# Patient Record
Sex: MALE | Race: WHITE
[De-identification: names, ages, dates, MRNs, and addresses within clinical notes are randomized per-mention and may not be internally consistent; named-entity substitution may affect disease eponyms.]

---

## 2017-08-17 ENCOUNTER — APPOINTMENT (OUTPATIENT)
Dept: INTERNAL MEDICINE | Facility: CLINIC | Age: 62
End: 2017-08-17

## 2017-08-22 ENCOUNTER — APPOINTMENT (OUTPATIENT)
Dept: INTERNAL MEDICINE | Facility: CLINIC | Age: 62
End: 2017-08-22

## 2017-12-26 ENCOUNTER — NON-APPOINTMENT (OUTPATIENT)
Age: 62
End: 2017-12-26

## 2017-12-26 ENCOUNTER — APPOINTMENT (OUTPATIENT)
Dept: INTERNAL MEDICINE | Facility: CLINIC | Age: 62
End: 2017-12-26
Payer: SELF-PAY

## 2017-12-26 VITALS
WEIGHT: 232.98 LBS | TEMPERATURE: 98.1 F | DIASTOLIC BLOOD PRESSURE: 66 MMHG | HEART RATE: 86 BPM | BODY MASS INDEX: 33.35 KG/M2 | OXYGEN SATURATION: 96 % | SYSTOLIC BLOOD PRESSURE: 130 MMHG | RESPIRATION RATE: 16 BRPM | HEIGHT: 70 IN

## 2017-12-26 DIAGNOSIS — G47.33 OBSTRUCTIVE SLEEP APNEA (ADULT) (PEDIATRIC): ICD-10-CM

## 2017-12-26 DIAGNOSIS — F41.9 ANXIETY DISORDER, UNSPECIFIED: ICD-10-CM

## 2017-12-26 DIAGNOSIS — I10 ESSENTIAL (PRIMARY) HYPERTENSION: ICD-10-CM

## 2017-12-26 DIAGNOSIS — R06.09 OTHER FORMS OF DYSPNEA: ICD-10-CM

## 2017-12-26 DIAGNOSIS — E78.5 HYPERLIPIDEMIA, UNSPECIFIED: ICD-10-CM

## 2017-12-26 PROCEDURE — 94060 EVALUATION OF WHEEZING: CPT

## 2017-12-26 PROCEDURE — 99214 OFFICE O/P EST MOD 30 MIN: CPT | Mod: 25

## 2017-12-26 RX ORDER — ESCITALOPRAM OXALATE 20 MG/1
20 TABLET, FILM COATED ORAL
Refills: 0 | Status: ACTIVE | COMMUNITY

## 2017-12-26 RX ORDER — IRBESARTAN 150 MG/1
150 TABLET ORAL
Refills: 0 | Status: DISCONTINUED | COMMUNITY
End: 2017-12-26

## 2017-12-26 RX ORDER — AMLODIPINE BESYLATE 10 MG/1
10 TABLET ORAL
Refills: 0 | Status: ACTIVE | COMMUNITY

## 2017-12-26 RX ORDER — CLONAZEPAM 1 MG/1
1 TABLET ORAL
Refills: 0 | Status: ACTIVE | COMMUNITY

## 2017-12-26 RX ORDER — QUINAPRIL HYDROCHLORIDE 20 MG/1
20 TABLET, FILM COATED ORAL
Refills: 0 | Status: ACTIVE | COMMUNITY

## 2017-12-26 RX ORDER — ATORVASTATIN CALCIUM 80 MG/1
TABLET, FILM COATED ORAL
Refills: 0 | Status: ACTIVE | COMMUNITY

## 2018-03-14 ENCOUNTER — INPATIENT (INPATIENT)
Facility: HOSPITAL | Age: 63
LOS: 1 days | Discharge: HOME | DRG: 440 | End: 2018-03-16
Attending: INTERNAL MEDICINE | Admitting: INTERNAL MEDICINE
Payer: COMMERCIAL

## 2018-03-14 VITALS
TEMPERATURE: 98 F | WEIGHT: 225.09 LBS | DIASTOLIC BLOOD PRESSURE: 78 MMHG | HEART RATE: 99 BPM | RESPIRATION RATE: 12 BRPM | OXYGEN SATURATION: 97 % | SYSTOLIC BLOOD PRESSURE: 142 MMHG

## 2018-03-14 DIAGNOSIS — Z98.89 OTHER SPECIFIED POSTPROCEDURAL STATES: Chronic | ICD-10-CM

## 2018-03-14 LAB
ALBUMIN SERPL ELPH-MCNC: 3.5 G/DL — SIGNIFICANT CHANGE UP (ref 3.3–5)
ALP SERPL-CCNC: 99 U/L — SIGNIFICANT CHANGE UP (ref 40–120)
ALT FLD-CCNC: 97 U/L — HIGH (ref 12–78)
AMYLASE P1 CFR SERPL: 63 U/L — SIGNIFICANT CHANGE UP (ref 25–115)
ANION GAP SERPL CALC-SCNC: 8 MMOL/L — SIGNIFICANT CHANGE UP (ref 5–17)
AST SERPL-CCNC: 101 U/L — HIGH (ref 15–37)
BASOPHILS # BLD AUTO: 0.1 K/UL — SIGNIFICANT CHANGE UP (ref 0–0.2)
BASOPHILS NFR BLD AUTO: 0.7 % — SIGNIFICANT CHANGE UP (ref 0–2)
BILIRUB SERPL-MCNC: 0.6 MG/DL — SIGNIFICANT CHANGE UP (ref 0.2–1.2)
BUN SERPL-MCNC: 13 MG/DL — SIGNIFICANT CHANGE UP (ref 7–23)
CALCIUM SERPL-MCNC: 8.4 MG/DL — LOW (ref 8.5–10.1)
CHLORIDE SERPL-SCNC: 100 MMOL/L — SIGNIFICANT CHANGE UP (ref 96–108)
CK SERPL-CCNC: 361 U/L — HIGH (ref 26–308)
CO2 SERPL-SCNC: 27 MMOL/L — SIGNIFICANT CHANGE UP (ref 22–31)
CREAT SERPL-MCNC: 0.87 MG/DL — SIGNIFICANT CHANGE UP (ref 0.5–1.3)
EOSINOPHIL # BLD AUTO: 0.1 K/UL — SIGNIFICANT CHANGE UP (ref 0–0.5)
EOSINOPHIL NFR BLD AUTO: 1 % — SIGNIFICANT CHANGE UP (ref 0–6)
GLUCOSE SERPL-MCNC: 126 MG/DL — HIGH (ref 70–99)
HCT VFR BLD CALC: 46 % — SIGNIFICANT CHANGE UP (ref 39–50)
HGB BLD-MCNC: 15.5 G/DL — SIGNIFICANT CHANGE UP (ref 13–17)
LIDOCAIN IGE QN: 1007 U/L — HIGH (ref 73–393)
LYMPHOCYTES # BLD AUTO: 1.1 K/UL — SIGNIFICANT CHANGE UP (ref 1–3.3)
LYMPHOCYTES # BLD AUTO: 9.4 % — LOW (ref 13–44)
MCHC RBC-ENTMCNC: 33.7 GM/DL — SIGNIFICANT CHANGE UP (ref 32–36)
MCHC RBC-ENTMCNC: 33.8 PG — SIGNIFICANT CHANGE UP (ref 27–34)
MCV RBC AUTO: 100.2 FL — HIGH (ref 80–100)
MONOCYTES # BLD AUTO: 1 K/UL — HIGH (ref 0–0.9)
MONOCYTES NFR BLD AUTO: 8.4 % — SIGNIFICANT CHANGE UP (ref 1–9)
NEUTROPHILS # BLD AUTO: 9.9 K/UL — HIGH (ref 1.8–7.4)
NEUTROPHILS NFR BLD AUTO: 80.6 % — HIGH (ref 43–77)
NT-PROBNP SERPL-SCNC: 22 PG/ML — SIGNIFICANT CHANGE UP (ref 0–125)
PLATELET # BLD AUTO: 206 K/UL — SIGNIFICANT CHANGE UP (ref 150–400)
POTASSIUM SERPL-MCNC: 4.7 MMOL/L — SIGNIFICANT CHANGE UP (ref 3.5–5.3)
POTASSIUM SERPL-SCNC: 4.7 MMOL/L — SIGNIFICANT CHANGE UP (ref 3.5–5.3)
PROT SERPL-MCNC: 7.8 G/DL — SIGNIFICANT CHANGE UP (ref 6–8.3)
RBC # BLD: 4.6 M/UL — SIGNIFICANT CHANGE UP (ref 4.2–5.8)
RBC # FLD: 12.6 % — SIGNIFICANT CHANGE UP (ref 10.3–14.5)
SODIUM SERPL-SCNC: 135 MMOL/L — SIGNIFICANT CHANGE UP (ref 135–145)
TROPONIN I SERPL-MCNC: <.015 NG/ML — SIGNIFICANT CHANGE UP (ref 0.01–0.04)
WBC # BLD: 12.2 K/UL — HIGH (ref 3.8–10.5)
WBC # FLD AUTO: 12.2 K/UL — HIGH (ref 3.8–10.5)

## 2018-03-14 RX ORDER — CYCLOBENZAPRINE HYDROCHLORIDE 10 MG/1
10 TABLET, FILM COATED ORAL ONCE
Qty: 0 | Refills: 0 | Status: COMPLETED | OUTPATIENT
Start: 2018-03-14 | End: 2018-03-14

## 2018-03-14 RX ORDER — FAMOTIDINE 10 MG/ML
20 INJECTION INTRAVENOUS ONCE
Qty: 0 | Refills: 0 | Status: COMPLETED | OUTPATIENT
Start: 2018-03-14 | End: 2018-03-14

## 2018-03-14 RX ORDER — ONDANSETRON 8 MG/1
4 TABLET, FILM COATED ORAL ONCE
Qty: 0 | Refills: 0 | Status: COMPLETED | OUTPATIENT
Start: 2018-03-14 | End: 2018-03-14

## 2018-03-14 RX ORDER — MORPHINE SULFATE 50 MG/1
4 CAPSULE, EXTENDED RELEASE ORAL ONCE
Qty: 0 | Refills: 0 | Status: DISCONTINUED | OUTPATIENT
Start: 2018-03-14 | End: 2018-03-14

## 2018-03-14 RX ORDER — SODIUM CHLORIDE 9 MG/ML
1000 INJECTION INTRAMUSCULAR; INTRAVENOUS; SUBCUTANEOUS
Qty: 0 | Refills: 0 | Status: COMPLETED | OUTPATIENT
Start: 2018-03-14 | End: 2018-03-14

## 2018-03-14 RX ADMIN — ONDANSETRON 4 MILLIGRAM(S): 8 TABLET, FILM COATED ORAL at 23:02

## 2018-03-14 RX ADMIN — CYCLOBENZAPRINE HYDROCHLORIDE 10 MILLIGRAM(S): 10 TABLET, FILM COATED ORAL at 23:01

## 2018-03-14 RX ADMIN — MORPHINE SULFATE 4 MILLIGRAM(S): 50 CAPSULE, EXTENDED RELEASE ORAL at 23:01

## 2018-03-14 RX ADMIN — FAMOTIDINE 20 MILLIGRAM(S): 10 INJECTION INTRAVENOUS at 23:01

## 2018-03-14 RX ADMIN — SODIUM CHLORIDE 1000 MILLILITER(S): 9 INJECTION INTRAMUSCULAR; INTRAVENOUS; SUBCUTANEOUS at 22:00

## 2018-03-14 NOTE — ED ADULT NURSE NOTE - OBJECTIVE STATEMENT
received pt stable alert oriented x4 no distress c/o back pain pt evaluated by MD and blood work drawned and sent to lab pt medicated with morphine 4mg ivp for pain scale 8 and cyclo po given with zofran and papcid as ordered ekg done awaiting ct scan as ordered

## 2018-03-14 NOTE — ED ADULT TRIAGE NOTE - CHIEF COMPLAINT QUOTE
patient with complain of back pain and abdominal discomfort patient with complain of back pain and abdominal discomfort, feels worst after visiting chiropractor today

## 2018-03-14 NOTE — ED PROVIDER NOTE - OBJECTIVE STATEMENT
63 male presents to ER c/o mid back pain radiating to the front, started this morning, felt it was musculoskeletal and went to the chiropracter, had heat therapy, tens and manipulation and feels no relief and came to the ER.

## 2018-03-14 NOTE — ED PROVIDER NOTE - PROGRESS NOTE DETAILS
patient states he feels more comfortable now, pain under control, aware lipase is elevated, having pancreatitis, patient admits to drinking vodka daily, awaiting cat scan and US, states he does not want to stay in the hospital and will f/u with his gastroenterologist Dw Pt re all findings, will stay for admission. Rajiv rosenberg, will see pt to admit

## 2018-03-15 DIAGNOSIS — K85.90 ACUTE PANCREATITIS WITHOUT NECROSIS OR INFECTION, UNSPECIFIED: ICD-10-CM

## 2018-03-15 DIAGNOSIS — R89.9 UNSPECIFIED ABNORMAL FINDING IN SPECIMENS FROM OTHER ORGANS, SYSTEMS AND TISSUES: ICD-10-CM

## 2018-03-15 DIAGNOSIS — Z29.9 ENCOUNTER FOR PROPHYLACTIC MEASURES, UNSPECIFIED: ICD-10-CM

## 2018-03-15 DIAGNOSIS — F32.9 MAJOR DEPRESSIVE DISORDER, SINGLE EPISODE, UNSPECIFIED: ICD-10-CM

## 2018-03-15 DIAGNOSIS — F10.10 ALCOHOL ABUSE, UNCOMPLICATED: ICD-10-CM

## 2018-03-15 DIAGNOSIS — E78.5 HYPERLIPIDEMIA, UNSPECIFIED: ICD-10-CM

## 2018-03-15 DIAGNOSIS — E29.1 TESTICULAR HYPOFUNCTION: ICD-10-CM

## 2018-03-15 DIAGNOSIS — D72.829 ELEVATED WHITE BLOOD CELL COUNT, UNSPECIFIED: ICD-10-CM

## 2018-03-15 DIAGNOSIS — I10 ESSENTIAL (PRIMARY) HYPERTENSION: ICD-10-CM

## 2018-03-15 DIAGNOSIS — Z98.890 OTHER SPECIFIED POSTPROCEDURAL STATES: Chronic | ICD-10-CM

## 2018-03-15 DIAGNOSIS — G47.33 OBSTRUCTIVE SLEEP APNEA (ADULT) (PEDIATRIC): ICD-10-CM

## 2018-03-15 LAB
ALBUMIN SERPL ELPH-MCNC: 3.4 G/DL — SIGNIFICANT CHANGE UP (ref 3.3–5)
ALP SERPL-CCNC: 92 U/L — SIGNIFICANT CHANGE UP (ref 40–120)
ALT FLD-CCNC: 83 U/L — HIGH (ref 12–78)
AMPHET UR-MCNC: NEGATIVE — SIGNIFICANT CHANGE UP
AMYLASE P1 CFR SERPL: 54 U/L — SIGNIFICANT CHANGE UP (ref 25–115)
ANION GAP SERPL CALC-SCNC: 7 MMOL/L — SIGNIFICANT CHANGE UP (ref 5–17)
APPEARANCE UR: CLEAR — SIGNIFICANT CHANGE UP
AST SERPL-CCNC: 63 U/L — HIGH (ref 15–37)
BARBITURATES UR SCN-MCNC: NEGATIVE — SIGNIFICANT CHANGE UP
BASOPHILS # BLD AUTO: 0.1 K/UL — SIGNIFICANT CHANGE UP (ref 0–0.2)
BASOPHILS NFR BLD AUTO: 0.8 % — SIGNIFICANT CHANGE UP (ref 0–2)
BENZODIAZ UR-MCNC: NEGATIVE — SIGNIFICANT CHANGE UP
BILIRUB SERPL-MCNC: 0.8 MG/DL — SIGNIFICANT CHANGE UP (ref 0.2–1.2)
BILIRUB UR-MCNC: NEGATIVE — SIGNIFICANT CHANGE UP
BUN SERPL-MCNC: 7 MG/DL — SIGNIFICANT CHANGE UP (ref 7–23)
CALCIUM SERPL-MCNC: 8.4 MG/DL — LOW (ref 8.5–10.1)
CHLORIDE SERPL-SCNC: 105 MMOL/L — SIGNIFICANT CHANGE UP (ref 96–108)
CHOLEST SERPL-MCNC: 180 MG/DL — SIGNIFICANT CHANGE UP (ref 10–199)
CO2 SERPL-SCNC: 27 MMOL/L — SIGNIFICANT CHANGE UP (ref 22–31)
COCAINE METAB.OTHER UR-MCNC: NEGATIVE — SIGNIFICANT CHANGE UP
COLOR SPEC: YELLOW — SIGNIFICANT CHANGE UP
CREAT SERPL-MCNC: 0.85 MG/DL — SIGNIFICANT CHANGE UP (ref 0.5–1.3)
DIFF PNL FLD: NEGATIVE — SIGNIFICANT CHANGE UP
EOSINOPHIL # BLD AUTO: 0.4 K/UL — SIGNIFICANT CHANGE UP (ref 0–0.5)
EOSINOPHIL NFR BLD AUTO: 3.3 % — SIGNIFICANT CHANGE UP (ref 0–6)
ETHANOL SERPL-MCNC: <10 MG/DL — SIGNIFICANT CHANGE UP (ref 0–10)
GLUCOSE SERPL-MCNC: 123 MG/DL — HIGH (ref 70–99)
GLUCOSE UR QL: 50 MG/DL
HCT VFR BLD CALC: 47.9 % — SIGNIFICANT CHANGE UP (ref 39–50)
HDLC SERPL-MCNC: 52 MG/DL — SIGNIFICANT CHANGE UP (ref 40–125)
HGB BLD-MCNC: 15.5 G/DL — SIGNIFICANT CHANGE UP (ref 13–17)
KETONES UR-MCNC: ABNORMAL
LEUKOCYTE ESTERASE UR-ACNC: NEGATIVE — SIGNIFICANT CHANGE UP
LIDOCAIN IGE QN: 708 U/L — HIGH (ref 73–393)
LIPID PNL WITH DIRECT LDL SERPL: 57 MG/DL — SIGNIFICANT CHANGE UP
LYMPHOCYTES # BLD AUTO: 1.7 K/UL — SIGNIFICANT CHANGE UP (ref 1–3.3)
LYMPHOCYTES # BLD AUTO: 15.3 % — SIGNIFICANT CHANGE UP (ref 13–44)
MAGNESIUM SERPL-MCNC: 1.8 MG/DL — SIGNIFICANT CHANGE UP (ref 1.6–2.6)
MCHC RBC-ENTMCNC: 32.4 GM/DL — SIGNIFICANT CHANGE UP (ref 32–36)
MCHC RBC-ENTMCNC: 33.5 PG — SIGNIFICANT CHANGE UP (ref 27–34)
MCV RBC AUTO: 103.4 FL — HIGH (ref 80–100)
METHADONE UR-MCNC: NEGATIVE — SIGNIFICANT CHANGE UP
MONOCYTES # BLD AUTO: 1 K/UL — HIGH (ref 0–0.9)
MONOCYTES NFR BLD AUTO: 8.9 % — SIGNIFICANT CHANGE UP (ref 1–9)
NEUTROPHILS # BLD AUTO: 7.8 K/UL — HIGH (ref 1.8–7.4)
NEUTROPHILS NFR BLD AUTO: 71.8 % — SIGNIFICANT CHANGE UP (ref 43–77)
NITRITE UR-MCNC: NEGATIVE — SIGNIFICANT CHANGE UP
OPIATES UR-MCNC: POSITIVE — SIGNIFICANT CHANGE UP
PCP SPEC-MCNC: SIGNIFICANT CHANGE UP
PCP UR-MCNC: NEGATIVE — SIGNIFICANT CHANGE UP
PH UR: 9 — HIGH (ref 5–8)
PHOSPHATE SERPL-MCNC: 3 MG/DL — SIGNIFICANT CHANGE UP (ref 2.5–4.5)
PLATELET # BLD AUTO: 203 K/UL — SIGNIFICANT CHANGE UP (ref 150–400)
POTASSIUM SERPL-MCNC: 3.9 MMOL/L — SIGNIFICANT CHANGE UP (ref 3.5–5.3)
POTASSIUM SERPL-SCNC: 3.9 MMOL/L — SIGNIFICANT CHANGE UP (ref 3.5–5.3)
PROT SERPL-MCNC: 7.6 G/DL — SIGNIFICANT CHANGE UP (ref 6–8.3)
PROT UR-MCNC: NEGATIVE — SIGNIFICANT CHANGE UP
RBC # BLD: 4.63 M/UL — SIGNIFICANT CHANGE UP (ref 4.2–5.8)
RBC # FLD: 13.2 % — SIGNIFICANT CHANGE UP (ref 10.3–14.5)
SODIUM SERPL-SCNC: 139 MMOL/L — SIGNIFICANT CHANGE UP (ref 135–145)
SP GR SPEC: 1.01 — SIGNIFICANT CHANGE UP (ref 1.01–1.02)
THC UR QL: NEGATIVE — SIGNIFICANT CHANGE UP
TOTAL CHOLESTEROL/HDL RATIO MEASUREMENT: 3.5 RATIO — SIGNIFICANT CHANGE UP (ref 3.4–9.6)
TRIGL SERPL-MCNC: 355 MG/DL — HIGH (ref 10–149)
UROBILINOGEN FLD QL: NEGATIVE — SIGNIFICANT CHANGE UP
WBC # BLD: 10.8 K/UL — HIGH (ref 3.8–10.5)
WBC # FLD AUTO: 10.8 K/UL — HIGH (ref 3.8–10.5)

## 2018-03-15 PROCEDURE — 90792 PSYCH DIAG EVAL W/MED SRVCS: CPT

## 2018-03-15 PROCEDURE — 76705 ECHO EXAM OF ABDOMEN: CPT | Mod: 26

## 2018-03-15 PROCEDURE — 99233 SBSQ HOSP IP/OBS HIGH 50: CPT

## 2018-03-15 PROCEDURE — 99285 EMERGENCY DEPT VISIT HI MDM: CPT

## 2018-03-15 PROCEDURE — 71275 CT ANGIOGRAPHY CHEST: CPT | Mod: 26

## 2018-03-15 PROCEDURE — 74183 MRI ABD W/O CNTR FLWD CNTR: CPT | Mod: 26

## 2018-03-15 PROCEDURE — 74174 CTA ABD&PLVS W/CONTRAST: CPT | Mod: 26

## 2018-03-15 RX ORDER — ATORVASTATIN CALCIUM 80 MG/1
20 TABLET, FILM COATED ORAL AT BEDTIME
Qty: 0 | Refills: 0 | Status: DISCONTINUED | OUTPATIENT
Start: 2018-03-15 | End: 2018-03-16

## 2018-03-15 RX ORDER — PANTOPRAZOLE SODIUM 20 MG/1
40 TABLET, DELAYED RELEASE ORAL DAILY
Qty: 0 | Refills: 0 | Status: DISCONTINUED | OUTPATIENT
Start: 2018-03-15 | End: 2018-03-16

## 2018-03-15 RX ORDER — LISINOPRIL 2.5 MG/1
40 TABLET ORAL
Qty: 0 | Refills: 0 | Status: DISCONTINUED | OUTPATIENT
Start: 2018-03-15 | End: 2018-03-16

## 2018-03-15 RX ORDER — CLONAZEPAM 1 MG
1 TABLET ORAL
Qty: 0 | Refills: 0 | Status: DISCONTINUED | OUTPATIENT
Start: 2018-03-15 | End: 2018-03-16

## 2018-03-15 RX ORDER — THIAMINE MONONITRATE (VIT B1) 100 MG
100 TABLET ORAL DAILY
Qty: 0 | Refills: 0 | Status: DISCONTINUED | OUTPATIENT
Start: 2018-03-15 | End: 2018-03-16

## 2018-03-15 RX ORDER — MORPHINE SULFATE 50 MG/1
1 CAPSULE, EXTENDED RELEASE ORAL EVERY 6 HOURS
Qty: 0 | Refills: 0 | Status: DISCONTINUED | OUTPATIENT
Start: 2018-03-15 | End: 2018-03-16

## 2018-03-15 RX ORDER — MORPHINE SULFATE 50 MG/1
2 CAPSULE, EXTENDED RELEASE ORAL EVERY 6 HOURS
Qty: 0 | Refills: 0 | Status: DISCONTINUED | OUTPATIENT
Start: 2018-03-15 | End: 2018-03-16

## 2018-03-15 RX ORDER — CLONAZEPAM 1 MG
1 TABLET ORAL THREE TIMES A DAY
Qty: 0 | Refills: 0 | Status: DISCONTINUED | OUTPATIENT
Start: 2018-03-15 | End: 2018-03-15

## 2018-03-15 RX ORDER — QUETIAPINE FUMARATE 200 MG/1
100 TABLET, FILM COATED ORAL AT BEDTIME
Qty: 0 | Refills: 0 | Status: DISCONTINUED | OUTPATIENT
Start: 2018-03-15 | End: 2018-03-16

## 2018-03-15 RX ORDER — FOLIC ACID 0.8 MG
1 TABLET ORAL DAILY
Qty: 0 | Refills: 0 | Status: DISCONTINUED | OUTPATIENT
Start: 2018-03-15 | End: 2018-03-16

## 2018-03-15 RX ORDER — SODIUM CHLORIDE 9 MG/ML
1000 INJECTION INTRAMUSCULAR; INTRAVENOUS; SUBCUTANEOUS
Qty: 0 | Refills: 0 | Status: DISCONTINUED | OUTPATIENT
Start: 2018-03-15 | End: 2018-03-16

## 2018-03-15 RX ORDER — MORPHINE SULFATE 50 MG/1
4 CAPSULE, EXTENDED RELEASE ORAL EVERY 6 HOURS
Qty: 0 | Refills: 0 | Status: DISCONTINUED | OUTPATIENT
Start: 2018-03-15 | End: 2018-03-16

## 2018-03-15 RX ORDER — ENOXAPARIN SODIUM 100 MG/ML
40 INJECTION SUBCUTANEOUS DAILY
Qty: 0 | Refills: 0 | Status: DISCONTINUED | OUTPATIENT
Start: 2018-03-15 | End: 2018-03-16

## 2018-03-15 RX ORDER — ESCITALOPRAM OXALATE 10 MG/1
20 TABLET, FILM COATED ORAL AT BEDTIME
Qty: 0 | Refills: 0 | Status: DISCONTINUED | OUTPATIENT
Start: 2018-03-15 | End: 2018-03-16

## 2018-03-15 RX ORDER — AMLODIPINE BESYLATE 2.5 MG/1
10 TABLET ORAL DAILY
Qty: 0 | Refills: 0 | Status: DISCONTINUED | OUTPATIENT
Start: 2018-03-15 | End: 2018-03-16

## 2018-03-15 RX ADMIN — PANTOPRAZOLE SODIUM 40 MILLIGRAM(S): 20 TABLET, DELAYED RELEASE ORAL at 11:44

## 2018-03-15 RX ADMIN — Medication 100 MILLIGRAM(S): at 11:46

## 2018-03-15 RX ADMIN — LISINOPRIL 40 MILLIGRAM(S): 2.5 TABLET ORAL at 17:21

## 2018-03-15 RX ADMIN — Medication 1 TABLET(S): at 11:45

## 2018-03-15 RX ADMIN — ATORVASTATIN CALCIUM 20 MILLIGRAM(S): 80 TABLET, FILM COATED ORAL at 21:35

## 2018-03-15 RX ADMIN — QUETIAPINE FUMARATE 100 MILLIGRAM(S): 200 TABLET, FILM COATED ORAL at 21:35

## 2018-03-15 RX ADMIN — ENOXAPARIN SODIUM 40 MILLIGRAM(S): 100 INJECTION SUBCUTANEOUS at 11:45

## 2018-03-15 RX ADMIN — SODIUM CHLORIDE 125 MILLILITER(S): 9 INJECTION INTRAMUSCULAR; INTRAVENOUS; SUBCUTANEOUS at 06:10

## 2018-03-15 RX ADMIN — ESCITALOPRAM OXALATE 20 MILLIGRAM(S): 10 TABLET, FILM COATED ORAL at 21:35

## 2018-03-15 RX ADMIN — Medication 1 MILLIGRAM(S): at 11:46

## 2018-03-15 RX ADMIN — SODIUM CHLORIDE 1000 MILLILITER(S): 9 INJECTION INTRAMUSCULAR; INTRAVENOUS; SUBCUTANEOUS at 01:24

## 2018-03-15 RX ADMIN — MORPHINE SULFATE 2 MILLIGRAM(S): 50 CAPSULE, EXTENDED RELEASE ORAL at 08:46

## 2018-03-15 RX ADMIN — LISINOPRIL 40 MILLIGRAM(S): 2.5 TABLET ORAL at 06:10

## 2018-03-15 RX ADMIN — MORPHINE SULFATE 2 MILLIGRAM(S): 50 CAPSULE, EXTENDED RELEASE ORAL at 09:15

## 2018-03-15 RX ADMIN — MORPHINE SULFATE 4 MILLIGRAM(S): 50 CAPSULE, EXTENDED RELEASE ORAL at 01:24

## 2018-03-15 RX ADMIN — Medication 1 MILLIGRAM(S): at 17:21

## 2018-03-15 RX ADMIN — AMLODIPINE BESYLATE 10 MILLIGRAM(S): 2.5 TABLET ORAL at 06:11

## 2018-03-15 NOTE — PROGRESS NOTE ADULT - PROBLEM SELECTOR PLAN 2
States that he consumes 2-3 glasses of vodka daily  [facility] protocol  Monitor for withdrawal symptoms States that he consumes 2-3 glasses of vodka daily  [facility] protocol  Monitor for withdrawal symptoms  f/u EtOH, [facility] labs States that he consumes 2-4 glasses of vodka daily  [facility] protocol, D/W Dr Coats. Restart, Klonopin 2x day  Monitor for withdrawal symptoms  f/u EtOH, [facility] labs

## 2018-03-15 NOTE — H&P ADULT - PROBLEM SELECTOR PLAN 8
IMPROVE VTE Individual Risk Assessment          RISK                                                          Points    [  ] Previous VTE                                                3  [  ] Thrombophilia                                             2  [  ] Lower limb paralysis                                   2        (unable to hold up >15 seconds)    [  ] Current Cancer                                            2         (within 6 months)  [  ] Immobilization > 24 hrs                              1  [  ] ICU/CCU stay > 24 hours                            1  [ x ] Age > 60                                                    1    IMPROVE VTE Score ____1_____    DVT ppx: Lovenox 40m g subq Continue home dose atorvastatin Patient states that he received testosterone injections every 3 weeks  States he takes anasterozole 1 mg, two times a week. Accurate scheduling of medication to be addressed in am. Held anasterozole for now. Patient states that he received testosterone injections every 3 weeks  States he takes Anastrozole 1 mg, two times a week. Accurate scheduling of medication to be addressed in am. Held Anastrozole for now.

## 2018-03-15 NOTE — CONSULT NOTE ADULT - PROBLEM SELECTOR RECOMMENDATION 9
likely secondary to etoh abuse   NPO, IVF  CTA a/p reviewed with abnormality of pancreatic head  MRI abdomen with contrast ordered to further evaluate-- r/o portal vein thrombosis/CBD stone/pancreatic head necrosis  further recommendations to follow   Check IGG-4, Triglycerides, trend amylase and lipase and cbc  pain control

## 2018-03-15 NOTE — H&P ADULT - NSHPSOCIALHISTORY_GEN_ALL_CORE
Lives at home with wife and daughter, in apartment  Restaurant owner   ETOH use: drinks 3-4 glasses of vodka daily  Smoking use: denies  Influenza vaccine: Oct 2017  Received pneumovax. Unsure of date  Colonoscopy: Had two. Last one performed in 2012. Found 1 polyp.

## 2018-03-15 NOTE — H&P ADULT - PROBLEM SELECTOR PLAN 5
Chronic, stable  Continue home dose amlodipine  Continue lisinopril (interchange of homedose quinipril) Chronic, stable  Continue home dose klonipin, quetipine, seroquel Chronic, stable  Continue home dose Klonopin, Quetiapine, Seroquel

## 2018-03-15 NOTE — H&P ADULT - HISTORY OF PRESENT ILLNESS
62 yo M pmh htn, hld, depression presents to the ED with abdominal and back pain. States the pain started suddenly today and was radiating from mid back to the front. Patient thought he has muscle pain and went to chiropractor had heat therapy and offered no relief. States that the pain has significantly decreased since he has been in the ED. Denies fevers, chills, nausea, vomiting, CP, SOB, palpitations. 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 Also states that he had been seeing psychiatrist and is taking antidepressant medications due to recent death of his son. Also states that his daughter recently finished drug rehab.     In the ED, VS 97.7, HR 99, /78, RR 12, SpO2 97 on rm air. WBC 12.2, Hg 15.5, Hct 46.0, platelet 206, Na 135, K 4.7, Cl 100, Co2 27, BUN 13, Cr 0.87, glucose 126, , ALT 97, Lipase 1007, Creatine kinase 361, troponin <0.015, ProBNP 22. UA small ketones, pH 9.0    Imaging:   CT angio chest, abdomen and pelvis with IV contast: No evidence of aortic dissection. Inflammatory change along the head of the pancreas most likely   pancreatitis. Small area of low density along the posterior aspect of the pancreatic head may be related to edema although early necrosis or an underlying lesion are not excluded. Follow-up CT or MRI recommended if there are no contraindications.  Low density within the portal vein/SMV in the region of the pancreatic head is presumably related to mixing of opacified and unopacified blood. This can also be further evaluated on follow-up imaging.  Gallbladder US: no gallstones    Received flexeril 10 mg oral x 1, 1 L NaCl 0.9% bolus, famotidine 20 mg IV x 1, morphone 4 mg IV x1, zofran 4 mg IV x 1. 64 yo M pmh htn, hld, depression presents to the ED with abdominal and back pain. States the pain started suddenly today and was radiating from mid back to the front. Patient thought he has muscle pain and went to chiropractor had heat therapy and offered no relief. States that the pain has significantly decreased since he has been in the ED. Denies fevers, chills, nausea, vomiting, CP, SOB, palpitations. 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 Also states that he had been seeing psychiatrist and is taking antidepressant medications due to recent death of his son. Also states that his daughter recently finished drug rehab.     In the ED, VS 97.7, HR 99, /78, RR 12, SpO2 97 on rm air. WBC 12.2, Hg 15.5, Hct 46.0, platelet 206, Na 135, K 4.7, Cl 100, Co2 27, BUN 13, Cr 0.87, glucose 126, , ALT 97, Lipase 1007, Creatine kinase 361, troponin <0.015, ProBNP 22. UA small ketones, pH 9.0    Imaging:   CT angio chest, abdomen and pelvis with IV contast: No evidence of aortic dissection. Inflammatory change along the head of the pancreas most likely   pancreatitis. Small area of low density along the posterior aspect of the pancreatic head may be related to edema although early necrosis or an underlying lesion are not excluded. Follow-up CT or MRI recommended if there are no contraindications.  Low density within the portal vein/SMV in the region of the pancreatic head is presumably related to mixing of opacified and unopacified blood. This can also be further evaluated on follow-up imaging.  Gallbladder US: no gallstones  EKG: NSR at 88 bpm    Received flexeril 10 mg oral x 1, 1 L NaCl 0.9% bolus, famotidine 20 mg IV x 1, morphone 4 mg IV x1, zofran 4 mg IV x 1. 64 yo M pmh htn, hld, ROSS, depression presents to the ED with abdominal and back pain. States the pain started suddenly today and was radiating from mid back to the front. Patient thought he has muscle pain and went to chiropractor had heat therapy and offered no relief. States that the pain has significantly decreased since he has been in the ED. Denies fevers, chills, nausea, vomiting, CP, SOB, palpitations. 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 Also states that he had been seeing psychiatrist and is taking antidepressant medications due to recent death of his son. Also states that his daughter recently finished drug rehab.     In the ED, VS 97.7, HR 99, /78, RR 12, SpO2 97 on rm air. WBC 12.2, Hg 15.5, Hct 46.0, platelet 206, Na 135, K 4.7, Cl 100, Co2 27, BUN 13, Cr 0.87, glucose 126, , ALT 97, Lipase 1007, Creatine kinase 361, troponin <0.015, ProBNP 22. UA small ketones, pH 9.0    Imaging:   CT angio chest, abdomen and pelvis with IV contast: No evidence of aortic dissection. Inflammatory change along the head of the pancreas most likely   pancreatitis. Small area of low density along the posterior aspect of the pancreatic head may be related to edema although early necrosis or an underlying lesion are not excluded. Follow-up CT or MRI recommended if there are no contraindications.  Low density within the portal vein/SMV in the region of the pancreatic head is presumably related to mixing of opacified and unopacified blood. This can also be further evaluated on follow-up imaging.  Gallbladder US: no gallstones  EKG: NSR at 88 bpm    Received flexeril 10 mg oral x 1, 1 L NaCl 0.9% bolus, famotidine 20 mg IV x 1, morphone 4 mg IV x1, zofran 4 mg IV x 1. 62 yo M pmh htn, hld, ROSS, depression presents to the ED with abdominal and back pain. States the pain started suddenly today and was radiating from mid back to the front. Patient thought he has muscle pain and went to chiropractor had heat therapy and offered no relief. States that the pain has significantly decreased since he has been in the ED. Denies fevers, chills, nausea, vomiting, CP, SOB, palpitations. 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 Also states that he had been seeing psychiatrist and is taking antidepressant medications due to recent death of his son. ~ 18 months ago. Also states that his daughter recently finished drug rehab & came home yesterday.Pt drinks ~4-5 drinks Vodka daily     In the ED, VS 97.7, HR 99, /78, RR 12, SpO2 97 on rm air. WBC 12.2, Hg 15.5, Hct 46.0, platelet 206, Na 135, K 4.7, Cl 100, Co2 27, BUN 13, Cr 0.87, glucose 126, , ALT 97, Lipase 1007, Creatine kinase 361, troponin <0.015, ProBNP 22. UA small ketones, pH 9.0    Imaging:   CT angio chest, abdomen and pelvis with IV contast: No evidence of aortic dissection. Inflammatory change along the head of the pancreas most likely   pancreatitis. Small area of low density along the posterior aspect of the pancreatic head may be related to edema although early necrosis or an underlying lesion are not excluded. Follow-up CT or MRI recommended if there are no contraindications.  Low density within the portal vein/SMV in the region of the pancreatic head is presumably related to mixing of opacified and unopacified blood. This can also be further evaluated on follow-up imaging.  Gallbladder US: no gallstones  EKG: NSR at 88 bpm    Received flexeril 10 mg oral x 1, 1 L NaCl 0.9% bolus, famotidine 20 mg IV x 1, morphone 4 mg IV x1, zofran 4 mg IV x 1.

## 2018-03-15 NOTE — PROGRESS NOTE ADULT - PROBLEM SELECTOR PLAN 1
Likely 2/2 to ETOH abuse, stress induced.  Continue IVF at 125 cc/hr  Pain control  GI Dr. Faulkner consulted   Protonix 40 mg IV  F/u repeat amylase, lipase  F/u hepatic function panel  F/u alcohol level Likely 2/2 to ETOH abuse, stress induced.  Continue IVF at 125 cc/hr  Pain control with morphine  GI Dr. Faulkner on [place], as per Renée (GI PA) Pt will go for MRI abd with contrast  NPO for study  c/w Protonix 40 mg IV  F/u repeat amylase, lipase  F/u hepatic function panel Likely 2/2 to ETOH abuse, CT a/p Ac pancreatitis Edema head of pancreas  Continue IVF at 125 cc/hr  Pain control with morphine  GI Dr. Faulkner on [place], as per Renée (GI PA) Pt will go for MRI abd with contrast  NPO for study  c/w Protonix 40 mg IV  F/u repeat amylase, lipase  F/u hepatic function panel

## 2018-03-15 NOTE — H&P ADULT - PROBLEM SELECTOR PLAN 2
States that he consumes 2-3 glasses of vodka daily  [facility] protocol  Monitor for withdrawal symptoms States that he consumes 4-5 glasses of vodka daily  [facility] protocol  Monitor for withdrawal symptoms  DR Coats Detox consult

## 2018-03-15 NOTE — H&P ADULT - NEUROLOGICAL DETAILS
alert and oriented x 3/responds to pain/sensation intact/cranial nerves intact/normal strength/no spontaneous movement/responds to verbal commands

## 2018-03-15 NOTE — ED ADULT NURSE REASSESSMENT NOTE - NS ED NURSE REASSESS COMMENT FT1
pt resting confortably report given to yumiko MATTHEWS pt medicated as ordered and iv fluid infusing well and transfer to floor in no distress
ultrasound and ct scan done ua and cxs sent to lab
pt stable reavaluated  and admitted to floor awaiting admitting MD  at present pt verbalizes good relief from pain at present
pt reavaluated and vitaligns stable and verbalizrs good relief from pain

## 2018-03-15 NOTE — PROGRESS NOTE ADULT - SUBJECTIVE AND OBJECTIVE BOX
Patient is a 63y old  Male who presents with a chief complaint of abdominal pain (15 Mar 2018 03:57)      INTERVAL HPI:    62 yo M pmh htn, hld, ROSS, depression presents to the ED with abdominal and back pain. Pt states the pain started suddenly today and was radiating from mid back to the front. Patient thought he has muscle pain and went to chiropractor had heat therapy and offered no relief. Pt states that the pain has significantly decreased since he has been in the ED. Denies fevers, chills, nausea, vomiting, CP, SOB, palpitations. 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 Also states that he had been seeing psychiatrist and is taking antidepressant medications due to recent death of his son. Also states that his daughter recently finished drug rehab. In the ED, VS 97.7, HR 99, /78, RR 12, SpO2 97 on rm air. WBC 12.2, Hg 15.5, Hct 46.0, platelet 206, Na 135, K 4.7, Cl 100, Co2 27, BUN 13, Cr 0.87, glucose 126, , ALT 97, Lipase 1007, Creatine kinase 361, troponin <0.015, ProBNP 22. UA small ketones, pH 9.0 CT angio chest, abdomen and pelvis with IV contast: No evidence of aortic dissection. Inflammatory change along the head of the pancreas most likely pancreatitis. Small area of low density along the posterior aspect of the pancreatic head may be related to edema although early necrosis or an underlying lesion are not excluded. Follow-up CT or MRI recommended if there are no contraindications. Low density within the portal vein/SMV in the region of the pancreatic head is presumably related to mixing of opacified and unopacified blood. This can also be further evaluated on follow-up imaging. Gallbladder US shows no gallstones. EKG: NSR at 88 bpm. In the ED, Pt received flexeril 10 mg oral x 1, 1 L NaCl 0.9% bolus, famotidine 20 mg IV x 1, morphone 4 mg IV x1, zofran 4 mg IV x 1.    No events overnight. Patient was seen and examined at bedside this AM. Patient described mild back pain and abd pain, but otherwise fine.     MEDICATIONS  (STANDING):  amLODIPine   Tablet 10 milliGRAM(s) Oral daily  atorvastatin 20 milliGRAM(s) Oral at bedtime  clonazePAM Tablet 1 milliGRAM(s) Oral two times a day  enoxaparin Injectable 40 milliGRAM(s) SubCutaneous daily  escitalopram 20 milliGRAM(s) Oral at bedtime  folic acid 1 milliGRAM(s) Oral daily  lisinopril 40 milliGRAM(s) Oral two times a day  multivitamin 1 Tablet(s) Oral daily  pantoprazole  Injectable 40 milliGRAM(s) IV Push daily  QUEtiapine 100 milliGRAM(s) Oral at bedtime  sodium chloride 0.9%. 1000 milliLiter(s) (125 mL/Hr) IV Continuous <Continuous>  thiamine 100 milliGRAM(s) Oral daily    MEDICATIONS  (PRN):  LORazepam   Injectable 2 milliGRAM(s) IV Push every 2 hours PRN CIWA-Ar score increase by 2 points and a total score of 7 or less  morphine  - Injectable 2 milliGRAM(s) IV Push every 6 hours PRN Moderate Pain (4 - 6)  morphine  - Injectable 4 milliGRAM(s) IV Push every 6 hours PRN Severe Pain (7 - 10)  morphine  - Injectable 1 milliGRAM(s) IV Push every 6 hours PRN Mild Pain (1 - 3)      Allergies    No Known Allergies    Intolerances        REVIEW OF SYSTEMS:  CONSTITUTIONAL: No fever, No chills, No fatigue, No myalgia, No Body ache  EYES: No eye pain, visual disturbances, or discharge  ENMT:  No ear pain, No nose bleed, No vertigo; No sinus or throat pain, No Congestion  NECK: No pain, No stiffness  RESPIRATORY: No cough, wheezing, No  hemoptysis, No shortness of breath  CARDIOVASCULAR: No chest pain, palpitations  GASTROINTESTINAL: + epigastric pain. No nausea, No vomiting; No diarrhea or constipation. [  ] BM  GENITOURINARY: No dysuria, No frequency, No urgency, No hematuria, or incontinence  NEUROLOGICAL: No headaches, No dizziness, No numbness, No tingling, No tremors, No weakness  EXT: No Swelling, No Pain, No Edema  SKIN:  [ s ] No itching, burning, rashes, or lesions   MUSCULOSKELETAL: No joint pain or swelling; No muscle pain, No back pain, No extremity pain  PSYCHIATRIC: No depression, anxiety, mood swings or difficulty sleeping at night  PAIN SCALE: [  ] None  [  x] Other- 3/10  ROS Unable to obtain due to - [  ] Dementia  [  ] Lethargy  [  ] Sedated   REST OF REVIEW Of SYSTEM - [  ] Normal     Vital Signs Last 24 Hrs  T(C): 37 (15 Mar 2018 13:10), Max: 37 (15 Mar 2018 13:10)  T(F): 98.6 (15 Mar 2018 13:10), Max: 98.6 (15 Mar 2018 13:10)  HR: 101 (15 Mar 2018 13:10) (84 - 101)  BP: 152/79 (15 Mar 2018 13:10) (142/78 - 152/79)  BP(mean): --  RR: 16 (15 Mar 2018 13:10) (12 - 16)  SpO2: 92% (15 Mar 2018 13:10) (92% - 97%)  Finger Stick          PHYSICAL EXAM:  GENERAL:  [  ] NAD , [  ] well appearing, [  ] Agitated, [  ] Lethargy, [  ] confused   HEAD:  [  ] Normal, [  ] Other  EYES:  [  ] EOMI, [  ] PERRLA, [  ] conjunctiva and sclera clear normal, [  ] Other,  [  ] Pallor,[  ] Discharge  ENMT:  [  ] Normal, [  ] Moist mucous membranes, [  ] Good dentition, [  ] No Thrush  NECK:  [  ] Supple, [  ] No JVD, [  ] Normal thyroid, [  ] Lymphadenopathy [  ] Other  NERVOUS SYSTEM:  [  ] Alert & Oriented X3, [  ] Nonfocal   [  ] Confusion  [  ] Encephalopathic [  ] Sedated [  ] Other-  CHEST/LUNG:  [  ] Clear to auscultation bilaterally, [  ] No rales, [  ] No rhonchi  [  ]  No wheezing  HEART:  [  ] Regular rate and rhythm  [  ] irregular  [  ] No murmurs, rubs, or gallops, [  ] PPM in place (Mfr:  )  ABDOMEN:  [  ] Soft, [  ] Nontender, [  ] Nondistended, [  ]No mass, [  ] Bowel sounds present, [  ] obese  EXTREMITIES: [  ] 2+ Peripheral Pulses, No clubbing, cyanosis,  [  ] edema, [  ] PVD stasis skin changes  LYMPH: No lymphadenopathy noted  SKIN:  [  ] No rashes or lesions, [  ] Pressure Ulcers, [  ] echymosis, [  ] Other    DIET:     LABS:                        15.5   12.2  )-----------( 206      ( 14 Mar 2018 23:12 )             46.0     15 Mar 2018 12:53    139    |  105    |  7      ----------------------------<  123    3.9     |  27     |  0.85     Ca    8.4        15 Mar 2018 12:53  Phos  3.0       15 Mar 2018 09:09  Mg     1.8       15 Mar 2018 09:09    TPro  7.6    /  Alb  3.4    /  TBili  0.8    /  DBili  x      /  AST  63     /  ALT  83     /  AlkPhos  92     15 Mar 2018 12:53      Urinalysis Basic - ( 15 Mar 2018 01:25 )    Color: Yellow / Appearance: Clear / S.010 / pH: x  Gluc: x / Ketone: Small  / Bili: Negative / Urobili: Negative   Blood: x / Protein: Negative / Nitrite: Negative   Leuk Esterase: Negative / RBC: x / WBC x   Sq Epi: x / Non Sq Epi: x / Bacteria: x                    RADIOLOGY & ADDITIONAL TESTS:      HEALTH ISSUES - PROBLEM Dx:  Obstructive sleep apnea on CPAP: Obstructive sleep apnea on CPAP  Abnormal laboratory test: Abnormal laboratory test  Need for prophylactic measure: Need for prophylactic measure  HLD (hyperlipidemia): HLD (hyperlipidemia)  Low testosterone in male: Low testosterone in male  HTN (hypertension): HTN (hypertension)  Depression: Depression  Leukocytosis: Leukocytosis  ETOH abuse: ETOH abuse  Acute pancreatitis, unspecified complication status, unspecified pancreatitis type: Acute pancreatitis, unspecified complication status, unspecified pancreatitis type          Consultant(s) Notes Reviewed:  [  ] YES     Care Discussed with [X] Consultants  [  ] Patient  [  ] Family  [  ]   [  ] Social Service  [  ] RN, [  ] Physical Therapy  DVT PPX: [  ] Lovenox, [  ] S C Heparin, [  ] Coumadin, [  ] Xarelto, [  ] Eliquis, [  ] SCD   Advanced directive: [  ] None, [  ] DNR/DNI Patient is a 63y old  Male who presents with a chief complaint of abdominal pain (15 Mar 2018 03:57)      INTERVAL HPI:    64 yo M pmh htn, hld, ROSS, depression presents to the ED with abdominal and back pain. Pt states the pain started suddenly today and was radiating from mid back to the front. Patient thought he has muscle pain and went to chiropractor had heat therapy and offered no relief. Pt states that the pain has significantly decreased since he has been in the ED. Denies fevers, chills, nausea, vomiting, CP, SOB, palpitations. 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 Also states that he had been seeing psychiatrist and is taking antidepressant medications due to recent death of his son. Also states that his daughter recently finished drug rehab. In the ED, VS 97.7, HR 99, /78, RR 12, SpO2 97 on rm air. WBC 12.2, Hg 15.5, Hct 46.0, platelet 206, Na 135, K 4.7, Cl 100, Co2 27, BUN 13, Cr 0.87, glucose 126, , ALT 97, Lipase 1007, Creatine kinase 361, troponin <0.015, ProBNP 22. UA small ketones, pH 9.0 CT angio chest, abdomen and pelvis with IV contast: No evidence of aortic dissection. Inflammatory change along the head of the pancreas most likely pancreatitis. Small area of low density along the posterior aspect of the pancreatic head may be related to edema although early necrosis or an underlying lesion are not excluded. Follow-up CT or MRI recommended if there are no contraindications. Low density within the portal vein/SMV in the region of the pancreatic head is presumably related to mixing of opacified and unopacified blood. This can also be further evaluated on follow-up imaging. Gallbladder US shows no gallstones. EKG: NSR at 88 bpm. In the ED, Pt received flexeril 10 mg oral x 1, 1 L NaCl 0.9% bolus, famotidine 20 mg IV x 1, morphone 4 mg IV x1, zofran 4 mg IV x 1.    No events overnight. Patient was seen and examined at bedside this AM. Patient described mild back pain and abd pain, but otherwise fine.     MEDICATIONS  (STANDING):  amLODIPine   Tablet 10 milliGRAM(s) Oral daily  atorvastatin 20 milliGRAM(s) Oral at bedtime  clonazePAM Tablet 1 milliGRAM(s) Oral two times a day  enoxaparin Injectable 40 milliGRAM(s) SubCutaneous daily  escitalopram 20 milliGRAM(s) Oral at bedtime  folic acid 1 milliGRAM(s) Oral daily  lisinopril 40 milliGRAM(s) Oral two times a day  multivitamin 1 Tablet(s) Oral daily  pantoprazole  Injectable 40 milliGRAM(s) IV Push daily  QUEtiapine 100 milliGRAM(s) Oral at bedtime  sodium chloride 0.9%. 1000 milliLiter(s) (125 mL/Hr) IV Continuous <Continuous>  thiamine 100 milliGRAM(s) Oral daily    MEDICATIONS  (PRN):  LORazepam   Injectable 2 milliGRAM(s) IV Push every 2 hours PRN CIWA-Ar score increase by 2 points and a total score of 7 or less  morphine  - Injectable 2 milliGRAM(s) IV Push every 6 hours PRN Moderate Pain (4 - 6)  morphine  - Injectable 4 milliGRAM(s) IV Push every 6 hours PRN Severe Pain (7 - 10)  morphine  - Injectable 1 milliGRAM(s) IV Push every 6 hours PRN Mild Pain (1 - 3)      Allergies    No Known Allergies    Intolerances        REVIEW OF SYSTEMS:  CONSTITUTIONAL: No fever, No chills, No fatigue, No myalgia, No Body ache  EYES: No eye pain, visual disturbances, or discharge  ENMT:  No ear pain, No nose bleed, No vertigo; No sinus or throat pain, No Congestion  NECK: No pain, No stiffness  RESPIRATORY: No cough, wheezing, No  hemoptysis, No shortness of breath  CARDIOVASCULAR: No chest pain, palpitations  GASTROINTESTINAL: + epigastric pain. No nausea, No vomiting; No diarrhea or constipation. [  ] BM  GENITOURINARY: No dysuria, No frequency, No urgency, No hematuria, or incontinence  NEUROLOGICAL: No headaches, No dizziness, No numbness, No tingling, No tremors, No weakness  EXT: No Swelling, No Pain, No Edema  SKIN:  [ s ] No itching, burning, rashes, or lesions   MUSCULOSKELETAL: No joint pain or swelling; No muscle pain, No back pain, No extremity pain  PSYCHIATRIC: No depression, anxiety, mood swings or difficulty sleeping at night, + Substance abuse issues  PAIN SCALE: [  ] None  [  x] Other- 3/10  ROS Unable to obtain due to - [  ] Dementia  [  ] Lethargy  [  ] Sedated   REST OF REVIEW Of SYSTEM - [ x ] Normal     Vital Signs Last 24 Hrs  T(C): 37 (15 Mar 2018 13:10), Max: 37 (15 Mar 2018 13:10)  T(F): 98.6 (15 Mar 2018 13:10), Max: 98.6 (15 Mar 2018 13:10)  HR: 101 (15 Mar 2018 13:10) (84 - 101)  BP: 152/79 (15 Mar 2018 13:10) (142/78 - 152/79)  BP(mean): --  RR: 16 (15 Mar 2018 13:10) (12 - 16)  SpO2: 92% (15 Mar 2018 13:10) (92% - 97%)  Finger Stick          PHYSICAL EXAM:  GENERAL:  [x  ] NAD , [ x ] well appearing, [  ] Agitated, [  ] Lethargy, [  ] confused   HEAD:  [x  ] Normal, [  ] Other  EYES:  [ x ] EOMI, [ x ] PERRLA, [  ] conjunctiva and sclera clear normal, [  ] Other,  [  ] Pallor,[  ] Discharge  ENMT:  [ x ] Normal, [x  ] Moist mucous membranes, [  ] Good dentition, [  ] No Thrush  NECK:  [x  ] Supple, [x  ] No JVD, [  ] Normal thyroid, [  ] Lymphadenopathy [  ] Other  NERVOUS SYSTEM:  [ x ] Alert & Oriented X3, [  ] Nonfocal   [  ] Confusion  [  ] Encephalopathic [  ] Sedated [  ] Other-  CHEST/LUNG:  [ x ] Clear to auscultation bilaterally, [  ] No rales, [  ] No rhonchi  [  ]  No wheezing  HEART:  [ x ] Regular rate and rhythm  [  ] irregular  [  ] No murmurs, rubs, or gallops, [  ] PPM in place (Mfr:  )  ABDOMEN:  [ x ] Soft, [  ] Nontender, [  ] Nondistended, [  ] No mass, [  ] Bowel sounds present, [  ] obese, + TTP right upper abd area  EXTREMITIES: [ x ] 2+ Peripheral Pulses, No clubbing, cyanosis,  [  ] edema, [  ] PVD stasis skin changes  LYMPH: No lymphadenopathy noted  SKIN:  [x  ] No rashes or lesions, [  ] Pressure Ulcers, [  ] echymosis, [  ] Other    DIET: NPO for expected MRI of abd with contrast     LABS:                        15.5   12.2  )-----------( 206      ( 14 Mar 2018 23:12 )             46.0     15 Mar 2018 12:53    139    |  105    |  7      ----------------------------<  123    3.9     |  27     |  0.85     Ca    8.4        15 Mar 2018 12:53  Phos  3.0       15 Mar 2018 09:09  Mg     1.8       15 Mar 2018 09:09    TPro  7.6    /  Alb  3.4    /  TBili  0.8    /  DBili  x      /  AST  63     /  ALT  83     /  AlkPhos  92     15 Mar 2018 12:53      Urinalysis Basic - ( 15 Mar 2018 01:25 )    Color: Yellow / Appearance: Clear / S.010 / pH: x  Gluc: x / Ketone: Small  / Bili: Negative / Urobili: Negative   Blood: x / Protein: Negative / Nitrite: Negative   Leuk Esterase: Negative / RBC: x / WBC x   Sq Epi: x / Non Sq Epi: x / Bacteria: x                    RADIOLOGY & ADDITIONAL TESTS:      HEALTH ISSUES - PROBLEM Dx:  Obstructive sleep apnea on CPAP: Obstructive sleep apnea on CPAP  Abnormal laboratory test: Abnormal laboratory test  Need for prophylactic measure: Need for prophylactic measure  HLD (hyperlipidemia): HLD (hyperlipidemia)  Low testosterone in male: Low testosterone in male  HTN (hypertension): HTN (hypertension)  Depression: Depression  Leukocytosis: Leukocytosis  ETOH abuse: ETOH abuse  Acute pancreatitis, unspecified complication status, unspecified pancreatitis type: Acute pancreatitis, unspecified complication status, unspecified pancreatitis type          Consultant(s) Notes Reviewed:  [  ] YES     Care Discussed with [X] Consultants  [  ] Patient  [  ] Family  [  ]   [  ] Social Service  [  ] RN, [  ] Physical Therapy  DVT PPX: [x  ] Lovenox, [  ] S C Heparin, [  ] Coumadin, [  ] Xarelto, [  ] Eliquis, [  ] SCD   Advanced directive: [  ] None, [  ] DNR/DNI Patient is a 63y old  Male who presents with a chief complaint of abdominal pain (15 Mar 2018 03:57)      INTERVAL HPI:    62 yo M pmh htn, hld, ROSS, depression presents to the ED with abdominal and back pain. Pt states the pain started suddenly today and was radiating from mid back to the front. Patient thought he has muscle pain and went to chiropractor had heat therapy and offered no relief. Pt states that the pain has significantly decreased since he has been in the ED. Denies fevers, chills, nausea, vomiting, CP, SOB, palpitations. 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 Also states that he had been seeing psychiatrist and is taking antidepressant medications due to recent death of his son. Also states that his daughter recently finished drug rehab. In the ED, VS 97.7, HR 99, /78, RR 12, SpO2 97 on rm air. WBC 12.2, Hg 15.5, Hct 46.0, platelet 206, Na 135, K 4.7, Cl 100, Co2 27, BUN 13, Cr 0.87, glucose 126, , ALT 97, Lipase 1007, Creatine kinase 361, troponin <0.015, ProBNP 22. UA small ketones, pH 9.0 CT angio chest, abdomen and pelvis with IV contast: No evidence of aortic dissection. Inflammatory change along the head of the pancreas most likely pancreatitis. Small area of low density along the posterior aspect of the pancreatic head may be related to edema although early necrosis or an underlying lesion are not excluded. Follow-up CT or MRI recommended if there are no contraindications. Low density within the portal vein/SMV in the region of the pancreatic head is presumably related to mixing of opacified and unopacified blood. This can also be further evaluated on follow-up imaging. Gallbladder US shows no gallstones. EKG: NSR at 88 bpm. In the ED, Pt received flexeril 10 mg oral x 1, 1 L NaCl 0.9% bolus, famotidine 20 mg IV x 1, morphine 4 mg IV x1, zofran 4 mg IV x 1.    No events overnight. Patient was seen and examined at bedside this AM. Patient described mild back pain and abd pain, but otherwise fine. Pt was seen by GI Dr medina, Detox- DR Coats &  psych -Dr Mcwilliams, Pt wants to go home, MRI abdomen ordered by GI for further work up,    MEDICATIONS  (STANDING):  amLODIPine   Tablet 10 milliGRAM(s) Oral daily  atorvastatin 20 milliGRAM(s) Oral at bedtime  clonazePAM Tablet 1 milliGRAM(s) Oral two times a day  enoxaparin Injectable 40 milliGRAM(s) SubCutaneous daily  escitalopram 20 milliGRAM(s) Oral at bedtime  folic acid 1 milliGRAM(s) Oral daily  lisinopril 40 milliGRAM(s) Oral two times a day  multivitamin 1 Tablet(s) Oral daily  pantoprazole  Injectable 40 milliGRAM(s) IV Push daily  QUEtiapine 100 milliGRAM(s) Oral at bedtime  sodium chloride 0.9%. 1000 milliLiter(s) (125 mL/Hr) IV Continuous <Continuous>  thiamine 100 milliGRAM(s) Oral daily    MEDICATIONS  (PRN):  LORazepam   Injectable 2 milliGRAM(s) IV Push every 2 hours PRN CIWA-Ar score increase by 2 points and a total score of 7 or less  morphine  - Injectable 2 milliGRAM(s) IV Push every 6 hours PRN Moderate Pain (4 - 6)  morphine  - Injectable 4 milliGRAM(s) IV Push every 6 hours PRN Severe Pain (7 - 10)  morphine  - Injectable 1 milliGRAM(s) IV Push every 6 hours PRN Mild Pain (1 - 3)      Allergies    No Known Allergies    Intolerances        REVIEW OF SYSTEMS: i feel better today  CONSTITUTIONAL: No fever, No chills, No fatigue, No myalgia, No Body ache  EYES: No eye pain, visual disturbances, or discharge  ENMT:  No ear pain, No nose bleed, No vertigo; No sinus or throat pain, No Congestion  NECK: No pain, No stiffness  RESPIRATORY: No cough, wheezing, No  hemoptysis, No shortness of breath  CARDIOVASCULAR: No chest pain, palpitations  GASTROINTESTINAL: mid  + epigastric pain. No nausea, No vomiting; No diarrhea or constipation. [  ] BM  GENITOURINARY: No dysuria, No frequency, No urgency, No hematuria, or incontinence  NEUROLOGICAL: No headaches, No dizziness, No numbness, No tingling, No tremors, No weakness  EXT: No Swelling, No Pain, No Edema  SKIN:  [ x ] No itching, burning, rashes, or lesions   MUSCULOSKELETAL: No joint pain or swelling; No muscle pain, No back pain, No extremity pain  PSYCHIATRIC: No depression, anxiety, mood swings or difficulty sleeping at night, + Substance abuse issues  PAIN SCALE: [  ] None  [  x] Other- 3/10 mid epigastric   ROS Unable to obtain due to - [  ] Dementia  [  ] Lethargy  [  ] Sedated   REST OF REVIEW Of SYSTEM - [ x ] Normal     Vital Signs Last 24 Hrs  T(C): 37 (15 Mar 2018 13:10), Max: 37 (15 Mar 2018 13:10)  T(F): 98.6 (15 Mar 2018 13:10), Max: 98.6 (15 Mar 2018 13:10)  HR: 101 (15 Mar 2018 13:10) (84 - 101)  BP: 152/79 (15 Mar 2018 13:10) (142/78 - 152/79)  BP(mean): --  RR: 16 (15 Mar 2018 13:10) (12 - 16)  SpO2: 92% (15 Mar 2018 13:10) (92% - 97%)  Finger Stick      PHYSICAL EXAM:  GENERAL:  [x  ] NAD , [ x ] well appearing, [  ] Agitated, [  ] Lethargy, [  ] confused   HEAD:  [x  ] Normal, [  ] Other  EYES:  [ x ] EOMI, [ x ] PERRLA, [  ] conjunctiva and sclera clear normal, [  ] Other,  [  ] Pallor,[  ] Discharge  ENMT:  [ x ] Normal, [x  ] Moist mucous membranes, [  ] Good dentition, [  ] No Thrush  NECK:  [x  ] Supple, [x  ] No JVD, [ x ] Normal thyroid, [  ] Lymphadenopathy [  ] Other  NERVOUS SYSTEM:  [ x ] Alert & Oriented X3, [  ] Nonfocal   [  ] Confusion  [  ] Encephalopathic [  ] Sedated [  ] Other-  CHEST/LUNG:  [ x ] Clear to auscultation bilaterally, [ x ] No rales, [x  ] No rhonchi  [ x ]  No wheezing  HEART:  [ x ] Regular rate and rhythm  [  ] irregular  [  x] No murmurs, rubs, or gallops, [  ] PPM in place (Mfr:  )  ABDOMEN:  [ x ] Soft, [ x ] Nontender, [ x ] Nondistended, [ x ] No mass, [  x] Bowel sounds present, [x ] obese,  soreness+right upper abd area  EXTREMITIES: [ x ] 2+ Peripheral Pulses, No clubbing, cyanosis,  [  ] edema, [  ] PVD stasis skin changes  LYMPH: No lymphadenopathy noted  SKIN:  [x  ] No rashes or lesions, [  ] Pressure Ulcers, [  ] ecchymosis [  ] Other    DIET: NPO for expected MRI of abd with contrast     LABS:                        15.5   12.2  )-----------( 206      ( 14 Mar 2018 23:12 )             46.0     15 Mar 2018 12:53    139    |  105    |  7      ----------------------------<  123    3.9     |  27     |  0.85     Ca    8.4        15 Mar 2018 12:53  Phos  3.0       15 Mar 2018 09:09  Mg     1.8       15 Mar 2018 09:09    TPro  7.6    /  Alb  3.4    /  TBili  0.8    /  DBili  x      /  AST  63     /  ALT  83     /  AlkPhos  92     15 Mar 2018 12:53      Urinalysis Basic - ( 15 Mar 2018 01:25 )    Color: Yellow / Appearance: Clear / S.010 / pH: x  Gluc: x / Ketone: Small  / Bili: Negative / Urobili: Negative   Blood: x / Protein: Negative / Nitrite: Negative   Leuk Esterase: Negative / RBC: x / WBC x   Sq Epi: x / Non Sq Epi: x / Bacteria: x    RADIOLOGY & ADDITIONAL TESTS: NONE  < from: CT Angio Abdomen and Pelvis w/ IV Cont (03.15.18 @ 01:06) >  CT Angiography of the Chest, Abdomen and Pelvis.   Precontrast imaging was performed through the chest followed by arterial   phase imaging of the chest, abdomen and pelvis.  Intravenous contrast: 95 ml Omnipaque 350.   Oral contrast:None.  Sagittal and coronal reformats were performed as well as MIPS.  FINDINGS:    CHEST:     LUNGS, LARGE AIRWAYS, PLEURA: Patent central airways. No consolidation or   pneumothorax.  No pleural effusion. Bibasilar atelectasis.  VESSELS: Coronary atherosclerosis. No evidence of aortic dissection.  HEART: Heart size is normal. No pericardial effusion.  MEDIASTINUM AND ANDREW: No lymphadenopathy.  CHEST WALL AND LOWER NECK: Degenerative changes along the spine.    ABDOMEN AND PELVIS:    Arterial phase imaging of the following:  LIVER: Hepatic steatosis  GALLBLADDER: Within normal limits.  SPLEEN: Within normal limits.  PANCREAS: Inflammatory change along the head of the pancreas compatible   with pancreatitis. Small 1.6 cm area of low density alongthe posterior   head may represent edema although early necrosis or underlying lesion not   excluded.  ADRENALS: Within normal limits.  KIDNEYS/URETERS: Right renal hypodensity is too small to characterize. No   hydronephrosis    BLADDER: Collapsed  REPRODUCTIVE ORGANS: Unremarkable    BOWEL: Limited without oral contrast. No evidence of bowel obstruction.   Normal appendix  PERITONEUM: No ascites.  VESSELS:  Aortic atherosclerosis. Low density along the portal vein in   the region of the pancreas is likely related to mixing of opacified and   unopacified blood.  RETROPERITONEUM: No lymphadenopathy.    ABDOMINAL WALL: Small fat-containing left inguinal hernia  BONES: Degenerative changes    IMPRESSION:    No evidence of aortic dissection.     Inflammatory change along the head of the pancreas most likely   pancreatitis. Small area of low density along the posterior aspect of the   pancreatic head may be related to edema although early necrosis or an   underlying lesion are not excluded. Follow-up CT or MRI recommended if   there are no contraindications.    Low density within the portal vein/SMV in the region of the pancreatic   head is presumably related to mixing of opacified and unopacified blood.   This can also be further evaluated on follow-up imaging.      Obstructive sleep apnea on CPAP: Obstructive sleep apnea on CPAP  Abnormal laboratory test: Abnormal laboratory test  Need for prophylactic measure: Need for prophylactic measure  HLD (hyperlipidemia): HLD (hyperlipidemia)  Low testosterone in male: Low testosterone in male  HTN (hypertension): HTN (hypertension)  Depression: Depression  Leukocytosis: Leukocytosis  ETOH abuse: ETOH abuse  Acute pancreatitis, unspecified complication status, unspecified pancreatitis type: Acute pancreatitis, unspecified complication status, unspecified pancreatitis type      Consultant(s) Notes Reviewed:  [ x ] YES     Care Discussed with [X] Consultants  [ x ] Patient  [  ] Family  [  ]   [  ] Social Service  [ x ] RN, [  ] Physical Therapy  DVT PPX: [x  ] Lovenox, [  ] S C Heparin, [  ] Coumadin, [  ] Xarelto, [  ] Eliquis, [  ] SCD   Advanced directive: [ x ] None, [  ] DNR/DNI

## 2018-03-15 NOTE — H&P ADULT - NEGATIVE ENMT SYMPTOMS
no hearing difficulty/no sinus symptoms/no nasal congestion/no throat pain/no vertigo/no nasal obstruction/no post-nasal discharge/no ear pain/no gum bleeding/no tinnitus/no nasal discharge/no nose bleeds/no abnormal taste sensation/no dysphagia/no recurrent cold sores

## 2018-03-15 NOTE — H&P ADULT - NEGATIVE PSYCHIATRIC SYMPTOMS
no hyperactivity/no auditory hallucinations/no mood swings/no paranoia/no visual hallucinations/no anxiety/no memory loss/no agitation/no insomnia/no suicidal ideation

## 2018-03-15 NOTE — H&P ADULT - NEGATIVE GENERAL SYMPTOMS
no weight loss/no polyphagia/no polydipsia/no fatigue/no sweating/no anorexia/no weight gain/no polyuria/no malaise/no fever/no chills

## 2018-03-15 NOTE — H&P ADULT - RS GEN PE MLT RESP DETAILS PC
no rales/no subcutaneous emphysema/good air movement/no intercostal retractions/no chest wall tenderness/no rhonchi/no wheezes/normal/airway patent/respirations non-labored/clear to auscultation bilaterally/breath sounds equal

## 2018-03-15 NOTE — BEHAVIORAL HEALTH ASSESSMENT NOTE - HPI (INCLUDE ILLNESS QUALITY, SEVERITY, DURATION, TIMING, CONTEXT, MODIFYING FACTORS, ASSOCIATED SIGNS AND SYMPTOMS)
62 yo M pmh htn, hld, ROSS, depression presents to the ED with abdominal and back pain. States the pain started suddenly today and was radiating from mid back to the front. Patient thought he has muscle pain and went to chiropractor had heat therapy and offered no relief. States that the pain has significantly decreased since he has been in the ED. Denies fevers, chills, nausea, vomiting, CP, SOB, palpitations. 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 Also states that he had been seeing psychiatrist and is taking antidepressant medications due to recent death of his son. Also states that his daughter recently finished drug rehab.   Patient reports that he was deeply affected by the death of his son 2 years ago, who likely accidentally overdosed. Currently he shows insight into alcohol abuse problem, denies acute depression, dennys or psychosis

## 2018-03-15 NOTE — H&P ADULT - NEGATIVE NEUROLOGICAL SYMPTOMS
no generalized seizures/no focal seizures/no syncope/no tremors/no loss of sensation/no difficulty walking/no transient paralysis/no weakness/no vertigo/no loss of consciousness/no hemiparesis/no headache/no confusion/no paresthesias

## 2018-03-15 NOTE — H&P ADULT - PROBLEM SELECTOR PLAN 10
IMPROVE VTE Individual Risk Assessment          RISK                                                          Points    [  ] Previous VTE                                                3  [  ] Thrombophilia                                             2  [  ] Lower limb paralysis                                   2        (unable to hold up >15 seconds)    [  ] Current Cancer                                            2         (within 6 months)  [  ] Immobilization > 24 hrs                              1  [  ] ICU/CCU stay > 24 hours                            1  [ x ] Age > 60                                                    1    IMPROVE VTE Score ____1_____    DVT ppx: Lovenox 40 mg subq

## 2018-03-15 NOTE — PROGRESS NOTE ADULT - PROBLEM SELECTOR PLAN 4
Elevated creatinine kinase  Possibly 2/2 to muscle damage s/p chiropractor session  Continue IVF  F/u repeat creatine kinase Elevated creatinine kinase  Possibly 2/2 to muscle damage s/p chiropractor session  Continue IVF  f/u CK in AM

## 2018-03-15 NOTE — H&P ADULT - PROBLEM SELECTOR PLAN 6
Patient states that he received testosterone injections every 3 weeks  States he takes anasterozole 1 mg, two times a week. Accurate scheduling of medication to be addressed in am. Held anasterozole for now. Chronic, stable  Continue home dose amlodipine  Continue lisinopril (interchange of homedose quinipril) Chronic, stable  Continue home dose amlodipine  Continue lisinopril (interchange of home dose quinipril)

## 2018-03-15 NOTE — PROGRESS NOTE ADULT - PROBLEM SELECTOR PLAN 6
Chronic, stable  Continue home dose amlodipine  Continue lisinopril (interchange of homedose quinipril) Chronic, stable  Continue home dose amlodipine  Continue lisinopril (interchange of home dose quinipril) Chronic, stable  Continue home dose amlodipine  Continue lisinopril (interchange of home dose quinapril)

## 2018-03-15 NOTE — H&P ADULT - ASSESSMENT
64 yo M pmh htn, hld, depression presents to the ED with abdominal and back pain. Admit to F for pancreatitis, leokocytosis, elevated creatine kinase, ETOH abuse. 64 yo M pmh htn, hld, ROSS, depression presents to the ED with abdominal and back pain. Admit to F for pancreatitis, leokocytosis, elevated creatine kinase, ETOH abuse. 64 yo M pmh htn, hld, ROSS, depression presents to the ED with abdominal and back pain. Admit to F for Ac pancreatitis 2/2 ETOH abuse, leokocytosis, elevated creatine kinase, ETOH abuse.

## 2018-03-15 NOTE — H&P ADULT - NEGATIVE OPHTHALMOLOGIC SYMPTOMS
no lacrimation L/no lacrimation R/no blurred vision L/no irritation R/no discharge R/no blurred vision R/no photophobia/no irritation L/no loss of vision R/no discharge L/no pain L/no pain R/no loss of vision L/no diplopia

## 2018-03-15 NOTE — H&P ADULT - PROBLEM SELECTOR PLAN 4
Chronic, stable  Continue home dose klonipin, quetipine, seroquel Elevated creatinine kinase  Possibly 2/2 to muscle damage s/p chiropractor session  Continue IVF  F/u repeat creatine kinase

## 2018-03-15 NOTE — BEHAVIORAL HEALTH ASSESSMENT NOTE - DETAILS
Abdominal pain Son had multiple emotional problems, and drug dependence Son had polysubstance dependence

## 2018-03-15 NOTE — H&P ADULT - GASTROINTESTINAL DETAILS
no rebound tenderness/no distention/no masses palpable/normal/soft/no organomegaly/no rigidity/no guarding/bowel sounds normal

## 2018-03-15 NOTE — H&P ADULT - NEGATIVE GENERAL GENITOURINARY SYMPTOMS
no nocturia/no hematuria/no flank pain R/no renal colic/no bladder infections/no gas in urine/normal urinary frequency/no urinary hesitancy/no flank pain L/no incontinence/no urine discoloration/no dysuria

## 2018-03-15 NOTE — H&P ADULT - NEGATIVE GASTROINTESTINAL SYMPTOMS
no steatorrhea/no hiccoughs/no diarrhea/no constipation/no nausea/no melena/no jaundice/no vomiting/no change in bowel habits/no flatulence/no hematochezia

## 2018-03-15 NOTE — PROGRESS NOTE ADULT - ASSESSMENT
64 yo M pmh htn, hld, ROSS, depression presents to the ED with abdominal and back pain. Admit to F for pancreatitis, leokocytosis, elevated creatine kinase, ETOH abuse.

## 2018-03-15 NOTE — H&P ADULT - NEGATIVE CARDIOVASCULAR SYMPTOMS
no peripheral edema/no orthopnea/no palpitations/no dyspnea on exertion/no paroxysmal nocturnal dyspnea/no claudication/no chest pain

## 2018-03-15 NOTE — PROGRESS NOTE ADULT - PROBLEM SELECTOR PLAN 5
Chronic, stable  Continue home dose klonipin, quetipine, seroquel Chronic, stable  Continue home dose klonipin, quetipine, seroquel  Dr. Mcwilliams (PSych) on board, no contraindications to discharge home  Patient stated he has no thoughts of suicide or self-harm Chronic, stable  Continue home dose klonipin, quetipine, seroquel  Dr. Mcwilliams (PSych) on board, no contraindications to discharge home  Dr. Coats (Addiction) on board  Patient stated he has no thoughts of suicide or self-harm  Previously saw psychiatrist, but practice closed

## 2018-03-15 NOTE — H&P ADULT - PROBLEM SELECTOR PLAN 9
IMPROVE VTE Individual Risk Assessment          RISK                                                          Points    [  ] Previous VTE                                                3  [  ] Thrombophilia                                             2  [  ] Lower limb paralysis                                   2        (unable to hold up >15 seconds)    [  ] Current Cancer                                            2         (within 6 months)  [  ] Immobilization > 24 hrs                              1  [  ] ICU/CCU stay > 24 hours                            1  [ x ] Age > 60                                                    1    IMPROVE VTE Score ____1_____    DVT ppx: Lovenox 40m g subq IMPROVE VTE Individual Risk Assessment          RISK                                                          Points    [  ] Previous VTE                                                3  [  ] Thrombophilia                                             2  [  ] Lower limb paralysis                                   2        (unable to hold up >15 seconds)    [  ] Current Cancer                                            2         (within 6 months)  [  ] Immobilization > 24 hrs                              1  [  ] ICU/CCU stay > 24 hours                            1  [ x ] Age > 60                                                    1    IMPROVE VTE Score ____1_____    DVT ppx: Lovenox 40 mg subq Continue home dose atorvastatin

## 2018-03-15 NOTE — H&P ADULT - NEGATIVE MUSCULOSKELETAL SYMPTOMS
no back pain/no leg pain L/no myalgia/no joint swelling/no arm pain L/no muscle cramps/no muscle weakness/no arthralgia/no arthritis/no stiffness/no neck pain/no arm pain R/no leg pain R

## 2018-03-15 NOTE — H&P ADULT - NEGATIVE MALE-SPECIFIC SYMPTOMS
no genital sores/no impotence/no erectile dysfunction/no urethral discharge/no ejaculatory dysfunction

## 2018-03-15 NOTE — H&P ADULT - MUSCULOSKELETAL
details… detailed exam no joint swelling/no joint erythema/no joint warmth/normal/ROM intact/no calf tenderness/normal strength

## 2018-03-15 NOTE — H&P ADULT - PROBLEM SELECTOR PLAN 1
Likely 2/2 to ETOH abuse, stress induced.  Continue IVF  Pain control  GI Dr. Faulkner consulted   F/u repeat amylase, lipase Likely 2/2 to ETOH abuse, stress induced.  Continue IVF at 125 cc/hr  Pain control  GI Dr. Faulkner consulted   Protonix 40 mg IV  F/u repeat amylase, lipase  F/u hepatic function panel  F/u alcohol level Likely 2/2 to ETOH abuse,  Continue IVF at 125 cc/hr  Pain control, NPO  GI Dr. Faulkner consulted   Protonix 40 mg IV daily  F/u repeat amylase, lipase  F/u hepatic function panel in AM  F/u alcohol level

## 2018-03-15 NOTE — H&P ADULT - FAMILY HISTORY
Father  Still living? Unknown  Family history of hypertension, Age at diagnosis: Age Unknown  Family history of squamous cell carcinoma, Age at diagnosis: Age Unknown

## 2018-03-15 NOTE — H&P ADULT - PROBLEM SELECTOR PLAN 7
Continue home dose atorvastatin Patient states that he received testosterone injections every 3 weeks  States he takes anasterozole 1 mg, two times a week. Accurate scheduling of medication to be addressed in am. Held anasterozole for now. Chronic, stable  Continue CIPAP Chronic, stable  Continue CPAP

## 2018-03-15 NOTE — H&P ADULT - NEGATIVE SKIN SYMPTOMS
no dryness/no itching/no brittle nails/no rash/no tumor/no change in size/color of mole/no pitted nails

## 2018-03-16 ENCOUNTER — TRANSCRIPTION ENCOUNTER (OUTPATIENT)
Age: 63
End: 2018-03-16

## 2018-03-16 VITALS
HEART RATE: 97 BPM | TEMPERATURE: 98 F | SYSTOLIC BLOOD PRESSURE: 121 MMHG | OXYGEN SATURATION: 95 % | RESPIRATION RATE: 18 BRPM | DIASTOLIC BLOOD PRESSURE: 73 MMHG

## 2018-03-16 LAB
ALBUMIN SERPL ELPH-MCNC: 2.9 G/DL — LOW (ref 3.3–5)
ALP SERPL-CCNC: 75 U/L — SIGNIFICANT CHANGE UP (ref 40–120)
ALT FLD-CCNC: 60 U/L — SIGNIFICANT CHANGE UP (ref 12–78)
AMYLASE P1 CFR SERPL: 35 U/L — SIGNIFICANT CHANGE UP (ref 25–115)
ANION GAP SERPL CALC-SCNC: 6 MMOL/L — SIGNIFICANT CHANGE UP (ref 5–17)
AST SERPL-CCNC: 42 U/L — HIGH (ref 15–37)
BASOPHILS # BLD AUTO: 0.1 K/UL — SIGNIFICANT CHANGE UP (ref 0–0.2)
BASOPHILS NFR BLD AUTO: 0.8 % — SIGNIFICANT CHANGE UP (ref 0–2)
BILIRUB SERPL-MCNC: 0.7 MG/DL — SIGNIFICANT CHANGE UP (ref 0.2–1.2)
BUN SERPL-MCNC: 7 MG/DL — SIGNIFICANT CHANGE UP (ref 7–23)
CALCIUM SERPL-MCNC: 8.6 MG/DL — SIGNIFICANT CHANGE UP (ref 8.5–10.1)
CHLORIDE SERPL-SCNC: 106 MMOL/L — SIGNIFICANT CHANGE UP (ref 96–108)
CK SERPL-CCNC: 169 U/L — SIGNIFICANT CHANGE UP (ref 26–308)
CO2 SERPL-SCNC: 29 MMOL/L — SIGNIFICANT CHANGE UP (ref 22–31)
CREAT SERPL-MCNC: 0.82 MG/DL — SIGNIFICANT CHANGE UP (ref 0.5–1.3)
CULTURE RESULTS: SIGNIFICANT CHANGE UP
EOSINOPHIL # BLD AUTO: 0.5 K/UL — SIGNIFICANT CHANGE UP (ref 0–0.5)
EOSINOPHIL NFR BLD AUTO: 5.6 % — SIGNIFICANT CHANGE UP (ref 0–6)
GLUCOSE SERPL-MCNC: 109 MG/DL — HIGH (ref 70–99)
HCT VFR BLD CALC: 42.8 % — SIGNIFICANT CHANGE UP (ref 39–50)
HGB BLD-MCNC: 14.4 G/DL — SIGNIFICANT CHANGE UP (ref 13–17)
LIDOCAIN IGE QN: 334 U/L — SIGNIFICANT CHANGE UP (ref 73–393)
LYMPHOCYTES # BLD AUTO: 1.8 K/UL — SIGNIFICANT CHANGE UP (ref 1–3.3)
LYMPHOCYTES # BLD AUTO: 22.2 % — SIGNIFICANT CHANGE UP (ref 13–44)
MAGNESIUM SERPL-MCNC: 2 MG/DL — SIGNIFICANT CHANGE UP (ref 1.6–2.6)
MCHC RBC-ENTMCNC: 33.6 GM/DL — SIGNIFICANT CHANGE UP (ref 32–36)
MCHC RBC-ENTMCNC: 34.1 PG — HIGH (ref 27–34)
MCV RBC AUTO: 101.7 FL — HIGH (ref 80–100)
MONOCYTES # BLD AUTO: 1.1 K/UL — HIGH (ref 0–0.9)
MONOCYTES NFR BLD AUTO: 12.9 % — HIGH (ref 1–9)
NEUTROPHILS # BLD AUTO: 4.8 K/UL — SIGNIFICANT CHANGE UP (ref 1.8–7.4)
NEUTROPHILS NFR BLD AUTO: 58.4 % — SIGNIFICANT CHANGE UP (ref 43–77)
PHOSPHATE SERPL-MCNC: 3.8 MG/DL — SIGNIFICANT CHANGE UP (ref 2.5–4.5)
PLATELET # BLD AUTO: 172 K/UL — SIGNIFICANT CHANGE UP (ref 150–400)
POTASSIUM SERPL-MCNC: 3.3 MMOL/L — LOW (ref 3.5–5.3)
POTASSIUM SERPL-SCNC: 3.3 MMOL/L — LOW (ref 3.5–5.3)
PROT SERPL-MCNC: 7 G/DL — SIGNIFICANT CHANGE UP (ref 6–8.3)
RBC # BLD: 4.21 M/UL — SIGNIFICANT CHANGE UP (ref 4.2–5.8)
RBC # FLD: 12.7 % — SIGNIFICANT CHANGE UP (ref 10.3–14.5)
SODIUM SERPL-SCNC: 141 MMOL/L — SIGNIFICANT CHANGE UP (ref 135–145)
SPECIMEN SOURCE: SIGNIFICANT CHANGE UP
WBC # BLD: 8.2 K/UL — SIGNIFICANT CHANGE UP (ref 3.8–10.5)
WBC # FLD AUTO: 8.2 K/UL — SIGNIFICANT CHANGE UP (ref 3.8–10.5)

## 2018-03-16 PROCEDURE — 99232 SBSQ HOSP IP/OBS MODERATE 35: CPT

## 2018-03-16 RX ORDER — POTASSIUM CHLORIDE 20 MEQ
40 PACKET (EA) ORAL EVERY 4 HOURS
Qty: 0 | Refills: 0 | Status: DISCONTINUED | OUTPATIENT
Start: 2018-03-16 | End: 2018-03-16

## 2018-03-16 RX ORDER — SODIUM CHLORIDE 0.65 %
1 AEROSOL, SPRAY (ML) NASAL
Qty: 0 | Refills: 0 | Status: DISCONTINUED | OUTPATIENT
Start: 2018-03-16 | End: 2018-03-16

## 2018-03-16 RX ORDER — THIAMINE MONONITRATE (VIT B1) 100 MG
1 TABLET ORAL
Qty: 90 | Refills: 0 | OUTPATIENT
Start: 2018-03-16 | End: 2018-06-13

## 2018-03-16 RX ORDER — FOLIC ACID 0.8 MG
1 TABLET ORAL
Qty: 90 | Refills: 0 | OUTPATIENT
Start: 2018-03-16 | End: 2018-06-13

## 2018-03-16 RX ORDER — POTASSIUM CHLORIDE 20 MEQ
40 PACKET (EA) ORAL EVERY 4 HOURS
Qty: 0 | Refills: 0 | Status: COMPLETED | OUTPATIENT
Start: 2018-03-16 | End: 2018-03-16

## 2018-03-16 RX ADMIN — Medication 40 MILLIEQUIVALENT(S): at 16:10

## 2018-03-16 RX ADMIN — Medication 1 TABLET(S): at 11:20

## 2018-03-16 RX ADMIN — Medication 1 MILLIGRAM(S): at 05:50

## 2018-03-16 RX ADMIN — Medication 1 SPRAY(S): at 12:35

## 2018-03-16 RX ADMIN — SODIUM CHLORIDE 125 MILLILITER(S): 9 INJECTION INTRAMUSCULAR; INTRAVENOUS; SUBCUTANEOUS at 12:34

## 2018-03-16 RX ADMIN — Medication 40 MILLIEQUIVALENT(S): at 12:34

## 2018-03-16 RX ADMIN — ENOXAPARIN SODIUM 40 MILLIGRAM(S): 100 INJECTION SUBCUTANEOUS at 11:20

## 2018-03-16 RX ADMIN — Medication 1 MILLIGRAM(S): at 11:20

## 2018-03-16 RX ADMIN — Medication 100 MILLIGRAM(S): at 11:21

## 2018-03-16 RX ADMIN — LISINOPRIL 40 MILLIGRAM(S): 2.5 TABLET ORAL at 05:50

## 2018-03-16 RX ADMIN — PANTOPRAZOLE SODIUM 40 MILLIGRAM(S): 20 TABLET, DELAYED RELEASE ORAL at 11:20

## 2018-03-16 RX ADMIN — AMLODIPINE BESYLATE 10 MILLIGRAM(S): 2.5 TABLET ORAL at 05:50

## 2018-03-16 NOTE — PROGRESS NOTE ADULT - SUBJECTIVE AND OBJECTIVE BOX
Patient is a 63y old  Male who presents with a chief complaint of abdominal pain (16 Mar 2018 12:35)      INTERVAL HPI:  64 yo M pmh htn, hld, ROSS, depression presents to the ED with abdominal and back pain. Pt states the pain started suddenly today and was radiating from mid back to the front. Patient thought he has muscle pain and went to chiropractor had heat therapy and offered no relief. Pt states that the pain has significantly decreased since he has been in the ED. Denies fevers, chills, nausea, vomiting, CP, SOB, palpitations. 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 Also states that he had been seeing psychiatrist and is taking antidepressant medications due to recent death of his son. Also states that his daughter recently finished drug rehab. In the ED, VS 97.7, HR 99, /78, RR 12, SpO2 97 on rm air. WBC 12.2, Hg 15.5, Hct 46.0, platelet 206, Na 135, K 4.7, Cl 100, Co2 27, BUN 13, Cr 0.87, glucose 126, , ALT 97, Lipase 1007, Creatine kinase 361, troponin <0.015, ProBNP 22. UA small ketones, pH 9.0 CT angio chest, abdomen and pelvis with IV contast: No evidence of aortic dissection. Inflammatory change along the head of the pancreas most likely pancreatitis. Small area of low density along the posterior aspect of the pancreatic head may be related to edema although early necrosis or an underlying lesion are not excluded. Follow-up CT or MRI recommended if there are no contraindications. Low density within the portal vein/SMV in the region of the pancreatic head is presumably related to mixing of opacified and unopacified blood. This can also be further evaluated on follow-up imaging. Gallbladder US shows no gallstones. EKG: NSR at 88 bpm. In the ED, Pt received flexeril 10 mg oral x 1, 1 L NaCl 0.9% bolus, famotidine 20 mg IV x 1, morphine 4 mg IV x1, zofran 4 mg IV x 1.    No events overnight. Patient was seen and examined at bedside this AM. Patient described mild back pain, but otherwise no complaints. MRI abdomen with contrast showed pancreatitis, a 4mm cyst and a 1cm focus of hemorrhage, which must be followed up wit ha repeat MRI abd with contrast in 3 months. Patient is enthusiastic to return home.      MEDICATIONS  (STANDING):  amLODIPine   Tablet 10 milliGRAM(s) Oral daily  atorvastatin 20 milliGRAM(s) Oral at bedtime  clonazePAM Tablet 1 milliGRAM(s) Oral two times a day  enoxaparin Injectable 40 milliGRAM(s) SubCutaneous daily  escitalopram 20 milliGRAM(s) Oral at bedtime  folic acid 1 milliGRAM(s) Oral daily  lisinopril 40 milliGRAM(s) Oral two times a day  multivitamin 1 Tablet(s) Oral daily  pantoprazole  Injectable 40 milliGRAM(s) IV Push daily  potassium chloride    Tablet ER 40 milliEquivalent(s) Oral every 4 hours  QUEtiapine 100 milliGRAM(s) Oral at bedtime  sodium chloride 0.9%. 1000 milliLiter(s) (125 mL/Hr) IV Continuous <Continuous>  thiamine 100 milliGRAM(s) Oral daily    MEDICATIONS  (PRN):  guaiFENesin/dextromethorphan  Syrup 5 milliLiter(s) Oral every 8 hours PRN Cough, congestion  LORazepam   Injectable 2 milliGRAM(s) IV Push every 2 hours PRN CIWA-Ar score increase by 2 points and a total score of 7 or less  morphine  - Injectable 2 milliGRAM(s) IV Push every 6 hours PRN Moderate Pain (4 - 6)  morphine  - Injectable 4 milliGRAM(s) IV Push every 6 hours PRN Severe Pain (7 - 10)  morphine  - Injectable 1 milliGRAM(s) IV Push every 6 hours PRN Mild Pain (1 - 3)  sodium chloride 0.65% Nasal 1 Spray(s) Both Nostrils five times a day PRN Nasal Congestion      Allergies    No Known Allergies    Intolerances        REVIEW OF SYSTEMS:  CONSTITUTIONAL: No fever, No chills, No fatigue, No myalgia, No Body ache  EYES: No eye pain, visual disturbances, or discharge  ENMT:  No ear pain, No nose bleed, No vertigo; No sinus or throat pain, No Congestion  NECK: No pain, No stiffness  RESPIRATORY: No cough, wheezing, No  hemoptysis, No shortness of breath  CARDIOVASCULAR: No chest pain, palpitations  GASTROINTESTINAL: No abdominal or epigastric pain. No nausea, No vomiting; No diarrhea or constipation. [  ] BM  GENITOURINARY: No dysuria, No frequency, No urgency, No hematuria, or incontinence  NEUROLOGICAL: No headaches, No dizziness, No numbness, No tingling, No tremors, No weakness  EXT: No Swelling, No Pain, No Edema  SKIN:  [  ] No itching, burning, rashes, or lesions   MUSCULOSKELETAL: No joint pain or swelling; No muscle pain, No back pain, No extremity pain  PSYCHIATRIC: No depression, anxiety, mood swings or difficulty sleeping at night  PAIN SCALE: [x  ] None  [  ] Other-  ROS Unable to obtain due to - [  ] Dementia  [  ] Lethargy  [  ] Sedated   REST OF REVIEW Of SYSTEM - [x  ] Normal     Vital Signs Last 24 Hrs  T(C): 36.9 (16 Mar 2018 05:17), Max: 36.9 (16 Mar 2018 05:17)  T(F): 98.5 (16 Mar 2018 05:17), Max: 98.5 (16 Mar 2018 05:17)  HR: 96 (16 Mar 2018 05:17) (96 - 100)  BP: 112/74 (16 Mar 2018 05:17) (112/74 - 148/75)  BP(mean): --  RR: 18 (16 Mar 2018 05:17) (18 - 18)  SpO2: 93% (16 Mar 2018 05:17) (93% - 94%)  Finger Stick        03-15 @ 07: @ 07:00  --------------------------------------------------------  IN: 2180 mL / OUT: 400 mL / NET: 1780 mL     @ 07: @ 13:28  --------------------------------------------------------  IN: 500 mL / OUT: 0 mL / NET: 500 mL        PHYSICAL EXAM:  GENERAL:  [x ] NAD , [x  ] well appearing, [  ] Agitated, [  ] Lethargy, [  ] confused   HEAD:  [ x ] Normal, [  ] Other  EYES:  [x  ] EOMI, [x  ] PERRLA, [  ] conjunctiva and sclera clear normal, [  ] Other,  [  ] Pallor,[  ] Discharge  ENMT:  [ x ] Normal, [ x ] Moist mucous membranes, [  ] Good dentition, [  ] No Thrush  NECK:  [ x] Supple, [ x ] No JVD, [  ] Normal thyroid, [  ] Lymphadenopathy [  ] Other  NERVOUS SYSTEM:  [ x ] Alert & Oriented X3, [  ] Nonfocal   [  ] Confusion  [  ] Encephalopathic [  ] Sedated [  ] Other-  CHEST/LUNG:  [ x ] Clear to auscultation bilaterally, [  ] No rales, [  ] No rhonchi  [  ]  No wheezing  HEART:  [x  ] Regular rate and rhythm  [  ] irregular  [  ] No murmurs, rubs, or gallops, [  ] PPM in place (Mfr:  )  ABDOMEN:  [ x ] Soft, [ x ] Nontender, [  ] Nondistended, [  ]No mass, [  ] Bowel sounds present, [  ] obese  EXTREMITIES: [ x ] 2+ Peripheral Pulses, No clubbing, cyanosis,  [  ] edema, [  ] PVD stasis skin changes  LYMPH: No lymphadenopathy noted  SKIN:  [ x ] No rashes or lesions, [  ] Pressure Ulcers, [  ] echymosis, [  ] Other    DIET:     LABS:                        14.4   8.2   )-----------( 172      ( 16 Mar 2018 07:36 )             42.8     16 Mar 2018 07:36    141    |  106    |  7      ----------------------------<  109    3.3     |  29     |  0.82     Ca    8.6        16 Mar 2018 07:36  Phos  3.8       16 Mar 2018 07:36  Mg     2.0       16 Mar 2018 07:36    TPro  7.0    /  Alb  2.9    /  TBili  0.7    /  DBili  x      /  AST  42     /  ALT  60     /  AlkPhos  75     16 Mar 2018 07:36      Urinalysis Basic - ( 15 Mar 2018 01:25 )    Color: Yellow / Appearance: Clear / S.010 / pH: x  Gluc: x / Ketone: Small  / Bili: Negative / Urobili: Negative   Blood: x / Protein: Negative / Nitrite: Negative   Leuk Esterase: Negative / RBC: x / WBC x   Sq Epi: x / Non Sq Epi: x / Bacteria: x                    RADIOLOGY & ADDITIONAL TESTS:      HEALTH ISSUES - PROBLEM Dx:  Obstructive sleep apnea on CPAP: Obstructive sleep apnea on CPAP  Abnormal laboratory test: Abnormal laboratory test  Need for prophylactic measure: Need for prophylactic measure  HLD (hyperlipidemia): HLD (hyperlipidemia)  Low testosterone in male: Low testosterone in male  HTN (hypertension): HTN (hypertension)  Depression: Depression  Leukocytosis: Leukocytosis  ETOH abuse: ETOH abuse  Acute pancreatitis, unspecified complication status, unspecified pancreatitis type: Acute pancreatitis, unspecified complication status, unspecified pancreatitis type          Consultant(s) Notes Reviewed:  [  ] YES     Care Discussed with [X] Consultants  [  ] Patient  [  ] Family  [  ]   [  ] Social Service  [  ] RN, [  ] Physical Therapy  DVT PPX: [ x ] Lovenox, [  ] S C Heparin, [  ] Coumadin, [  ] Xarelto, [  ] Eliquis, [  ] SCD   Advanced directive: [  ] None, [  ] DNR/DNI Patient is a 63y old  Male who presents with a chief complaint of abdominal pain (16 Mar 2018 12:35)      INTERVAL HPI:  62 yo M pmh htn, hld, ROSS, depression presents to the ED with abdominal and back pain. Pt states the pain started suddenly today and was radiating from mid back to the front. Patient thought he has muscle pain and went to chiropractor had heat therapy and offered no relief. Pt states that the pain has significantly decreased since he has been in the ED. Denies fevers, chills, nausea, vomiting, CP, SOB, palpitations. 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 Also states that he had been seeing psychiatrist and is taking antidepressant medications due to recent death of his son. Also states that his daughter recently finished drug rehab. In the ED, VS 97.7, HR 99, /78, RR 12, SpO2 97 on rm air. WBC 12.2, Hg 15.5, Hct 46.0, platelet 206, Na 135, K 4.7, Cl 100, Co2 27, BUN 13, Cr 0.87, glucose 126, , ALT 97, Lipase 1007, Creatine kinase 361, troponin <0.015, ProBNP 22. UA small ketones, pH 9.0 CT angio chest, abdomen and pelvis with IV contast: No evidence of aortic dissection. Inflammatory change along the head of the pancreas most likely pancreatitis. Small area of low density along the posterior aspect of the pancreatic head may be related to edema although early necrosis or an underlying lesion are not excluded. Follow-up CT or MRI recommended if there are no contraindications. Low density within the portal vein/SMV in the region of the pancreatic head is presumably related to mixing of opacified and unopacified blood. This can also be further evaluated on follow-up imaging. Gallbladder US shows no gallstones. EKG: NSR at 88 bpm. In the ED, Pt received flexeril 10 mg oral x 1, 1 L NaCl 0.9% bolus, famotidine 20 mg IV x 1, morphine 4 mg IV x1, zofran 4 mg IV x 1.    No events overnight. Patient was seen and examined at bedside this AM. Patient described mild back pain, but otherwise no complaints. MRI abdomen with contrast showed pancreatitis, a 4mm cyst and a 1cm focus of hemorrhage, which must be followed up wit ha repeat MRI abd with contrast in 3 months. Patient is enthusiastic to return home. Pt seen by GI ,diet advanced, pt has NO pain, wants to go home. U Tox + opiates .Pt was given Iv Morphine for pain. No sign of withdrawal.      MEDICATIONS  (STANDING):  amLODIPine   Tablet 10 milliGRAM(s) Oral daily  atorvastatin 20 milliGRAM(s) Oral at bedtime  clonazePAM Tablet 1 milliGRAM(s) Oral two times a day  enoxaparin Injectable 40 milliGRAM(s) SubCutaneous daily  escitalopram 20 milliGRAM(s) Oral at bedtime  folic acid 1 milliGRAM(s) Oral daily  lisinopril 40 milliGRAM(s) Oral two times a day  multivitamin 1 Tablet(s) Oral daily  pantoprazole  Injectable 40 milliGRAM(s) IV Push daily  potassium chloride    Tablet ER 40 milliEquivalent(s) Oral every 4 hours  QUEtiapine 100 milliGRAM(s) Oral at bedtime  sodium chloride 0.9%. 1000 milliLiter(s) (125 mL/Hr) IV Continuous <Continuous>  thiamine 100 milliGRAM(s) Oral daily    MEDICATIONS  (PRN):  guaiFENesin/dextromethorphan  Syrup 5 milliLiter(s) Oral every 8 hours PRN Cough, congestion  LORazepam   Injectable 2 milliGRAM(s) IV Push every 2 hours PRN CIWA-Ar score increase by 2 points and a total score of 7 or less  morphine  - Injectable 2 milliGRAM(s) IV Push every 6 hours PRN Moderate Pain (4 - 6)  morphine  - Injectable 4 milliGRAM(s) IV Push every 6 hours PRN Severe Pain (7 - 10)  morphine  - Injectable 1 milliGRAM(s) IV Push every 6 hours PRN Mild Pain (1 - 3)  sodium chloride 0.65% Nasal 1 Spray(s) Both Nostrils five times a day PRN Nasal Congestion      Allergies    No Known Allergies    Intolerances      REVIEW OF SYSTEMS: I feel fine, no complaints, Tolerated oral diet.  CONSTITUTIONAL: No fever, No chills, No fatigue, No myalgia, No Body ache  EYES: No eye pain, visual disturbances, or discharge  ENMT:  No ear pain, No nose bleed, No vertigo; No sinus or throat pain, No Congestion  NECK: No pain, No stiffness  RESPIRATORY: No cough, wheezing, No  hemoptysis, No shortness of breath  CARDIOVASCULAR: No chest pain, palpitations  GASTROINTESTINAL: No abdominal or epigastric pain. No nausea, No vomiting; No diarrhea or constipation. [  ] BM  GENITOURINARY: No dysuria, No frequency, No urgency, No hematuria, or incontinence  NEUROLOGICAL: No headaches, No dizziness, No numbness, No tingling, No tremors, No weakness  EXT: No Swelling, No Pain, No Edema  SKIN:  [ x ] No itching, burning, rashes, or lesions   MUSCULOSKELETAL: No joint pain or swelling; No muscle pain, No back pain, No extremity pain  PSYCHIATRIC: No depression, anxiety, mood swings or difficulty sleeping at night  PAIN SCALE: [x  ] None  [  ] Other-  ROS Unable to obtain due to - [  ] Dementia  [  ] Lethargy  [  ] Sedated   REST OF REVIEW Of SYSTEM - [x  ] Normal     Vital Signs Last 24 Hrs  T(C): 36.9 (16 Mar 2018 05:17), Max: 36.9 (16 Mar 2018 05:17)  T(F): 98.5 (16 Mar 2018 05:17), Max: 98.5 (16 Mar 2018 05:17)  HR: 96 (16 Mar 2018 05:17) (96 - 100)  BP: 112/74 (16 Mar 2018 05:17) (112/74 - 148/75)  BP(mean): --  RR: 18 (16 Mar 2018 05:17) (18 - 18)  SpO2: 93% (16 Mar 2018 05:17) (93% - 94%)  Finger Stick        03-15 @ 07: @ 07:00  --------------------------------------------------------  IN: 2180 mL / OUT: 400 mL / NET: 1780 mL     @ 07: @ 13:28  --------------------------------------------------------  IN: 500 mL / OUT: 0 mL / NET: 500 mL        PHYSICAL EXAM:  GENERAL:  [x ] NAD , [x  ] well appearing, [  ] Agitated, [  ] Lethargy, [  ] confused   HEAD:  [ x ] Normal, [  ] Other  EYES:  [x  ] EOMI, [x  ] PERRLA, [ x ] conjunctiva and sclera clear normal, [  ] Other,  [  ] Pallor,[  ] Discharge  ENMT:  [ x ] Normal, [ x ] Moist mucous membranes, [ x ] Good dentition, [ x ] No Thrush  NECK:  [ x] Supple, [ x ] No JVD, [ x ] Normal thyroid, [  ] Lymphadenopathy [  ] Other  NERVOUS SYSTEM:  [ x ] Alert & Oriented X3, [ x ] Nonfocal   [  ] Confusion  [  ] Encephalopathic [  ] Sedated [  ] Other-  CHEST/LUNG:  [ x ] Clear to auscultation bilaterally, [ x ] No rales, [ x ] No rhonchi  [ x ]  No wheezing  HEART:  [x  ] Regular rate and rhythm  [  ] irregular  [x  ] No murmurs, rubs, or gallops, [  ] PPM in place (Mfr:  )  ABDOMEN:  [ x ] Soft, [ x ] Nontender, [ x ] Nondistended, [ x ]No mass, [  x] Bowel sounds present, [ x ] obese  EXTREMITIES: [ x ] 2+ Peripheral Pulses, No clubbing, cyanosis,  [  ] edema, [  ] PVD stasis skin changes  LYMPH: No lymphadenopathy noted  SKIN:  [ x ] No rashes or lesions, [  ] Pressure Ulcers, [  ] ecchymosis [  ] Other    DIET: Low fat ,Low cholesterol    LABS:                        14.4   8.2   )-----------( 172      ( 16 Mar 2018 07:36 )             42.8     16 Mar 2018 07:36    141    |  106    |  7      ----------------------------<  109    3.3     |  29     |  0.82     Ca    8.6        16 Mar 2018 07:36  Phos  3.8       16 Mar 2018 07:36  Mg     2.0       16 Mar 2018 07:36    TPro  7.0    /  Alb  2.9    /  TBili  0.7    /  DBili  x      /  AST  42     /  ALT  60     /  AlkPhos  75     16 Mar 2018 07:36      Urinalysis Basic - ( 15 Mar 2018 01:25 )    Color: Yellow / Appearance: Clear / S.010 / pH: x  Gluc: x / Ketone: Small  / Bili: Negative / Urobili: Negative   Blood: x / Protein: Negative / Nitrite: Negative   Leuk Esterase: Negative / RBC: x / WBC x   Sq Epi: x / Non Sq Epi: x / Bacteria: x      RADIOLOGY & ADDITIONAL TESTS:  < from: MR Abdomen w/wo IV Cont (03.15.18 @ 19:19) >    INTERPRETATION:  CLINICAL INFORMATION: Evaluate for CBD stone and portal   vein thrombosis. Pancreatitis, evaluate for necrosis    COMPARISON: CT of the abdomen and pelvis dated 3/15/2018    PROCEDURE: Multiplanar multisequence imaging of the abdomen was performed   with and without contrast. MRCP was also performed. 15 mls of Magnevist   was administered intravenously without complication and 0 mls were   discarded.    FINDINGS:     The liver enhances homogeneously without focal lesion.  No intra or   extrahepatic biliary dilatation is noted. There is diffuse fatty   infiltration of the liver. The CBD measures 3 mm. The portal veins are   patent. No intraductal biliary calculi are identified. The gallbladder is   visualized without stones.     There is mild inflammation involving the pancreatic head and uncinate   process. There is a 1 cm focus of hemorrhage within the uncinate process   (series 12, image 36). The pancreatic duct is normal in caliber.     The spleen, adrenal glands, and kidneys are unremarkable in appearance   aside from small bilateral renal cysts.      There is no significant retroperitoneal adenopathy. There is trace free   fluid in the right upper quadrant.      IMPRESSION:      Mild inflammatory changes in the pancreatic head/uncinate process   compatible with pancreatitis. 1 cm focus of hemorrhage within the   uncinate process. 3 month follow-up may be obtained if clinically   indicated.    Fatty infiltration of the liver.    No biliary ductal dilatation or choledocholithiasis.    No portal vein thrombosis.        < end of copied text >      HEALTH ISSUES - PROBLEM Dx:  Obstructive sleep apnea on CPAP: Obstructive sleep apnea on CPAP  Abnormal laboratory test: Abnormal laboratory test  Need for prophylactic measure: Need for prophylactic measure  HLD (hyperlipidemia): HLD (hyperlipidemia)  Low testosterone in male: Low testosterone in male  HTN (hypertension): HTN (hypertension)  Depression: Depression  Leukocytosis: Leukocytosis  ETOH abuse: ETOH abuse  Acute pancreatitis, unspecified complication status, unspecified pancreatitis type: Acute pancreatitis, unspecified complication status, unspecified pancreatitis type      Consultant(s) Notes Reviewed:  [ x ] YES     Care Discussed with [X] Consultants  [ x ] Patient  [  ] Family  [  ]   [  ] Social Service  [ x ] RN, [  ] Physical Therapy  DVT PPX: [ x ] Lovenox, [  ] S C Heparin, [  ] Coumadin, [  ] Xarelto, [  ] Eliquis, [  ] SCD   Advanced directive: [x  ] None, [  ] DNR/DNI

## 2018-03-16 NOTE — PROGRESS NOTE ADULT - PROBLEM SELECTOR PLAN 5
Chronic, stable  Continue home dose klonipin, quetipine, seroquel  Dr. Mcwilliams (PSych) on board, no contraindications to discharge home  Dr. Coats (Addiction) on board  Patient stated he has no thoughts of suicide or self-harm  Previously saw psychiatrist, but practice closed  Follow up with Dr. Mcwilliams as outpatient Chronic, stable  Continue home dose Klonopin, quetiapine, Seroquel  Dr. Mcwilliams (PSych) on board, no contraindications to discharge home  Dr. Coats (Addiction) on board  Patient stated he has no thoughts of suicide or self-harm  Previously saw psychiatrist,  Follow up with Dr. Mcwilliams as outpatient

## 2018-03-16 NOTE — DISCHARGE NOTE ADULT - NS AS ACTIVITY OBS
Bathing allowed/Return to Work/School allowed/Walking-Outdoors allowed/Showering allowed/Walking-Indoors allowed Showering allowed/No Heavy lifting/straining/Bathing allowed/Walking-Outdoors allowed/Return to Work/School allowed/Walking-Indoors allowed

## 2018-03-16 NOTE — DISCHARGE NOTE ADULT - MEDICATION SUMMARY - MEDICATIONS TO TAKE
I will START or STAY ON the medications listed below when I get home from the hospital:    quinapril 40 mg oral tablet  -- 1 tab(s) by mouth 2 times a day  -- Indication: For HTN (hypertension)    KlonoPIN 1 mg oral tablet  -- 1 tab(s) by mouth 3 times a day, As Needed  -- Indication: For Depression    Lexapro 20 mg oral tablet  -- 1 tab(s) by mouth once a day  -- Indication: For Depression    Lipitor 20 mg oral tablet  -- 1 tab(s) by mouth once a day  -- Indication: For Need for prophylactic measure    anastrozole 1 mg oral tablet  -- 1 tab(s) by mouth 2 times a week  -- Indication: For Low testosterone in male    SEROquel 100 mg oral tablet  -- 1 tab(s) by mouth once a day (at bedtime)  -- Indication: For Depression    amLODIPine 10 mg oral tablet  -- 1 tab(s) by mouth once a day  -- Indication: For HTN (hypertension)    folic acid 1 mg oral tablet  -- 1 tab(s) by mouth once a day  -- Indication: For ETOH abuse    Vitamin B1 100 mg oral tablet  -- 1 tab(s) by mouth once a day  -- Indication: For ETOH abuse I will START or STAY ON the medications listed below when I get home from the hospital:    quinapril 40 mg oral tablet  -- 1 tab(s) by mouth 2 times a day  -- Indication: For HTN (hypertension)    KlonoPIN 1 mg oral tablet  -- 1 tab(s) by mouth 3 times a day, As Needed  -- Indication: For Depression    Lexapro 20 mg oral tablet  -- 1 tab(s) by mouth once a day  -- Indication: For Depression    Lipitor 20 mg oral tablet  -- 1 tab(s) by mouth once a day  -- Indication: For Dyslipidemia    anastrozole 1 mg oral tablet  -- 1 tab(s) by mouth 2 times a week  -- Indication: For Low testosterone in male    SEROquel 100 mg oral tablet  -- 1 tab(s) by mouth once a day (at bedtime)  -- Indication: For Depression    amLODIPine 10 mg oral tablet  -- 1 tab(s) by mouth once a day  -- Indication: For HTN (hypertension)    folic acid 1 mg oral tablet  -- 1 tab(s) by mouth once a day  -- Indication: For ETOH abuse, VIt    Vitamin B1 100 mg oral tablet  -- 1 tab(s) by mouth once a day  -- Indication: For ETOH abuse ,vit

## 2018-03-16 NOTE — PROGRESS NOTE ADULT - PROBLEM SELECTOR PLAN 8
Patient states that he received testosterone injections every 3 weeks  States he takes Anastrozole 1 mg, two times a week. Accurate scheduling of medication to be addressed in am. Held Anastrozole for now.

## 2018-03-16 NOTE — PROGRESS NOTE ADULT - PROBLEM SELECTOR PLAN 3
Resolved  Likely due to pancreatitis  Continue to trend WBC Resolved  Likely due to pancreatitis  CBC as out pt

## 2018-03-16 NOTE — DISCHARGE NOTE ADULT - PLAN OF CARE
Please follow up with Dr. Mcwilliams (Psychiatrist) for management of EtOH abuse and depression  Please take supplemental vitamins   NO ALCOHOL Resolved, likely result of pancreatitis Please follow up with Dr. Mcwilliams for management of EtOH abuse and depression  Continue home medications   NO ALCOHOL Continue home medications  Monitor BP continue CPAP at night  monitor symptoms Continue home anastrozole Resolution of pain and symptoms, hemorrhagic lesion Improved, lipase reduced  MRI abdomen exhibited pancreatitis, 4mm cyst and 1cm focus of hemmorhage in uncinate process  Patient needs follow-up MRI abdomen with contrast within 3 months   Follow up with GI specialists (Dr. Faulkner)   Avoid fatty foods  NO ALCOHOL Cessation of alcohol abuse 2/2 Alcohol abuse  MRI abdomen exhibited pancreatitis, 4mm cyst and 1cm focus of hemorrhage in uncinate process  Patient needs follow-up MRI abdomen with IV contrast within 3 months with GI MD  Follow up with GI specialists (Dr. Faulkner)   Avoid fatty foods  NO ALCOHOL at all  Follow with GI in 1-2 weeks follow up with Dr. Mcwilliams (Psychiatrist) for management of EtOH abuse and depression   take supplemental vitamins   NO ALCOHOL 2/2 Alcohol abuse  MRI abdomen exhibited pancreatitis, 4mm cyst and 1cm focus of hemorrhage in uncinate process  Patient needs follow-up MRI abdomen with IV contrast within 3 months with GI MD to follow resolution   Follow up with GI specialists (Dr. Faulkner) OR your   Avoid fatty foods  NO ALCOHOL at all  Follow with GI in 1-2 weeks follow up with Dr. Mcwilliams (Psychiatrist) for management of EtOH abuse and depression   take supplemental vitamins, VIt B12, MVI, Folic Acid daily  NO ALCOHOL Resolved, likely result of pancreatitis, CBC with PMD in 1 week Continue home medications with Norvasc & Quinapril continue CPAP at night  Follow with PMD Continue home anastrozole with your Urologist after 3 weeks  Please d/w your Urologist about Ac Pancreatitis prior to your Next Anastrozole dose

## 2018-03-16 NOTE — PROGRESS NOTE ADULT - SUBJECTIVE AND OBJECTIVE BOX
HPI:  62 yo M pmh htn, hld, ROSS, depression presents to the ED with abdominal and back pain. States the pain started suddenly today and was radiating from mid back to the front. Patient thought he has muscle pain and went to chiropractor had heat therapy and offered no relief. States that the pain has significantly decreased since he has been in the ED. Denies fevers, chills, nausea, vomiting, CP, SOB, palpitations. 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 Also states that he had been seeing psychiatrist and is taking antidepressant medications due to recent death of his son. ~ 18 months ago. Also states that his daughter recently finished drug rehab & came home yesterday.Pt drinks ~4-5 drinks Vodka daily     Detox:  Feels better   slight tremor  on Klonopin    In the ED, VS 97.7, HR 99, /78, RR 12, SpO2 97 on rm air. WBC 12.2, Hg 15.5, Hct 46.0, platelet 206, Na 135, K 4.7, Cl 100, Co2 27, BUN 13, Cr 0.87, glucose 126, , ALT 97, Lipase 1007, Creatine kinase 361, troponin <0.015, ProBNP 22. UA small ketones, pH 9.0    Imaging:   CT angio chest, abdomen and pelvis with IV contast: No evidence of aortic dissection. Inflammatory change along the head of the pancreas most likely   pancreatitis. Small area of low density along the posterior aspect of the pancreatic head may be related to edema although early necrosis or an underlying lesion are not excluded. Follow-up CT or MRI recommended if there are no contraindications.  Low density within the portal vein/SMV in the region of the pancreatic head is presumably related to mixing of opacified and unopacified blood. This can also be further evaluated on follow-up imaging.  Gallbladder US: no gallstones  EKG: NSR at 88 bpm    Received flexeril 10 mg oral x 1, 1 L NaCl 0.9% bolus, famotidine 20 mg IV x 1, morphone 4 mg IV x1, zofran 4 mg IV x 1. (15 Mar 2018 03:57)      Allergies    No Known Allergies    Intolerances        REVIEW OF SYSTEMS:    Constitutional: No fever, weight loss or fatigue  ENT:  No difficulty hearing, tinnitus, vertigo; No sinus or throat pain  Neck: No pain or stiffness  Respiratory: No cough, wheezing, chills or hemoptysis  Cardiovascular: No chest pain, palpitations, shortness of breath, dizziness or leg swelling  Gastrointestinal: No abdominal or epigastric pain. No nausea, vomiting or hematemesis; No diarrhea or constipation. No melena or hematochezia.  Neurological: No headaches, memory loss, loss of strength, numbness or tremors  Musculoskeletal: No joint pain or swelling; No muscle, back or extremity pain  Psychiatric: No depression, anxiety, mood swings or difficulty sleeping    MEDICATIONS  (STANDING):  amLODIPine   Tablet 10 milliGRAM(s) Oral daily  atorvastatin 20 milliGRAM(s) Oral at bedtime  clonazePAM Tablet 1 milliGRAM(s) Oral two times a day  enoxaparin Injectable 40 milliGRAM(s) SubCutaneous daily  escitalopram 20 milliGRAM(s) Oral at bedtime  folic acid 1 milliGRAM(s) Oral daily  lisinopril 40 milliGRAM(s) Oral two times a day  multivitamin 1 Tablet(s) Oral daily  pantoprazole  Injectable 40 milliGRAM(s) IV Push daily  QUEtiapine 100 milliGRAM(s) Oral at bedtime  sodium chloride 0.9%. 1000 milliLiter(s) (125 mL/Hr) IV Continuous <Continuous>  thiamine 100 milliGRAM(s) Oral daily    MEDICATIONS  (PRN):  guaiFENesin/dextromethorphan  Syrup 5 milliLiter(s) Oral every 8 hours PRN Cough, congestion  LORazepam   Injectable 2 milliGRAM(s) IV Push every 2 hours PRN CIWA-Ar score increase by 2 points and a total score of 7 or less  morphine  - Injectable 2 milliGRAM(s) IV Push every 6 hours PRN Moderate Pain (4 - 6)  morphine  - Injectable 4 milliGRAM(s) IV Push every 6 hours PRN Severe Pain (7 - 10)  morphine  - Injectable 1 milliGRAM(s) IV Push every 6 hours PRN Mild Pain (1 - 3)      Vital Signs Last 24 Hrs  T(C): 36.9 (16 Mar 2018 05:17), Max: 37 (15 Mar 2018 13:10)  T(F): 98.5 (16 Mar 2018 05:17), Max: 98.6 (15 Mar 2018 13:10)  HR: 96 (16 Mar 2018 05:17) (96 - 101)  BP: 112/74 (16 Mar 2018 05:17) (112/74 - 152/79)  BP(mean): --  RR: 18 (16 Mar 2018 05:17) (16 - 18)  SpO2: 93% (16 Mar 2018 05:17) (92% - 94%)    PHYSICAL EXAM:    Constitutional: NAD, well-groomed, well-developed  HEENT: PERRLA, EOMI, Normal Hearing, MMM  Neck: No LAD, No JVD  Back: Normal spine flexure, No CVA tenderness  Respiratory: CTAB/L  Cardiovascular: S1 and S2, RRR, no M/G/R  Gastrointestinal: BS+, soft, NT/ND  Extremities: No peripheral edema  Vascular: 2+ peripheral pulses  Neurological: A/O x 3, no focal deficits slight tremor    LABS:                        15.5   10.8  )-----------( 203      ( 15 Mar 2018 16:47 )             47.9     03-15    139  |  105  |  7   ----------------------------<  123<H>  3.9   |  27  |  0.85    Ca    8.4<L>      15 Mar 2018 12:53  Phos  3.0     03-15  Mg     1.8     -15    TPro  7.6  /  Alb  3.4  /  TBili  0.8  /  DBili  x   /  AST  63<H>  /  ALT  83<H>  /  AlkPhos  92  03-15      Urinalysis Basic - ( 15 Mar 2018 01:25 )    Color: Yellow / Appearance: Clear / S.010 / pH: x  Gluc: x / Ketone: Small  / Bili: Negative / Urobili: Negative   Blood: x / Protein: Negative / Nitrite: Negative   Leuk Esterase: Negative / RBC: x / WBC x   Sq Epi: x / Non Sq Epi: x / Bacteria: x        RADIOLOGY & ADDITIONAL STUDIES:      Assessment and Plan:      Alcoholism: continue present treatment plan

## 2018-03-16 NOTE — DISCHARGE NOTE ADULT - SECONDARY DIAGNOSIS.
Leukocytosis Depression HTN (hypertension) Obstructive sleep apnea on CPAP Low testosterone in male ETOH abuse

## 2018-03-16 NOTE — PROGRESS NOTE ADULT - PROBLEM SELECTOR PLAN 2
States that he consumes 2-4 glasses of vodka daily  CIWA protocol, D/W Dr Coats. Restart, Klonopin 2x day  Monitor for withdrawal symptoms  EtOH negative States that he consumes 2-4 glasses of vodka daily, NO signs of withdrawal  CIWA protocol, D/W Dr Coats. Restart, Klonopin 2x day  Monitor for withdrawal symptoms  EtOH level negative, D/W pt STOP drinking Alcohol

## 2018-03-16 NOTE — PROGRESS NOTE ADULT - ATTENDING COMMENTS
Pt seen, examined, case & care plan d/w pt at detail. D/W GI at detail, stable for d/c with out pt follow up with GI , DR Mcwilliams as out pt.  D/C home, Total d/c care time is 45 minutes.
Pt seen, examined, case & care plan d/w pt, residents, GI DR Faulkner, Detox Dr Rodriguez & Dr Mcwilliams at detail.  Pt has capacity to make medical decisions.  AM labs, Clear liquid as per GI  D/W pt at detail.

## 2018-03-16 NOTE — PROGRESS NOTE ADULT - SUBJECTIVE AND OBJECTIVE BOX
Interval Events: Pt denies abdominal pain, nausea, vomiting, diarrhea, Diet advanced to low fat.     HPI:64 yo M pmh htn, hld, ROSS, depression presents to the ED with abdominal and back pain. States the pain started suddenly today and was radiating from mid back to the front. Patient thought he has muscle pain and went to chiropractor had heat therapy and offered no relief. States that the pain has significantly decreased since he has been in the ED. Denies fevers, chills, nausea, vomiting, CP, SOB, palpitations. 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 Also states that he had been seeing psychiatrist and is taking antidepressant medications due to recent death of his son. Also states that his daughter recently finished drug rehab.     Imaging:   CT angio chest, abdomen and pelvis with IV contast: No evidence of aortic dissection. Inflammatory Call to make an appointment [phone] along the head of the pancreas most likely   pancreatitis. Small area of low density along the posterior aspect of the pancreatic head may be related to edema although early necrosis or an underlying lesion are not excluded. Follow-up CT or MRI recommended if there are no contraindications.  Low density within the portal vein/SMV in the region of the pancreatic head is presumably related to mixing of opacified and unopacified blood. This can also be further evaluated on follow-up imaging.  Gallbladder US: no gallstones  EKG: NSR at 88 bpm    Pt states he had an EGD and colonoscopy within 5-7 years which were reportedly normal.       MEDICATIONS  (STANDING):  amLODIPine   Tablet 10 milliGRAM(s) Oral daily  atorvastatin 20 milliGRAM(s) Oral at bedtime  clonazePAM Tablet 1 milliGRAM(s) Oral two times a day  enoxaparin Injectable 40 milliGRAM(s) SubCutaneous daily  escitalopram 20 milliGRAM(s) Oral at bedtime  folic acid 1 milliGRAM(s) Oral daily  lisinopril 40 milliGRAM(s) Oral two times a day  multivitamin 1 Tablet(s) Oral daily  pantoprazole  Injectable 40 milliGRAM(s) IV Push daily  potassium chloride    Tablet ER 40 milliEquivalent(s) Oral every 4 hours  QUEtiapine 100 milliGRAM(s) Oral at bedtime  sodium chloride 0.9%. 1000 milliLiter(s) (125 mL/Hr) IV Continuous <Continuous>  thiamine 100 milliGRAM(s) Oral daily    MEDICATIONS  (PRN):  guaiFENesin/dextromethorphan  Syrup 5 milliLiter(s) Oral every 8 hours PRN Cough, congestion  LORazepam   Injectable 2 milliGRAM(s) IV Push every 2 hours PRN CIWA-Ar score increase by 2 points and a total score of 7 or less  morphine  - Injectable 2 milliGRAM(s) IV Push every 6 hours PRN Moderate Pain (4 - 6)  morphine  - Injectable 4 milliGRAM(s) IV Push every 6 hours PRN Severe Pain (7 - 10)  morphine  - Injectable 1 milliGRAM(s) IV Push every 6 hours PRN Mild Pain (1 - 3)      Allergies    No Known Allergies    Intolerances        Review of Systems:    General:  No wt loss, fevers, chills, night sweats,fatigue,   Eyes:  Good vision, no reported pain  ENT:  No sore throat, pain, runny nose, dysphagia  CV:  No pain, palpitations, hypo/hypertension  Resp:  No dyspnea, cough, tachypnea, wheezing  GI:  No pain, No nausea, No vomiting, No diarrhea, No constipation, No weight loss, No fever, No pruritis, No rectal bleeding, No melena, No dysphagia  :  No pain, bleeding, incontinence, nocturia  Muscle:  No pain, weakness  Neuro:  No weakness, tingling, memory problems  Psych:  No fatigue, insomnia, mood problems, depression  Endocrine:  No polyuria, polydypsia, cold/heat intolerance  Heme:  No petechiae, ecchymosis, easy bruisability  Skin:  No rash, tattoos, scars, edema      Vital Signs Last 24 Hrs  T(C): 36.9 (16 Mar 2018 05:17), Max: 37 (15 Mar 2018 13:10)  T(F): 98.5 (16 Mar 2018 05:17), Max: 98.6 (15 Mar 2018 13:10)  HR: 96 (16 Mar 2018 05:17) (96 - 101)  BP: 112/74 (16 Mar 2018 05:17) (112/74 - 152/79)  BP(mean): --  RR: 18 (16 Mar 2018 05:17) (16 - 18)  SpO2: 93% (16 Mar 2018 05:17) (92% - 94%)    PHYSICAL EXAM:    Constitutional: NAD, well-developed  HEENT: EOMI, throat clear  Neck: No LAD, supple  Respiratory: CTA and P  Cardiovascular: S1 and S2, RRR, no M  Gastrointestinal: BS+, soft, NT/ND, neg HSM,  Extremities: No peripheral edema, neg clubing, cyanosis  Vascular: 2+ peripheral pulses  Neurological: A/O x 3, no focal deficits  Psychiatric: Normal mood, normal affect  Skin: No rashes      LABS:                        14.4   8.2   )-----------( 172      ( 16 Mar 2018 07:36 )             42.8     03-16    141  |  106  |  7   ----------------------------<  109<H>  3.3<L>   |  29  |  0.82    Ca    8.6      16 Mar 2018 07:36  Phos  3.8     03-16  Mg     2.0     03-16    TPro  7.0  /  Alb  2.9<L>  /  TBili  0.7  /  DBili  x   /  AST  42<H>  /  ALT  60  /  AlkPhos  75  03-16      Urinalysis Basic - ( 15 Mar 2018 01:25 )    Color: Yellow / Appearance: Clear / S.010 / pH: x  Gluc: x / Ketone: Small  / Bili: Negative / Urobili: Negative   Blood: x / Protein: Negative / Nitrite: Negative   Leuk Esterase: Negative / RBC: x / WBC x   Sq Epi: x / Non Sq Epi: x / Bacteria: x        RADIOLOGY & ADDITIONAL TESTS:

## 2018-03-16 NOTE — PROGRESS NOTE ADULT - PROBLEM SELECTOR PLAN 6
Chronic, stable  Continue home dose amlodipine  Continue lisinopril (interchange of home dose quinapril)

## 2018-03-16 NOTE — DISCHARGE NOTE ADULT - CARE PROVIDER_API CALL
Michael Wade), Cardiovascular Disease; Internal Medicine  [address] [place] [address]  Phone: [phone]  Fax: [phone]    Srinivasa Mcwilliams), Psychiatry  [address] [place] [address]  Phone: [phone]  Fax: [phone]    Saurav Faulkner (), Gastroenterology; Internal Medicine  [address] [place] [address]  Phone: [phone]  Fax: [phone]

## 2018-03-16 NOTE — PROGRESS NOTE ADULT - PROBLEM SELECTOR PLAN 1
likely secondary to etoh abuse   diet advanced to low fat   MRI abdomen reviewed with mild inflammatory changes in the pancreatic head/uncinate process compatible with pancreatitis. 1 cm focus of hemorrhage within the uncinate process. Recommend 3 month follow-up MRI  dc planning if he can tolerate diet

## 2018-03-16 NOTE — PROGRESS NOTE ADULT - PROBLEM SELECTOR PLAN 4
Elevated creatinine kinase  Possibly 2/2 to muscle damage s/p chiropractor session  Continue IVF  f/u CK Elevated creatinine kinase  Possibly 2/2 to muscle damage s/p chiropractor session  Continue IVF

## 2018-03-16 NOTE — DISCHARGE NOTE ADULT - PATIENT PORTAL LINK FT
You can access the [facility] Patient Portal, offered by [facility], by registering with the following website: [email or web address]

## 2018-03-16 NOTE — DISCHARGE NOTE ADULT - CARE PLAN
Principal Discharge DX:	Acute pancreatitis, unspecified complication status, unspecified pancreatitis type  Goal:	Resolution of pain and symptoms, hemorrhagic lesion  Assessment and plan of treatment:	Improved, lipase reduced  MRI abdomen exhibited pancreatitis, 4mm cyst and 1cm focus of hemmorhage in uncinate process  Patient needs follow-up MRI abdomen with contrast within 3 months   Follow up with GI specialists (Dr. Faulkner)   Avoid fatty foods  NO ALCOHOL  Secondary Diagnosis:	ETOH abuse  Goal:	Cessation of alcohol abuse  Assessment and plan of treatment:	Please follow up with Dr. Mcwilliams (Psychiatrist) for management of EtOH abuse and depression  Please take supplemental vitamins   NO ALCOHOL  Secondary Diagnosis:	Leukocytosis  Assessment and plan of treatment:	Resolved, likely result of pancreatitis  Secondary Diagnosis:	Depression  Assessment and plan of treatment:	Please follow up with Dr. Mcwilliams for management of EtOH abuse and depression  Continue home medications   NO ALCOHOL  Secondary Diagnosis:	HTN (hypertension)  Assessment and plan of treatment:	Continue home medications  Monitor BP  Secondary Diagnosis:	Obstructive sleep apnea on CPAP  Assessment and plan of treatment:	continue CPAP at night  monitor symptoms  Secondary Diagnosis:	Low testosterone in male  Assessment and plan of treatment:	Continue home anastrozole Principal Discharge DX:	Acute pancreatitis, unspecified complication status, unspecified pancreatitis type  Goal:	Resolution of pain and symptoms, hemorrhagic lesion  Assessment and plan of treatment:	2/2 Alcohol abuse  MRI abdomen exhibited pancreatitis, 4mm cyst and 1cm focus of hemorrhage in uncinate process  Patient needs follow-up MRI abdomen with IV contrast within 3 months with GI MD  Follow up with GI specialists (Dr. Faulkner)   Avoid fatty foods  NO ALCOHOL at all  Follow with GI in 1-2 weeks  Secondary Diagnosis:	ETOH abuse  Goal:	Cessation of alcohol abuse  Assessment and plan of treatment:	follow up with Dr. Mcwilliams (Psychiatrist) for management of EtOH abuse and depression   take supplemental vitamins   NO ALCOHOL  Secondary Diagnosis:	Leukocytosis  Assessment and plan of treatment:	Resolved, likely result of pancreatitis  Secondary Diagnosis:	Depression  Assessment and plan of treatment:	Please follow up with Dr. Mcwilliams for management of EtOH abuse and depression  Continue home medications   NO ALCOHOL  Secondary Diagnosis:	HTN (hypertension)  Assessment and plan of treatment:	Continue home medications  Monitor BP  Secondary Diagnosis:	Obstructive sleep apnea on CPAP  Assessment and plan of treatment:	continue CPAP at night  monitor symptoms  Secondary Diagnosis:	Low testosterone in male  Assessment and plan of treatment:	Continue home anastrozole Principal Discharge DX:	Acute pancreatitis, unspecified complication status, unspecified pancreatitis type  Goal:	Resolution of pain and symptoms, hemorrhagic lesion  Assessment and plan of treatment:	2/2 Alcohol abuse  MRI abdomen exhibited pancreatitis, 4mm cyst and 1cm focus of hemorrhage in uncinate process  Patient needs follow-up MRI abdomen with IV contrast within 3 months with GI MD to follow resolution   Follow up with GI specialists (Dr. Faulkner) OR your   Avoid fatty foods  NO ALCOHOL at all  Follow with GI in 1-2 weeks  Secondary Diagnosis:	ETOH abuse  Goal:	Cessation of alcohol abuse  Assessment and plan of treatment:	follow up with Dr. Mcwilliams (Psychiatrist) for management of EtOH abuse and depression   take supplemental vitamins, VIt B12, MVI, Folic Acid daily  NO ALCOHOL  Secondary Diagnosis:	Leukocytosis  Assessment and plan of treatment:	Resolved, likely result of pancreatitis, CBC with PMD in 1 week  Secondary Diagnosis:	Depression  Assessment and plan of treatment:	Please follow up with Dr. Mcwilliams for management of EtOH abuse and depression  Continue home medications   NO ALCOHOL  Secondary Diagnosis:	HTN (hypertension)  Assessment and plan of treatment:	Continue home medications with Norvasc & Quinapril  Secondary Diagnosis:	Obstructive sleep apnea on CPAP  Assessment and plan of treatment:	continue CPAP at night  Follow with PMD  Secondary Diagnosis:	Low testosterone in male  Assessment and plan of treatment:	Continue home anastrozole with your Urologist after 3 weeks  Please d/w your Urologist about Ac Pancreatitis prior to your Next Anastrozole dose

## 2018-03-16 NOTE — PROGRESS NOTE ADULT - PROBLEM SELECTOR PLAN 1
Likely 2/2 to ETOH abuse, Improved  Pain significantly improved  Lipase 334 today  CT a/p Ac pancreatitis Edema head of pancreas  MRI shows pancreatitis, 4mm cyst and 1cm focus of hemorrhage in uncinate process, as per GI must follow up and repeat MRI abd with contrast in 3 months  Continue IVF at 125 cc/hr  Pain control with morphine  GI Dr. Faulkner on board, as per Renée (GI PA)  c/w Protonix 40 mg IV  F/u folate, B12 levels Likely 2/2 to ETOH abuse, Improved  Pain significantly improved  Lipase 334 today  CT a/p Ac pancreatitis Edema head of pancreas, hepatic steatosis  MRI shows pancreatitis, 4mm cyst and 1cm focus of hemorrhage in uncinate process, as per GI must follow up and repeat MRI abd with contrast in 3 months  Continue IVF at 125 cc/hr  Pain control with morphine  GI Dr. Faulkner D/W, stable for d/c with out pt follow up  c/w Protonix 40 mg IV, PO K Dur 40 Meq x 2 doses today for Low K  F/u folate, B12 levels  D/W pt about Liver steatosis, Pancreatic cyst & Hemorrhagic lesionwhich needs close follow up & STOP Alcohol

## 2018-03-16 NOTE — DISCHARGE NOTE ADULT - HOSPITAL COURSE
62 yo M pmh htn, hld, ROSS, depression presented to the ED with abdominal and back pain. Pt states the pain started suddenly and was radiating from mid back to the front. Patient thought he has muscle pain and went to chiropractor had heat therapy and offered no relief. 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 Also states that he had been seeing psychiatrist and is taking antidepressant medications due to recent death of his son. Also states that his daughter recently finished drug rehab. In the ED, VS 97.7, HR 99, /78, RR 12, SpO2 97 on rm air. WBC 12.2, Hg 15.5, Hct 46.0, platelet 206, Na 135, K 4.7, Cl 100, Co2 27, BUN 13, Cr 0.87, glucose 126, , ALT 97, Lipase 1007, Creatine kinase 361, troponin <0.015, ProBNP 22. UA small ketones, pH 9.0. CT angio chest, abdomen and pelvis with IV contrast showed inflammatory change along the head of the pancreas, most likely pancreatitis. Gallbladder US shows no gallstones. EKG: NSR at 88 bpm. In the ED, Pt received flexeril 10 mg oral x 1, 1 L NaCl 0.9% bolus, famotidine 20 mg IV x 1, morphine 4 mg IV x1, zofran 4 mg IV x 1. Patient was seen by addiction specialist, and started on CIWA protocol, which was not utilized. Patient was seen by GI specialist. MRI abdomen with contrast showed mild inflammatory changes in the pancreatic head/uncinate process compatible with pancreatitis, 4mm cyst in uncinate process, and 1 cm focus of hemorrhage within the uncinate process. GI recommends follow up with GI and repeat MRI abdomen with contrast in 3 months. Lipase was found to be reduced. Patient was also evaluated by Dr. Mcwilliams (psychiatry), who found o psychiatric contraindications to discharge. Patient stated he had no suicidal ideations, or thoughts to hurt himself or others. Patient was optimized and discharged home. 62 yo M pmh htn, hld, ROSS, depression presented to the ED with abdominal and back pain. Pt states the pain started suddenly and was radiating from mid back to the front. Patient thought he has muscle pain and went to chiropractor had heat therapy and offered no relief. 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 Also states that he had been seeing psychiatrist and is taking antidepressant medications due to recent death of his son. Also states that his daughter recently finished drug rehab. In the ED, VS 97.7, HR 99, /78, RR 12, SpO2 97 on rm air. WBC 12.2, Hg 15.5, Hct 46.0, platelet 206, Na 135, K 4.7, Cl 100, Co2 27, BUN 13, Cr 0.87, glucose 126, , ALT 97, Lipase 1007, Creatine kinase 361, troponin <0.015, ProBNP 22. UA small ketones, pH 9.0. CT angio chest, abdomen and pelvis with IV contrast showed inflammatory change along the head of the pancreas, most likely pancreatitis. Gallbladder US shows no gallstones. EKG: NSR at 88 bpm. In the ED, Pt received flexeril 10 mg oral x 1, 1 L NaCl 0.9% bolus, famotidine 20 mg IV x 1, morphine 4 mg IV x1, Zofran 4 mg IV x 1. Patient was seen by addiction specialist, and started on CIWA protocol, which was not utilized. Patient was seen by GI specialist. Detox Eval done by Dr Coats , Psych Dr Mcwilliams on case, Pt was NPO, later changed to clear Liquid & advance as tolerated to Low Fat diet. D/W pt at detail about NO Alcohol, as MRI abdomen showed Liver & Pancreatic changes.   MRI abdomen with contrast showed mild inflammatory changes in the pancreatic head/uncinate process compatible with pancreatitis, 4mm cyst in uncinate process, and 1 cm focus of hemorrhage within the uncinate process. GI recommends follow up with GI and repeat MRI abdomen with contrast in 3 months. Lipase was found to be reduced. Patient was also evaluated by Dr. Mcwilliams (psychiatry), who found o psychiatric contraindications to discharge. Patient stated he had no suicidal ideations, or thoughts to hurt himself or others. Patient was optimized and discharged home. Pt Voiced Verbal understanding & Agrees to foloow with all MD"s & to STOP ETOH drinking.

## 2018-03-19 LAB
IGG SERPL-MCNC: 1126 MG/DL — SIGNIFICANT CHANGE UP (ref 700–1600)
IGG1 SER-MCNC: 798 MG/DL — SIGNIFICANT CHANGE UP (ref 248–810)
IGG2 SER-MCNC: 397 MG/DL — SIGNIFICANT CHANGE UP (ref 130–555)
IGG3 SER-MCNC: 53 MG/DL — SIGNIFICANT CHANGE UP (ref 15–102)
IGG4 SER-MCNC: 67 MG/DL — SIGNIFICANT CHANGE UP (ref 2–96)

## 2018-04-09 RX ORDER — QUETIAPINE FUMARATE 200 MG/1
0 TABLET, FILM COATED ORAL
Qty: 0 | Refills: 0 | COMMUNITY

## 2018-04-09 RX ORDER — CLONAZEPAM 1 MG
0 TABLET ORAL
Qty: 0 | Refills: 0 | COMMUNITY

## 2018-04-09 RX ORDER — ATORVASTATIN CALCIUM 80 MG/1
0 TABLET, FILM COATED ORAL
Qty: 0 | Refills: 0 | COMMUNITY

## 2018-04-09 RX ORDER — ESCITALOPRAM OXALATE 10 MG/1
0 TABLET, FILM COATED ORAL
Qty: 0 | Refills: 0 | COMMUNITY

## 2018-04-09 RX ORDER — QUINAPRIL HYDROCHLORIDE 40 MG/1
0 TABLET, FILM COATED ORAL
Qty: 0 | Refills: 0 | COMMUNITY

## 2018-12-01 ENCOUNTER — INPATIENT (INPATIENT)
Facility: HOSPITAL | Age: 63
LOS: 4 days | Discharge: OTHER INSTITUTION | DRG: 438 | End: 2018-12-06
Attending: STUDENT IN AN ORGANIZED HEALTH CARE EDUCATION/TRAINING PROGRAM | Admitting: INTERNAL MEDICINE
Payer: COMMERCIAL

## 2018-12-01 VITALS
HEART RATE: 98 BPM | RESPIRATION RATE: 18 BRPM | DIASTOLIC BLOOD PRESSURE: 70 MMHG | HEIGHT: 70 IN | OXYGEN SATURATION: 98 % | SYSTOLIC BLOOD PRESSURE: 133 MMHG | WEIGHT: 220.02 LBS | TEMPERATURE: 98 F

## 2018-12-01 DIAGNOSIS — Z98.89 OTHER SPECIFIED POSTPROCEDURAL STATES: Chronic | ICD-10-CM

## 2018-12-01 DIAGNOSIS — Z98.890 OTHER SPECIFIED POSTPROCEDURAL STATES: Chronic | ICD-10-CM

## 2018-12-01 DIAGNOSIS — A41.9 SEPSIS, UNSPECIFIED ORGANISM: ICD-10-CM

## 2018-12-01 DIAGNOSIS — K85.92 ACUTE PANCREATITIS WITH INFECTED NECROSIS, UNSPECIFIED: ICD-10-CM

## 2018-12-01 DIAGNOSIS — E78.5 HYPERLIPIDEMIA, UNSPECIFIED: ICD-10-CM

## 2018-12-01 DIAGNOSIS — F10.10 ALCOHOL ABUSE, UNCOMPLICATED: ICD-10-CM

## 2018-12-01 DIAGNOSIS — I10 ESSENTIAL (PRIMARY) HYPERTENSION: ICD-10-CM

## 2018-12-01 DIAGNOSIS — G47.00 INSOMNIA, UNSPECIFIED: ICD-10-CM

## 2018-12-01 DIAGNOSIS — Z29.9 ENCOUNTER FOR PROPHYLACTIC MEASURES, UNSPECIFIED: ICD-10-CM

## 2018-12-01 DIAGNOSIS — M51.26 OTHER INTERVERTEBRAL DISC DISPLACEMENT, LUMBAR REGION: ICD-10-CM

## 2018-12-01 DIAGNOSIS — F32.9 MAJOR DEPRESSIVE DISORDER, SINGLE EPISODE, UNSPECIFIED: ICD-10-CM

## 2018-12-01 DIAGNOSIS — R73.03 PREDIABETES: ICD-10-CM

## 2018-12-01 PROBLEM — E29.1 TESTICULAR HYPOFUNCTION: Chronic | Status: ACTIVE | Noted: 2018-03-15

## 2018-12-01 LAB
ALBUMIN SERPL ELPH-MCNC: 3.2 G/DL — LOW (ref 3.3–5)
ALP SERPL-CCNC: 425 U/L — HIGH (ref 40–120)
ALT FLD-CCNC: 214 U/L — HIGH (ref 12–78)
AMMONIA BLD-MCNC: 79 UMOL/L — HIGH (ref 11–32)
AMYLASE P1 CFR SERPL: 358 U/L — HIGH (ref 25–115)
ANION GAP SERPL CALC-SCNC: 12 MMOL/L — SIGNIFICANT CHANGE UP (ref 5–17)
ANION GAP SERPL CALC-SCNC: 13 MMOL/L — SIGNIFICANT CHANGE UP (ref 5–17)
ANION GAP SERPL CALC-SCNC: 17 MMOL/L — SIGNIFICANT CHANGE UP (ref 5–17)
APAP SERPL-MCNC: <2 UG/ML — LOW (ref 10–30)
APPEARANCE UR: CLEAR — SIGNIFICANT CHANGE UP
APTT BLD: 27.4 SEC — LOW (ref 27.5–36.3)
AST SERPL-CCNC: 404 U/L — HIGH (ref 15–37)
BACTERIA # UR AUTO: ABNORMAL
BASOPHILS # BLD AUTO: 0.02 K/UL — SIGNIFICANT CHANGE UP (ref 0–0.2)
BASOPHILS NFR BLD AUTO: 0.2 % — SIGNIFICANT CHANGE UP (ref 0–2)
BILIRUB SERPL-MCNC: 0.9 MG/DL — SIGNIFICANT CHANGE UP (ref 0.2–1.2)
BILIRUB UR-MCNC: NEGATIVE — SIGNIFICANT CHANGE UP
BUN SERPL-MCNC: 16 MG/DL — SIGNIFICANT CHANGE UP (ref 7–23)
CALCIUM SERPL-MCNC: 6.2 MG/DL — CRITICAL LOW (ref 8.5–10.1)
CALCIUM SERPL-MCNC: 6.8 MG/DL — LOW (ref 8.5–10.1)
CALCIUM SERPL-MCNC: 7.5 MG/DL — LOW (ref 8.5–10.1)
CHLORIDE SERPL-SCNC: 100 MMOL/L — SIGNIFICANT CHANGE UP (ref 96–108)
CHLORIDE SERPL-SCNC: 94 MMOL/L — LOW (ref 96–108)
CHLORIDE SERPL-SCNC: 98 MMOL/L — SIGNIFICANT CHANGE UP (ref 96–108)
CK SERPL-CCNC: 375 U/L — HIGH (ref 26–308)
CO2 SERPL-SCNC: 19 MMOL/L — LOW (ref 22–31)
CO2 SERPL-SCNC: 20 MMOL/L — LOW (ref 22–31)
CO2 SERPL-SCNC: 21 MMOL/L — LOW (ref 22–31)
COLOR SPEC: YELLOW — SIGNIFICANT CHANGE UP
CREAT SERPL-MCNC: 0.8 MG/DL — SIGNIFICANT CHANGE UP (ref 0.5–1.3)
CREAT SERPL-MCNC: 0.92 MG/DL — SIGNIFICANT CHANGE UP (ref 0.5–1.3)
CREAT SERPL-MCNC: 1 MG/DL — SIGNIFICANT CHANGE UP (ref 0.5–1.3)
DIFF PNL FLD: NEGATIVE — SIGNIFICANT CHANGE UP
EOSINOPHIL # BLD AUTO: 0 K/UL — SIGNIFICANT CHANGE UP (ref 0–0.5)
EOSINOPHIL NFR BLD AUTO: 0 % — SIGNIFICANT CHANGE UP (ref 0–6)
ETHANOL SERPL-MCNC: <10 MG/DL — SIGNIFICANT CHANGE UP (ref 0–10)
GLUCOSE SERPL-MCNC: 141 MG/DL — HIGH (ref 70–99)
GLUCOSE SERPL-MCNC: 181 MG/DL — HIGH (ref 70–99)
GLUCOSE SERPL-MCNC: 197 MG/DL — HIGH (ref 70–99)
GLUCOSE UR QL: 50 MG/DL
HCT VFR BLD CALC: 38.1 % — LOW (ref 39–50)
HCT VFR BLD CALC: 43.3 % — SIGNIFICANT CHANGE UP (ref 39–50)
HGB BLD-MCNC: 13.7 G/DL — SIGNIFICANT CHANGE UP (ref 13–17)
HGB BLD-MCNC: 15.2 G/DL — SIGNIFICANT CHANGE UP (ref 13–17)
IMM GRANULOCYTES NFR BLD AUTO: 0.4 % — SIGNIFICANT CHANGE UP (ref 0–1.5)
INR BLD: 1.25 RATIO — HIGH (ref 0.88–1.16)
KETONES UR-MCNC: ABNORMAL
LACTATE SERPL-SCNC: 2.3 MMOL/L — HIGH (ref 0.7–2)
LACTATE SERPL-SCNC: 2.3 MMOL/L — HIGH (ref 0.7–2)
LEUKOCYTE ESTERASE UR-ACNC: ABNORMAL
LIDOCAIN IGE QN: HIGH U/L (ref 73–393)
LYMPHOCYTES # BLD AUTO: 0.49 K/UL — LOW (ref 1–3.3)
LYMPHOCYTES # BLD AUTO: 5.2 % — LOW (ref 13–44)
MAGNESIUM SERPL-MCNC: 1.1 MG/DL — LOW (ref 1.6–2.6)
MAGNESIUM SERPL-MCNC: 2.9 MG/DL — HIGH (ref 1.6–2.6)
MCHC RBC-ENTMCNC: 34.1 PG — HIGH (ref 27–34)
MCHC RBC-ENTMCNC: 34.4 PG — HIGH (ref 27–34)
MCHC RBC-ENTMCNC: 35.1 GM/DL — SIGNIFICANT CHANGE UP (ref 32–36)
MCHC RBC-ENTMCNC: 36 GM/DL — SIGNIFICANT CHANGE UP (ref 32–36)
MCV RBC AUTO: 95.7 FL — SIGNIFICANT CHANGE UP (ref 80–100)
MCV RBC AUTO: 97.1 FL — SIGNIFICANT CHANGE UP (ref 80–100)
MONOCYTES # BLD AUTO: 0.42 K/UL — SIGNIFICANT CHANGE UP (ref 0–0.9)
MONOCYTES NFR BLD AUTO: 4.5 % — SIGNIFICANT CHANGE UP (ref 2–14)
NEUTROPHILS # BLD AUTO: 8.41 K/UL — HIGH (ref 1.8–7.4)
NEUTROPHILS NFR BLD AUTO: 89.7 % — HIGH (ref 43–77)
NITRITE UR-MCNC: NEGATIVE — SIGNIFICANT CHANGE UP
NRBC # BLD: 0 /100 WBCS — SIGNIFICANT CHANGE UP (ref 0–0)
PH UR: 8 — SIGNIFICANT CHANGE UP (ref 5–8)
PHOSPHATE SERPL-MCNC: 2.5 MG/DL — SIGNIFICANT CHANGE UP (ref 2.5–4.5)
PLATELET # BLD AUTO: 177 K/UL — SIGNIFICANT CHANGE UP (ref 150–400)
PLATELET # BLD AUTO: 214 K/UL — SIGNIFICANT CHANGE UP (ref 150–400)
POTASSIUM SERPL-MCNC: 4.4 MMOL/L — SIGNIFICANT CHANGE UP (ref 3.5–5.3)
POTASSIUM SERPL-MCNC: 5.6 MMOL/L — HIGH (ref 3.5–5.3)
POTASSIUM SERPL-MCNC: 5.9 MMOL/L — HIGH (ref 3.5–5.3)
POTASSIUM SERPL-SCNC: 4.4 MMOL/L — SIGNIFICANT CHANGE UP (ref 3.5–5.3)
POTASSIUM SERPL-SCNC: 5.6 MMOL/L — HIGH (ref 3.5–5.3)
POTASSIUM SERPL-SCNC: 5.9 MMOL/L — HIGH (ref 3.5–5.3)
PROT SERPL-MCNC: 7.5 G/DL — SIGNIFICANT CHANGE UP (ref 6–8.3)
PROT UR-MCNC: 25 MG/DL
PROTHROM AB SERPL-ACNC: 14.2 SEC — HIGH (ref 10–12.9)
RBC # BLD: 3.98 M/UL — LOW (ref 4.2–5.8)
RBC # BLD: 4.46 M/UL — SIGNIFICANT CHANGE UP (ref 4.2–5.8)
RBC # FLD: 12.2 % — SIGNIFICANT CHANGE UP (ref 10.3–14.5)
RBC # FLD: 12.6 % — SIGNIFICANT CHANGE UP (ref 10.3–14.5)
SODIUM SERPL-SCNC: 130 MMOL/L — LOW (ref 135–145)
SODIUM SERPL-SCNC: 130 MMOL/L — LOW (ref 135–145)
SODIUM SERPL-SCNC: 134 MMOL/L — LOW (ref 135–145)
SP GR SPEC: 1.01 — SIGNIFICANT CHANGE UP (ref 1.01–1.02)
TRIGL SERPL-MCNC: 1845 MG/DL — HIGH (ref 10–149)
UROBILINOGEN FLD QL: 1
WBC # BLD: 12.69 K/UL — HIGH (ref 3.8–10.5)
WBC # BLD: 9.38 K/UL — SIGNIFICANT CHANGE UP (ref 3.8–10.5)
WBC # FLD AUTO: 12.69 K/UL — HIGH (ref 3.8–10.5)
WBC # FLD AUTO: 9.38 K/UL — SIGNIFICANT CHANGE UP (ref 3.8–10.5)
WBC UR QL: SIGNIFICANT CHANGE UP

## 2018-12-01 PROCEDURE — 76705 ECHO EXAM OF ABDOMEN: CPT | Mod: 26

## 2018-12-01 PROCEDURE — 93010 ELECTROCARDIOGRAM REPORT: CPT

## 2018-12-01 PROCEDURE — 71045 X-RAY EXAM CHEST 1 VIEW: CPT | Mod: 26

## 2018-12-01 PROCEDURE — 74177 CT ABD & PELVIS W/CONTRAST: CPT | Mod: 26

## 2018-12-01 PROCEDURE — 99223 1ST HOSP IP/OBS HIGH 75: CPT | Mod: AI,GC

## 2018-12-01 PROCEDURE — 99291 CRITICAL CARE FIRST HOUR: CPT

## 2018-12-01 PROCEDURE — 99285 EMERGENCY DEPT VISIT HI MDM: CPT

## 2018-12-01 RX ORDER — INSULIN HUMAN 100 [IU]/ML
10 INJECTION, SOLUTION SUBCUTANEOUS
Qty: 100 | Refills: 0 | Status: DISCONTINUED | OUTPATIENT
Start: 2018-12-01 | End: 2018-12-03

## 2018-12-01 RX ORDER — SODIUM CHLORIDE 9 MG/ML
1000 INJECTION, SOLUTION INTRAVENOUS
Qty: 0 | Refills: 0 | Status: DISCONTINUED | OUTPATIENT
Start: 2018-12-01 | End: 2018-12-01

## 2018-12-01 RX ORDER — MAGNESIUM SULFATE 500 MG/ML
4 VIAL (ML) INJECTION ONCE
Qty: 0 | Refills: 0 | Status: COMPLETED | OUTPATIENT
Start: 2018-12-01 | End: 2018-12-01

## 2018-12-01 RX ORDER — MORPHINE SULFATE 50 MG/1
4 CAPSULE, EXTENDED RELEASE ORAL ONCE
Qty: 0 | Refills: 0 | Status: DISCONTINUED | OUTPATIENT
Start: 2018-12-01 | End: 2018-12-01

## 2018-12-01 RX ORDER — HYDROMORPHONE HYDROCHLORIDE 2 MG/ML
2 INJECTION INTRAMUSCULAR; INTRAVENOUS; SUBCUTANEOUS EVERY 4 HOURS
Qty: 0 | Refills: 0 | Status: DISCONTINUED | OUTPATIENT
Start: 2018-12-01 | End: 2018-12-04

## 2018-12-01 RX ORDER — HYDROMORPHONE HYDROCHLORIDE 2 MG/ML
1 INJECTION INTRAMUSCULAR; INTRAVENOUS; SUBCUTANEOUS EVERY 4 HOURS
Qty: 0 | Refills: 0 | Status: DISCONTINUED | OUTPATIENT
Start: 2018-12-01 | End: 2018-12-04

## 2018-12-01 RX ORDER — SODIUM CHLORIDE 9 MG/ML
1000 INJECTION INTRAMUSCULAR; INTRAVENOUS; SUBCUTANEOUS ONCE
Qty: 0 | Refills: 0 | Status: COMPLETED | OUTPATIENT
Start: 2018-12-01 | End: 2018-12-01

## 2018-12-01 RX ORDER — HYDROMORPHONE HYDROCHLORIDE 2 MG/ML
1 INJECTION INTRAMUSCULAR; INTRAVENOUS; SUBCUTANEOUS
Qty: 0 | Refills: 0 | Status: DISCONTINUED | OUTPATIENT
Start: 2018-12-01 | End: 2018-12-01

## 2018-12-01 RX ORDER — DEXMEDETOMIDINE HYDROCHLORIDE IN 0.9% SODIUM CHLORIDE 4 UG/ML
0.2 INJECTION INTRAVENOUS
Qty: 200 | Refills: 0 | Status: DISCONTINUED | OUTPATIENT
Start: 2018-12-01 | End: 2018-12-05

## 2018-12-01 RX ORDER — FOLIC ACID 0.8 MG
1 TABLET ORAL DAILY
Qty: 0 | Refills: 0 | Status: DISCONTINUED | OUTPATIENT
Start: 2018-12-01 | End: 2018-12-06

## 2018-12-01 RX ORDER — PIPERACILLIN AND TAZOBACTAM 4; .5 G/20ML; G/20ML
3.38 INJECTION, POWDER, LYOPHILIZED, FOR SOLUTION INTRAVENOUS ONCE
Qty: 0 | Refills: 0 | Status: COMPLETED | OUTPATIENT
Start: 2018-12-01 | End: 2018-12-01

## 2018-12-01 RX ORDER — HYDROMORPHONE HYDROCHLORIDE 2 MG/ML
0.5 INJECTION INTRAMUSCULAR; INTRAVENOUS; SUBCUTANEOUS ONCE
Qty: 0 | Refills: 0 | Status: DISCONTINUED | OUTPATIENT
Start: 2018-12-01 | End: 2018-12-01

## 2018-12-01 RX ORDER — CALCIUM GLUCONATE 100 MG/ML
2 VIAL (ML) INTRAVENOUS ONCE
Qty: 0 | Refills: 0 | Status: COMPLETED | OUTPATIENT
Start: 2018-12-01 | End: 2018-12-01

## 2018-12-01 RX ORDER — ESCITALOPRAM OXALATE 10 MG/1
1 TABLET, FILM COATED ORAL
Qty: 0 | Refills: 0 | COMMUNITY

## 2018-12-01 RX ORDER — THIAMINE MONONITRATE (VIT B1) 100 MG
100 TABLET ORAL DAILY
Qty: 0 | Refills: 0 | Status: DISCONTINUED | OUTPATIENT
Start: 2018-12-01 | End: 2018-12-06

## 2018-12-01 RX ORDER — HEPARIN SODIUM 5000 [USP'U]/ML
5000 INJECTION INTRAVENOUS; SUBCUTANEOUS EVERY 8 HOURS
Qty: 0 | Refills: 0 | Status: DISCONTINUED | OUTPATIENT
Start: 2018-12-01 | End: 2018-12-01

## 2018-12-01 RX ORDER — SODIUM CHLORIDE 9 MG/ML
2000 INJECTION, SOLUTION INTRAVENOUS ONCE
Qty: 0 | Refills: 0 | Status: COMPLETED | OUTPATIENT
Start: 2018-12-01 | End: 2018-12-01

## 2018-12-01 RX ORDER — ENOXAPARIN SODIUM 100 MG/ML
40 INJECTION SUBCUTANEOUS DAILY
Qty: 0 | Refills: 0 | Status: DISCONTINUED | OUTPATIENT
Start: 2018-12-01 | End: 2018-12-06

## 2018-12-01 RX ORDER — ONDANSETRON 8 MG/1
4 TABLET, FILM COATED ORAL ONCE
Qty: 0 | Refills: 0 | Status: COMPLETED | OUTPATIENT
Start: 2018-12-01 | End: 2018-12-01

## 2018-12-01 RX ORDER — INFLUENZA VIRUS VACCINE 15; 15; 15; 15 UG/.5ML; UG/.5ML; UG/.5ML; UG/.5ML
0.5 SUSPENSION INTRAMUSCULAR ONCE
Qty: 0 | Refills: 0 | Status: DISCONTINUED | OUTPATIENT
Start: 2018-12-01 | End: 2018-12-06

## 2018-12-01 RX ORDER — SODIUM CHLORIDE 9 MG/ML
1000 INJECTION, SOLUTION INTRAVENOUS
Qty: 0 | Refills: 0 | Status: DISCONTINUED | OUTPATIENT
Start: 2018-12-01 | End: 2018-12-02

## 2018-12-01 RX ORDER — CLONAZEPAM 1 MG
1 TABLET ORAL
Qty: 0 | Refills: 0 | COMMUNITY

## 2018-12-01 RX ADMIN — HYDROMORPHONE HYDROCHLORIDE 0.5 MILLIGRAM(S): 2 INJECTION INTRAMUSCULAR; INTRAVENOUS; SUBCUTANEOUS at 09:13

## 2018-12-01 RX ADMIN — SODIUM CHLORIDE 150 MILLILITER(S): 9 INJECTION, SOLUTION INTRAVENOUS at 13:03

## 2018-12-01 RX ADMIN — Medication 50 MILLIGRAM(S): at 11:20

## 2018-12-01 RX ADMIN — HYDROMORPHONE HYDROCHLORIDE 2 MILLIGRAM(S): 2 INJECTION INTRAMUSCULAR; INTRAVENOUS; SUBCUTANEOUS at 16:05

## 2018-12-01 RX ADMIN — HYDROMORPHONE HYDROCHLORIDE 1 MILLIGRAM(S): 2 INJECTION INTRAMUSCULAR; INTRAVENOUS; SUBCUTANEOUS at 11:55

## 2018-12-01 RX ADMIN — Medication 100 MILLIGRAM(S): at 13:02

## 2018-12-01 RX ADMIN — Medication 200 GRAM(S): at 21:00

## 2018-12-01 RX ADMIN — Medication 1 MILLIGRAM(S): at 13:02

## 2018-12-01 RX ADMIN — SODIUM CHLORIDE 1000 MILLILITER(S): 9 INJECTION INTRAMUSCULAR; INTRAVENOUS; SUBCUTANEOUS at 10:20

## 2018-12-01 RX ADMIN — SODIUM CHLORIDE 1000 MILLILITER(S): 9 INJECTION INTRAMUSCULAR; INTRAVENOUS; SUBCUTANEOUS at 10:15

## 2018-12-01 RX ADMIN — HYDROMORPHONE HYDROCHLORIDE 0.5 MILLIGRAM(S): 2 INJECTION INTRAMUSCULAR; INTRAVENOUS; SUBCUTANEOUS at 10:14

## 2018-12-01 RX ADMIN — MORPHINE SULFATE 4 MILLIGRAM(S): 50 CAPSULE, EXTENDED RELEASE ORAL at 07:15

## 2018-12-01 RX ADMIN — SODIUM CHLORIDE 150 MILLILITER(S): 9 INJECTION, SOLUTION INTRAVENOUS at 12:59

## 2018-12-01 RX ADMIN — Medication 100 GRAM(S): at 19:02

## 2018-12-01 RX ADMIN — DEXMEDETOMIDINE HYDROCHLORIDE IN 0.9% SODIUM CHLORIDE 4.99 MICROGRAM(S)/KG/HR: 4 INJECTION INTRAVENOUS at 15:34

## 2018-12-01 RX ADMIN — ONDANSETRON 4 MILLIGRAM(S): 8 TABLET, FILM COATED ORAL at 09:05

## 2018-12-01 RX ADMIN — Medication 50 MILLIGRAM(S): at 22:34

## 2018-12-01 RX ADMIN — ONDANSETRON 4 MILLIGRAM(S): 8 TABLET, FILM COATED ORAL at 06:46

## 2018-12-01 RX ADMIN — INSULIN HUMAN 10 UNIT(S)/HR: 100 INJECTION, SOLUTION SUBCUTANEOUS at 18:12

## 2018-12-01 RX ADMIN — HYDROMORPHONE HYDROCHLORIDE 0.5 MILLIGRAM(S): 2 INJECTION INTRAMUSCULAR; INTRAVENOUS; SUBCUTANEOUS at 08:13

## 2018-12-01 RX ADMIN — DEXMEDETOMIDINE HYDROCHLORIDE IN 0.9% SODIUM CHLORIDE 4.99 MICROGRAM(S)/KG/HR: 4 INJECTION INTRAVENOUS at 23:02

## 2018-12-01 RX ADMIN — MORPHINE SULFATE 4 MILLIGRAM(S): 50 CAPSULE, EXTENDED RELEASE ORAL at 06:46

## 2018-12-01 RX ADMIN — SODIUM CHLORIDE 1000 MILLILITER(S): 9 INJECTION INTRAMUSCULAR; INTRAVENOUS; SUBCUTANEOUS at 06:47

## 2018-12-01 RX ADMIN — HYDROMORPHONE HYDROCHLORIDE 2 MILLIGRAM(S): 2 INJECTION INTRAMUSCULAR; INTRAVENOUS; SUBCUTANEOUS at 15:51

## 2018-12-01 RX ADMIN — SODIUM CHLORIDE 1000 MILLILITER(S): 9 INJECTION INTRAMUSCULAR; INTRAVENOUS; SUBCUTANEOUS at 09:20

## 2018-12-01 RX ADMIN — Medication 200 GRAM(S): at 22:34

## 2018-12-01 RX ADMIN — HYDROMORPHONE HYDROCHLORIDE 0.5 MILLIGRAM(S): 2 INJECTION INTRAMUSCULAR; INTRAVENOUS; SUBCUTANEOUS at 07:35

## 2018-12-01 RX ADMIN — HEPARIN SODIUM 5000 UNIT(S): 5000 INJECTION INTRAVENOUS; SUBCUTANEOUS at 13:01

## 2018-12-01 RX ADMIN — SODIUM CHLORIDE 1333.33 MILLILITER(S): 9 INJECTION, SOLUTION INTRAVENOUS at 12:58

## 2018-12-01 RX ADMIN — PIPERACILLIN AND TAZOBACTAM 200 GRAM(S): 4; .5 INJECTION, POWDER, LYOPHILIZED, FOR SOLUTION INTRAVENOUS at 11:13

## 2018-12-01 RX ADMIN — Medication 2 MILLIGRAM(S): at 14:19

## 2018-12-01 NOTE — H&P ADULT - PROBLEM SELECTOR PLAN 6
chronic, stable  - holding home quinapril and amlodipine for now  - continue to monitor, AM team to assess need to restart BP meds

## 2018-12-01 NOTE — H&P ADULT - HISTORY OF PRESENT ILLNESS
Pt is a 64 y/o M with PMHx of Pancreatitis, AODM (diet-controlled), Depression, HLD, HTN, BIBEMS from home complaining of upper  abdominal pain, radiating to the back with nausea and vomiting. Pain started last night, rated 10/10. Pt states he took Advil to no relief. Of note pt states he had a similar episode in May when he was diagnosed with pancreatitis. Pt states he had a sonogram one month ago that did show gall stones. 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 Pt admits abd pain radiating to back, N/V. Denies CP, SOB, fever, chills, urinary symptoms.     In the ED: VS T 98.3 HR 98 /70 RR18 98%RA.  Labs significant for WBC 12.69, Na 130, K 5.6, Cl 94, CO2 19, glu 197, Ca 7.5, Alb 3.2, , , , lipase 12,651, amylase 358, lactate 2.3. CXR WNL. US GB showing Cholelithiasis and gallbladder sludge. Borderline common duct dilatation. CT Abd/pelvis showing Severe acute phlegmonous pancreatitis. Early pancreatic necrosis not excluded. Pt was given 1x dose zosyn, 3L NS bolus, 2x doses Dilaudid, 1x dose morphine, 2x doses zofran. UCx sent. Pt is a 62 y/o M with PMHx of alcohol induced Pancreatitis, AODM (diet-controlled), Depression, HLD, HTN, BIBEMS from home complaining of gradual onset of upper abdominal pain, radiating to the back with nausea and vomiting. Pain started last night, rated 10/10. Pt states he took Advil and peptobismol to no relief. Of note pt states he had a similar episode in May when he was diagnosed with pancreatitis. Pt states he had a sonogram one month ago that did show gall stones. 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 Pt admits abd pain radiating to back, N/V, and blood streaked stool with BRBPR. Denies CP, SOB, fever, chills, urinary symptoms.     In the ED: VS T 98.3 HR 98 /70 RR18 98%RA. Labs significant for WBC 12.69, Na 130, K 5.6, Cl 94, CO2 19, glu 197, Ca 7.5, Alb 3.2, , , , lipase 12,651, amylase 358, lactate 2.3. CXR WNL. US GB showing Cholelithiasis and gallbladder sludge. Borderline common duct dilatation. CT Abd/pelvis showing Severe acute phlegmonous pancreatitis. Early pancreatic necrosis not excluded. Pt was given 1x dose zosyn, 3L NS bolus, 2x doses Dilaudid, 1x dose morphine, 2x doses Zofran. UCx sent. Pt evaluated and admitted to ICU.

## 2018-12-01 NOTE — H&P ADULT - PROBLEM SELECTOR PLAN 5
pt states he is borderline diabetic, does not recall last Hba1C  - pt not on home meds, diet controlled  - low dose ISS, with hypoglycemic protocol  - f/u Am HbA1c in AM  - NPO for now

## 2018-12-01 NOTE — H&P ADULT - ATTENDING COMMENTS
pt is 63 year old male with h/o heavy consumption of alcohol  admitted  to icu sec to pancreatitis   need gi evaluation   trend and f/u amylase   pain control   npo   iv fluids   ciwa protocol   dvt ppx

## 2018-12-01 NOTE — H&P ADULT - ASSESSMENT
Pt is a 64 y/o M with PMHx of alcohol induced Pancreatitis, AODM (diet-controlled), Depression, HLD, HTN, BIBEMS from home complaining of gradual onset of upper abdominal pain, radiating to the back with nausea and vomiting admitted for acute pancreatitis likely 2/2 alcohol abuse.

## 2018-12-01 NOTE — ED PROVIDER NOTE - PSH
H/O knee surgery  b/l knees  H/O shoulder surgery  right  S/P ACL repair  left knee 2010  S/P arthroscopy of right knee  2010  S/P right inguinal hernia repair  2010  S/P rotator cuff repair  Right shoulder 2008

## 2018-12-01 NOTE — H&P ADULT - NSHPREVIEWOFSYSTEMS_GEN_ALL_CORE
Constitutional: denies fever, chills, sweating  HEENT: denies headache, dizziness, or lightheadedness  Respiratory: denies SOB, cough, or wheezing  Cardiovascular: denies CP, palpitations  Gastrointestinal: admits (+) nausea, vomiting, denies diarrhea, constipation, admits (+) 10/10 midline abdominal pain radiating to back, admits (+) blood-streaked stools from hemorrhoids  Genitourinary: denies painful urination, increased frequency, urgency, or bloody urine  Skin/Breast: denies rashes or itching  Musculoskeletal: denies muscle aches, joint swelling, or muscle weakness  Neurologic: denies loss of sensation, numbness, or tingling  ROS negative except as noted above

## 2018-12-01 NOTE — ED ADULT NURSE REASSESSMENT NOTE - NS ED NURSE REASSESS COMMENT FT1
PT rec'd in morning report with c/o upper abdominal pain. PT rec'd pain med's at 6:45am and still c/o pain. RN will request additional pain med's from ED

## 2018-12-01 NOTE — H&P ADULT - PROBLEM SELECTOR PLAN 4
chronic, pt follows psych (Dr. Rinaldi) as outpt  - pt states he has been drinking heavily since loss of his son 2 years ago  - continue home seroquel, lexapro  - psych consult

## 2018-12-01 NOTE — ED ADULT NURSE REASSESSMENT NOTE - NS ED NURSE REASSESS COMMENT FT1
pt with pain scale 10 over abd pt evaluated vital signs stable and blood work sent to lab ivf in progress and medicated with morphine 4mg and zofran 4mg ivp as ordered xray done awaiting reavaluation and urine

## 2018-12-01 NOTE — H&P ADULT - NSHPPHYSICALEXAM_GEN_ALL_CORE
Physical Exam:  General: obese male in mild distress from pain, diaphoretic  HEENT: NC/AT, PERRLA, EOMI B/L, dry mucous membranes   Neck: Supple, nontender, no masses  CV: RRR, +S1/S2, no murmurs, rubs or gallops  Respiratory: CTA B/L, No W/R/R  Abdominal: distended, TTP epigastric region. BS x4.  Extremities: No C/C/E, + peripheral pulses  Neurology: AAOx3, nonfocal, CN II-XII grossly intact, tremors noted in b/l hands

## 2018-12-01 NOTE — ED PROVIDER NOTE - PMH
Borderline diabetes mellitus  last A1c unknown  Depression    Depression, unspecified depression type  Lost his son this past June 2016  Essential hypertension    HLD (hyperlipidemia)    HTN (hypertension)    Insomnia    Low back pain, unspecified back pain laterality, unspecified chronicity, with sciatica presence unspecified    Low testosterone in male    Low testosterone level in male    Lumbar herniated disc    Obesity (BMI 30-39.9)    Sleep apnea, unspecified type    Sprain of right rotator cuff capsule, subsequent encounter

## 2018-12-01 NOTE — PROGRESS NOTE ADULT - SUBJECTIVE AND OBJECTIVE BOX
HISTORY  64yo male presenting to the Emergency Department with new onset mid epigastric pain 10/10. He has similar pain in the past and has been diagnosed with pancreatitis. CT scan and US abdomen were performed with evidence of pancreatitis again this admit. He admits to drinking heavily over the past month. He drinks Vodka in excess. He started drinking x 2 years ago after the loss of his son.  At this time patient states he is having severe abdominal pain. He is diaphoretic. He is unable to void. He is asking for pain medications. No SOB, CP, N/v.     PAST MEDICAL & SURGICAL HISTORY:  Low testosterone in male  Depression  Insomnia  HLD (hyperlipidemia)  HTN (hypertension)  Low testosterone level in male  Sprain of right rotator cuff capsule, subsequent encounter  Borderline diabetes mellitus: last A1c unknown  Low back pain, unspecified back pain laterality, unspecified chronicity, with sciatica presence unspecified  Lumbar herniated disc  Depression, unspecified depression type: Lost his son this past June 2016  Essential hypertension  Sleep apnea, unspecified type  Obesity (BMI 30-39.9)  H/O knee surgery: b/l knees  H/O shoulder surgery: right  S/P arthroscopy of right knee: 2010  S/P right inguinal hernia repair: 2010  S/P ACL repair: left knee 2010  S/P rotator cuff repair: Right shoulder 2008    Home Medications:  amLODIPine 10 mg oral tablet: 1 tab(s) orally once a day (01 Dec 2018 06:13)  anastrozole 1 mg oral tablet: 1 tab(s) orally 2 times a week (01 Dec 2018 06:13)  aspirin 162 mg oral delayed release tablet:  orally once a day (01 Dec 2018 06:13)  ibuprofen:  orally 3 times a day, As Needed (01 Dec 2018 06:13)  KlonoPIN 1 mg oral tablet: 1 tab(s) orally 2 times a day (01 Dec 2018 06:13)  Lexapro 10 mg oral tablet: 1 tab(s) orally once a day (at bedtime) (01 Dec 2018 06:13)  Lipitor 20 mg oral tablet: 1 tab(s) orally once a day (01 Dec 2018 06:13)  Lipitor 20 mg oral tablet: 1 tab(s) orally once a day (01 Dec 2018 06:13)  quinapril 40 mg oral tablet: 1 tab(s) orally 2 times a day (01 Dec 2018 06:13)  SEROquel 100 mg oral tablet: 1 tab(s) orally once a day (at bedtime) (01 Dec 2018 06:13)  testosterone:    (01 Dec 2018 06:13)    Allergies    No Known Allergies    Intolerances      SUBJECTIVE/ROS:  [x ] A ten-point review of systems was otherwise negative except as noted.  [ ] Due to altered mental status/intubation, subjective information were not able to be obtained from the patient. History was obtained, to the extent possible, from review of the chart and collateral sources of information.      NEURO  RASS: 0     GCS: 15   Exam: A&O x3, no focal deficits. Moderate distress, no tremors at present  Meds: chlordiazePOXIDE 50 milliGRAM(s) Oral every 8 hours  HYDROmorphone  Injectable 1 milliGRAM(s) IV Push every 3 hours PRN Severe Pain (7 - 10)    [x] Adequacy of sedation and pain control has been assessed and adjusted    RESPIRATORY  RR: 18 (12-01-18 @ 06:05) (18 - 18)  SpO2: 98% (12-01-18 @ 06:05) (98% - 98%)  Wt(kg): --  Exam: unlabored, clear to auscultation bilaterally  Mechanical Ventilation:  NA  ABG - ( 01 Dec 2018 07:08 )  pH: x     /  pCO2: x     /  pO2: x     / HCO3: x     / Base Excess: x     /  SaO2: x       Lactate: 2.3      [NA ] Extubation Readiness Assessed  Meds:       CARDIOVASCULAR  HR: 98 (12-01-18 @ 06:05) (98 - 98)  BP: 133/70 (12-01-18 @ 06:05) (133/70 - 133/70)  BP(mean): --  ABP: --  ABP(mean): --  Wt(kg): --  CVP(cm H2O): --    Lactate, Blood: 2.3 mmol/L (12-01 @ 07:08)    Exam: S1S2, RRR  Cardiac Rhythm: NSR  Perfusion     [ x]Adequate   [ ]Inadequate  Mentation   [ x]Normal       [ ]Reduced  Extremities  [ x]Warm         [ ]Cool  Volume Status [ ]Hypervolemic [ ]Euvolemic [ x]Hypovolemic  Meds:       GI/NUTRITION  Exam: Distended, TTP to mid epigastrum. No guarding or rebound at present  Diet: NPO  Meds:     GENITOURINARY -unable to void  I&O's Detail    Weight (kg): 99.8 (12-01 @ 06:05)  12-01    130<L>  |  94<L>  |  16  ----------------------------<  197<H>  5.6<H>   |  19<L>  |  0.80    Ca    7.5<L>      01 Dec 2018 06:49    TPro  7.5  /  Alb  3.2<L>  /  TBili  0.9  /  DBili  x   /  AST  404<H>  /  ALT  214<H>  /  AlkPhos  425<H>  12-01    [x ] Holder catheter, indication: Retention and critical illness  Meds: folic acid Injectable 1 milliGRAM(s) IV Push daily  lactated ringers Bolus 2000 milliLiter(s) IV Bolus once  lactated ringers. 1000 milliLiter(s) IV Continuous <Continuous>  sodium chloride 0.9% Bolus 1000 milliLiter(s) IV Bolus once  thiamine Injectable 100 milliGRAM(s) IV Push daily      HEMATOLOGIC  Meds: heparin  Injectable 5000 Unit(s) SubCutaneous every 8 hours    [x] VTE Prophylaxis                        13.7   12.69 )-----------( 214      ( 01 Dec 2018 06:49 )             38.1       Transfusion     [ ] PRBC   [ ] Platelets   [ ] FFP   [ ] Cryoprecipitate      INFECTIOUS DISEASES  T(C): 36.8 (12-01-18 @ 06:05), Max: 36.8 (12-01-18 @ 06:05)  Wt(kg): --  WBC Count: 12.69 K/uL (12-01 @ 06:49)    Recent Cultures:    Meds: piperacillin/tazobactam IVPB. 3.375 Gram(s) IV Intermittent once    ENDOCRINE  Capillary Blood Glucose    Meds:    ACCESS DEVICES:  [x ] Peripheral IV  [ ] Central Venous Line	[ ] R	[ ] L	[ ] IJ	[ ] Fem	[ ] SC	Placed:   [ ] Arterial Line		[ ] R	[ ] L	[ ] Fem	[ ] Rad	[ ] Ax	Placed:   [ ] PICC:					[ ] Mediport  [ x] Urinary Catheter, Date Placed:   [ x] Necessity of urinary, arterial, and venous catheters discussed    OTHER MEDICATIONS:    CODE STATUS:  Full    IMAGING:    US Abdomen: Impression: Cholelithiasis and gallbladder sludge. Borderline common duct   dilatation. Correlate with MRCP.    CT Abd/Pelvis: acute phlegmonous pancreatitis. Early pancreatic   necrosis not excluded. Close follow-up recommended.

## 2018-12-01 NOTE — PROGRESS NOTE ADULT - ASSESSMENT
64yo male admitted with:   1. Acute Pancreatitis- [place] 2  2. ETOH abuse  3. ETOh withdrawal  4. Gallstones    Plan:   Neuro: Will place patient on CIWA protocol and start Librium 50mg PO Q 8 hrs as proph. In addition will place on folic acid and thiamine. Social work consultation for ETOh counseling    CV: No pressor requirement at present, however lactate > 2. WIll order additional 2L of IVF bolus and repeat lactate this afternoon. Patient is at high risk for further hemodynamic deterioration    Pulm: Clear lungs at present on CXR and on auscultation but fluid sequestration and high volume resuscitation likely. CXR in Am 12/2 and 48hr PO2 via ABG    GI/Nutrition: NPO at present time with aggressive IVF resuscitation. GI consultation for pancreatitis and for gallstone on imaging. WIll not consult surgery at present time but will need to do so during this admit. Triglyceride level ordered as well. LDH pending    /Renal: Will place miller for urinary retention as well as for strict I&Os in the setting of critical illness. Avoid nephrotoxic agents. Monitor BUN    ID: Jeane ordered by ED as proph,however likely cause of pancreatitis is alcohol at this time. Will discuss utility of antibiotics at present time.      Endo: Will place on ISS and order Hemoglobin AIc for AM 12/2. Target  in setting of critical illness    Heme: Daily H/H. monitor WBC    Skin: Intact    Lines: PIV only at present but should condition worsen will have invasive catheters placed.    Proph: SQH ordered for DVT Proph    Dispo; Patient is critically ill and requires ICU level of care for aggressive resuscitation and monitoring. Patient is also likely to withdraw from ETOh as he is already exhibiting some signs which could cloud his clinical picture warranting extensive monitoring and care. Discussed with ICU intensivist who is in agreement with this plan.    Critical Care time spent excluding time spent on procedures or bundled care = 61 minutes. 64yo male admitted with:   1. Acute Alcoholic Pancreatitis- [place] 2  2. ETOH abuse  3. ETOh withdrawal  4. Gallstones  5. SIRS, non infectious    Plan:   Neuro: Will place patient on CIWA protocol and start Librium 50mg PO Q 8 hrs as proph. In addition will place on folic acid and thiamine. Social work consultation for ETOh counseling    CV: No pressor requirement at present, however lactate > 2. WIll order additional 2L of IVF bolus and repeat lactate this afternoon. Patient is at high risk for further hemodynamic deterioration    Pulm: Clear lungs at present on CXR and on auscultation but fluid sequestration and high volume resuscitation likely. CXR in Am 12/2 and 48hr PO2 via ABG    GI/Nutrition: NPO at present time with aggressive IVF resuscitation. GI consultation for pancreatitis and for gallstone on imaging. WIll not consult surgery at present time but will need to do so during this admit. Triglyceride level ordered as well. LDH pending    /Renal: Will place miller for urinary retention as well as for strict I&Os in the setting of critical illness. Avoid nephrotoxic agents. Monitor BUN    ID: Zorenee ordered by ED as proph,however likely cause of pancreatitis is alcohol at this time. Will discuss utility of antibiotics at present time.      Endo: Will place on ISS and order Hemoglobin AIc for AM 12/2. Target  in setting of critical illness    Heme: Daily H/H. monitor WBC    Skin: Intact    Lines: PIV only at present but should condition worsen will have invasive catheters placed.    Proph: SQH ordered for DVT Proph    Dispo; Patient is critically ill and requires ICU level of care for aggressive resuscitation and monitoring. Patient is also likely to withdraw from ETOh as he is already exhibiting some signs which could cloud his clinical picture warranting extensive monitoring and care. Discussed with ICU intensivist who is in agreement with this plan.    Critical Care time spent excluding time spent on procedures or bundled care = 61 minutes.

## 2018-12-01 NOTE — H&P ADULT - PROBLEM SELECTOR PLAN 2
CT findings as above  - pt with similar episode in march, counseled on heavy drinking cessation, with instruction to f/u with GI and detox center  - Dilaudid 1mg IVP q3h for severe pain  - mgmt as above

## 2018-12-01 NOTE — ED PROVIDER NOTE - CARE PLAN
Principal Discharge DX:	Abdominal pain Principal Discharge DX:	Acute pancreatitis with infected necrosis, unspecified pancreatitis type

## 2018-12-01 NOTE — H&P ADULT - PROBLEM SELECTOR PLAN 3
chronic for last 2 years, now with b/l hand tremors  - Pt reports 1/3-1/2 bottle vodka per day.  - Librium 50mg q8h, CIWA protocol  - pt evaluated by Dr. Coats in March 2018.   - continue Vit B1, folic acid supplementation  - pt states he did not like the group therapy requirement for Vivitrol, and did not proceed with detox  - repeat addiction medicine K[facility] barrett

## 2018-12-01 NOTE — H&P ADULT - NSHPSOCIALHISTORY_GEN_ALL_CORE
Restaurant owner   ETOH use: drinks 1/3-1/2 bottle of vodka daily  Smoking use: denies  Influenza vaccine: Oct 2017  Received pneumovax. Unsure of date  Colonoscopy: Had two. Last one performed in 2012. Found 1 polyp.

## 2018-12-01 NOTE — H&P ADULT - PROBLEM SELECTOR PLAN 1
likely 2/2 acute pancreatitis from alcohol abuse.   - admit to ICU  - meets sepsis criteria with WBC>12, HR>90. Lactate elevated at 2.3  - s/p 1x dose zosyn, 3L NS bolus in ED.   - give 2L NS bolus, f/u rpt lactate  - start Holder for strict I/Os, fluid mgmt  - f/u UCx, AM CBC, CMP  - ATB regimen as per ICU  - pt with rapidly progressing epigastric abd pain, pt states similar to previous episode in March  - US GB showing Cholelithiasis and gallbladder sludge. Borderline common duct dilatation.   - CT Abd/pelvis showing Severe acute phlegmonous pancreatitis. Early pancreatic necrosis not excluded.  - GI consulted (Dr. Galeana), awaiting recs  - mgmt as per ICU

## 2018-12-01 NOTE — ED PROVIDER NOTE - OBJECTIVE STATEMENT
64 y/o WM h/o Adult  diabetes mellitus  Depression  HLD HTN  C/C  Patient brought in by ambulance from home complaining of diffuse abdominal pain with nausea, vomiting started last night took Advil didn't work 62 y/o WM h/o Pancreatitis,  AODM Diet controlled,   Depression  HLD HTN  C/C  Patient brought in by ambulance from home complaining of upper  abdominal pain, radiates to the back  with nausea and vomiting. It started last night, he took Advil didn't work. No CP, No SOB, No fever, no chills, no urinary symptoms, no GIB. He had a similar episode in May when he was diagnosis pancreatis. 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

## 2018-12-01 NOTE — PROGRESS NOTE ADULT - ATTENDING COMMENTS
Patient assessed independently from above    63M PMH HTN, HLD, depression, obesity, diet controlled DM, ROSS (on CPAP), ETOH abuse, admitted with acute alcoholic pancreatitis and acute alcoholic hepatitis. He also has hypertriglyceridemia, hypocalcemia, hypomagnesemia, hyperkalemia and mildly elevated lactate.     On exam - he is clinically stable  Had diaphoresis and anxiety earlier - responded to Ativan  C/o severe abdominal pain despite dilaudid - increased dose and placed on Precedex (both for alcohol withdrawal and analgesia)    Imaging studies show gallstone and borderline CBD but doubt as a causative factor for pancreatitis  He has significantly elevated TGs and may be adding to pancreatic inflammation  No evidence to suggest infection at this time    Will continue aggressive ivf resuscitation - change ivf to NS given hyperkalemia  Replace Mg, Ca  Repeat BMP, Mg, Ca at 2100    For hypertriglyceridemia - will start iv insulin and add D5 to ivf   Repeat TGs and lipase in am    Continue librium, FA, thiamine and prn Ativan    Patient is critically ill, 45mins spent Patient assessed independently from above    63M PMH HTN, HLD, depression, obesity, diet controlled DM, ROSS (on CPAP), ETOH abuse, admitted with acute alcoholic pancreatitis and acute alcoholic hepatitis. He also has hypertriglyceridemia, hypocalcemia, hypomagnesemia, hyperkalemia and mildly elevated lactate.     On exam - he is clinically stable  Had diaphoresis and anxiety earlier - responded to Ativan  C/o severe abdominal pain despite dilaudid - increased dose and placed on Precedex (both for alcohol withdrawal and analgesia)    Imaging studies show gallstone and borderline CBD but doubt as a causative factor for pancreatitis  He has significantly elevated TGs and may be adding to pancreatic inflammation  No evidence to suggest infection at this time    Will continue aggressive ivf resuscitation - change ivf to NS given hyperkalemia  Replace Mg, Ca  Repeat BMP, Mg, Ca at 2100    For hypertriglyceridemia - will start iv insulin and add D5 to ivf   Repeat TGs and lipase in am    Continue librium, FA, thiamine and prn Ativan    DF is 7, no need for steroids/pentoxifylline  Avoid hepatotoxic meds    Patient is critically ill, 45mins spent Patient assessed independently from above    63M PMH HTN, HLD, depression, obesity, diet controlled DM, ROSS (on CPAP), ETOH abuse, admitted with acute alcoholic pancreatitis and acute alcoholic hepatitis. He also has hypertriglyceridemia, hypocalcemia, hypomagnesemia, hyperkalemia and mildly elevated lactate.     On exam - he is clinically stable  Had diaphoresis and anxiety earlier - responded to Ativan  C/o severe abdominal pain despite dilaudid - increased dose and placed on Precedex (both for alcohol withdrawal and analgesia)  Keep NPO    Imaging studies show gallstone and borderline CBD but doubt as a causative factor for pancreatitis  He has significantly elevated TGs and may be adding to pancreatic inflammation  No evidence to suggest infection at this time    Will continue aggressive ivf resuscitation - change ivf to NS given hyperkalemia  Replace Mg, Ca  Repeat BMP, Mg, Ca at 2100    For hypertriglyceridemia - will start iv insulin and add D5 to ivf   Repeat TGs and lipase in am    Continue librium, FA, thiamine and prn Ativan    DF is 7, no need for steroids/pentoxifylline  Avoid hepatotoxic meds  Trend LFTs    Patient is critically ill, 45mins spent

## 2018-12-01 NOTE — CONSULT NOTE ADULT - SUBJECTIVE AND OBJECTIVE BOX
Chief Complaint:  Patient is a 63y old  Male who presents with a chief complaint of pancreatitis (01 Dec 2018 11:59)    Low testosterone in male  Depression  Insomnia  HLD (hyperlipidemia)  HTN (hypertension)  Low testosterone level in male  Sprain of right rotator cuff capsule, subsequent encounter  Borderline diabetes mellitus  Low back pain, unspecified back pain laterality, unspecified chronicity, with sciatica presence unspecified  Lumbar herniated disc  Depression, unspecified depression type  Essential hypertension  Sleep apnea, unspecified type  Obesity (BMI 30-39.9)  H/O knee surgery  H/O shoulder surgery  S/P arthroscopy of right knee  S/P right inguinal hernia repair  S/P ACL repair  S/P rotator cuff repair     HPI:  Pt is a 62 y/o M with PMHx of alcohol induced Pancreatitis, AODM (diet-controlled), Depression, HLD, HTN, BIBEMS from home complaining of gradual onset of upper abdominal pain, radiating to the back with nausea and vomiting. Pain started last night, rated 10/10. Pt states he took Advil and peptobismol to no relief. Of note pt states he had a similar episode in May when he was diagnosed with pancreatitis. Pt states he had a sonogram one month ago that did show gall stones. 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 Pt admits abd pain radiating to back, N/V, and blood streaked stool with BRBPR. Denies CP, SOB, fever, chills, urinary symptoms.     In the ED: VS T 98.3 HR 98 /70 RR18 98%RA. Labs significant for WBC 12.69, Na 130, K 5.6, Cl 94, CO2 19, glu 197, Ca 7.5, Alb 3.2, , , , lipase 12,651, amylase 358, lactate 2.3. CXR WNL. US GB showing Cholelithiasis and gallbladder sludge. Borderline common duct dilatation. CT Abd/pelvis showing Severe acute phlegmonous pancreatitis. Early pancreatic necrosis not excluded. Pt was given 1x dose zosyn, 3L NS bolus, 2x doses Dilaudid, 1x dose morphine, 2x doses Zofran. UCx sent. Pt evaluated and admitted to ICU. (01 Dec 2018 11:59)      No Known Allergies      calcium gluconate IVPB 2 Gram(s) IV Intermittent once  chlordiazePOXIDE 50 milliGRAM(s) Oral every 8 hours  dexmedetomidine Infusion 0.2 MICROgram(s)/kG/Hr IV Continuous <Continuous>  dextrose 5% + sodium chloride 0.9%. 1000 milliLiter(s) IV Continuous <Continuous>  enoxaparin Injectable 40 milliGRAM(s) SubCutaneous daily  folic acid Injectable 1 milliGRAM(s) IV Push daily  HYDROmorphone  Injectable 1 milliGRAM(s) IV Push every 4 hours PRN  HYDROmorphone  Injectable 2 milliGRAM(s) IV Push every 4 hours PRN  influenza   Vaccine 0.5 milliLiter(s) IntraMuscular once  insulin Infusion 10 Unit(s)/Hr IV Continuous <Continuous>  LORazepam   Injectable 2 milliGRAM(s) IV Push every 3 hours PRN  magnesium sulfate  IVPB 4 Gram(s) IV Intermittent once  thiamine Injectable 100 milliGRAM(s) IV Push daily        FAMILY HISTORY:  Family history of squamous cell carcinoma: of parotid  Family history of hypertension  Family history of cancer (Father)        Review of Systems:    General:  No wt loss, fevers, chills, night sweats,fatigue,   Eyes:  Good vision, no reported pain  ENT:  No sore throat, pain, runny nose, dysphagia  CV:  No pain, palpitatioins, hypo/hypertension  Resp:  No dyspnea, cough, tachypnea, wheezing  :  No pain, bleeding, incontinence, nocturia  Muscle:  No pain, weakness  Neuro:  No weakness, tingling, memory problems  Psych:  No fatigue, insomnia, mood problems, depression  Endocrine:  No polyuria, polydypsia, cold/heat intolerance  Heme:  No petechiae, ecchymosis, easy bruisability  Skin:  No rash, tattoos, scars, edema    Relevant Family History:       Relevant Social History:       Physical Exam:    Vital Signs:  Vital Signs Last 24 Hrs  T(C): 37.1 (01 Dec 2018 16:01), Max: 37.1 (01 Dec 2018 16:01)  T(F): 98.7 (01 Dec 2018 16:01), Max: 98.7 (01 Dec 2018 16:01)  HR: 96 (01 Dec 2018 17:00) (96 - 109)  BP: 123/70 (01 Dec 2018 17:00) (118/71 - 142/81)  BP(mean): 90 (01 Dec 2018 17:00) (88 - 106)  RR: 16 (01 Dec 2018 17:00) (16 - 25)  SpO2: 91% (01 Dec 2018 17:00) (86% - 100%)  Daily Height in cm: 177.8 (01 Dec 2018 06:05)    Daily Weight in k.9 (01 Dec 2018 12:15)    General:  Appears stated age, well-groomed, well-nourished, no distress  HEENT:  NC/AT,  conjunctivae clear and pink, no thyromegaly, nodules, adenopathy, no JVD  Chest:  Full & symmetric excursion, no increased effort, breath sounds clear  Cardiovascular:  Regular rhythm, S1, S2, no murmur/rub/S3/S4, no abdominal bruit, no edema  Abdomen:  Soft, non-tender, non-distended, normoactive bowel sounds,  no masses ,no hepatosplenomeagaly, no signs of chronic liver disease  Extremities:  no cyanosis,clubbing or edema  Skin:  No rash/erythema/ecchymoses/petechiae/wounds/abscess/warm/dry  Neuro/Psych:  Alert, oriented, no asterixis, no tremor, no encephalopathy    Laboratory:                            15.2   9.38  )-----------( 177      ( 01 Dec 2018 17:35 )             43.3     12    130<L>  |  98  |  16  ----------------------------<  181<H>  5.9<H>   |  20<L>  |  0.92    Ca    6.8<L>      01 Dec 2018 17:35  Phos  2.5       Mg     1.1         TPro  7.5  /  Alb  3.2<L>  /  TBili  0.9  /  DBili  x   /  AST  404<H>  /  ALT  214<H>  /  AlkPhos  425<H>      LIVER FUNCTIONS - ( 01 Dec 2018 06:49 )  Alb: 3.2 g/dL / Pro: 7.5 g/dL / ALK PHOS: 425 U/L / ALT: 214 U/L / AST: 404 U/L / GGT: x           PT/INR - ( 01 Dec 2018 17:35 )   PT: 14.2 sec;   INR: 1.25 ratio         PTT - ( 01 Dec 2018 17:35 )  PTT:27.4 sec  Urinalysis Basic - ( 01 Dec 2018 11:26 )    Color: Yellow / Appearance: Clear / S.010 / pH: x  Gluc: x / Ketone: Trace  / Bili: Negative / Urobili: 1   Blood: x / Protein: 25 mg/dL / Nitrite: Negative   Leuk Esterase: Trace / RBC: x / WBC 0-2   Sq Epi: x / Non Sq Epi: x / Bacteria: Occasional      Amylase Serum--      Lipase serum--       [id number]  Amylase [id number]      Lipase [id number]       Ammonia--    Imaging:

## 2018-12-01 NOTE — ED ADULT TRIAGE NOTE - CHIEF COMPLAINT QUOTE
Patient brought in by ambulance from home complaining of diffuse abdominal pain with nausea, vomiting started last night took Advil didn't work

## 2018-12-01 NOTE — H&P ADULT - FAMILY HISTORY
Family history of hypertension     Family history of squamous cell carcinoma, of parotid     Father  Still living? Unknown  Family history of cancer, Age at diagnosis: Age Unknown

## 2018-12-01 NOTE — CONSULT NOTE ADULT - PROBLEM SELECTOR RECOMMENDATION 9
NPO  aggressive hydration  trend serial cbc to monitor for dilutional effect  monitor elytes  surgical eval  ICU monitoring

## 2018-12-02 DIAGNOSIS — R65.10 SYSTEMIC INFLAMMATORY RESPONSE SYNDROME (SIRS) OF NON-INFECTIOUS ORIGIN WITHOUT ACUTE ORGAN DYSFUNCTION: ICD-10-CM

## 2018-12-02 LAB
ALBUMIN SERPL ELPH-MCNC: 2.4 G/DL — LOW (ref 3.3–5)
ALP SERPL-CCNC: 231 U/L — HIGH (ref 40–120)
ALT FLD-CCNC: 118 U/L — HIGH (ref 12–78)
AMMONIA BLD-MCNC: 61 UMOL/L — HIGH (ref 11–32)
ANION GAP SERPL CALC-SCNC: 10 MMOL/L — SIGNIFICANT CHANGE UP (ref 5–17)
ANION GAP SERPL CALC-SCNC: 11 MMOL/L — SIGNIFICANT CHANGE UP (ref 5–17)
AST SERPL-CCNC: 172 U/L — HIGH (ref 15–37)
BASOPHILS # BLD AUTO: 0 K/UL — SIGNIFICANT CHANGE UP (ref 0–0.2)
BASOPHILS NFR BLD AUTO: 0 % — SIGNIFICANT CHANGE UP (ref 0–2)
BILIRUB SERPL-MCNC: 1.1 MG/DL — SIGNIFICANT CHANGE UP (ref 0.2–1.2)
BUN SERPL-MCNC: 21 MG/DL — SIGNIFICANT CHANGE UP (ref 7–23)
BUN SERPL-MCNC: 23 MG/DL — SIGNIFICANT CHANGE UP (ref 7–23)
CALCIUM SERPL-MCNC: 6.7 MG/DL — LOW (ref 8.5–10.1)
CALCIUM SERPL-MCNC: 7.7 MG/DL — LOW (ref 8.5–10.1)
CHLORIDE SERPL-SCNC: 100 MMOL/L — SIGNIFICANT CHANGE UP (ref 96–108)
CHLORIDE SERPL-SCNC: 104 MMOL/L — SIGNIFICANT CHANGE UP (ref 96–108)
CO2 SERPL-SCNC: 21 MMOL/L — LOW (ref 22–31)
CO2 SERPL-SCNC: 23 MMOL/L — SIGNIFICANT CHANGE UP (ref 22–31)
CREAT SERPL-MCNC: 1.2 MG/DL — SIGNIFICANT CHANGE UP (ref 0.5–1.3)
CREAT SERPL-MCNC: 1.4 MG/DL — HIGH (ref 0.5–1.3)
CULTURE RESULTS: NO GROWTH — SIGNIFICANT CHANGE UP
EOSINOPHIL # BLD AUTO: 0 K/UL — SIGNIFICANT CHANGE UP (ref 0–0.5)
EOSINOPHIL NFR BLD AUTO: 0 % — SIGNIFICANT CHANGE UP (ref 0–6)
FERRITIN SERPL-MCNC: 4821 NG/ML — HIGH (ref 30–400)
GLUCOSE SERPL-MCNC: 123 MG/DL — HIGH (ref 70–99)
GLUCOSE SERPL-MCNC: 80 MG/DL — SIGNIFICANT CHANGE UP (ref 70–99)
HBA1C BLD-MCNC: 7 % — HIGH (ref 4–5.6)
HCT VFR BLD CALC: 45 % — SIGNIFICANT CHANGE UP (ref 39–50)
HGB BLD-MCNC: 14.9 G/DL — SIGNIFICANT CHANGE UP (ref 13–17)
LACTATE SERPL-SCNC: 2.7 MMOL/L — HIGH (ref 0.7–2)
LACTATE SERPL-SCNC: 2.7 MMOL/L — HIGH (ref 0.7–2)
LIDOCAIN IGE QN: 2421 U/L — HIGH (ref 73–393)
LYMPHOCYTES # BLD AUTO: 1.41 K/UL — SIGNIFICANT CHANGE UP (ref 1–3.3)
LYMPHOCYTES # BLD AUTO: 16 % — SIGNIFICANT CHANGE UP (ref 13–44)
MAGNESIUM SERPL-MCNC: 2.5 MG/DL — SIGNIFICANT CHANGE UP (ref 1.6–2.6)
MCHC RBC-ENTMCNC: 33.1 GM/DL — SIGNIFICANT CHANGE UP (ref 32–36)
MCHC RBC-ENTMCNC: 33.2 PG — SIGNIFICANT CHANGE UP (ref 27–34)
MCV RBC AUTO: 100.2 FL — HIGH (ref 80–100)
MONOCYTES # BLD AUTO: 0.88 K/UL — SIGNIFICANT CHANGE UP (ref 0–0.9)
MONOCYTES NFR BLD AUTO: 10 % — SIGNIFICANT CHANGE UP (ref 2–14)
NEUTROPHILS # BLD AUTO: 6.54 K/UL — SIGNIFICANT CHANGE UP (ref 1.8–7.4)
NEUTROPHILS NFR BLD AUTO: 49 % — SIGNIFICANT CHANGE UP (ref 43–77)
PHOSPHATE SERPL-MCNC: 3.8 MG/DL — SIGNIFICANT CHANGE UP (ref 2.5–4.5)
PLATELET # BLD AUTO: 173 K/UL — SIGNIFICANT CHANGE UP (ref 150–400)
POTASSIUM SERPL-MCNC: 4.4 MMOL/L — SIGNIFICANT CHANGE UP (ref 3.5–5.3)
POTASSIUM SERPL-MCNC: 5 MMOL/L — SIGNIFICANT CHANGE UP (ref 3.5–5.3)
POTASSIUM SERPL-SCNC: 4.4 MMOL/L — SIGNIFICANT CHANGE UP (ref 3.5–5.3)
POTASSIUM SERPL-SCNC: 5 MMOL/L — SIGNIFICANT CHANGE UP (ref 3.5–5.3)
PROT SERPL-MCNC: 6.3 G/DL — SIGNIFICANT CHANGE UP (ref 6–8.3)
RBC # BLD: 4.49 M/UL — SIGNIFICANT CHANGE UP (ref 4.2–5.8)
RBC # FLD: 12.8 % — SIGNIFICANT CHANGE UP (ref 10.3–14.5)
SODIUM SERPL-SCNC: 134 MMOL/L — LOW (ref 135–145)
SODIUM SERPL-SCNC: 135 MMOL/L — SIGNIFICANT CHANGE UP (ref 135–145)
SPECIMEN SOURCE: SIGNIFICANT CHANGE UP
TRIGL SERPL-MCNC: 1123 MG/DL — HIGH (ref 10–149)
WBC # BLD: 8.84 K/UL — SIGNIFICANT CHANGE UP (ref 3.8–10.5)
WBC # FLD AUTO: 8.84 K/UL — SIGNIFICANT CHANGE UP (ref 3.8–10.5)

## 2018-12-02 PROCEDURE — 99291 CRITICAL CARE FIRST HOUR: CPT

## 2018-12-02 PROCEDURE — 99233 SBSQ HOSP IP/OBS HIGH 50: CPT

## 2018-12-02 PROCEDURE — 71045 X-RAY EXAM CHEST 1 VIEW: CPT | Mod: 26

## 2018-12-02 RX ORDER — ONDANSETRON 8 MG/1
4 TABLET, FILM COATED ORAL EVERY 6 HOURS
Qty: 0 | Refills: 0 | Status: DISCONTINUED | OUTPATIENT
Start: 2018-12-02 | End: 2018-12-06

## 2018-12-02 RX ORDER — CALCIUM GLUCONATE 100 MG/ML
2 VIAL (ML) INTRAVENOUS ONCE
Qty: 0 | Refills: 0 | Status: COMPLETED | OUTPATIENT
Start: 2018-12-02 | End: 2018-12-02

## 2018-12-02 RX ORDER — SODIUM CHLORIDE 9 MG/ML
1000 INJECTION, SOLUTION INTRAVENOUS
Qty: 0 | Refills: 0 | Status: DISCONTINUED | OUTPATIENT
Start: 2018-12-02 | End: 2018-12-03

## 2018-12-02 RX ORDER — SODIUM CHLORIDE 9 MG/ML
500 INJECTION, SOLUTION INTRAVENOUS ONCE
Qty: 0 | Refills: 0 | Status: COMPLETED | OUTPATIENT
Start: 2018-12-02 | End: 2018-12-02

## 2018-12-02 RX ORDER — SODIUM CHLORIDE 9 MG/ML
1000 INJECTION, SOLUTION INTRAVENOUS ONCE
Qty: 0 | Refills: 0 | Status: COMPLETED | OUTPATIENT
Start: 2018-12-02 | End: 2018-12-02

## 2018-12-02 RX ORDER — FUROSEMIDE 40 MG
40 TABLET ORAL ONCE
Qty: 0 | Refills: 0 | Status: COMPLETED | OUTPATIENT
Start: 2018-12-02 | End: 2018-12-02

## 2018-12-02 RX ORDER — SODIUM CHLORIDE 9 MG/ML
1000 INJECTION INTRAMUSCULAR; INTRAVENOUS; SUBCUTANEOUS ONCE
Qty: 0 | Refills: 0 | Status: COMPLETED | OUTPATIENT
Start: 2018-12-02 | End: 2018-12-02

## 2018-12-02 RX ADMIN — Medication 2 MILLIGRAM(S): at 22:29

## 2018-12-02 RX ADMIN — Medication 200 GRAM(S): at 19:07

## 2018-12-02 RX ADMIN — DEXMEDETOMIDINE HYDROCHLORIDE IN 0.9% SODIUM CHLORIDE 4.99 MICROGRAM(S)/KG/HR: 4 INJECTION INTRAVENOUS at 02:57

## 2018-12-02 RX ADMIN — ONDANSETRON 4 MILLIGRAM(S): 8 TABLET, FILM COATED ORAL at 13:15

## 2018-12-02 RX ADMIN — Medication 2 MILLIGRAM(S): at 13:15

## 2018-12-02 RX ADMIN — DEXMEDETOMIDINE HYDROCHLORIDE IN 0.9% SODIUM CHLORIDE 4.99 MICROGRAM(S)/KG/HR: 4 INJECTION INTRAVENOUS at 22:27

## 2018-12-02 RX ADMIN — Medication 40 MILLIGRAM(S): at 22:51

## 2018-12-02 RX ADMIN — INSULIN HUMAN 10 UNIT(S)/HR: 100 INJECTION, SOLUTION SUBCUTANEOUS at 04:18

## 2018-12-02 RX ADMIN — Medication 1 MILLIGRAM(S): at 13:14

## 2018-12-02 RX ADMIN — Medication 1 TABLET(S): at 13:14

## 2018-12-02 RX ADMIN — SODIUM CHLORIDE 1000 MILLILITER(S): 9 INJECTION, SOLUTION INTRAVENOUS at 17:00

## 2018-12-02 RX ADMIN — HYDROMORPHONE HYDROCHLORIDE 2 MILLIGRAM(S): 2 INJECTION INTRAMUSCULAR; INTRAVENOUS; SUBCUTANEOUS at 13:15

## 2018-12-02 RX ADMIN — SODIUM CHLORIDE 1000 MILLILITER(S): 9 INJECTION INTRAMUSCULAR; INTRAVENOUS; SUBCUTANEOUS at 04:00

## 2018-12-02 RX ADMIN — HYDROMORPHONE HYDROCHLORIDE 2 MILLIGRAM(S): 2 INJECTION INTRAMUSCULAR; INTRAVENOUS; SUBCUTANEOUS at 06:28

## 2018-12-02 RX ADMIN — HYDROMORPHONE HYDROCHLORIDE 2 MILLIGRAM(S): 2 INJECTION INTRAMUSCULAR; INTRAVENOUS; SUBCUTANEOUS at 02:30

## 2018-12-02 RX ADMIN — HYDROMORPHONE HYDROCHLORIDE 2 MILLIGRAM(S): 2 INJECTION INTRAMUSCULAR; INTRAVENOUS; SUBCUTANEOUS at 23:34

## 2018-12-02 RX ADMIN — Medication 50 MILLIGRAM(S): at 05:37

## 2018-12-02 RX ADMIN — HYDROMORPHONE HYDROCHLORIDE 2 MILLIGRAM(S): 2 INJECTION INTRAMUSCULAR; INTRAVENOUS; SUBCUTANEOUS at 01:23

## 2018-12-02 RX ADMIN — Medication 50 MILLIGRAM(S): at 22:28

## 2018-12-02 RX ADMIN — SODIUM CHLORIDE 150 MILLILITER(S): 9 INJECTION, SOLUTION INTRAVENOUS at 00:51

## 2018-12-02 RX ADMIN — SODIUM CHLORIDE 2000 MILLILITER(S): 9 INJECTION, SOLUTION INTRAVENOUS at 14:45

## 2018-12-02 RX ADMIN — Medication 100 MILLIGRAM(S): at 13:15

## 2018-12-02 RX ADMIN — HYDROMORPHONE HYDROCHLORIDE 2 MILLIGRAM(S): 2 INJECTION INTRAMUSCULAR; INTRAVENOUS; SUBCUTANEOUS at 13:00

## 2018-12-02 RX ADMIN — HYDROMORPHONE HYDROCHLORIDE 2 MILLIGRAM(S): 2 INJECTION INTRAMUSCULAR; INTRAVENOUS; SUBCUTANEOUS at 05:37

## 2018-12-02 RX ADMIN — DEXMEDETOMIDINE HYDROCHLORIDE IN 0.9% SODIUM CHLORIDE 4.99 MICROGRAM(S)/KG/HR: 4 INJECTION INTRAVENOUS at 04:00

## 2018-12-02 RX ADMIN — HYDROMORPHONE HYDROCHLORIDE 2 MILLIGRAM(S): 2 INJECTION INTRAMUSCULAR; INTRAVENOUS; SUBCUTANEOUS at 23:19

## 2018-12-02 RX ADMIN — Medication 2 MILLIGRAM(S): at 19:14

## 2018-12-02 RX ADMIN — SODIUM CHLORIDE 2000 MILLILITER(S): 9 INJECTION, SOLUTION INTRAVENOUS at 13:16

## 2018-12-02 RX ADMIN — ENOXAPARIN SODIUM 40 MILLIGRAM(S): 100 INJECTION SUBCUTANEOUS at 13:14

## 2018-12-02 RX ADMIN — Medication 50 MILLIGRAM(S): at 13:13

## 2018-12-02 RX ADMIN — SODIUM CHLORIDE 200 MILLILITER(S): 9 INJECTION, SOLUTION INTRAVENOUS at 10:45

## 2018-12-02 NOTE — DIETITIAN INITIAL EVALUATION ADULT. - NS AS NUTRI INTERV VITAMIN
Thiamin/Multivitamin/mineral/Folate/Recommend MVI w/ minerals. Continue thiamine, folic acid suppelmentation given hx ETOH abuse.

## 2018-12-02 NOTE — PROGRESS NOTE ADULT - ASSESSMENT
Pt is a 64 y/o M with PMHx of alcohol induced Pancreatitis, AODM (diet-controlled), Depression, HLD, HTN, BIBEMS from home complaining of gradual onset of upper abdominal pain, radiating to the back with nausea and vomiting admitted for SIRS secondary to acute pancreatitis likely 2/2 alcohol abuse with impending withdrawal

## 2018-12-02 NOTE — PROGRESS NOTE ADULT - SUBJECTIVE AND OBJECTIVE BOX
Patient is a 63y old  Male who presents with a chief complaint of pancreatitis (02 Dec 2018 08:36)    24 hour events: feels better c/w yesterday  BP stable  Oliguric   afebrile  abd pain worse with movement and deep breaths  hypoxic this morning - placed on VM  no flatus    REVIEW OF SYSTEMS  Constitutional: No fever, chills, fatigue  Neuro: No headache, numbness, weakness  Resp: inability to take deep breaths, No cough, wheezing, shortness of breath  CVS: No chest pain, palpitations, leg swelling  GI: +abdominal pain, No nausea, vomiting, diarrhea   : No dysuria, frequency, incontinence  Skin: No itching, burning, rashes, or lesions   Msk: No joint pain or swelling  Psych: No depression, anxiety, mood swings  Heme: No bleeding    T(F): 97.5 (18 @ 08:01), Max: 98.7 (18 @ 16:01)  HR: 104 (18 @ 10:00) (85 - 113)  BP: 96/61 (18 @ 10:00) (75/54 - 142/81)  RR: 23 (18 @ 10:00) (12 - 35)  SpO2: 90% (18 @ 10:00) (86% - 100%)    CAPILLARY BLOOD GLUCOSE  POCT Blood Glucose.: 91 mg/dL (02 Dec 2018 10:30)  POCT Blood Glucose.: 91 mg/dL (02 Dec 2018 09:30)  POCT Blood Glucose.: 84 mg/dL (02 Dec 2018 08:10)  POCT Blood Glucose.: 106 mg/dL (02 Dec 2018 07:07)  POCT Blood Glucose.: 87 mg/dL (02 Dec 2018 06:17)  POCT Blood Glucose.: 85 mg/dL (02 Dec 2018 05:12)  POCT Blood Glucose.: 88 mg/dL (02 Dec 2018 04:06)  POCT Blood Glucose.: 95 mg/dL (02 Dec 2018 03:00)  POCT Blood Glucose.: 98 mg/dL (02 Dec 2018 02:00)  POCT Blood Glucose.: 93 mg/dL (02 Dec 2018 01:03)  POCT Blood Glucose.: 108 mg/dL (02 Dec 2018 00:12)  POCT Blood Glucose.: 117 mg/dL (01 Dec 2018 23:08)  POCT Blood Glucose.: 112 mg/dL (01 Dec 2018 22:08)  POCT Blood Glucose.: 136 mg/dL (01 Dec 2018 21:10)  POCT Blood Glucose.: 157 mg/dL (01 Dec 2018 19:58)  POCT Blood Glucose.: 190 mg/dL (01 Dec 2018 19:06)  POCT Blood Glucose.: 176 mg/dL (01 Dec 2018 18:08)    I&O's Summary     @ 07: @ 07:00  --------------------------------------------------------  IN: 5221.2 mL / OUT: 1295 mL / NET: 3926.2 mL     @ 07: @ 10:45  --------------------------------------------------------  IN: 496.2 mL / OUT: 65 mL / NET: 431.2 mL    PHYSICAL EXAM  General: NAD  CNS: AAOx3, no focal deficits, communicative   HEENT: PERRL, dry mucosa  Resp: clear b/l  CVS: S1S2, regular  Abd: soft, distended, epigastric tenderness improving c/w yesterday, hypoactive BS  Ext: no edema  Skin: warm, not mottled     MEDICATIONS  insulin Infusion IV Continuous  chlordiazePOXIDE Oral  dexmedetomidine Infusion IV Continuous  HYDROmorphone  Injectable IV Push PRN  HYDROmorphone  Injectable IV Push PRN  LORazepam   Injectable IV Push PRN  ondansetron Injectable IV Push PRN  enoxaparin Injectable SubCutaneous  dextrose 5% + sodium chloride 0.9%. IV Continuous  folic acid Injectable IV Push  lactated ringers Bolus IV Bolus  multivitamin Oral  thiamine Injectable IV Push  influenza   Vaccine IntraMuscular                        14.9   8.84  )-----------( 173      ( 02 Dec 2018 06:02 )             45.0     Bands 25.0        134<L>  |  100  |  21  ----------------------------<  80  4.4   |  23  |  1.40<H>    Ca    7.7<L>      02 Dec 2018 06:02  Phos  3.8       Mg     2.5         TPro  6.3  /  Alb  2.4<L>  /  TBili  1.1  /  DBili  .40<H>  /  AST  172<H>  /  ALT  118<H>  /  AlkPhos  231<H>      Lactate 2.7            @ 05:56    Lactate 2.3            @ 17:35    CARDIAC MARKERS ( 01 Dec 2018 17:35 )  x     / x     / 375 U/L / x     / x        PT/INR - ( 01 Dec 2018 17:35 )   PT: 14.2 sec;   INR: 1.25 ratio       PTT - ( 01 Dec 2018 17:35 )  PTT:27.4 sec  Urinalysis Basic - ( 01 Dec 2018 11:26 )    Color: Yellow / Appearance: Clear / S.010 / pH: x  Gluc: x / Ketone: Trace  / Bili: Negative / Urobili: 1   Blood: x / Protein: 25 mg/dL / Nitrite: Negative   Leuk Esterase: Trace / RBC: x / WBC 0-2   Sq Epi: x / Non Sq Epi: x / Bacteria: Occasional    Bedside lung ultrasound: a-line predominance   Bedside ECHO: normal LV sys fn, no septal flattening or RV dilatation     CENTRAL LINE: N           PEOPLES: Y                            REMOVE: N  A-LINE: N                          GLOBAL ISSUE/BEST PRACTICE  Analgesia: Y  Sedation: Y  CAM-ICU: neg  HOB elevation: yes  Stress ulcer prophylaxis: N  VTE prophylaxis: Y  Glycemic control: Y  Nutrition: Y    CODE STATUS: full  GOC discussion: Y

## 2018-12-02 NOTE — DIETITIAN INITIAL EVALUATION ADULT. - ETIOLOGY
related to excessive energy intake & not ready for diet/lifestyle changes related to altered function of GI related organs

## 2018-12-02 NOTE — DIETITIAN INITIAL EVALUATION ADULT. - NS AS NUTRI INTERV ED CONTENT
Will remain available for diet/pancreatitis nutrition therapy as medically appropriate./Purpose of the nutrition education

## 2018-12-02 NOTE — CONSULT NOTE ADULT - SUBJECTIVE AND OBJECTIVE BOX
INCOMPLETE  NOTE:   Pt seen and evaluated. Full note to follow.   CONSULTATION & DOCUMENTATION IN PROGRESS Patient is a 63y old  Male who presents with a chief complaint of pancreatitis (02 Dec 2018 16:53)      HPI:  Pt is a 62 y/o M with PMHx of alcohol induced Pancreatitis, AODM (diet-controlled), Depression, HLD, HTN, BIBEMS from home complaining of gradual onset of upper abdominal pain, radiating to the back with nausea and vomiting. Pain started last night, rated 10/10. Pt states he took Advil and peptobismol to no relief. Of note pt states he had a similar episode in May when he was diagnosed with pancreatitis. Pt states he had a sonogram one month ago that did show gall stones. 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 Pt admits abd pain radiating to back, N/V, and blood streaked stool with BRBPR. Denies CP, SOB, fever, chills, urinary symptoms.     In the ED: VS T 98.3 HR 98 /70 RR18 98%RA. Labs significant for WBC 12.69, Na 130, K 5.6, Cl 94, CO2 19, glu 197, Ca 7.5, Alb 3.2, , , , lipase 12,651, amylase 358, lactate 2.3. CXR WNL. US GB showing Cholelithiasis and gallbladder sludge. Borderline common duct dilatation. CT Abd/pelvis showing Severe acute phlegmonous pancreatitis. Early pancreatic necrosis not excluded. Pt was given 1x dose zosyn, 3L NS bolus, 2x doses Dilaudid, 1x dose morphine, 2x doses Zofran. UCx sent. Pt evaluated and admitted to ICU. (01 Dec 2018 11:59)    Heme asked to see pt for abn ferritin.         PAST MEDICAL & SURGICAL HISTORY:  Low testosterone in male  Depression  Insomnia  HLD (hyperlipidemia)  HTN (hypertension)  Low testosterone level in male  Sprain of right rotator cuff capsule, subsequent encounter  Borderline diabetes mellitus: last A1c unknown  Low back pain, unspecified back pain laterality, unspecified chronicity, with sciatica presence unspecified  Lumbar herniated disc  Depression, unspecified depression type: Lost his son this past 2016  Essential hypertension  Sleep apnea, unspecified type  Obesity (BMI 30-39.9)  H/O knee surgery: b/l knees  H/O shoulder surgery: right  S/P arthroscopy of right knee: 2010  S/P right inguinal hernia repair: 2010  S/P ACL repair: left knee   S/P rotator cuff repair: Right shoulder       HEALTH ISSUES - PROBLEM Dx:  SIRS (systemic inflammatory response syndrome): SIRS (systemic inflammatory response syndrome)  Need for prophylactic measure: Need for prophylactic measure  Lumbar herniated disc: Lumbar herniated disc  Insomnia: Insomnia  HLD (hyperlipidemia): HLD (hyperlipidemia)  HTN (hypertension): HTN (hypertension)  Borderline diabetes mellitus: Borderline diabetes mellitus  Depression: Depression  Alcohol abuse: Alcohol abuse  Acute pancreatitis with infected necrosis, unspecified pancreatitis type: Acute pancreatitis with infected necrosis, unspecified pancreatitis type  Sepsis: Sepsis          FAMILY HISTORY:  Family history of squamous cell carcinoma: of parotid  Family history of hypertension  Family history of cancer (Father)        [SOCIAL HISTORY: ]     smoking:  denies     EtOH:  ~1/3 to 1/2 bottle vodka daily     illicit drugs:  denies     occupation:  [employer]     marital status:       Other:       [ALLERGIES/INTOLERANCES:]  Allergies       No Known Allergies  Intolerances          [MEDICATIONS]  MEDICATIONS  (STANDING):  chlordiazePOXIDE 50 milliGRAM(s) Oral every 8 hours  dexmedetomidine Infusion 0.2 MICROgram(s)/kG/Hr (4.99 mL/Hr) IV Continuous <Continuous>  dextrose 5% + sodium chloride 0.9%. 1000 milliLiter(s) (200 mL/Hr) IV Continuous <Continuous>  enoxaparin Injectable 40 milliGRAM(s) SubCutaneous daily  folic acid Injectable 1 milliGRAM(s) IV Push daily  influenza   Vaccine 0.5 milliLiter(s) IntraMuscular once  insulin Infusion 10 Unit(s)/Hr (10 mL/Hr) IV Continuous <Continuous>  multivitamin 1 Tablet(s) Oral daily  thiamine Injectable 100 milliGRAM(s) IV Push daily    MEDICATIONS  (PRN):  HYDROmorphone  Injectable 1 milliGRAM(s) IV Push every 4 hours PRN Moderate Pain (4 - 6)  HYDROmorphone  Injectable 2 milliGRAM(s) IV Push every 4 hours PRN Severe Pain (7 - 10)  LORazepam   Injectable 2 milliGRAM(s) IV Push every 3 hours PRN ETOH withdrawal  ondansetron Injectable 4 milliGRAM(s) IV Push every 6 hours PRN Nausea and/or Vomiting        [REVIEW OF SYSTEMS: ]  CONSTITUTIONAL: normal, no fever, no shakes, no chills   EYES: No eye pain, no visual disturbances, no discharge  ENMT:  no discharge  NECK: No pain, no stiffness  BREASTS: No pain, no masses, no nipple discharge  RESPIRATORY: No cough, no wheezing, no chills, no hemoptysis; No shortness of breath  CARDIOVASCULAR: No chest pain, no palpitations, no dizziness, no leg swelling  GASTROINTESTINAL: No abdominal or epigastric pain. No nausea, no vomiting, no hematemesis; No diarrhea , no constipation. No melena, no hematochezia.  GENITOURINARY: No dysuria, no frequency, no hematuria, no incontinence  NEUROLOGICAL: No headaches, no memory loss, no loss of strength, no numbness, no tremors  SKIN: No itching, no burning, no rashes, no lesions   LYMPH NODES: No enlarged glands  ENDOCRINE: No heat or cold intolerance; No hair loss  MUSCULOSKELETAL: No joint pain or swelling; No muscle, no back, no extremity pain  PSYCHIATRIC: No depression, no anxiety, no mood swings, no difficulty sleeping  HEME/LYMPH: No easy bruising, no bleeding gums    [VITALS SIGNS 24hrs]  Vital Signs Last 24 Hrs  T(C): 36.7 (02 Dec 2018 15:57), Max: 37.1 (02 Dec 2018 00:14)  T(F): 98 (02 Dec 2018 15:57), Max: 98.7 (02 Dec 2018 00:14)  HR: 123 (02 Dec 2018 19:00) (87 - 123)  BP: 122/66 (02 Dec 2018 19:00) (75/54 - 134/66)  BP(mean): 87 (02 Dec 2018 19:00) (60 - 94)  RR: 22 (02 Dec 2018 19:00) (12 - 39)  SpO2: 91% (02 Dec 2018 19:00) (86% - 93%)    [PHYSICAL EXAM]  General: adult in NAD,  WN,  WD.  HEENT: clear oropharynx, anicteric sclera, pink conjunctivae.  Neck: supple, no masses.  CV: normal S1S2, no murmur, no rubs, no gallops.  Lungs: clear to auscultation, no wheezes, no rales, no rhonchi.  Abdomen: soft, mild-tender epigastric, moderately-distended, no hepatosplenomegaly, normal BS, no guarding.  Ext: no clubbing, no cyanosis, no edema.  Skin: no rashes,  no petechiae, no venous stasis changes.  Neuro: alert and oriented X3, no focal motor deficits.  LN: no SC GUS.      [LABS:]                        14.9   8.84  )-----------( 173      ( 02 Dec 2018 06:02 )             45.0     CBC Full  -  ( 02 Dec 2018 06:02 )  WBC Count : 8.84 K/uL  Hemoglobin : 14.9 g/dL  Hematocrit : 45.0 %  Platelet Count - Automated : 173 K/uL  Mean Cell Volume : 100.2 fl  Mean Cell Hemoglobin : 33.2 pg  Mean Cell Hemoglobin Concentration : 33.1 gm/dL  Auto Neutrophil # : 6.54 K/uL  Auto Lymphocyte # : 1.41 K/uL  Auto Monocyte # : 0.88 K/uL  Auto Eosinophil # : 0.00 K/uL  Auto Basophil # : 0.00 K/uL  Auto Neutrophil % : 49.0 %  Auto Lymphocyte % : 16.0 %  Auto Monocyte % : 10.0 %  Auto Eosinophil % : 0.0 %  Auto Basophil % : 0.0 %        135  |  104  |  23  ----------------------------<  123<H>  5.0   |  21<L>  |  1.20    Ca    6.7<L>      02 Dec 2018 16:58  Phos  3.8       Mg     2.5         TPro  6.3  /  Alb  2.4<L>  /  TBili  1.1  /  DBili  .40<H>  /  AST  172<H>  /  ALT  118<H>  /  AlkPhos  231<H>      PT/INR - ( 01 Dec 2018 17:35 )   PT: 14.2 sec;   INR: 1.25 ratio         PTT - ( 01 Dec 2018 17:35 )  PTT:27.4 sec  LIVER FUNCTIONS - ( 02 Dec 2018 06:02 )  Alb: 2.4 g/dL / Pro: 6.3 g/dL / ALK PHOS: 231 U/L / ALT: 118 U/L / AST: 172 U/L / GGT: x           CARDIAC MARKERS ( 02 Dec 2018 06:02 )  x     / x     / 391 U/L / x     / x      CARDIAC MARKERS ( 01 Dec 2018 17:35 )  x     / x     / 375 U/L / x     / x          Urinalysis Basic - ( 01 Dec 2018 11:26 )    Color: Yellow / Appearance: Clear / S.010 / pH: x  Gluc: x / Ketone: Trace  / Bili: Negative / Urobili: 1   Blood: x / Protein: 25 mg/dL / Nitrite: Negative   Leuk Esterase: Trace / RBC: x / WBC 0-2   Sq Epi: x / Non Sq Epi: x / Bacteria: Occasional        WBC  TREND (5 Days)  WBC Count: 8.84 K/uL ( @ 06:02)  WBC Count: 9.38 K/uL ( @ 17:35)  WBC Count: 12.69 K/uL ( @ 06:49)    HGB  TREND (5 Days)  Hemoglobin: 14.9 g/dL ( @ 06:02)  Hemoglobin: 15.2 g/dL ( @ 17:35)  Hemoglobin: 13.7 g/dL ( @ :49)    HCT  TREND (5 Days)  Hematocrit: 45.0 % ( @ 06:02)  Hematocrit: 43.3 % ( @ 17:35)  Hematocrit: 38.1 % ( @ :49)    PLT  TREND (5 Days)  Platelet Count - Automated: 173 K/uL ( @ 06:02)  Platelet Count - Automated: 177 K/uL ( @ 17:35)  Platelet Count - Automated: 214 K/uL ( @ :49)        Ferritin, Serum: 4821 ng/mL ( @ 22:00)    Folate, Serum (03.15.18 @ 23:40)    Folate, Serum: >20.0 ng/mL    Vitamin B12, Serum (03.15.18 @ 23:40)    Vitamin B12, Serum: 435: Note: Reference Range Change on 2017. pg/mL      Triglycerides, Serum: 1123: Test Repeated. mg/dL (18 @ 10:38)  Triglycerides, Serum: 1845: Test Repeated. mg/dL (18 @ 15:38)    Lipid Profile (03.15.18 @ 21:27)    Triglycerides, Serum: 355 mg/dL      Lipase, Serum (12.01.18 @ 06:49)    Lipase, Serum: 51875 U/L    Lipase, Serum in AM (18 @ 06:02)    Lipase, Serum: 2421 U/L    Lipase, Serum in AM (18 @ 07:36)    Lipase, Serum: 334 U/L        [RADIOLOGY & ADDITIONAL STUDIES:]  < from: CT Abdomen and Pelvis w/ Oral Cont and w/ IV Cont (18 @ 08:20) >    EXAM:  CT ABDOMEN AND PELVIS OC IC                            *** ADDENDUM 2018  ***    A tiny gallstone is noted.      *** END OF ADDENDUM 2018  ***      PROCEDURE DATE:  2018          INTERPRETATION:  History: Abdominal pain history of pancreatitis.    CT abdomen pelvis oral and IV contrast.  95 cc Omnipaque 350 injected intravenously.  Bibasilar dependent atelectasis.  No calcified gallstones or biliary dilatation. Diffuse fatty liver.   Spleen unremarkable.  The tail of pancreas is markedly edematous. There is severe surrounding   inflammatory changes with peripancreatic fluid, stranding and phlegmonous   change. Findings consistent with severe acute pancreatitis. There is no   discrete organized collection to suggest abscess or pseudocyst.   Hypoenhancement in the pancreatic tail may reflect pancreatic edema.   Early necrosis not excluded. No pancreatic ductal dilatation.  Right renal cysts.  Nonaneurysmal abdominal aorta.  No suspicious adenopathy.  No obstructedor inflamed bowel.  Prostate bladder not remarkable.  No acute skeletal abnormality    Impression: Severe acute phlegmonous pancreatitis. Early pancreatic   necrosis not excluded. Close follow-up recommended.      ***Please see the addendum at the top of this report. It may contain   additional important information or changes.****          MARA VELA M.D., ATTENDING RADIOLOGIST  This document has been electronically signed. Dec  1 2018  9:19AM  Addend:  MARA VELA M.D., ATTENDING RADIOLOGIST  This addendum was electronically signed on: Dec  1 2018 10:47AM.          < end of copied text > Patient is a 63y old  Male who presents with a chief complaint of pancreatitis (02 Dec 2018 16:53)      HPI:  Pt is a 62 y/o M with PMHx of alcohol induced Pancreatitis, AODM (diet-controlled), Depression, HLD, HTN, BIBEMS from home complaining of gradual onset of upper abdominal pain, radiating to the back with nausea and vomiting. Pain started last night, rated 10/10. Pt states he took Advil and peptobismol to no relief. Of note pt states he had a similar episode in May when he was diagnosed with pancreatitis. Pt states he had a sonogram one month ago that did show gall stones. 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 Pt admits abd pain radiating to back, N/V, and blood streaked stool with BRBPR. Denies CP, SOB, fever, chills, urinary symptoms.     In the ED: VS T 98.3 HR 98 /70 RR18 98%RA. Labs significant for WBC 12.69, Na 130, K 5.6, Cl 94, CO2 19, glu 197, Ca 7.5, Alb 3.2, , , , lipase 12,651, amylase 358, lactate 2.3. CXR WNL. US GB showing Cholelithiasis and gallbladder sludge. Borderline common duct dilatation. CT Abd/pelvis showing Severe acute phlegmonous pancreatitis. Early pancreatic necrosis not excluded. Pt was given 1x dose zosyn, 3L NS bolus, 2x doses Dilaudid, 1x dose morphine, 2x doses Zofran. UCx sent. Pt evaluated and admitted to ICU. (01 Dec 2018 11:59)    Heme asked to see pt for abn ferritin.   No prior hx of high ferritin. No know hx of hemochromatosis personally nor in family.   volunteers to be drinking EtOH excessively.   States "I am done with EtOH, I can't keep doing this, I know what i need to do"      PAST MEDICAL & SURGICAL HISTORY:  Low testosterone in male  Depression  Insomnia  HLD (hyperlipidemia)  HTN (hypertension)  Low testosterone level in male  Sprain of right rotator cuff capsule, subsequent encounter  Borderline diabetes mellitus: last A1c unknown  Low back pain, unspecified back pain laterality, unspecified chronicity, with sciatica presence unspecified  Lumbar herniated disc  Depression, unspecified depression type: Lost his son this past 2016  Essential hypertension  Sleep apnea, unspecified type  Obesity (BMI 30-39.9)  H/O knee surgery: b/l knees  H/O shoulder surgery: right  S/P arthroscopy of right knee:   S/P right inguinal hernia repair:   S/P ACL repair: left knee   S/P rotator cuff repair: Right shoulder       HEALTH ISSUES - PROBLEM Dx:  SIRS (systemic inflammatory response syndrome): SIRS (systemic inflammatory response syndrome)  Need for prophylactic measure: Need for prophylactic measure  Lumbar herniated disc: Lumbar herniated disc  Insomnia: Insomnia  HLD (hyperlipidemia): HLD (hyperlipidemia)  HTN (hypertension): HTN (hypertension)  Borderline diabetes mellitus: Borderline diabetes mellitus  Depression: Depression  Alcohol abuse: Alcohol abuse  Acute pancreatitis with infected necrosis, unspecified pancreatitis type: Acute pancreatitis with infected necrosis, unspecified pancreatitis type  Sepsis: Sepsis          FAMILY HISTORY:  Family history of squamous cell carcinoma: of parotid  Family history of hypertension  Family history of cancer (Father)        [SOCIAL HISTORY: ]     smoking:  denies     EtOH:  ~1/3 to 1/2 bottle vodka daily     illicit drugs:  denies     occupation:  [employer]     marital status:       Other:       [ALLERGIES/INTOLERANCES:]  Allergies       No Known Allergies  Intolerances          [MEDICATIONS]  MEDICATIONS  (STANDING):  chlordiazePOXIDE 50 milliGRAM(s) Oral every 8 hours  dexmedetomidine Infusion 0.2 MICROgram(s)/kG/Hr (4.99 mL/Hr) IV Continuous <Continuous>  dextrose 5% + sodium chloride 0.9%. 1000 milliLiter(s) (200 mL/Hr) IV Continuous <Continuous>  enoxaparin Injectable 40 milliGRAM(s) SubCutaneous daily  folic acid Injectable 1 milliGRAM(s) IV Push daily  influenza   Vaccine 0.5 milliLiter(s) IntraMuscular once  insulin Infusion 10 Unit(s)/Hr (10 mL/Hr) IV Continuous <Continuous>  multivitamin 1 Tablet(s) Oral daily  thiamine Injectable 100 milliGRAM(s) IV Push daily    MEDICATIONS  (PRN):  HYDROmorphone  Injectable 1 milliGRAM(s) IV Push every 4 hours PRN Moderate Pain (4 - 6)  HYDROmorphone  Injectable 2 milliGRAM(s) IV Push every 4 hours PRN Severe Pain (7 - 10)  LORazepam   Injectable 2 milliGRAM(s) IV Push every 3 hours PRN ETOH withdrawal  ondansetron Injectable 4 milliGRAM(s) IV Push every 6 hours PRN Nausea and/or Vomiting        [REVIEW OF SYSTEMS: ]  CONSTITUTIONAL: normal, no fever, no shakes, no chills   EYES: No eye pain, no visual disturbances, no discharge  ENMT:  no discharge  NECK: No pain, no stiffness  BREASTS: No pain, no masses, no nipple discharge  RESPIRATORY: No cough, no wheezing, no chills, no hemoptysis; No shortness of breath  CARDIOVASCULAR: No chest pain, no palpitations, no dizziness, no leg swelling  GASTROINTESTINAL: +abdominal or epigastric pain. No nausea, no vomiting, no hematemesis; No diarrhea , no constipation. No melena, no hematochezia.  GENITOURINARY: No dysuria, no frequency, no hematuria, no incontinence  NEUROLOGICAL: No headaches, no memory loss, no loss of strength, no numbness, no tremors  SKIN: No itching, no burning, no rashes, no lesions   LYMPH NODES: No enlarged glands  ENDOCRINE: No heat or cold intolerance; No hair loss  MUSCULOSKELETAL: No joint pain or swelling; No muscle, no back, no extremity pain  PSYCHIATRIC: No depression, no anxiety, no mood swings, no difficulty sleeping  HEME/LYMPH: No easy bruising, no bleeding gums    [VITALS SIGNS 24hrs]  Vital Signs Last 24 Hrs  T(C): 36.7 (02 Dec 2018 15:57), Max: 37.1 (02 Dec 2018 00:14)  T(F): 98 (02 Dec 2018 15:57), Max: 98.7 (02 Dec 2018 00:14)  HR: 123 (02 Dec 2018 19:00) (87 - 123)  BP: 122/66 (02 Dec 2018 19:00) (75/54 - 134/66)  BP(mean): 87 (02 Dec 2018 19:00) (60 - 94)  RR: 22 (02 Dec 2018 19:00) (12 - 39)  SpO2: 91% (02 Dec 2018 19:00) (86% - 93%)    [PHYSICAL EXAM]  General: adult in NAD,  WN,  WD.  HEENT: clear oropharynx, anicteric sclera, pink conjunctivae.  Neck: supple, no masses.  CV: normal S1S2, no murmur, no rubs, no gallops.  Lungs: clear to auscultation, no wheezes, no rales, no rhonchi.  Abdomen: soft, mild-tender epigastric, moderately-distended, no hepatosplenomegaly, normal BS, no guarding.  Ext: no clubbing, no cyanosis, no edema.  Skin: no rashes,  no petechiae, no venous stasis changes.  Neuro: alert and oriented X3, no focal motor deficits.  LN: no SC GUS.      [LABS:]                        14.9   8.84  )-----------( 173      ( 02 Dec 2018 06:02 )             45.0     CBC Full  -  ( 02 Dec 2018 06:02 )  WBC Count : 8.84 K/uL  Hemoglobin : 14.9 g/dL  Hematocrit : 45.0 %  Platelet Count - Automated : 173 K/uL  Mean Cell Volume : 100.2 fl  Mean Cell Hemoglobin : 33.2 pg  Mean Cell Hemoglobin Concentration : 33.1 gm/dL  Auto Neutrophil # : 6.54 K/uL  Auto Lymphocyte # : 1.41 K/uL  Auto Monocyte # : 0.88 K/uL  Auto Eosinophil # : 0.00 K/uL  Auto Basophil # : 0.00 K/uL  Auto Neutrophil % : 49.0 %  Auto Lymphocyte % : 16.0 %  Auto Monocyte % : 10.0 %  Auto Eosinophil % : 0.0 %  Auto Basophil % : 0.0 %        135  |  104  |  23  ----------------------------<  123<H>  5.0   |  21<L>  |  1.20    Ca    6.7<L>      02 Dec 2018 16:58  Phos  3.8       Mg     2.5         TPro  6.3  /  Alb  2.4<L>  /  TBili  1.1  /  DBili  .40<H>  /  AST  172<H>  /  ALT  118<H>  /  AlkPhos  231<H>      PT/INR - ( 01 Dec 2018 17:35 )   PT: 14.2 sec;   INR: 1.25 ratio         PTT - ( 01 Dec 2018 17:35 )  PTT:27.4 sec  LIVER FUNCTIONS - ( 02 Dec 2018 06:02 )  Alb: 2.4 g/dL / Pro: 6.3 g/dL / ALK PHOS: 231 U/L / ALT: 118 U/L / AST: 172 U/L / GGT: x           CARDIAC MARKERS ( 02 Dec 2018 06:02 )  x     / x     / 391 U/L / x     / x      CARDIAC MARKERS ( 01 Dec 2018 17:35 )  x     / x     / 375 U/L / x     / x          Urinalysis Basic - ( 01 Dec 2018 11:26 )    Color: Yellow / Appearance: Clear / S.010 / pH: x  Gluc: x / Ketone: Trace  / Bili: Negative / Urobili: 1   Blood: x / Protein: 25 mg/dL / Nitrite: Negative   Leuk Esterase: Trace / RBC: x / WBC 0-2   Sq Epi: x / Non Sq Epi: x / Bacteria: Occasional        WBC  TREND (5 Days)  WBC Count: 8.84 K/uL ( @ 06:02)  WBC Count: 9.38 K/uL ( @ 17:35)  WBC Count: 12.69 K/uL ( @ 06:49)    HGB  TREND (5 Days)  Hemoglobin: 14.9 g/dL ( @ 06:02)  Hemoglobin: 15.2 g/dL ( @ 17:35)  Hemoglobin: 13.7 g/dL ( @ 06:49)    HCT  TREND (5 Days)  Hematocrit: 45.0 % ( @ 06:02)  Hematocrit: 43.3 % ( @ 17:35)  Hematocrit: 38.1 % ( @ 06:49)    PLT  TREND (5 Days)  Platelet Count - Automated: 173 K/uL ( @ 06:02)  Platelet Count - Automated: 177 K/uL ( @ 17:35)  Platelet Count - Automated: 214 K/uL ( @ 06:49)        Ferritin, Serum: 4821 ng/mL ( @ 22:00)    Folate, Serum (03.15.18 @ 23:40)    Folate, Serum: >20.0 ng/mL    Vitamin B12, Serum (03.15.18 @ 23:40)    Vitamin B12, Serum: 435: Note: Reference Range Change on 2017. pg/mL      Triglycerides, Serum: 1123: Test Repeated. mg/dL (18 @ 10:38)  Triglycerides, Serum: 1845: Test Repeated. mg/dL (18 @ 15:38)    Lipid Profile (03.15.18 @ 21:27)    Triglycerides, Serum: 355 mg/dL      Lipase, Serum (18 @ 06:49)    Lipase, Serum: 34908 U/L    Lipase, Serum in AM (18 @ 06:02)    Lipase, Serum: 2421 U/L    Lipase, Serum in AM (18 @ 07:36)    Lipase, Serum: 334 U/L        [RADIOLOGY & ADDITIONAL STUDIES:]  < from: CT Abdomen and Pelvis w/ Oral Cont and w/ IV Cont (18 @ 08:20) >  EXAM:  CT ABDOMEN AND PELVIS OC IC                          *** ADDENDUM 2018  ***  A tiny gallstone is noted.  *** END OF ADDENDUM 2018  ***    PROCEDURE DATE:  2018    INTERPRETATION:  History: Abdominal pain history of pancreatitis.  CT abdomen pelvis oral and IV contrast.  95 cc Omnipaque 350 injected intravenously.    Bibasilar dependent atelectasis.  No calcified gallstones or biliary dilatation. Diffuse fatty liver.   Spleen unremarkable.  The tail of pancreas is markedly edematous. There is severe surrounding   inflammatory changes with peripancreatic fluid, stranding and phlegmonous   change. Findings consistent with severe acute pancreatitis. There is no   discrete organized collection to suggest abscess or pseudocyst.   Hypoenhancement in the pancreatic tail may reflect pancreatic edema.   Early necrosis not excluded. No pancreatic ductal dilatation.  Right renal cysts.  Nonaneurysmal abdominal aorta.  No suspicious adenopathy.  No obstructedor inflamed bowel.  Prostate bladder not remarkable.  No acute skeletal abnormality    Impression: Severe acute phlegmonous pancreatitis. Early pancreatic   necrosis not excluded. Close follow-up recommended.    ***Please see the addendum at the top of this report. It may contain   additional important information or changes.****    MARA VELA M.D., ATTENDING RADIOLOGIST  This document has been electronically signed. Dec  1 2018  9:19AM  Addend:  MARA VELA M.D., ATTENDING RADIOLOGIST  This addendum was electronically signed on: Dec  1 2018 10:47AM.  < end of copied text >

## 2018-12-02 NOTE — DIETITIAN INITIAL EVALUATION ADULT. - FACTORS AFF FOOD INTAKE
other (specify)/pain/NPO, dx acute pancreatitis w/ infected necrosis, ETOH abuse NPO, dx acute pancreatitis w/ infected necrosis, ETOH abuse, depression/pain/other (specify)

## 2018-12-02 NOTE — PROGRESS NOTE ADULT - ATTENDING COMMENTS
63M PMH HTN, HLD, depression, obesity, diet controlled DM, ROSS (on CPAP), ETOH abuse, admitted with acute alcoholic pancreatitis, acute alcoholic hepatitis, hypertriglyceridemia. Course complicated by prerenal HERIBERTO, ileus and hypoxia due to resp distress.     He feels better and is stable from a clinical standpoint  He denies nausea, vomiting, requesting liquids and has improving abd pain  He does not have alcohol withdrawal signs/symptoms    Continue prn dilaudid, precedex, librium, prn Ativan  Continue FA, thiamine  Ordered incentive spirometry  OOB with assistance   Start clears  PRN zofran for nausea  Increase ivf to 200ml/hr, give 1L LR bolus  Monitor UO and renal fn, avoid nephrotoxic agents, maintain miller  Lipase improved, TGs pending  Continue iv insulin   LFTs improving, no need for pentoxifylline/steroids  Consult Hematology for elevated Ferritin  He has bandemia today - monitor for now, will send cultures and consider abx if develops signs/symptoms of infection  Lovenox for DVT ppx    Patient is critically ill, 50mins spent 63M PMH HTN, HLD, depression, obesity, diet controlled DM, ROSS (on CPAP), ETOH abuse, admitted with acute alcoholic pancreatitis, acute alcoholic hepatitis, hypertriglyceridemia. Course complicated by prerenal HERIBERTO, ileus and hypoxia/resp distress due to abd pain.     He feels better and is stable from a clinical standpoint  He denies nausea, vomiting, requesting liquids and has improving abd pain  He does not have alcohol withdrawal signs/symptoms    Continue prn dilaudid, precedex, librium, prn Ativan  Continue FA, thiamine  Ordered incentive spirometry  OOB with assistance   Start clears  PRN zofran for nausea  Increase ivf to 200ml/hr, give 1L LR bolus  Monitor UO and renal fn, avoid nephrotoxic agents, maintain miller  Lipase improved, TGs pending  Continue iv insulin   LFTs improving, no need for pentoxifylline/steroids  Consult Hematology for elevated Ferritin  He has bandemia today - monitor for now, will send cultures and consider abx if develops signs/symptoms of infection  Lovenox for DVT ppx    Patient is critically ill, 50mins spent

## 2018-12-02 NOTE — DIETITIAN INITIAL EVALUATION ADULT. - NS AS NUTRI INTERV MED MANAGE
Recommend lipid profile. HgbA1c pending. Recommend current NH3 level w/ lactulose per MD discretion./Prescription medications Recommend lipid profile. HgbA1c pending. Recommend current NH3 level w/ lactulose per MD discretion. Stool softeners prn./Prescription medications

## 2018-12-02 NOTE — DIETITIAN INITIAL EVALUATION ADULT. - NS AS NUTRI INTERV MEALS SNACK3
When medically feasible recommend clear liquid diet and advance as tolerated to Dash/TLC, low fat, consistent carb./Other (specify)

## 2018-12-02 NOTE — DIETITIAN INITIAL EVALUATION ADULT. - SIGNS/SYMPTOMS
as evidenced by BMI 34.4, TG 1845, ETOH abuse. as evidenced by dx acute pancreatitis, ETOH abuse; Lipase 2421, NH3 79, elevated LFTS, TG 1845

## 2018-12-02 NOTE — PROGRESS NOTE ADULT - ASSESSMENT
62yo male admitted with:    1. Acute alcoholic Pancreatitis  2. Hypertriglyceridemia  3. ETOH misuse  4. ETOH withdrawal  5. Toxic Encephalopathy    Plan:  Neuro:    CV:    PULM:    GI/Nutrition:    /Renal:    ID:    Skin:    Heme:    Proph:    Endo:    Lines:    Dispo:    Critical care time spent excluding time spent on bundled care or procedures = 37 minutes. 62yo male admitted with:    1. Acute alcoholic Pancreatitis  2. Hypertriglyceridemia  3. ETOH misuse  4. ETOH withdrawal  5. Toxic Encephalopathy    Plan:  Neuro: Continue with Librium 50mg Q 8 hours. I do not believe we should taper this day as patient is only one day out from his last drink. Continue with precedex gtt for sedation/pain control. Ativan prn. Maintain and encourage normal sleep/wake cycle    CV: Patient without a vasopressor requirement at present but he now has a new bandemia and remains inflammed as evidenced by increased pain and tachycardia. He has a worsening SIRS response, unsure if septic or primary inflammation.     PULM: wean from venti mask for O2 sat > 92%. Patient is a high risk for worsening clinical picture and possible intubation this admit    GI/Nutrition: NPO for now. Serial abdominal exams. Continue dilaudid for pain control. OOB to chair as able. Ensure bowel regimen. Will monitor intraabdominal exam for risk of intrabdominal hypertension    /Renal: Patient continues with ATN and oliguria. Holder with approx 20cc per hour. He has been bolused 2L of IVF so far. He continues with jd of 3, 48 hr jd to be completed in AM, but already >6L IV bolused    ID: Unsure if infectious in nature, but will order cultures and monitor for signs/sx of a source of infection aside from pancreas. Will continue to observe off antibiotics at this time. Will add if clinical picture worsens (carbapenem)    Skin: mobility to prevent skin breakdown    Heme: No role for transfusion at present. Daily H/H    Proph: SQH for DVT proph    Endo: Insulin gtt to remain for elevated triglyceride level. Some improvement this day with aide of gtt. Continue Dextrose containing solution while on gtt to prevent hypoglycemia    Lines: PIV access only at present    Dispo: Patient requires continued ICU Care and is in multi organ dysfunction. He must be in the ICU for his respiratory distress, precedex and insulin gtts. He remains high risk for HD collapse    Critical care time spent excluding time spent on bundled care or procedures = 37 minutes.

## 2018-12-02 NOTE — CONSULT NOTE ADULT - ASSESSMENT
*********************  [ASSESSMENT and  PLAN]  I have discussed the above plan of care with patient/family in detail. They expressed understanding of the treatment plan . Risks, benefits and alternatives discussed in detail. I have asked if they have any questions or concerns and appropriately addressed them; all questions answered to their satisfactions and in lay terms.     > xxxxxx minutes spent in direct patient care, examining and counseling patient,  reviewing  the notes, lab data/ imaging , discussion with multidisciplinary team. [ASSESSMENT and  PLAN]  Clinical picture consistent with acute pancreatitis, with high triglycerides in pt with hx of heavy EtOH use.     Elevated Ferritin likely due to acute phase reactant from inflammation from pancreatitis.   High triglycerides can contribute to pancreatitis.     Can have cytopenia due to pancreatitis At current time, CBC relatively preserved cell lines.   If on testosterone replacement tx, may account for higher than expected Hgb.         RECOMMEND:   Follow Feriritin serially, weekly.   Outpt Follow to ensure Ferritin returns to normal, and that pt does not have underlying hemochromatosis.   EtOH cessation/abstinence. In event pt has occult hemochromatosis, then cessation of EtOh would help prevent cirrhosis.   eg Multiple potential health benefits from abstinence     Cardiology/primary care followup for mgmt of triglycerides.   Mgmt of pancreatitis per GI and ICU team.     No additional recommendations at current time.    Can followup oupt.   Contact information given to pt.     Will sign off case for now. Reconsult if needed.       I have discussed the above plan of care with patient/family in detail. They expressed understanding of the treatment plan . Risks, benefits and alternatives discussed in detail. I have asked if they have any questions or concerns and appropriately addressed them; all questions answered to their satisfactions and in lay terms.

## 2018-12-02 NOTE — DIETITIAN INITIAL EVALUATION ADULT. - OTHER INFO
Pt sleeping soundly at time of visit. On precedex. Dx acute pancreatitis w/ infected necrosis. Hx ETOH abuse. On thiamine, folic acid. Severe hypertriglyceridemia. On insulin drip. NPO on IVF-D5NS@150cc/hr. No report N/V/D/C. Pt awake/forgetful at times. On precedex. Dx acute pancreatitis w/ infected necrosis. Hx ETOH abuse. On thiamine, folic acid. Severe hypertriglyceridemia. On insulin drip. NPO on IVF-D5NS@150cc/hr. No report N/V at present. +constipation. No BM x 4 days. Recommend stool softeners/laxatives. Per diet recall pt appears to have high fat, high protein diet. Excessive ETOH intake pta.

## 2018-12-02 NOTE — DIETITIAN INITIAL EVALUATION ADULT. - NS AS NUTRI INTERV MEALS SNACK
When medically feasible recommend clear liquid diet and advance as tolerated to Dash/TLC, low fat, consistent carb.

## 2018-12-02 NOTE — PROGRESS NOTE ADULT - SUBJECTIVE AND OBJECTIVE BOX
INTERVAL HPI/OVERNIGHT EVENTS:  on bipap  sedated    MEDICATIONS  (STANDING):  chlordiazePOXIDE 50 milliGRAM(s) Oral every 8 hours  dexmedetomidine Infusion 0.2 MICROgram(s)/kG/Hr (4.99 mL/Hr) IV Continuous <Continuous>  dextrose 5% + sodium chloride 0.9%. 1000 milliLiter(s) (150 mL/Hr) IV Continuous <Continuous>  enoxaparin Injectable 40 milliGRAM(s) SubCutaneous daily  folic acid Injectable 1 milliGRAM(s) IV Push daily  influenza   Vaccine 0.5 milliLiter(s) IntraMuscular once  insulin Infusion 10 Unit(s)/Hr (10 mL/Hr) IV Continuous <Continuous>  thiamine Injectable 100 milliGRAM(s) IV Push daily    MEDICATIONS  (PRN):  HYDROmorphone  Injectable 1 milliGRAM(s) IV Push every 4 hours PRN Moderate Pain (4 - 6)  HYDROmorphone  Injectable 2 milliGRAM(s) IV Push every 4 hours PRN Severe Pain (7 - 10)  LORazepam   Injectable 2 milliGRAM(s) IV Push every 3 hours PRN ETOH withdrawal      Allergies    No Known Allergies    Intolerances        Review of Systems:    General:  No wt loss, fevers, chills, night sweats,fatigue,   Eyes:  Good vision, no reported pain  ENT:  No sore throat, pain, runny nose, dysphagia  CV:  No pain, palpitatioins, hypo/hypertension  Resp:  No dyspnea, cough, tachypnea, wheezing  GI:  No pain, No nausea, No vomiting, No diarrhea, No constipatiion, No weight loss, No fever, No pruritis, No rectal bleeding, No tarry stools, No dysphagia,  :  No pain, bleeding, incontinence, nocturia  Muscle:  No pain, weakness  Neuro:  No weakness, tingling, memory problems  Psych:  No fatigue, insomnia, mood problems, depression  Endocrine:  No polyuria, polydypsia, cold/heat intolerance  Heme:  No petechiae, ecchymosis, easy bruisability  Skin:  No rash, tattoos, scars, edema      Vital Signs Last 24 Hrs  T(C): 37.1 (02 Dec 2018 00:14), Max: 37.1 (01 Dec 2018 16:01)  T(F): 98.7 (02 Dec 2018 00:14), Max: 98.7 (01 Dec 2018 16:01)  HR: 95 (02 Dec 2018 00:00) (85 - 109)  BP: 100/61 (02 Dec 2018 00:00) (99/56 - 142/81)  BP(mean): 75 (02 Dec 2018 00:00) (72 - 106)  RR: 27 (02 Dec 2018 00:00) (16 - 27)  SpO2: 93% (02 Dec 2018 00:00) (86% - 100%)    PHYSICAL EXAM:    Constitutional: NAD, well-developed  HEENT: EOMI, throat clear  Neck: No LAD, supple  Respiratory: CTA and P  Cardiovascular: S1 and S2, RRR, no M  Gastrointestinal: BS+, soft, NT/ND, neg HSM,  Extremities: No peripheral edema, neg clubing, cyanosis  Vascular: 2+ peripheral pulses  Neurological: A/O x 3, no focal deficits  Psychiatric: Normal mood, normal affect  Skin: No rashes      LABS:                        15.2   9.38  )-----------( 177      ( 01 Dec 2018 17:35 )             43.3     12    134<L>  |  100  |  16  ----------------------------<  141<H>  4.4   |  21<L>  |  1.00    Ca    6.2<LL>      01 Dec 2018 20:46  Phos  2.5       Mg     2.9         TPro  7.5  /  Alb  3.2<L>  /  TBili  0.9  /  DBili  x   /  AST  404<H>  /  ALT  214<H>  /  AlkPhos  425<H>  12    PT/INR - ( 01 Dec 2018 17:35 )   PT: 14.2 sec;   INR: 1.25 ratio         PTT - ( 01 Dec 2018 17:35 )  PTT:27.4 sec  Urinalysis Basic - ( 01 Dec 2018 11:26 )    Color: Yellow / Appearance: Clear / S.010 / pH: x  Gluc: x / Ketone: Trace  / Bili: Negative / Urobili: 1   Blood: x / Protein: 25 mg/dL / Nitrite: Negative   Leuk Esterase: Trace / RBC: x / WBC 0-2   Sq Epi: x / Non Sq Epi: x / Bacteria: Occasional        RADIOLOGY & ADDITIONAL TESTS:

## 2018-12-02 NOTE — DIETITIAN INITIAL EVALUATION ADULT. - NUTRITION INTERVENTION
Nutrition Education/Meals and Snack Vitamin/Nutrition - Related Medication Management/Meals and Snack/Nutrition Education

## 2018-12-02 NOTE — PROGRESS NOTE ADULT - SUBJECTIVE AND OBJECTIVE BOX
HISTORY  63y Male    24 HOUR EVENTS:    SUBJECTIVE/ROS:  [ ] A ten-point review of systems was otherwise negative except as noted.  [ ] Due to altered mental status/intubation, subjective information were not able to be obtained from the patient. History was obtained, to the extent possible, from review of the chart and collateral sources of information.      NEURO  RASS:     GCS:     CAM ICU:  Exam:   Meds: chlordiazePOXIDE 50 milliGRAM(s) Oral every 8 hours  dexmedetomidine Infusion 0.2 MICROgram(s)/kG/Hr IV Continuous <Continuous>  HYDROmorphone  Injectable 1 milliGRAM(s) IV Push every 4 hours PRN Moderate Pain (4 - 6)  HYDROmorphone  Injectable 2 milliGRAM(s) IV Push every 4 hours PRN Severe Pain (7 - 10)  LORazepam   Injectable 2 milliGRAM(s) IV Push every 3 hours PRN ETOH withdrawal    [x] Adequacy of sedation and pain control has been assessed and adjusted      RESPIRATORY  RR: 13 (12-02-18 @ 08:01) (12 - 35)  SpO2: 90% (12-02-18 @ 08:01) (86% - 100%)  Wt(kg): --  Exam: unlabored, clear to auscultation bilaterally  Mechanical Ventilation:   ABG - ( 02 Dec 2018 05:56 )  pH: x     /  pCO2: x     /  pO2: x     / HCO3: x     / Base Excess: x     /  SaO2: x       Lactate: 2.7              [ ] Extubation Readiness Assessed  Meds:       CARDIOVASCULAR  HR: 93 (12-02-18 @ 08:01) (85 - 113)  BP: 95/60 (12-02-18 @ 08:01) (75/54 - 142/81)  BP(mean): 72 (12-02-18 @ 08:01) (60 - 106)  ABP: --  ABP(mean): --  Wt(kg): --  CVP(cm H2O): --    Lactate, Blood: 2.7 mmol/L (12-02 @ 05:56)    Exam:  Cardiac Rhythm:  Perfusion     [ ]Adequate   [ ]Inadequate  Mentation   [ ]Normal       [ ]Reduced  Extremities  [ ]Warm         [ ]Cool  Volume Status [ ]Hypervolemic [ ]Euvolemic [ ]Hypovolemic  Meds:       GI/NUTRITION  Exam:  Diet:  Meds:     GENITOURINARY  I&O's Detail    12-01 @ 07:01 - 12-02 @ 07:00  --------------------------------------------------------  IN:    dexmedetomidine Infusion: 156.2 mL    dextrose 5% + sodium chloride 0.9%.: 2100 mL    insulin Infusion: 140 mL    IV PiggyBack: 1000 mL    lactated ringers.: 875 mL    Oral Fluid: 650 mL    Solution: 100 mL    Solution: 200 mL  Total IN: 5221.2 mL    OUT:    Voided: 1295 mL  Total OUT: 1295 mL    Total NET: 3926.2 mL      12-02 @ 07:01 - 12-02 @ 08:36  --------------------------------------------------------  IN:    dexmedetomidine Infusion: 5.4 mL    dextrose 5% + sodium chloride 0.9%.: 150 mL    insulin Infusion: 10 mL  Total IN: 165.4 mL    OUT:    Voided: 25 mL  Total OUT: 25 mL    Total NET: 140.4 mL          12-02    134<L>  |  100  |  21  ----------------------------<  80  4.4   |  23  |  1.40<H>    Ca    7.7<L>      02 Dec 2018 06:02  Phos  3.8     12-02  Mg     2.5     12-02    TPro  6.3  /  Alb  2.4<L>  /  TBili  1.1  /  DBili  .40<H>  /  AST  172<H>  /  ALT  118<H>  /  AlkPhos  231<H>  12-02    [ ] Holder catheter, indication: N/A  Meds: dextrose 5% + sodium chloride 0.9%. 1000 milliLiter(s) IV Continuous <Continuous>  folic acid Injectable 1 milliGRAM(s) IV Push daily  thiamine Injectable 100 milliGRAM(s) IV Push daily        HEMATOLOGIC  Meds: enoxaparin Injectable 40 milliGRAM(s) SubCutaneous daily    [x] VTE Prophylaxis                        14.9   8.84  )-----------( 173      ( 02 Dec 2018 06:02 )             45.0     PT/INR - ( 01 Dec 2018 17:35 )   PT: 14.2 sec;   INR: 1.25 ratio         PTT - ( 01 Dec 2018 17:35 )  PTT:27.4 sec  Transfusion     [ ] PRBC   [ ] Platelets   [ ] FFP   [ ] Cryoprecipitate      INFECTIOUS DISEASES  T(C): 36.4 (12-02-18 @ 08:01), Max: 37.1 (12-01-18 @ 16:01)  Wt(kg): --  WBC Count: 8.84 K/uL (12-02 @ 06:02)  WBC Count: 9.38 K/uL (12-01 @ 17:35)    Recent Cultures:    Meds: influenza   Vaccine 0.5 milliLiter(s) IntraMuscular once        ENDOCRINE  Capillary Blood Glucose    Meds: insulin Infusion 10 Unit(s)/Hr IV Continuous <Continuous>        ACCESS DEVICES:  [ ] Peripheral IV  [ ] Central Venous Line	[ ] R	[ ] L	[ ] IJ	[ ] Fem	[ ] SC	Placed:   [ ] Arterial Line		[ ] R	[ ] L	[ ] Fem	[ ] Rad	[ ] Ax	Placed:   [ ] PICC:					[ ] Mediport  [ ] Urinary Catheter, Date Placed:   [ ] Necessity of urinary, arterial, and venous catheters discussed    OTHER MEDICATIONS:      CODE STATUS:     IMAGING: HISTORY  HPI:  Pt is a 64 y/o M with PMHx of alcohol induced Pancreatitis, AODM (diet-controlled), Depression, HLD, HTN, BIBEMS from home complaining of gradual onset of upper abdominal pain, radiating to the back with nausea and vomiting. Pain started last night, rated 10/10. Pt states he took Advil and peptobismol to no relief. Of note pt states he had a similar episode in May when he was diagnosed with pancreatitis. Pt states he had a sonogram one month ago that did show gall stones. 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 Pt admits abd pain radiating to back, N/V, and blood streaked stool with BRBPR. Denies CP, SOB, fever, chills, urinary symptoms.     In the ED: VS T 98.3 HR 98 /70 RR18 98%RA. Labs significant for WBC 12.69, Na 130, K 5.6, Cl 94, CO2 19, glu 197, Ca 7.5, Alb 3.2, , , , lipase 12,651, amylase 358, lactate 2.3. CXR WNL. US GB showing Cholelithiasis and gallbladder sludge. Borderline common duct dilatation. CT Abd/pelvis showing Severe acute phlegmonous pancreatitis. Early pancreatic necrosis not excluded. Pt was given 1x dose zosyn, 3L NS bolus, 2x doses Dilaudid, 1x dose morphine, 2x doses Zofran. UCx sent. Pt evaluated and admitted to ICU. (01 Dec 2018 11:59)    24 HOUR EVENTS:  Patient did not show any further signs of alcohol withdrawal overnight, however continues to desaturate and requiring additional fluid boluses due to oliguria and hypotension. He now has a bandemia as well. Continues on precedex and librium for ETOH. Ativan x 1 given yesterday. Insulin gtt initiated due to high triglyceride levels which could be contributing to his pancreatitis    SUBJECTIVE/ROS:  [ x] A ten-point review of systems was otherwise negative except as noted.  [ ] Due to altered mental status/intubation, subjective information were not able to be obtained from the patient. History was obtained, to the extent possible, from review of the chart and collateral sources of information.      NEURO  RASS: 0     GCS:   15  CAM ICU: negative  Exam: Alert and oriented x 3, no focal deficits. no tremors, VÁZQUEZ  Meds: chlordiazePOXIDE 50 milliGRAM(s) Oral every 8 hours  dexmedetomidine Infusion 0.2 MICROgram(s)/kG/Hr IV Continuous <Continuous>  HYDROmorphone  Injectable 1 milliGRAM(s) IV Push every 4 hours PRN Moderate Pain (4 - 6)  HYDROmorphone  Injectable 2 milliGRAM(s) IV Push every 4 hours PRN Severe Pain (7 - 10)  LORazepam   Injectable 2 milliGRAM(s) IV Push every 3 hours PRN ETOH withdrawal    [x] Adequacy of sedation and pain control has been assessed and adjusted      RESPIRATORY  RR: 13 (12-02-18 @ 08:01) (12 - 35)  SpO2: 90% (12-02-18 @ 08:01) (86% - 100%)  Wt(kg): --  Exam: unlabored, clear to auscultation bilaterally  Mechanical Ventilation:   ABG - ( 02 Dec 2018 05:56 )  pH: x     /  pCO2: x     /  pO2: x     / HCO3: x     / Base Excess: x     /  SaO2: x       Lactate: 2.7      [ ] Extubation Readiness Assessed  Meds:     CARDIOVASCULAR  HR: 93 (12-02-18 @ 08:01) (85 - 113)  BP: 95/60 (12-02-18 @ 08:01) (75/54 - 142/81)  BP(mean): 72 (12-02-18 @ 08:01) (60 - 106)  ABP: --  ABP(mean): --  Wt(kg): --  CVP(cm H2O): --    Lactate, Blood: 2.7 mmol/L (12-02 @ 05:56)    Exam:  Cardiac Rhythm: NSR. S1S2, no m/r/g  Perfusion     [x ]Adequate   [ ]Inadequate  Mentation   [ x]Normal       [ ]Reduced  Extremities  [ x]Warm         [ ]Cool  Volume Status [ ]Hypervolemic [ ]Euvolemic [x ]Hypovolemic  Meds:     GI/NUTRITION  Exam: mid epigastric TTP, mild guarding, no rebound tenderness  Diet: NPO  Meds:     GENITOURINARY  I&O's Detail    12-01 @ 07:01  -  12-02 @ 07:00  --------------------------------------------------------  IN:    dexmedetomidine Infusion: 156.2 mL    dextrose 5% + sodium chloride 0.9%.: 2100 mL    insulin Infusion: 140 mL    IV PiggyBack: 1000 mL    lactated ringers.: 875 mL    Oral Fluid: 650 mL    Solution: 100 mL    Solution: 200 mL  Total IN: 5221.2 mL    OUT:    Voided: 1295 mL  Total OUT: 1295 mL    Total NET: 3926.2 mL      12-02 @ 07:01  -  12-02 @ 08:36  --------------------------------------------------------  IN:    dexmedetomidine Infusion: 5.4 mL    dextrose 5% + sodium chloride 0.9%.: 150 mL    insulin Infusion: 10 mL  Total IN: 165.4 mL    OUT:    Voided: 25 mL  Total OUT: 25 mL    Total NET: 140.4 mL      12-02    134<L>  |  100  |  21  ----------------------------<  80  4.4   |  23  |  1.40<H>    Ca    7.7<L>      02 Dec 2018 06:02  Phos  3.8     12-02  Mg     2.5     12-02    TPro  6.3  /  Alb  2.4<L>  /  TBili  1.1  /  DBili  .40<H>  /  AST  172<H>  /  ALT  118<H>  /  AlkPhos  231<H>  12-02    [x ] Holder catheter, indication: Monitoring in critical care setting  Meds: dextrose 5% + sodium chloride 0.9%. 1000 milliLiter(s) IV Continuous <Continuous>  folic acid Injectable 1 milliGRAM(s) IV Push daily  thiamine Injectable 100 milliGRAM(s) IV Push daily    HEMATOLOGIC  Meds: enoxaparin Injectable 40 milliGRAM(s) SubCutaneous daily    [x] VTE Prophylaxis                        14.9   8.84  )-----------( 173      ( 02 Dec 2018 06:02 )             45.0     PT/INR - ( 01 Dec 2018 17:35 )   PT: 14.2 sec;   INR: 1.25 ratio         PTT - ( 01 Dec 2018 17:35 )  PTT:27.4 sec  Transfusion     [ ] PRBC   [ ] Platelets   [ ] FFP   [ ] Cryoprecipitate    INFECTIOUS DISEASES  T(C): 36.4 (12-02-18 @ 08:01), Max: 37.1 (12-01-18 @ 16:01)  Wt(kg): --  WBC Count: 8.84 K/uL (12-02 @ 06:02)  WBC Count: 9.38 K/uL (12-01 @ 17:35)    Recent Cultures:    Meds: influenza   Vaccine 0.5 milliLiter(s) IntraMuscular once    ENDOCRINE  CAPILLARY BLOOD GLUCOSE      POCT Blood Glucose.: 109 mg/dL (02 Dec 2018 13:25)  POCT Blood Glucose.: 89 mg/dL (02 Dec 2018 12:20)  POCT Blood Glucose.: 91 mg/dL (02 Dec 2018 10:30)  POCT Blood Glucose.: 91 mg/dL (02 Dec 2018 09:30)  POCT Blood Glucose.: 84 mg/dL (02 Dec 2018 08:10)  POCT Blood Glucose.: 106 mg/dL (02 Dec 2018 07:07)  POCT Blood Glucose.: 87 mg/dL (02 Dec 2018 06:17)  POCT Blood Glucose.: 85 mg/dL (02 Dec 2018 05:12)  POCT Blood Glucose.: 88 mg/dL (02 Dec 2018 04:06)  POCT Blood Glucose.: 95 mg/dL (02 Dec 2018 03:00)  POCT Blood Glucose.: 98 mg/dL (02 Dec 2018 02:00)  POCT Blood Glucose.: 93 mg/dL (02 Dec 2018 01:03)  POCT Blood Glucose.: 108 mg/dL (02 Dec 2018 00:12)  POCT Blood Glucose.: 117 mg/dL (01 Dec 2018 23:08)  POCT Blood Glucose.: 112 mg/dL (01 Dec 2018 22:08)  POCT Blood Glucose.: 136 mg/dL (01 Dec 2018 21:10)  POCT Blood Glucose.: 157 mg/dL (01 Dec 2018 19:58)  POCT Blood Glucose.: 190 mg/dL (01 Dec 2018 19:06)  POCT Blood Glucose.: 176 mg/dL (01 Dec 2018 18:08)      Meds: insulin Infusion 10 Unit(s)/Hr IV Continuous <Continuous>      ACCESS DEVICES:  [ x] Peripheral IV  [ ] Central Venous Line	[ ] R	[ ] L	[ ] IJ	[ ] Fem	[ ] SC	Placed:   [ ] Arterial Line		[ ] R	[ ] L	[ ] Fem	[ ] Rad	[ ] Ax	Placed:   [ ] PICC:					[ ] Mediport  [x ] Urinary Catheter, Date Placed:   [x ] Necessity of urinary, arterial, and venous catheters discussed    OTHER MEDICATIONS:    CODE STATUS: Full     IMAGING: As per prior note. None new HISTORY  HPI:  Pt is a 62 y/o M with PMHx of alcohol induced Pancreatitis, AODM (diet-controlled), Depression, HLD, HTN, BIBEMS from home complaining of gradual onset of upper abdominal pain, radiating to the back with nausea and vomiting. Pain started last night, rated 10/10. Pt states he took Advil and peptobismol to no relief. Of note pt states he had a similar episode in May when he was diagnosed with pancreatitis. Pt states he had a sonogram one month ago that did show gall stones. 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 Pt admits abd pain radiating to back, N/V, and blood streaked stool with BRBPR. Denies CP, SOB, fever, chills, urinary symptoms.     In the ED: VS T 98.3 HR 98 /70 RR18 98%RA. Labs significant for WBC 12.69, Na 130, K 5.6, Cl 94, CO2 19, glu 197, Ca 7.5, Alb 3.2, , , , lipase 12,651, amylase 358, lactate 2.3. CXR WNL. US GB showing Cholelithiasis and gallbladder sludge. Borderline common duct dilatation. CT Abd/pelvis showing Severe acute phlegmonous pancreatitis. Early pancreatic necrosis not excluded. Pt was given 1x dose zosyn, 3L NS bolus, 2x doses Dilaudid, 1x dose morphine, 2x doses Zofran. UCx sent. Pt evaluated and admitted to ICU. (01 Dec 2018 11:59)    24 HOUR EVENTS:  Patient did not show any further signs of alcohol withdrawal overnight, however continues to with hypoxia and now required a venti mask. Also requiring additional fluid boluses due to oliguria and hypotension. He now has a bandemia as well. Continues on precedex and librium for ETOH. Ativan x 1 given yesterday. Insulin gtt initiated due to high triglyceride levels which could be contributing to his pancreatitis    SUBJECTIVE/ROS:  [ x] A ten-point review of systems was otherwise negative except as noted.  [ ] Due to altered mental status/intubation, subjective information were not able to be obtained from the patient. History was obtained, to the extent possible, from review of the chart and collateral sources of information.      NEURO  RASS: 0     GCS:   15  CAM ICU: negative  Exam: Alert and oriented x 3, no focal deficits. no tremors, VÁZQUEZ  Meds: chlordiazePOXIDE 50 milliGRAM(s) Oral every 8 hours  dexmedetomidine Infusion 0.2 MICROgram(s)/kG/Hr IV Continuous <Continuous>  HYDROmorphone  Injectable 1 milliGRAM(s) IV Push every 4 hours PRN Moderate Pain (4 - 6)  HYDROmorphone  Injectable 2 milliGRAM(s) IV Push every 4 hours PRN Severe Pain (7 - 10)  LORazepam   Injectable 2 milliGRAM(s) IV Push every 3 hours PRN ETOH withdrawal    [x] Adequacy of sedation and pain control has been assessed and adjusted      RESPIRATORY  RR: 13 (12-02-18 @ 08:01) (12 - 35)  SpO2: 90% (12-02-18 @ 08:01) (86% - 100%)  Wt(kg): --  Exam: mildly labored, coarse to auscultation bilaterally  Mechanical Ventilation:   ABG - ( 02 Dec 2018 05:56 )  pH: x     /  pCO2: x     /  pO2: x     / HCO3: x     / Base Excess: x     /  SaO2: x       Lactate: 2.7      [NA ] Extubation Readiness Assessed  Meds:     CARDIOVASCULAR  HR: 93 (12-02-18 @ 08:01) (85 - 113)  BP: 95/60 (12-02-18 @ 08:01) (75/54 - 142/81)  BP(mean): 72 (12-02-18 @ 08:01) (60 - 106)  ABP: --  ABP(mean): --  Wt(kg): --  CVP(cm H2O): --    Lactate, Blood: 2.7 mmol/L (12-02 @ 05:56)    Exam:  Cardiac Rhythm: Sinus Tachycardia. S1S2, no m/r/g  Perfusion     [x ]Adequate   [ ]Inadequate  Mentation   [ x]Normal       [ ]Reduced  Extremities  [ x]Warm         [ ]Cool  Volume Status [ ]Hypervolemic [ ]Euvolemic [x ]Hypovolemic  Meds:     GI/NUTRITION  Exam: mid epigastric TTP, mild guarding, no rebound tenderness  Diet: NPO  Meds:     GENITOURINARY  I&O's Detail    12-01 @ 07:01 - 12-02 @ 07:00  --------------------------------------------------------  IN:    dexmedetomidine Infusion: 156.2 mL    dextrose 5% + sodium chloride 0.9%.: 2100 mL    insulin Infusion: 140 mL    IV PiggyBack: 1000 mL    lactated ringers.: 875 mL    Oral Fluid: 650 mL    Solution: 100 mL    Solution: 200 mL  Total IN: 5221.2 mL    OUT:    Voided: 1295 mL  Total OUT: 1295 mL    Total NET: 3926.2 mL      12-02 @ 07:01 - 12-02 @ 08:36  --------------------------------------------------------  IN:    dexmedetomidine Infusion: 5.4 mL    dextrose 5% + sodium chloride 0.9%.: 150 mL    insulin Infusion: 10 mL  Total IN: 165.4 mL    OUT:    Voided: 25 mL  Total OUT: 25 mL    Total NET: 140.4 mL      12-02    134<L>  |  100  |  21  ----------------------------<  80  4.4   |  23  |  1.40<H>    Ca    7.7<L>      02 Dec 2018 06:02  Phos  3.8     12-02  Mg     2.5     12-02    TPro  6.3  /  Alb  2.4<L>  /  TBili  1.1  /  DBili  .40<H>  /  AST  172<H>  /  ALT  118<H>  /  AlkPhos  231<H>  12-02    [x ] Holder catheter, indication: Monitoring in critical care setting  Meds: dextrose 5% + sodium chloride 0.9%. 1000 milliLiter(s) IV Continuous <Continuous>  folic acid Injectable 1 milliGRAM(s) IV Push daily  thiamine Injectable 100 milliGRAM(s) IV Push daily    HEMATOLOGIC  Meds: enoxaparin Injectable 40 milliGRAM(s) SubCutaneous daily    [x] VTE Prophylaxis                        14.9   8.84  )-----------( 173      ( 02 Dec 2018 06:02 )             45.0     PT/INR - ( 01 Dec 2018 17:35 )   PT: 14.2 sec;   INR: 1.25 ratio         PTT - ( 01 Dec 2018 17:35 )  PTT:27.4 sec  Transfusion     [ ] PRBC   [ ] Platelets   [ ] FFP   [ ] Cryoprecipitate    INFECTIOUS DISEASES  T(C): 36.4 (12-02-18 @ 08:01), Max: 37.1 (12-01-18 @ 16:01)  Wt(kg): --  WBC Count: 8.84 K/uL (12-02 @ 06:02)  WBC Count: 9.38 K/uL (12-01 @ 17:35)    Recent Cultures:    Meds: influenza   Vaccine 0.5 milliLiter(s) IntraMuscular once    ENDOCRINE  CAPILLARY BLOOD GLUCOSE      POCT Blood Glucose.: 109 mg/dL (02 Dec 2018 13:25)  POCT Blood Glucose.: 89 mg/dL (02 Dec 2018 12:20)  POCT Blood Glucose.: 91 mg/dL (02 Dec 2018 10:30)  POCT Blood Glucose.: 91 mg/dL (02 Dec 2018 09:30)  POCT Blood Glucose.: 84 mg/dL (02 Dec 2018 08:10)  POCT Blood Glucose.: 106 mg/dL (02 Dec 2018 07:07)  POCT Blood Glucose.: 87 mg/dL (02 Dec 2018 06:17)  POCT Blood Glucose.: 85 mg/dL (02 Dec 2018 05:12)  POCT Blood Glucose.: 88 mg/dL (02 Dec 2018 04:06)  POCT Blood Glucose.: 95 mg/dL (02 Dec 2018 03:00)  POCT Blood Glucose.: 98 mg/dL (02 Dec 2018 02:00)  POCT Blood Glucose.: 93 mg/dL (02 Dec 2018 01:03)  POCT Blood Glucose.: 108 mg/dL (02 Dec 2018 00:12)  POCT Blood Glucose.: 117 mg/dL (01 Dec 2018 23:08)  POCT Blood Glucose.: 112 mg/dL (01 Dec 2018 22:08)  POCT Blood Glucose.: 136 mg/dL (01 Dec 2018 21:10)  POCT Blood Glucose.: 157 mg/dL (01 Dec 2018 19:58)  POCT Blood Glucose.: 190 mg/dL (01 Dec 2018 19:06)  POCT Blood Glucose.: 176 mg/dL (01 Dec 2018 18:08)      Meds: insulin Infusion 10 Unit(s)/Hr IV Continuous <Continuous>      ACCESS DEVICES:  [ x] Peripheral IV  [ ] Central Venous Line	[ ] R	[ ] L	[ ] IJ	[ ] Fem	[ ] SC	Placed:   [ ] Arterial Line		[ ] R	[ ] L	[ ] Fem	[ ] Rad	[ ] Ax	Placed:   [ ] PICC:					[ ] Mediport  [x ] Urinary Catheter, Date Placed:   [x ] Necessity of urinary, arterial, and venous catheters discussed    OTHER MEDICATIONS:    CODE STATUS: Full     IMAGING: As per prior note. None new

## 2018-12-02 NOTE — PROGRESS NOTE ADULT - SUBJECTIVE AND OBJECTIVE BOX
Patient is a 63y old  Male who presents with a chief complaint of pancreatitis (02 Dec 2018 10:45)       INTERVAL HPI/OVERNIGHT EVENTS: 64 y/o M with PMHx of alcohol induced Pancreatitis, AODM (diet-controlled), Depression, HLD, HTN, BIBEMS from home complaining of gradual onset of upper abdominal pain, radiating to the back with nausea and vomiting admitted for acute pancreatitis likely 2/2 alcohol abuse. Seen and examined at bedside. C/o abdominal pain and asking for more pain meds. Denies chest pain, palpitation, sob.       MEDICATIONS  (STANDING):  chlordiazePOXIDE 50 milliGRAM(s) Oral every 8 hours  dexmedetomidine Infusion 0.2 MICROgram(s)/kG/Hr (4.99 mL/Hr) IV Continuous <Continuous>  dextrose 5% + sodium chloride 0.9%. 1000 milliLiter(s) (200 mL/Hr) IV Continuous <Continuous>  enoxaparin Injectable 40 milliGRAM(s) SubCutaneous daily  folic acid Injectable 1 milliGRAM(s) IV Push daily  influenza   Vaccine 0.5 milliLiter(s) IntraMuscular once  insulin Infusion 10 Unit(s)/Hr (10 mL/Hr) IV Continuous <Continuous>  lactated ringers Bolus 1000 milliLiter(s) IV Bolus once  multivitamin 1 Tablet(s) Oral daily  thiamine Injectable 100 milliGRAM(s) IV Push daily    MEDICATIONS  (PRN):  HYDROmorphone  Injectable 1 milliGRAM(s) IV Push every 4 hours PRN Moderate Pain (4 - 6)  HYDROmorphone  Injectable 2 milliGRAM(s) IV Push every 4 hours PRN Severe Pain (7 - 10)  LORazepam   Injectable 2 milliGRAM(s) IV Push every 3 hours PRN ETOH withdrawal  ondansetron Injectable 4 milliGRAM(s) IV Push every 6 hours PRN Nausea and/or Vomiting      Allergies    No Known Allergies    Intolerances        REVIEW OF SYSTEMS:  CONSTITUTIONAL: No fever, or fatigue  EYES: No eye pain, visual disturbances, or discharge  ENMT:  No difficulty hearing, tinnitus, vertigo; No sinus or throat pain  NECK: No pain or stiffness  RESPIRATORY: No cough, wheezing, chills or hemoptysis; No shortness of breath  CARDIOVASCULAR: No chest pain, palpitations, dizziness, or leg swelling  GASTROINTESTINAL: + epigastric pain. No nausea, vomiting, or hematemesis; No diarrhea or constipation. No melena or hematochezia.  GENITOURINARY: No dysuria, frequency, hematuria, or incontinence  NEUROLOGICAL: No headaches, memory loss, loss of strength, numbness, or tremors  SKIN: No itching, burning, rashes, or lesions   LYMPH NODES: No enlarged glands  ENDOCRINE: No heat or cold intolerance; No hair loss; No polydipsia or polyuria  MUSCULOSKELETAL: No joint pain or swelling; No muscle, back, or extremity pain  PSYCHIATRIC: No depression, anxiety, mood swings, or difficulty sleeping  HEME/LYMPH: No easy bruising, or bleeding gums  ALLERGY AND IMMUNOLOGIC: No hives or eczema    Vital Signs Last 24 Hrs  T(C): 37 (02 Dec 2018 11:59), Max: 37.1 (01 Dec 2018 16:01)  T(F): 98.6 (02 Dec 2018 11:59), Max: 98.7 (01 Dec 2018 16:01)  HR: 102 (02 Dec 2018 11:00) (85 - 113)  BP: 101/60 (02 Dec 2018 11:00) (75/54 - 142/81)  BP(mean): 77 (02 Dec 2018 11:00) (60 - 106)  RR: 21 (02 Dec 2018 11:00) (12 - 35)  SpO2: 91% (02 Dec 2018 11:00) (86% - 100%)    PHYSICAL EXAM:  GENERAL: NAD, Awake, Alert, Obese   HEAD:  Atraumatic, Normocephalic  EYES: EOMI, PERRLA, conjunctiva and sclera clear  ENMT: No tonsillar erythema, exudates, or enlargement; Moist mucous membranes  NECK: Supple, No JVD, Normal thyroid  NERVOUS SYSTEM:  Alert and Awake, grossly non focal motor and sensory exam   CHEST/LUNG: Clear to auscultation bilaterally; No rales, rhonchi, wheezing, or rubs  HEART: S1S2+, Regular rate and rhythm  ABDOMEN: Soft, Nontender, Nondistended; Bowel sounds present  EXTREMITIES:  2+ Peripheral Pulses, No clubbing, cyanosis, or edema  LYMPH: No lymphadenopathy noted  SKIN: No rashes or lesions    LABS:                        14.9   8.84  )-----------( 173      ( 02 Dec 2018 06:02 )             45.0     02 Dec 2018 06:02    134    |  100    |  21     ----------------------------<  80     4.4     |  23     |  1.40     Ca    7.7        02 Dec 2018 06:02  Phos  3.8       02 Dec 2018 06:02  Mg     2.5       02 Dec 2018 06:02    TPro  6.3    /  Alb  2.4    /  TBili  1.1    /  DBili  .40    /  AST  172    /  ALT  118    /  AlkPhos  231    02 Dec 2018 06:02    PT/INR - ( 01 Dec 2018 17:35 )   PT: 14.2 sec;   INR: 1.25 ratio         PTT - ( 01 Dec 2018 17:35 )  PTT:27.4 sec  CAPILLARY BLOOD GLUCOSE      POCT Blood Glucose.: 91 mg/dL (02 Dec 2018 10:30)  POCT Blood Glucose.: 91 mg/dL (02 Dec 2018 09:30)  POCT Blood Glucose.: 84 mg/dL (02 Dec 2018 08:10)  POCT Blood Glucose.: 106 mg/dL (02 Dec 2018 07:07)  POCT Blood Glucose.: 87 mg/dL (02 Dec 2018 06:17)  POCT Blood Glucose.: 85 mg/dL (02 Dec 2018 05:12)  POCT Blood Glucose.: 88 mg/dL (02 Dec 2018 04:06)  POCT Blood Glucose.: 95 mg/dL (02 Dec 2018 03:00)  POCT Blood Glucose.: 98 mg/dL (02 Dec 2018 02:00)  POCT Blood Glucose.: 93 mg/dL (02 Dec 2018 01:03)  POCT Blood Glucose.: 108 mg/dL (02 Dec 2018 00:12)  POCT Blood Glucose.: 117 mg/dL (01 Dec 2018 23:08)  POCT Blood Glucose.: 112 mg/dL (01 Dec 2018 22:08)  POCT Blood Glucose.: 136 mg/dL (01 Dec 2018 21:10)  POCT Blood Glucose.: 157 mg/dL (01 Dec 2018 19:58)  POCT Blood Glucose.: 190 mg/dL (01 Dec 2018 19:06)  POCT Blood Glucose.: 176 mg/dL (01 Dec 2018 18:08)    BLOOD CULTURE    RADIOLOGY & ADDITIONAL TESTS:    Imaging Personally Reviewed:  [ ] YES     Consultant(s) Notes Reviewed:      Care Discussed with Consultants/Other Providers:

## 2018-12-03 DIAGNOSIS — E78.1 PURE HYPERGLYCERIDEMIA: ICD-10-CM

## 2018-12-03 LAB
ALBUMIN SERPL ELPH-MCNC: 2.3 G/DL — LOW (ref 3.3–5)
ALP SERPL-CCNC: 167 U/L — HIGH (ref 40–120)
ALT FLD-CCNC: 65 U/L — SIGNIFICANT CHANGE UP (ref 12–78)
ANION GAP SERPL CALC-SCNC: 10 MMOL/L — SIGNIFICANT CHANGE UP (ref 5–17)
AST SERPL-CCNC: 93 U/L — HIGH (ref 15–37)
BASE EXCESS BLDA CALC-SCNC: -3.7 MMOL/L — LOW (ref -2–2)
BASOPHILS # BLD AUTO: 0 K/UL — SIGNIFICANT CHANGE UP (ref 0–0.2)
BASOPHILS NFR BLD AUTO: 0 % — SIGNIFICANT CHANGE UP (ref 0–2)
BILIRUB SERPL-MCNC: 0.6 MG/DL — SIGNIFICANT CHANGE UP (ref 0.2–1.2)
BLOOD GAS COMMENTS ARTERIAL: SIGNIFICANT CHANGE UP
BLOOD GAS COMMENTS ARTERIAL: SIGNIFICANT CHANGE UP
BUN SERPL-MCNC: 24 MG/DL — HIGH (ref 7–23)
CALCIUM SERPL-MCNC: 7 MG/DL — LOW (ref 8.5–10.1)
CHLORIDE SERPL-SCNC: 104 MMOL/L — SIGNIFICANT CHANGE UP (ref 96–108)
CO2 SERPL-SCNC: 24 MMOL/L — SIGNIFICANT CHANGE UP (ref 22–31)
CREAT SERPL-MCNC: 1.2 MG/DL — SIGNIFICANT CHANGE UP (ref 0.5–1.3)
EOSINOPHIL # BLD AUTO: 0.27 K/UL — SIGNIFICANT CHANGE UP (ref 0–0.5)
EOSINOPHIL NFR BLD AUTO: 4 % — SIGNIFICANT CHANGE UP (ref 0–6)
GLUCOSE SERPL-MCNC: 71 MG/DL — SIGNIFICANT CHANGE UP (ref 70–99)
HCO3 BLDA-SCNC: 21 MMOL/L — LOW (ref 23–27)
HCT VFR BLD CALC: 36.3 % — LOW (ref 39–50)
HGB BLD-MCNC: 12 G/DL — LOW (ref 13–17)
HOROWITZ INDEX BLDA+IHG-RTO: 45 — SIGNIFICANT CHANGE UP
LIDOCAIN IGE QN: 708 U/L — HIGH (ref 73–393)
LYMPHOCYTES # BLD AUTO: 1.2 K/UL — SIGNIFICANT CHANGE UP (ref 1–3.3)
LYMPHOCYTES # BLD AUTO: 18 % — SIGNIFICANT CHANGE UP (ref 13–44)
MAGNESIUM SERPL-MCNC: 2.2 MG/DL — SIGNIFICANT CHANGE UP (ref 1.6–2.6)
MCHC RBC-ENTMCNC: 33.1 GM/DL — SIGNIFICANT CHANGE UP (ref 32–36)
MCHC RBC-ENTMCNC: 33.4 PG — SIGNIFICANT CHANGE UP (ref 27–34)
MCV RBC AUTO: 101.1 FL — HIGH (ref 80–100)
MONOCYTES # BLD AUTO: 0.33 K/UL — SIGNIFICANT CHANGE UP (ref 0–0.9)
MONOCYTES NFR BLD AUTO: 5 % — SIGNIFICANT CHANGE UP (ref 2–14)
NEUTROPHILS # BLD AUTO: 4.87 K/UL — SIGNIFICANT CHANGE UP (ref 1.8–7.4)
NEUTROPHILS NFR BLD AUTO: 43 % — SIGNIFICANT CHANGE UP (ref 43–77)
PCO2 BLDA: 41 MMHG — SIGNIFICANT CHANGE UP (ref 32–46)
PH BLDA: 7.33 — LOW (ref 7.35–7.45)
PHOSPHATE SERPL-MCNC: 2.9 MG/DL — SIGNIFICANT CHANGE UP (ref 2.5–4.5)
PLATELET # BLD AUTO: 137 K/UL — LOW (ref 150–400)
PO2 BLDA: 68 MMHG — LOW (ref 74–108)
POTASSIUM SERPL-MCNC: 4 MMOL/L — SIGNIFICANT CHANGE UP (ref 3.5–5.3)
POTASSIUM SERPL-SCNC: 4 MMOL/L — SIGNIFICANT CHANGE UP (ref 3.5–5.3)
PROT SERPL-MCNC: 5.7 G/DL — LOW (ref 6–8.3)
RBC # BLD: 3.59 M/UL — LOW (ref 4.2–5.8)
RBC # FLD: 12.7 % — SIGNIFICANT CHANGE UP (ref 10.3–14.5)
SAO2 % BLDA: 92 % — SIGNIFICANT CHANGE UP (ref 92–96)
SODIUM SERPL-SCNC: 138 MMOL/L — SIGNIFICANT CHANGE UP (ref 135–145)
TRIGL SERPL-MCNC: 404 MG/DL — HIGH (ref 10–149)
WBC # BLD: 6.67 K/UL — SIGNIFICANT CHANGE UP (ref 3.8–10.5)
WBC # FLD AUTO: 6.67 K/UL — SIGNIFICANT CHANGE UP (ref 3.8–10.5)

## 2018-12-03 PROCEDURE — 99233 SBSQ HOSP IP/OBS HIGH 50: CPT

## 2018-12-03 PROCEDURE — 99291 CRITICAL CARE FIRST HOUR: CPT

## 2018-12-03 RX ORDER — FUROSEMIDE 40 MG
40 TABLET ORAL ONCE
Qty: 0 | Refills: 0 | Status: COMPLETED | OUTPATIENT
Start: 2018-12-03 | End: 2018-12-03

## 2018-12-03 RX ORDER — PHENOBARBITAL 60 MG
130 TABLET ORAL
Qty: 0 | Refills: 0 | Status: DISCONTINUED | OUTPATIENT
Start: 2018-12-03 | End: 2018-12-04

## 2018-12-03 RX ORDER — GEMFIBROZIL 600 MG
600 TABLET ORAL
Qty: 0 | Refills: 0 | Status: DISCONTINUED | OUTPATIENT
Start: 2018-12-03 | End: 2018-12-06

## 2018-12-03 RX ORDER — ATORVASTATIN CALCIUM 80 MG/1
20 TABLET, FILM COATED ORAL AT BEDTIME
Qty: 0 | Refills: 0 | Status: DISCONTINUED | OUTPATIENT
Start: 2018-12-03 | End: 2018-12-06

## 2018-12-03 RX ORDER — DEXTROSE 50 % IN WATER 50 %
25 SYRINGE (ML) INTRAVENOUS ONCE
Qty: 0 | Refills: 0 | Status: COMPLETED | OUTPATIENT
Start: 2018-12-03 | End: 2018-12-03

## 2018-12-03 RX ORDER — PHENOBARBITAL 60 MG
130 TABLET ORAL ONCE
Qty: 0 | Refills: 0 | Status: DISCONTINUED | OUTPATIENT
Start: 2018-12-03 | End: 2018-12-03

## 2018-12-03 RX ORDER — FUROSEMIDE 40 MG
20 TABLET ORAL ONCE
Qty: 0 | Refills: 0 | Status: COMPLETED | OUTPATIENT
Start: 2018-12-03 | End: 2018-12-03

## 2018-12-03 RX ORDER — ASPIRIN/CALCIUM CARB/MAGNESIUM 324 MG
81 TABLET ORAL DAILY
Qty: 0 | Refills: 0 | Status: DISCONTINUED | OUTPATIENT
Start: 2018-12-03 | End: 2018-12-06

## 2018-12-03 RX ADMIN — Medication 50 MILLIGRAM(S): at 06:10

## 2018-12-03 RX ADMIN — INSULIN HUMAN 10 UNIT(S)/HR: 100 INJECTION, SOLUTION SUBCUTANEOUS at 02:42

## 2018-12-03 RX ADMIN — Medication 130 MILLIGRAM(S): at 11:45

## 2018-12-03 RX ADMIN — HYDROMORPHONE HYDROCHLORIDE 2 MILLIGRAM(S): 2 INJECTION INTRAMUSCULAR; INTRAVENOUS; SUBCUTANEOUS at 11:17

## 2018-12-03 RX ADMIN — DEXMEDETOMIDINE HYDROCHLORIDE IN 0.9% SODIUM CHLORIDE 4.99 MICROGRAM(S)/KG/HR: 4 INJECTION INTRAVENOUS at 23:30

## 2018-12-03 RX ADMIN — Medication 1 MILLIGRAM(S): at 11:17

## 2018-12-03 RX ADMIN — ATORVASTATIN CALCIUM 20 MILLIGRAM(S): 80 TABLET, FILM COATED ORAL at 22:15

## 2018-12-03 RX ADMIN — Medication 25 MILLILITER(S): at 11:00

## 2018-12-03 RX ADMIN — Medication 2 MILLIGRAM(S): at 09:05

## 2018-12-03 RX ADMIN — Medication 130 MILLIGRAM(S): at 09:20

## 2018-12-03 RX ADMIN — Medication 40 MILLIGRAM(S): at 09:37

## 2018-12-03 RX ADMIN — Medication 130 MILLIGRAM(S): at 22:14

## 2018-12-03 RX ADMIN — DEXMEDETOMIDINE HYDROCHLORIDE IN 0.9% SODIUM CHLORIDE 4.99 MICROGRAM(S)/KG/HR: 4 INJECTION INTRAVENOUS at 00:54

## 2018-12-03 RX ADMIN — Medication 600 MILLIGRAM(S): at 18:30

## 2018-12-03 RX ADMIN — SODIUM CHLORIDE 200 MILLILITER(S): 9 INJECTION, SOLUTION INTRAVENOUS at 06:09

## 2018-12-03 RX ADMIN — Medication 20 MILLIGRAM(S): at 20:00

## 2018-12-03 RX ADMIN — HYDROMORPHONE HYDROCHLORIDE 2 MILLIGRAM(S): 2 INJECTION INTRAMUSCULAR; INTRAVENOUS; SUBCUTANEOUS at 11:32

## 2018-12-03 RX ADMIN — SODIUM CHLORIDE 200 MILLILITER(S): 9 INJECTION, SOLUTION INTRAVENOUS at 01:11

## 2018-12-03 RX ADMIN — ENOXAPARIN SODIUM 40 MILLIGRAM(S): 100 INJECTION SUBCUTANEOUS at 11:17

## 2018-12-03 RX ADMIN — DEXMEDETOMIDINE HYDROCHLORIDE IN 0.9% SODIUM CHLORIDE 4.99 MICROGRAM(S)/KG/HR: 4 INJECTION INTRAVENOUS at 21:30

## 2018-12-03 RX ADMIN — DEXMEDETOMIDINE HYDROCHLORIDE IN 0.9% SODIUM CHLORIDE 4.99 MICROGRAM(S)/KG/HR: 4 INJECTION INTRAVENOUS at 06:09

## 2018-12-03 RX ADMIN — Medication 600 MILLIGRAM(S): at 11:17

## 2018-12-03 RX ADMIN — Medication 1 TABLET(S): at 11:17

## 2018-12-03 RX ADMIN — Medication 100 MILLIGRAM(S): at 11:18

## 2018-12-03 NOTE — PROGRESS NOTE ADULT - SUBJECTIVE AND OBJECTIVE BOX
Brief interval history: 64yo male presenting to the Emergency Department with new onset mid epigastric pain 10/10 that began day prior to admission. He has similar pain in May 2018 and had been diagnosed with pancreatitis. CT scan and US abdomen were performed with evidence of pancreatitis again this admit. He admits to drinking heavily over the past month. He drinks Vodka in excess for past 2 years after the loss of his son.  Pt was admitted to ICU for aggressive resuscitation and EtOH withdrawal.     24 hour events: Pt was diuresed this last night and this morning and has been producing urine. Pt was taken off BiPAP this morning became agitated Administered 2 mg Ativan and 130 mg Phenobarbital. Pt given another 130 mg Phenobarbital for continued agitation. Pt put on 50% venti mask which after one hour he did not tolerate. Pt put back on BiPAP at 12/8 at 45% O2. Pt also had episode of hypoglycemia this morning at 57 mg/dL and given half amp of D50 and orange juice which improved glucose to 109 mg/dL.     Review of Systems:  Constitutional: No fever, chills, fatigue  Neuro: No headache, numbness, weakness  Resp: + shortness of breath. No cough, wheezing  CVS: No chest pain, palpitations, leg swelling  GI: + abdominal pain. No nausea, vomiting, diarrhea   : No dysuria, frequency, incontinence  Skin: No itching, burning, rashes, or lesions   Msk: No joint pain or swelling  Psych: No depression, anxiety, mood swings    ICU Vital Signs Last 24 Hrs  T(C): 36.4 (03 Dec 2018 12:00), Max: 37.2 (02 Dec 2018 20:30)  T(F): 97.6 (03 Dec 2018 12:00), Max: 99 (02 Dec 2018 20:30)  HR: 85 (03 Dec 2018 12:05) (84 - 123)  BP: 119/72 (03 Dec 2018 12:00) (86/55 - 150/70)  BP(mean): 90 (03 Dec 2018 12:00) (66 - 100)  RR: 27 (03 Dec 2018 12:00) (15 - 43)  SpO2: 91% (03 Dec 2018 12:05) (87% - 94%)    POCT Blood Glucose.: 114 mg/dL (03 Dec 2018 12:11)    I&O's Summary    02 Dec 2018 07:01  -  03 Dec 2018 07:00  --------------------------------------------------------  IN: 7804.2 mL / OUT: 1380 mL / NET: 6424.2 mL    03 Dec 2018 07:01  -  03 Dec 2018 12:44  --------------------------------------------------------  IN: 1151 mL / OUT: 1080 mL / NET: 71 mL      Physical Exam:   Gen: Pt A&O x2 and agitated. Pt appears stated age with increased work of breathing.   Neuro: GCS 14 (4/4/6). Moves all extremities spontaneously. Strength 5/5 in all extremities.   HEENT: Head: normocephalic atraumatic. Eyes: PERRL 3mm and sluggishly reactive. Mouth/Throat: Dry mucous membranes with no erythema.   Resp: + decreased breath sounds in all fields L > R. No crackles or wheezes.  CVS: +S1/S2, no murmurs, rubs, or gallops  Abd: Distended, non-tender  Ext: 2+ pulses in all extremities, neurovascularly intact  Skin: No lesions, ecchymoses, rashes    MEDICATIONS  (STANDING):  dexmedetomidine Infusion 0.2 MICROgram(s)/kG/Hr (4.99 mL/Hr) IV Continuous <Continuous>  dextrose 5% + sodium chloride 0.9%. 1000 milliLiter(s) (200 mL/Hr) IV Continuous <Continuous>  enoxaparin Injectable 40 milliGRAM(s) SubCutaneous daily  folic acid Injectable 1 milliGRAM(s) IV Push daily  gemfibrozil 600 milliGRAM(s) Oral two times a day  influenza   Vaccine 0.5 milliLiter(s) IntraMuscular once  multivitamin 1 Tablet(s) Oral daily  thiamine Injectable 100 milliGRAM(s) IV Push daily    MEDICATIONS  (PRN):  HYDROmorphone  Injectable 1 milliGRAM(s) IV Push every 4 hours PRN Moderate Pain (4 - 6)  HYDROmorphone  Injectable 2 milliGRAM(s) IV Push every 4 hours PRN Severe Pain (7 - 10)  ondansetron Injectable 4 milliGRAM(s) IV Push every 6 hours PRN Nausea and/or Vomiting  PHENobarbital Injectable 130 milliGRAM(s) IV Push every 15 minutes PRN Agitation        Labs:                         12.0   6.67  )-----------( 137      ( 03 Dec 2018 06:24 )             36.3     12-03    138  |  104  |  24<H>  ----------------------------<  71  4.0   |  24  |  1.20    Ca    7.0<L>      03 Dec 2018 06:24  Phos  2.9     12-03  Mg     2.2     12-03    TPro  5.7<L>  /  Alb  2.3<L>  /  TBili  0.6  /  DBili  x   /  AST  93<H>  /  ALT  65  /  AlkPhos  167<H>  12-03      CARDIAC MARKERS ( 02 Dec 2018 06:02 )  x     / x     / 391 U/L / x     / x      CARDIAC MARKERS ( 01 Dec 2018 17:35 )  x     / x     / 375 U/L / x     / x        PT/INR - ( 01 Dec 2018 17:35 )   PT: 14.2 sec;   INR: 1.25 ratio       PTT - ( 01 Dec 2018 17:35 )  PTT:27.4 sec      Radiology:   EXAM:  US GALLBLADDER                            PROCEDURE DATE:  12/01/2018          INTERPRETATION:  History: Abdominal pain, pancreatitis    Gallbladder ultrasound.    There is at least one gallstone. Gallbladder sludge noted. No wall   thickening or pericholecystic fluid. Borderline common duct dilatation of   0.6 cm. Correlate with MRCP. Incidental fatty liver.    Impression: Cholelithiasis and gallbladder sludge. Borderline common duct   dilatation. Correlate with MRCP.                MARA VELA M.D., ATTENDING RADIOLOGIST  This document has been electronically signed. Dec  1 2018 10:45AM    EXAM:  CT ABDOMEN AND PELVIS OC IC                            *** ADDENDUM 12/01/2018  ***    A tiny gallstone is noted.      *** END OF ADDENDUM 12/01/2018  ***      PROCEDURE DATE:  12/01/2018          INTERPRETATION:  History: Abdominal pain history of pancreatitis.    CT abdomen pelvis oral and IV contrast.  95 cc Omnipaque 350 injected intravenously.  Bibasilar dependent atelectasis.  No calcified gallstones or biliary dilatation. Diffuse fatty liver.   Spleen unremarkable.  The tail of pancreas is markedly edematous. There is severe surrounding   inflammatory changes with peripancreatic fluid, stranding and phlegmonous   change. Findings consistent with severe acute pancreatitis. There is no   discrete organized collection to suggest abscess or pseudocyst.   Hypoenhancement in the pancreatic tail may reflect pancreatic edema.   Early necrosis not excluded. No pancreatic ductal dilatation.  Right renal cysts.  Nonaneurysmal abdominal aorta.  No suspicious adenopathy.  No obstructed or inflamed bowel.  Prostate bladder not remarkable.  No acute skeletal abnormality    Impression: Severe acute phlegmonous pancreatitis. Early pancreatic   necrosis not excluded. Close follow-up recommended.      ***Please see the addendum at the top of this report. It may contain   additional important information or changes.****          MARA VELA M.D., ATTENDING RADIOLOGIST  This document has been electronically signed. Dec  1 2018  9:19AM  Addend:  MARA VELA M.D., ATTENDING RADIOLOGIST  This addendum was electronically signed on: Dec  1 2018 10:47AM.      Bedside ultrasound: Showed A lines in upper and middle fields. B lines in lower lobes bilaterally with small effusions bilaterally.     CENTRAL LINE: N  PEOPLES: Y                        DATE INSERTED: 12/1/18              REMOVE: N  A-LINE: N    GLOBAL ISSUE/BEST PRACTICE:  Analgesia: Pt has IV Dilaudid PRN for pain.   Sedation: N  CAM-ICU:   HOB elevation: N    Stress ulcer prophylaxis: N  VTE prophylaxis: Enoxaparin 40 mg SQ  Glycemic control: Insulin Sliding Scale 3x per day before meals and at bedtime    Nutrition: Clears diet when not on BiPAP    CODE STATUS: FULL

## 2018-12-03 NOTE — PROGRESS NOTE ADULT - ATTENDING COMMENTS
63M PMH HTN, HLD, depression, obesity, diet-controlled DM (A1C 7.0), ROSS (on CPAP), ETOH abuse, admitted with acute alcoholic pancreatitis, acute alcoholic hepatitis, hypertriglyceridemia, bandemia. Course complicated by prerenal HERIBERTO, ileus, acute hypoxic respiratory failure due to pulmonary edema + abdominal distention, and alcohol withdrawal.    - Worsening alcohol withdrawal today. No improvement with chlordiazepoxide or lorazepam. Required phenobarbital 130 mg IV x2. Continue phenobarbital prn. Dexmedetomidine gtt, currently on hold due to lethargy after 2nd dose phenobarb. Continue thiamine, MVI, folic acid  - Pain control with hydromorphone prn  - HD stable, restart aspirin and statin. Hold amlodipine  - Hypoxia likely due to pulmonary edema from IVF hydration + abdominal distention. S/p furosemide for diuresis with improvement. Continue bilevel PAP. History of ROSS, needs nocturnal NIPPV. Supplemental oxygen with NC when off NIPPV. Low threshold for intubation  - NPO while on BiPAP, clears as tolerates if becomes more awake and appropriate. Improved pancreatitis and pain. Lipase 700. Eventually needs MRCP when more stable and improved withdrawal. Improved alcoholic hepatitis, DF<32, does not require steroids/pentoxifylline  - Improved hypertriglyceridemia (SU=[id number] today). D/c insulin gtt. Start gemfibrozil 600 mg po bid  - Improved HERIBERTO, strict I/O's, trend kidney function and lytes. Responded well to diuresis  - Continue to observe off abx. No evidence of infected necrosis on CT. F/up MRCP  - Enoxaparin for DVT ppx  - Insulin coverage scale for DM2, A1C 7.0  - Discussed with patient and wife, full code, prognosis guarded  CC Time spent: 45 min

## 2018-12-03 NOTE — PROGRESS NOTE ADULT - ASSESSMENT
64 y/o male admitted for Pancreatitis and EtOH withdrawal.     Neuro:  - Agitation: Likely due to EtOH withdrawal. Will start 1:1 monitoring of Pt.  - EtOH withdrawal: will manage with Phenobarbital 130 mg IV q 15 minutes PRN for agitation with instruction to inform provider when administered. Continue to monitor agitation level. C/w thiamine and folic acid.  - Pain: Dilaudid PRN  CV: Stable.  Pulm: C/w BiPAP 12/8 on 45% O2 during day and when sleeping. Will try and wean off BiPAP. C/w incentive spirometry when off BiPAP.  GI:  - Pancreatitis: EtOH vs. hypertriglyceridemia. Discriminant Function: 6.4 today. LFTs and TG improving. Will start on Gemfibrozil and d/c Insulin drip now that TG < 500. May repeat CT Abd/Pelvis tomorrow. C/w zofran PRN for nausea.  - Diet: Clears when off BiPAP.  Renal: Given Lasix 40 mg IV this morning. C/w miller to continue to monitor I&O.   Endo: Began ISS 3x with meals and at bedtime.   Heme: Elevated ferritin on admission. Appreciated heme recommendation for follow up ferritin level in one week and follow up outpatient.   ID: Bandemia, infection unlikely at this time, will monitor and continue to follow urine cultures.   DVT PPX: Enoxaparin SQ 64 y/o male admitted for Pancreatitis and EtOH withdrawal.     Neuro:  - Agitation: Likely due to EtOH withdrawal. Will start 1:1 monitoring of Pt.  - EtOH withdrawal: will manage with Phenobarbital 130 mg IV q 15 minutes PRN for agitation with instruction to inform provider when administered. D/C Ativan and Precedex. Continue to monitor agitation level. C/w thiamine and folic acid.  - Pain: Dilaudid PRN  CV: Stable.  Pulm: C/w BiPAP 12/8 on 45% O2 during day and when sleeping. Will try and wean off BiPAP. C/w incentive spirometry when off BiPAP.  GI:  - Pancreatitis: EtOH vs. hypertriglyceridemia. Discriminant Function: 6.4 today. LFTs and TG improving. Will start on Gemfibrozil and d/c Insulin drip now that TG < 500. May repeat CT Abd/Pelvis tomorrow. C/w zofran PRN for nausea.  - Diet: Clears when off BiPAP.  Renal: Given Lasix 40 mg IV this morning. C/w miller to continue to monitor I&O.   Endo: Began ISS 3x with meals and at bedtime.   Heme: Elevated ferritin on admission. Appreciated heme recommendation for follow up ferritin level in one week and follow up outpatient.   ID: Bandemia, infection unlikely at this time, will monitor and continue to follow urine cultures.   DVT PPX: Enoxaparin SQ 64 y/o male admitted for Pancreatitis and EtOH withdrawal.     Neuro:  - Agitation: Likely due to EtOH withdrawal. Will start 1:1 monitoring of Pt.  - EtOH withdrawal: will manage with Phenobarbital 130 mg IV q 15 minutes PRN for agitation with instruction to inform provider when administered. D/C Ativan and Precedex. Continue to monitor agitation level. C/w thiamine and folic acid.  - Pain: Dilaudid PRN  CV: Stable.  Pulm: C/w BiPAP 12/8 on 45% O2 during day and when sleeping. Will try and wean off BiPAP. C/w incentive spirometry when off BiPAP.  GI:  - Pancreatitis: EtOH vs. hypertriglyceridemia. Discriminant Function: 6.4 today. LFTs and TG improving. Will start on Gemfibrozil and d/c Insulin drip now that TG < 500. May repeat CT Abd/Pelvis tomorrow. C/w zofran PRN for nausea.  - Diet: Clears when off BiPAP.  Renal: Given Lasix 40 mg IV this morning. C/w miller to continue to monitor I&O.   Endo: Began ISS 3x with meals and at bedtime.   Heme: Elevated ferritin on admission. Appreciated heme recommendation for follow up ferritin level in one week and follow up outpatient.   ID: Bandemia, infection unlikely at this time, will monitor and continue to follow urine cultures.   DVT PPX: Enoxaparin SQ  Ethics: Full code 62 y/o male admitted for Pancreatitis and EtOH withdrawal.     Neuro:  - Agitation: Likely due to EtOH withdrawal. Will start 1:1 monitoring of Pt.  - EtOH withdrawal: will manage with Phenobarbital 130 mg IV q 15 minutes PRN for agitation with instruction to inform provider when administered. D/C Ativan and Precedex. Continue to monitor agitation level. C/w thiamine and folic acid.  - Pain: Dilaudid PRN  CV: Stable.  Pulm: C/w BiPAP 12/8 on 45% O2 during day and when sleeping. Will try and wean off BiPAP. C/w incentive spirometry when off BiPAP.  GI:  - Pancreatitis: EtOH vs. hypertriglyceridemia. Discriminant Function: 6.4 today. LFTs and TG improving. Will start on Gemfibrozil and d/c Insulin drip now that TG < 500. May repeat CT Abd/Pelvis tomorrow. C/w zofran PRN for nausea.  - Diet: Clears when off BiPAP.  Renal: Given Lasix 40 mg IV this morning. C/w miller to continue to monitor I&O.   Endo: Found to be diabetic with HbA1C 7 on this admission. Will begin ISS 3x with meals and at bedtime.   Heme: Elevated ferritin on admission. Appreciated heme recommendation for follow up ferritin level in one week and follow up outpatient.   ID: Bandemia, infection unlikely at this time, will monitor and continue to follow urine cultures.   DVT PPX: Enoxaparin SQ  Ethics: Full code

## 2018-12-03 NOTE — PROGRESS NOTE ADULT - PROBLEM SELECTOR PLAN 4
- On Precedex. Plan per ICU   - Librium 50mg q8h, CIWA protocol  - Detox Dr. Coats called   - continue Vit B1, folic acid supplementation  - pt states he did not like the group therapy requirement for Vivitrol, and did not proceed with detox  - repeat addiction medicine [facility] barrett

## 2018-12-03 NOTE — PROGRESS NOTE ADULT - SUBJECTIVE AND OBJECTIVE BOX
INTERVAL HPI/OVERNIGHT EVENTS:  pt seen and examined  MEDICATIONS  (STANDING):  chlordiazePOXIDE 50 milliGRAM(s) Oral every 8 hours  dexmedetomidine Infusion 0.2 MICROgram(s)/kG/Hr (4.99 mL/Hr) IV Continuous <Continuous>  dextrose 5% + sodium chloride 0.9%. 1000 milliLiter(s) (200 mL/Hr) IV Continuous <Continuous>  enoxaparin Injectable 40 milliGRAM(s) SubCutaneous daily  folic acid Injectable 1 milliGRAM(s) IV Push daily  gemfibrozil 600 milliGRAM(s) Oral two times a day  influenza   Vaccine 0.5 milliLiter(s) IntraMuscular once  insulin Infusion 10 Unit(s)/Hr (10 mL/Hr) IV Continuous <Continuous>  multivitamin 1 Tablet(s) Oral daily  thiamine Injectable 100 milliGRAM(s) IV Push daily    MEDICATIONS  (PRN):  HYDROmorphone  Injectable 1 milliGRAM(s) IV Push every 4 hours PRN Moderate Pain (4 - 6)  HYDROmorphone  Injectable 2 milliGRAM(s) IV Push every 4 hours PRN Severe Pain (7 - 10)  LORazepam   Injectable 2 milliGRAM(s) IV Push every 3 hours PRN ETOH withdrawal  ondansetron Injectable 4 milliGRAM(s) IV Push every 6 hours PRN Nausea and/or Vomiting      Allergies    No Known Allergies    Intolerances        Review of Systems:    General:  No wt loss, fevers, chills, night sweats, fatigue   Eyes:  Good vision, no reported pain  ENT:  No sore throat, pain, runny nose, dysphagia  CV:  No pain, palpitations, hypo/hypertension  Resp:  No dyspnea, cough, tachypnea, wheezing  GI:  No pain, No nausea, No vomiting, No diarrhea, No constipation, No weight loss, No fever, No pruritis, No rectal bleeding, No melena, No dysphagia  :  No pain, bleeding, incontinence, nocturia  Muscle:  No pain, weakness  Neuro:  No weakness, tingling, memory problems  Psych:  No fatigue, insomnia, mood problems, depression  Endocrine:  No polyuria, polydypsia, cold/heat intolerance  Heme:  No petechiae, ecchymosis, easy bruisability  Skin:  No rash, tattoos, scars, edema      Vital Signs Last 24 Hrs  T(C): 37.1 (03 Dec 2018 08:01), Max: 37.2 (02 Dec 2018 20:30)  T(F): 98.7 (03 Dec 2018 08:01), Max: 99 (02 Dec 2018 20:30)  HR: 117 (03 Dec 2018 09:00) (84 - 123)  BP: 150/70 (03 Dec 2018 09:00) (86/55 - 150/70)  BP(mean): 100 (03 Dec 2018 09:00) (66 - 100)  RR: 43 (03 Dec 2018 09:00) (15 - 43)  SpO2: 87% (03 Dec 2018 09:00) (86% - 94%)    PHYSICAL EXAM:    Constitutional: NAD  HEENT: EOMI, throat clear  Neck: No LAD, supple  Respiratory: CTA and P  Cardiovascular: S1 and S2, RRR, no M  Gastrointestinal: BS+, soft, NT/ND, neg HSM,  Extremities: No peripheral edema, neg clubbing, cyanosis  Vascular: 2+ peripheral pulses  Neurological: A/O x 3, no focal deficits  Psychiatric: Normal mood, normal affect  Skin: No rashes      LABS:                        12.0   6.67  )-----------( 137      ( 03 Dec 2018 06:24 )             36.3     12-03    138  |  104  |  24<H>  ----------------------------<  71  4.0   |  24  |  1.20    Ca    7.0<L>      03 Dec 2018 06:24  Phos  2.9     12-  Mg     2.2     12-03    TPro  5.7<L>  /  Alb  2.3<L>  /  TBili  0.6  /  DBili  x   /  AST  93<H>  /  ALT  65  /  AlkPhos  167<H>  12-03    PT/INR - ( 01 Dec 2018 17:35 )   PT: 14.2 sec;   INR: 1.25 ratio         PTT - ( 01 Dec 2018 17:35 )  PTT:27.4 sec  Urinalysis Basic - ( 01 Dec 2018 11:26 )    Color: Yellow / Appearance: Clear / S.010 / pH: x  Gluc: x / Ketone: Trace  / Bili: Negative / Urobili: 1   Blood: x / Protein: 25 mg/dL / Nitrite: Negative   Leuk Esterase: Trace / RBC: x / WBC 0-2   Sq Epi: x / Non Sq Epi: x / Bacteria: Occasional        RADIOLOGY & ADDITIONAL TESTS: INTERVAL HPI/OVERNIGHT EVENTS:  pt seen and examined  still w abd pain but improving, denies n/v  no bm but passing lots of gas  tolerated clears  per nursing still on insulin gtt  labs noted inc bands    MEDICATIONS  (STANDING):  chlordiazePOXIDE 50 milliGRAM(s) Oral every 8 hours  dexmedetomidine Infusion 0.2 MICROgram(s)/kG/Hr (4.99 mL/Hr) IV Continuous <Continuous>  dextrose 5% + sodium chloride 0.9%. 1000 milliLiter(s) (200 mL/Hr) IV Continuous <Continuous>  enoxaparin Injectable 40 milliGRAM(s) SubCutaneous daily  folic acid Injectable 1 milliGRAM(s) IV Push daily  gemfibrozil 600 milliGRAM(s) Oral two times a day  influenza   Vaccine 0.5 milliLiter(s) IntraMuscular once  insulin Infusion 10 Unit(s)/Hr (10 mL/Hr) IV Continuous <Continuous>  multivitamin 1 Tablet(s) Oral daily  thiamine Injectable 100 milliGRAM(s) IV Push daily    MEDICATIONS  (PRN):  HYDROmorphone  Injectable 1 milliGRAM(s) IV Push every 4 hours PRN Moderate Pain (4 - 6)  HYDROmorphone  Injectable 2 milliGRAM(s) IV Push every 4 hours PRN Severe Pain (7 - 10)  LORazepam   Injectable 2 milliGRAM(s) IV Push every 3 hours PRN ETOH withdrawal  ondansetron Injectable 4 milliGRAM(s) IV Push every 6 hours PRN Nausea and/or Vomiting      Allergies    No Known Allergies    Intolerances        Review of Systems:    General:  No wt loss, fevers, chills, night sweats, fatigue   Eyes:  Good vision, no reported pain  ENT:  No sore throat, pain, runny nose, dysphagia  CV:  No pain, palpitations, hypo/hypertension  Resp:  No dyspnea, cough, tachypnea, wheezing  GI:  +pain, No nausea, No vomiting, No diarrhea, No constipation, No weight loss, No fever, No pruritis, No rectal bleeding, No melena, No dysphagia  :  No pain, bleeding, incontinence, nocturia  Muscle:  No pain, weakness  Neuro:  No weakness, tingling, memory problems  Psych:  No fatigue, insomnia, mood problems, depression  Endocrine:  No polyuria, polydypsia, cold/heat intolerance  Heme:  No petechiae, ecchymosis, easy bruisability  Skin:  No rash, tattoos, scars, edema      Vital Signs Last 24 Hrs  T(C): 37.1 (03 Dec 2018 08:01), Max: 37.2 (02 Dec 2018 20:30)  T(F): 98.7 (03 Dec 2018 08:01), Max: 99 (02 Dec 2018 20:30)  HR: 117 (03 Dec 2018 09:00) (84 - 123)  BP: 150/70 (03 Dec 2018 09:00) (86/55 - 150/70)  BP(mean): 100 (03 Dec 2018 09:00) (66 - 100)  RR: 43 (03 Dec 2018 09:00) (15 - 43)  SpO2: 87% (03 Dec 2018 09:00) (86% - 94%)    PHYSICAL EXAM:    Constitutional: NAD  HEENT: ncat  Neck: No LAD  Respiratory: dec bs  Cardiovascular: S1 and S2, RRR  Gastrointestinal: firm generalized ttp +dt  Extremities: No peripheral edema  Vascular: 2+ peripheral pulses  Neurological: awake alert responds appropriately  Skin: No rashes      LABS:                        12.0   6.67  )-----------( 137      ( 03 Dec 2018 06:24 )             36.3     12-    138  |  104  |  24<H>  ----------------------------<  71  4.0   |  24  |  1.20    Ca    7.0<L>      03 Dec 2018 06:24  Phos  2.9     12-  Mg     2.2     12-    TPro  5.7<L>  /  Alb  2.3<L>  /  TBili  0.6  /  DBili  x   /  AST  93<H>  /  ALT  65  /  AlkPhos  167<H>  12-03    PT/INR - ( 01 Dec 2018 17:35 )   PT: 14.2 sec;   INR: 1.25 ratio         PTT - ( 01 Dec 2018 17:35 )  PTT:27.4 sec  Urinalysis Basic - ( 01 Dec 2018 11:26 )    Color: Yellow / Appearance: Clear / S.010 / pH: x  Gluc: x / Ketone: Trace  / Bili: Negative / Urobili: 1   Blood: x / Protein: 25 mg/dL / Nitrite: Negative   Leuk Esterase: Trace / RBC: x / WBC 0-2   Sq Epi: x / Non Sq Epi: x / Bacteria: Occasional        RADIOLOGY & ADDITIONAL TESTS:

## 2018-12-03 NOTE — PROGRESS NOTE ADULT - SUBJECTIVE AND OBJECTIVE BOX
Patient is a 63y old  Male who presents with a chief complaint of pancreatitis (03 Dec 2018 13:17)    PAST MEDICAL & SURGICAL HISTORY:  Low testosterone in male  Depression  Insomnia  HLD (hyperlipidemia)  HTN (hypertension)  Low testosterone level in male  Sprain of right rotator cuff capsule, subsequent encounter  Borderline diabetes mellitus: last A1c unknown  Low back pain, unspecified back pain laterality, unspecified chronicity, with sciatica presence unspecified  Lumbar herniated disc  Depression, unspecified depression type: Lost his son this past June 2016  Essential hypertension  Sleep apnea, unspecified type  Obesity (BMI 30-39.9)  H/O knee surgery: b/l knees  H/O shoulder surgery: right  S/P arthroscopy of right knee: 2010  S/P right inguinal hernia repair: 2010  S/P ACL repair: left knee 2010  S/P rotator cuff repair: Right shoulder 2008    JOHN DHILLON   63y    Male    Review of Systems:     abd distention and pain.  confused                    Allergies    No Known Allergies    Intolerances          ICU Vital Signs Last 24 Hrs  T(C): 36.6 (03 Dec 2018 20:10), Max: 37.1 (02 Dec 2018 23:51)  T(F): 97.8 (03 Dec 2018 20:10), Max: 98.7 (02 Dec 2018 23:51)  HR: 91 (03 Dec 2018 22:00) (79 - 117)  BP: 94/50 (03 Dec 2018 22:00) (86/55 - 150/70)  BP(mean): 64 (03 Dec 2018 22:00) (64 - 100)  ABP: --  ABP(mean): --  RR: 17 (03 Dec 2018 22:00) (12 - 59)  SpO2: 94% (03 Dec 2018 22:00) (87% - 95%)    Physical Examination:    General:  confused agitated     HEENT:  no JVD    PULM: bilateral BS    CVS:  s1 s2 reg    ABD:  distended mid epigastric tenderness     EXT: no edema     SKIN: warn    Neuro:  moves 4 confused     ABG - ( 03 Dec 2018 12:23 )  pH, Arterial: 7.33  pH, Blood: x     /  pCO2: 41    /  pO2: 68    / HCO3: 21    / Base Excess: -3.7  /  SaO2: 92          LABS:                        12.0   6.67  )-----------( 137      ( 03 Dec 2018 06:24 )             36.3     12-03    138  |  104  |  24<H>  ----------------------------<  71  4.0   |  24  |  1.20    Ca    7.0<L>      03 Dec 2018 06:24  Phos  2.9     12-03  Mg     2.2     12-03    TPro  5.7<L>  /  Alb  2.3<L>  /  TBili  0.6  /  DBili  x   /  AST  93<H>  /  ALT  65  /  AlkPhos  167<H>  12-03      CARDIAC MARKERS ( 02 Dec 2018 06:02 )  x     / x     / 391 U/L / x     / x          CAPILLARY BLOOD GLUCOSE      POCT Blood Glucose.: 233 mg/dL (03 Dec 2018 19:11)  POCT Blood Glucose.: 114 mg/dL (03 Dec 2018 12:11)  POCT Blood Glucose.: 106 mg/dL (03 Dec 2018 11:27)  POCT Blood Glucose.: 57 mg/dL (03 Dec 2018 10:42)  POCT Blood Glucose.: 95 mg/dL (03 Dec 2018 09:31)  POCT Blood Glucose.: 74 mg/dL (03 Dec 2018 08:12)  POCT Blood Glucose.: 104 mg/dL (03 Dec 2018 07:06)  POCT Blood Glucose.: 92 mg/dL (03 Dec 2018 06:05)  POCT Blood Glucose.: 95 mg/dL (03 Dec 2018 05:06)  POCT Blood Glucose.: 89 mg/dL (03 Dec 2018 04:00)  POCT Blood Glucose.: 95 mg/dL (03 Dec 2018 03:02)  POCT Blood Glucose.: 102 mg/dL (03 Dec 2018 02:02)  POCT Blood Glucose.: 123 mg/dL (03 Dec 2018 01:01)  POCT Blood Glucose.: 125 mg/dL (03 Dec 2018 00:00)  POCT Blood Glucose.: 139 mg/dL (02 Dec 2018 22:58)        CULTURES:  Culture Results:   No growth (12-01 @ 17:01)      Medications:  MEDICATIONS  (STANDING):  aspirin  chewable 81 milliGRAM(s) Oral daily  atorvastatin 20 milliGRAM(s) Oral at bedtime  dexmedetomidine Infusion 0.2 MICROgram(s)/kG/Hr (4.99 mL/Hr) IV Continuous <Continuous>  enoxaparin Injectable 40 milliGRAM(s) SubCutaneous daily  folic acid Injectable 1 milliGRAM(s) IV Push daily  gemfibrozil 600 milliGRAM(s) Oral two times a day  influenza   Vaccine 0.5 milliLiter(s) IntraMuscular once  multivitamin 1 Tablet(s) Oral daily  thiamine Injectable 100 milliGRAM(s) IV Push daily    MEDICATIONS  (PRN):  HYDROmorphone  Injectable 1 milliGRAM(s) IV Push every 4 hours PRN Moderate Pain (4 - 6)  HYDROmorphone  Injectable 2 milliGRAM(s) IV Push every 4 hours PRN Severe Pain (7 - 10)  ondansetron Injectable 4 milliGRAM(s) IV Push every 6 hours PRN Nausea and/or Vomiting  PHENobarbital Injectable 130 milliGRAM(s) IV Push every 15 minutes PRN Agitation      12-02 @ 07:01  -  12-03 @ 07:00  --------------------------------------------------------  IN: 7804.2 mL / OUT: 1380 mL / NET: 6424.2 mL    12-03 @ 07:01  -  12-03 @ 22:27  --------------------------------------------------------  IN: 1631 mL / OUT: 1415 mL / NET: 216 mL        RADIOLOGY/IMAGING/ECHO      < from: Xray Chest 1 View- PORTABLE-Urgent (12.02.18 @ 22:55) >    Impression: Very low lung volumes. Left lower lobe   consolidation/atelectasis, and small left pleural effusion.      < from: CT Abdomen and Pelvis w/ Oral Cont and w/ IV Cont (12.01.18 @ 08:20) >  Impression: Severe acute phlegmonous pancreatitis. Early pancreatic   necrosis not excluded. Close follow-up recommended.        Critical care point of care ultrasound:    Assessment/Plan:    63M PMH HTN, HLD, depression, obesity, DM (2) ROSS (on CPAP), ETOH abuse, admit  with abd pain  pancreatitis due to ETOH and hypertriglyceridemia , acute alcoholic hepatitis  HERIBERTO, , acute hypoxic respiratory failure due to pulmonary edema.  Now alcohol withdrawal with agitation requiring phenobarb and precedex.      Worsennig hypoxemia now on NIV   Concern fro ALI/ARDS due to pancreatitis.    Diuresing      Neuro Confused disoriented    high CIWA precedex   infusion PRN phenobarb  IV thiamine   Cor   HD stable but BP relatively low from baseline holding meds.   Pulm  NIV for hypoxemia that is multifactorial.  Negative fluid balance   GI bowel rest   no need for steroids Maddrey score not supporting.  MRCP r/o stone.  He has a small GB stone.   Renal  HERIBERTO improved   Heme/DVT  lovenox  ID   Not on ABX bandemia.   pancreatic necrosis not r/o  afebrile.  check PCT culture if spikes defer ABX     Endo  elev TG's on statin gemfibrozil  hypoglycemic earlier required dextrose and OJ .  Off insulin drip now   TG's improved  Lines/tubes  PIV              CRITICAL CARE TIME SPENT: 37 minutes assessing presenting problems of acute illness, which pose high probability of life threatening deterioration or end organ damage/dysfunction, as well as medical decision making including initiating plan of care, reviewing data, reviewing radiologic exams, discussing with multidisciplinary team,  discussing goals of care with patient/family, and writing this note.  Non-inclusive of procedures performed,

## 2018-12-03 NOTE — PROGRESS NOTE ADULT - PROBLEM SELECTOR PLAN 1
recurrent, 2/2 etoh use and hypertriglyceridemia   ivf/clears  advance diet slowly as pain improves  rec ppi qd  surgical eval  trend labs  pain control  monitor abd exam/stool output  etoh abstinence  continue icu monitoring  will need eventual mrcp when medically optimized

## 2018-12-03 NOTE — PROGRESS NOTE ADULT - SUBJECTIVE AND OBJECTIVE BOX
Patient is a 63y old  Male who presents with a chief complaint of pancreatitis (03 Dec 2018 09:55)       INTERVAL HPI/OVERNIGHT EVENTS:  64 y/o M with PMHx of alcohol induced Pancreatitis, AODM (diet-controlled), Depression, HLD, HTN, BIBEMS from home complaining of gradual onset of upper abdominal pain, radiating to the back with nausea and vomiting admitted for SIRS secondary to acute pancreatitis likely 2/2 alcohol abuse with impending withdrawal. Patient seen and examined at bedside. Sedated, on 1:1 observation.       MEDICATIONS  (STANDING):  dexmedetomidine Infusion 0.2 MICROgram(s)/kG/Hr (4.99 mL/Hr) IV Continuous <Continuous>  dextrose 5% + sodium chloride 0.9%. 1000 milliLiter(s) (200 mL/Hr) IV Continuous <Continuous>  enoxaparin Injectable 40 milliGRAM(s) SubCutaneous daily  folic acid Injectable 1 milliGRAM(s) IV Push daily  gemfibrozil 600 milliGRAM(s) Oral two times a day  influenza   Vaccine 0.5 milliLiter(s) IntraMuscular once  multivitamin 1 Tablet(s) Oral daily  thiamine Injectable 100 milliGRAM(s) IV Push daily    MEDICATIONS  (PRN):  HYDROmorphone  Injectable 1 milliGRAM(s) IV Push every 4 hours PRN Moderate Pain (4 - 6)  HYDROmorphone  Injectable 2 milliGRAM(s) IV Push every 4 hours PRN Severe Pain (7 - 10)  ondansetron Injectable 4 milliGRAM(s) IV Push every 6 hours PRN Nausea and/or Vomiting  PHENobarbital Injectable 130 milliGRAM(s) IV Push every 15 minutes PRN Agitation      Allergies    No Known Allergies    Intolerances        REVIEW OF SYSTEMS:  Unable to obtain, patient is sedated   Vital Signs Last 24 Hrs  T(C): 36.4 (03 Dec 2018 12:00), Max: 37.2 (02 Dec 2018 20:30)  T(F): 97.6 (03 Dec 2018 12:00), Max: 99 (02 Dec 2018 20:30)  HR: 85 (03 Dec 2018 12:05) (84 - 123)  BP: 119/72 (03 Dec 2018 12:00) (86/55 - 150/70)  BP(mean): 90 (03 Dec 2018 12:00) (66 - 100)  RR: 27 (03 Dec 2018 12:00) (15 - 43)  SpO2: 91% (03 Dec 2018 12:05) (87% - 94%)    PHYSICAL EXAM:  GENERAL: NAD, Sedated   HEAD:  Atraumatic, Normocephalic  EYES: EOMI, PERRLA, conjunctiva and sclera clear  ENMT: No tonsillar erythema, exudates, or enlargement; Moist mucous membranes  NECK: Supple, No JVD, Normal thyroid  NERVOUS SYSTEM: sedated, responsive to tactile and verbal stimuli, sensations grossly intact.   CHEST/LUNG: Clear to auscultation bilaterally; No rales, rhonchi, wheezing, or rubs  HEART:S1S2+,  Regular rate and rhythm  ABDOMEN: Soft, Nontender, Nondistended; Bowel sounds present  EXTREMITIES:  2+ Peripheral Pulses, No clubbing, cyanosis, or edema  LYMPH: No lymphadenopathy noted  SKIN: No rashes or lesions    LABS:                        12.0   6.67  )-----------( 137      ( 03 Dec 2018 06:24 )             36.3     03 Dec 2018 06:24    138    |  104    |  24     ----------------------------<  71     4.0     |  24     |  1.20     Ca    7.0        03 Dec 2018 06:24  Phos  2.9       03 Dec 2018 06:24  Mg     2.2       03 Dec 2018 06:24    TPro  5.7    /  Alb  2.3    /  TBili  0.6    /  DBili  x      /  AST  93     /  ALT  65     /  AlkPhos  167    03 Dec 2018 06:24    PT/INR - ( 01 Dec 2018 17:35 )   PT: 14.2 sec;   INR: 1.25 ratio         PTT - ( 01 Dec 2018 17:35 )  PTT:27.4 sec  CAPILLARY BLOOD GLUCOSE      POCT Blood Glucose.: 114 mg/dL (03 Dec 2018 12:11)  POCT Blood Glucose.: 106 mg/dL (03 Dec 2018 11:27)  POCT Blood Glucose.: 57 mg/dL (03 Dec 2018 10:42)  POCT Blood Glucose.: 95 mg/dL (03 Dec 2018 09:31)  POCT Blood Glucose.: 74 mg/dL (03 Dec 2018 08:12)  POCT Blood Glucose.: 104 mg/dL (03 Dec 2018 07:06)  POCT Blood Glucose.: 92 mg/dL (03 Dec 2018 06:05)  POCT Blood Glucose.: 95 mg/dL (03 Dec 2018 05:06)  POCT Blood Glucose.: 89 mg/dL (03 Dec 2018 04:00)  POCT Blood Glucose.: 95 mg/dL (03 Dec 2018 03:02)  POCT Blood Glucose.: 102 mg/dL (03 Dec 2018 02:02)  POCT Blood Glucose.: 123 mg/dL (03 Dec 2018 01:01)  POCT Blood Glucose.: 125 mg/dL (03 Dec 2018 00:00)  POCT Blood Glucose.: 139 mg/dL (02 Dec 2018 22:58)  POCT Blood Glucose.: 143 mg/dL (02 Dec 2018 22:02)  POCT Blood Glucose.: 161 mg/dL (02 Dec 2018 21:00)  POCT Blood Glucose.: 200 mg/dL (02 Dec 2018 20:00)  POCT Blood Glucose.: 264 mg/dL (02 Dec 2018 18:55)  POCT Blood Glucose.: 118 mg/dL (02 Dec 2018 17:30)  POCT Blood Glucose.: 121 mg/dL (02 Dec 2018 16:20)  POCT Blood Glucose.: 145 mg/dL (02 Dec 2018 15:23)  POCT Blood Glucose.: 109 mg/dL (02 Dec 2018 13:25)    BLOOD CULTURE  12-01 @ 17:01   No growth  --  --    RADIOLOGY & ADDITIONAL TESTS:    Imaging Personally Reviewed:  [ ] YES     Consultant(s) Notes Reviewed:      Care Discussed with Consultants/Other Providers:

## 2018-12-04 LAB
ALBUMIN SERPL ELPH-MCNC: 2.3 G/DL — LOW (ref 3.3–5)
ALP SERPL-CCNC: 197 U/L — HIGH (ref 40–120)
ALT FLD-CCNC: 54 U/L — SIGNIFICANT CHANGE UP (ref 12–78)
ANION GAP SERPL CALC-SCNC: 9 MMOL/L — SIGNIFICANT CHANGE UP (ref 5–17)
AST SERPL-CCNC: 76 U/L — HIGH (ref 15–37)
BASOPHILS # BLD AUTO: 0 K/UL — SIGNIFICANT CHANGE UP (ref 0–0.2)
BASOPHILS NFR BLD AUTO: 0 % — SIGNIFICANT CHANGE UP (ref 0–2)
BILIRUB SERPL-MCNC: 0.6 MG/DL — SIGNIFICANT CHANGE UP (ref 0.2–1.2)
BUN SERPL-MCNC: 23 MG/DL — SIGNIFICANT CHANGE UP (ref 7–23)
CALCIUM SERPL-MCNC: 7.5 MG/DL — LOW (ref 8.5–10.1)
CHLORIDE SERPL-SCNC: 102 MMOL/L — SIGNIFICANT CHANGE UP (ref 96–108)
CO2 SERPL-SCNC: 24 MMOL/L — SIGNIFICANT CHANGE UP (ref 22–31)
CREAT SERPL-MCNC: 0.96 MG/DL — SIGNIFICANT CHANGE UP (ref 0.5–1.3)
EOSINOPHIL # BLD AUTO: 0.09 K/UL — SIGNIFICANT CHANGE UP (ref 0–0.5)
EOSINOPHIL NFR BLD AUTO: 1 % — SIGNIFICANT CHANGE UP (ref 0–6)
GLUCOSE SERPL-MCNC: 195 MG/DL — HIGH (ref 70–99)
HCT VFR BLD CALC: 32.9 % — LOW (ref 39–50)
HGB BLD-MCNC: 11.3 G/DL — LOW (ref 13–17)
LIDOCAIN IGE QN: 394 U/L — HIGH (ref 73–393)
LYMPHOCYTES # BLD AUTO: 0.83 K/UL — LOW (ref 1–3.3)
LYMPHOCYTES # BLD AUTO: 9 % — LOW (ref 13–44)
MAGNESIUM SERPL-MCNC: 1.9 MG/DL — SIGNIFICANT CHANGE UP (ref 1.6–2.6)
MCHC RBC-ENTMCNC: 33.9 PG — SIGNIFICANT CHANGE UP (ref 27–34)
MCHC RBC-ENTMCNC: 34.3 GM/DL — SIGNIFICANT CHANGE UP (ref 32–36)
MCV RBC AUTO: 98.8 FL — SIGNIFICANT CHANGE UP (ref 80–100)
MONOCYTES # BLD AUTO: 0.55 K/UL — SIGNIFICANT CHANGE UP (ref 0–0.9)
MONOCYTES NFR BLD AUTO: 6 % — SIGNIFICANT CHANGE UP (ref 2–14)
NEUTROPHILS # BLD AUTO: 7.48 K/UL — HIGH (ref 1.8–7.4)
NEUTROPHILS NFR BLD AUTO: 54 % — SIGNIFICANT CHANGE UP (ref 43–77)
PHOSPHATE SERPL-MCNC: 2.3 MG/DL — LOW (ref 2.5–4.5)
PLATELET # BLD AUTO: 130 K/UL — LOW (ref 150–400)
POTASSIUM SERPL-MCNC: 3.8 MMOL/L — SIGNIFICANT CHANGE UP (ref 3.5–5.3)
POTASSIUM SERPL-SCNC: 3.8 MMOL/L — SIGNIFICANT CHANGE UP (ref 3.5–5.3)
PROT SERPL-MCNC: 6.4 G/DL — SIGNIFICANT CHANGE UP (ref 6–8.3)
RBC # BLD: 3.33 M/UL — LOW (ref 4.2–5.8)
RBC # FLD: 12.7 % — SIGNIFICANT CHANGE UP (ref 10.3–14.5)
SODIUM SERPL-SCNC: 135 MMOL/L — SIGNIFICANT CHANGE UP (ref 135–145)
WBC # BLD: 9.24 K/UL — SIGNIFICANT CHANGE UP (ref 3.8–10.5)
WBC # FLD AUTO: 9.24 K/UL — SIGNIFICANT CHANGE UP (ref 3.8–10.5)

## 2018-12-04 PROCEDURE — 99291 CRITICAL CARE FIRST HOUR: CPT

## 2018-12-04 PROCEDURE — 99233 SBSQ HOSP IP/OBS HIGH 50: CPT

## 2018-12-04 RX ORDER — IPRATROPIUM/ALBUTEROL SULFATE 18-103MCG
3 AEROSOL WITH ADAPTER (GRAM) INHALATION ONCE
Qty: 0 | Refills: 0 | Status: COMPLETED | OUTPATIENT
Start: 2018-12-04 | End: 2018-12-04

## 2018-12-04 RX ORDER — QUETIAPINE FUMARATE 200 MG/1
50 TABLET, FILM COATED ORAL AT BEDTIME
Qty: 0 | Refills: 0 | Status: DISCONTINUED | OUTPATIENT
Start: 2018-12-04 | End: 2018-12-05

## 2018-12-04 RX ORDER — DEXAMETHASONE 0.5 MG/5ML
5 ELIXIR ORAL ONCE
Qty: 0 | Refills: 0 | Status: DISCONTINUED | OUTPATIENT
Start: 2018-12-04 | End: 2018-12-04

## 2018-12-04 RX ORDER — FUROSEMIDE 40 MG
40 TABLET ORAL ONCE
Qty: 0 | Refills: 0 | Status: COMPLETED | OUTPATIENT
Start: 2018-12-04 | End: 2018-12-04

## 2018-12-04 RX ORDER — POTASSIUM PHOSPHATE, MONOBASIC POTASSIUM PHOSPHATE, DIBASIC 236; 224 MG/ML; MG/ML
15 INJECTION, SOLUTION INTRAVENOUS ONCE
Qty: 0 | Refills: 0 | Status: COMPLETED | OUTPATIENT
Start: 2018-12-04 | End: 2018-12-04

## 2018-12-04 RX ORDER — PHENOBARBITAL 60 MG
130 TABLET ORAL
Qty: 0 | Refills: 0 | Status: DISCONTINUED | OUTPATIENT
Start: 2018-12-04 | End: 2018-12-05

## 2018-12-04 RX ORDER — DEXAMETHASONE 0.5 MG/5ML
4 ELIXIR ORAL ONCE
Qty: 0 | Refills: 0 | Status: COMPLETED | OUTPATIENT
Start: 2018-12-04 | End: 2018-12-04

## 2018-12-04 RX ORDER — ESCITALOPRAM OXALATE 10 MG/1
20 TABLET, FILM COATED ORAL DAILY
Qty: 0 | Refills: 0 | Status: DISCONTINUED | OUTPATIENT
Start: 2018-12-04 | End: 2018-12-06

## 2018-12-04 RX ORDER — PHENOBARBITAL 60 MG
65 TABLET ORAL EVERY 8 HOURS
Qty: 0 | Refills: 0 | Status: DISCONTINUED | OUTPATIENT
Start: 2018-12-04 | End: 2018-12-05

## 2018-12-04 RX ADMIN — Medication 100 MILLIGRAM(S): at 12:13

## 2018-12-04 RX ADMIN — Medication 4 MILLIGRAM(S): at 12:15

## 2018-12-04 RX ADMIN — DEXMEDETOMIDINE HYDROCHLORIDE IN 0.9% SODIUM CHLORIDE 4.99 MICROGRAM(S)/KG/HR: 4 INJECTION INTRAVENOUS at 06:42

## 2018-12-04 RX ADMIN — HYDROMORPHONE HYDROCHLORIDE 2 MILLIGRAM(S): 2 INJECTION INTRAMUSCULAR; INTRAVENOUS; SUBCUTANEOUS at 12:30

## 2018-12-04 RX ADMIN — Medication 130 MILLIGRAM(S): at 02:18

## 2018-12-04 RX ADMIN — Medication 40 MILLIGRAM(S): at 12:13

## 2018-12-04 RX ADMIN — Medication 1 TABLET(S): at 12:14

## 2018-12-04 RX ADMIN — ESCITALOPRAM OXALATE 20 MILLIGRAM(S): 10 TABLET, FILM COATED ORAL at 12:16

## 2018-12-04 RX ADMIN — DEXMEDETOMIDINE HYDROCHLORIDE IN 0.9% SODIUM CHLORIDE 4.99 MICROGRAM(S)/KG/HR: 4 INJECTION INTRAVENOUS at 00:39

## 2018-12-04 RX ADMIN — ENOXAPARIN SODIUM 40 MILLIGRAM(S): 100 INJECTION SUBCUTANEOUS at 12:14

## 2018-12-04 RX ADMIN — DEXMEDETOMIDINE HYDROCHLORIDE IN 0.9% SODIUM CHLORIDE 4.99 MICROGRAM(S)/KG/HR: 4 INJECTION INTRAVENOUS at 02:18

## 2018-12-04 RX ADMIN — Medication 65 MILLIGRAM(S): at 21:58

## 2018-12-04 RX ADMIN — Medication 3 MILLILITER(S): at 08:03

## 2018-12-04 RX ADMIN — Medication 81 MILLIGRAM(S): at 12:14

## 2018-12-04 RX ADMIN — HYDROMORPHONE HYDROCHLORIDE 2 MILLIGRAM(S): 2 INJECTION INTRAMUSCULAR; INTRAVENOUS; SUBCUTANEOUS at 12:13

## 2018-12-04 RX ADMIN — DEXMEDETOMIDINE HYDROCHLORIDE IN 0.9% SODIUM CHLORIDE 4.99 MICROGRAM(S)/KG/HR: 4 INJECTION INTRAVENOUS at 20:33

## 2018-12-04 RX ADMIN — Medication 1 MILLIGRAM(S): at 12:14

## 2018-12-04 RX ADMIN — Medication 130 MILLIGRAM(S): at 12:38

## 2018-12-04 RX ADMIN — POTASSIUM PHOSPHATE, MONOBASIC POTASSIUM PHOSPHATE, DIBASIC 62.5 MILLIMOLE(S): 236; 224 INJECTION, SOLUTION INTRAVENOUS at 12:15

## 2018-12-04 RX ADMIN — DEXMEDETOMIDINE HYDROCHLORIDE IN 0.9% SODIUM CHLORIDE 4.99 MICROGRAM(S)/KG/HR: 4 INJECTION INTRAVENOUS at 03:18

## 2018-12-04 RX ADMIN — Medication 600 MILLIGRAM(S): at 06:42

## 2018-12-04 NOTE — PROGRESS NOTE ADULT - ASSESSMENT
63M with PMHx as above, admitted with acute alcoholic pancreatitis, acute alcoholic hepatitis, hypertriglyceridemia, bandemia. Course complicated by prerenal HERIBERTO, ileus, acute hypoxic respiratory failure due to pulmonary edema + abdominal distention, and alcohol withdrawal.    -Continue phenobarb gtt. Holding benzo's  -Continue to hold narcotics for now  -Continue precedex gtt for lght sedation  -[facility] protocol  -MVI/Thiamine/FA  -Monitor HD's. Continue asa/statin  -Continue bipap. Wean FiO2 as tolerated. Trial NC in am  -Continue aggressive diuresis with lasix. Goal net negative 1 liter  -ADAT. PPI  -Will need MRCP when stable  -Trend LFT's. Now off insulin gtt for hypertriglyceridemia.   -Monitor UOP/Lytes. Continue aggressive diuresis  -Replete hypophosphatemia and hypomagnesemia  -Monitor off abx.   -DVT ppx  -Supportive care

## 2018-12-04 NOTE — PROGRESS NOTE ADULT - ASSESSMENT
Pt is a 62 y/o M with PMHx of alcohol induced Pancreatitis, AODM (diet-controlled), Depression, HLD, HTN, BIBEMS from home complaining of gradual onset of upper abdominal pain, radiating to the back with nausea and vomiting admitted for SIRS secondary to acute pancreatitis likely 2/2 alcohol abuse with impending withdrawal

## 2018-12-04 NOTE — PROGRESS NOTE ADULT - ASSESSMENT
62 y/o male admitted for Pancreatitis and EtOH withdrawal.     Neuro:  - Agitation: Likely due to EtOH withdrawal. Will start 1:1 monitoring of Pt.  - EtOH withdrawal: will manage with Phenobarbital 130 mg IV q 15 minutes PRN for agitation with instruction to inform provider when administered. D/C Ativan and Precedex. Continue to monitor agitation level. C/w thiamine and folic acid.  - Pain: Dilaudid PRN  CV: Stable.  Pulm: C/w BiPAP 12/8 on 45% O2 during day and when sleeping. Will try and wean off BiPAP. C/w incentive spirometry when off BiPAP.  GI:  - Pancreatitis: EtOH vs. hypertriglyceridemia. Discriminant Function: 6.4 today. LFTs and TG improving. Will start on Gemfibrozil and d/c Insulin drip now that TG < 500. May repeat CT Abd/Pelvis tomorrow. C/w zofran PRN for nausea.  - Diet: Clears when off BiPAP.  Renal: Given Lasix 40 mg IV this morning. C/w miller to continue to monitor I&O.   Endo: Found to be diabetic with HbA1C 7 on this admission. Will begin ISS 3x with meals and at bedtime.   Heme: Elevated ferritin on admission. Appreciated heme recommendation for follow up ferritin level in one week and follow up outpatient.   ID: Bandemia, infection unlikely at this time, will monitor and continue to follow urine cultures.   DVT PPX: Enoxaparin SQ  Ethics: Full code 62 y/o male admitted for Pancreatitis and EtOH withdrawal.     Neuro:  - Agitation: Likely due to EtOH withdrawal. Will start 1:1 monitoring of Pt.  - EtOH withdrawal: C/w Phenobarbital 130 mg IV q 15 minutes PRN for agitation with instruction to inform provider when administered. Continue to monitor agitation level. C/w thiamine and folic acid.  - Pain: Dilaudid PRN  CV: Stable. C/w ASA and Atorvastatin. Hold Amlodipine.  Pulm: Will try and wean off BiPAP using venti mask and nasal canula. C/w BiPAP 12/8 on 45% O2 when sleeping for PMH of ROSS on home CPAP. C/w incentive spirometry when off BiPAP.  GI:  - Pancreatitis: EtOH vs. hypertriglyceridemia. Discriminant Function: 15.3 today. LFTs and TG improving. C/w Gemfibrozil. May repeat CT Abd/Pelvis tomorrow. C/w Zofran PRN for nausea.  - Diet: Clears when off BiPAP and mentating well.  Renal: Given Lasix 40 mg IV this morning. C/w miller to continue to monitor I&O. Will replete Magnesium.  Endo: DM - FS running slightly elevated. C/w insulin coverage.  Heme: Elevated ferritin on admission. Appreciated heme recommendation for follow up ferritin level in one week and follow up outpatient.   ID: Bandemia, infection unlikely at this time, will monitor and continue to follow urine cultures.   DVT PPX: Enoxaparin SQ  Ethics: Full code 62 y/o male admitted for Pancreatitis and EtOH withdrawal.     Neuro:  - Agitation: Likely due to EtOH withdrawal. C/w 1:1 monitoring of Pt. Will begin home medication of Seroquel 50 mg at bedtime, Escitalopram 20 mg daily.  - EtOH withdrawal: Will begin Phenobarbital 65 mg IV q8 hours standing. C/w Phenobarbital 130 mg IV q 15 minutes PRN for agitation with instruction to inform provider when administered. Continue to monitor agitation level. C/w thiamine and folic acid.  - Pain: D/C Dilaudid due to sedative nature of medication. Will start alternative.  CV: Stable. C/w ASA and Atorvastatin. Hold Amlodipine.  Pulm: Will try and wean off BiPAP using venti mask and nasal canula. C/w BiPAP 12/8 on 35% O2 when sleeping for PMH of ROSS on home CPAP. C/w incentive spirometry when off BiPAP.  GI:  - Pancreatitis: EtOH vs. hypertriglyceridemia. [place]'s Criteria on admission 3, Cumulative score 6 points = 40% mortality. Discriminant Function: 15.3 today. LFTs and TG improving. C/w Gemfibrozil. May repeat CT Abd/Pelvis tomorrow. C/w Zofran PRN for nausea.  - Diet: DM low fat diet when off BiPAP and mentating well.  Renal: Given Lasix 40 mg IV this morning. C/w miller to continue to monitor I&O. Will replete Magnesium.  Endo: DM - FS running slightly elevated. C/w insulin coverage.  Heme: Elevated ferritin on admission. Appreciated heme recommendation for follow up ferritin level in one week and follow up outpatient.   ID: Bandemia, infection unlikely at this time, will monitor and continue to follow urine cultures.   DVT PPX: Enoxaparin SQ  Ethics: Full code

## 2018-12-04 NOTE — PROGRESS NOTE ADULT - PROBLEM SELECTOR PLAN 4
- On Precedex. Plan per ICU   - Librium 50mg q8h, CIWA protocol  - Detox Dr. Coats's consult appreciated   - continue Vit B1, folic acid supplementation  - pt states he did not like the group therapy requirement for Vivitrol, and did not proceed with detox  - repeat addiction medicine KING barrett

## 2018-12-04 NOTE — PROGRESS NOTE ADULT - SUBJECTIVE AND OBJECTIVE BOX
Patient is a 63y old  Male who presents with a chief complaint of pancreatitis (04 Dec 2018 08:03)       INTERVAL HPI/OVERNIGHT EVENTS:  64 y/o M with PMHx of alcohol induced Pancreatitis, AODM (diet-controlled), Depression, HLD, HTN, BIBEMS from home complaining of gradual onset of upper abdominal pain, radiating to the back with nausea and vomiting admitted for SIRS secondary to acute pancreatitis likely 2/2 alcohol abuse/ Withdrawal. Seen and examined at bedside. Sedated , on 1:1 observation        MEDICATIONS  (STANDING):  aspirin  chewable 81 milliGRAM(s) Oral daily  atorvastatin 20 milliGRAM(s) Oral at bedtime  dexamethasone  Injectable 4 milliGRAM(s) IV Push once  dexmedetomidine Infusion 0.2 MICROgram(s)/kG/Hr (4.99 mL/Hr) IV Continuous <Continuous>  enoxaparin Injectable 40 milliGRAM(s) SubCutaneous daily  escitalopram 20 milliGRAM(s) Oral daily  folic acid Injectable 1 milliGRAM(s) IV Push daily  furosemide   Injectable 40 milliGRAM(s) IV Push once  gemfibrozil 600 milliGRAM(s) Oral two times a day  influenza   Vaccine 0.5 milliLiter(s) IntraMuscular once  multivitamin 1 Tablet(s) Oral daily  potassium phosphate IVPB 15 milliMole(s) IV Intermittent once  QUEtiapine 50 milliGRAM(s) Oral at bedtime  thiamine Injectable 100 milliGRAM(s) IV Push daily    MEDICATIONS  (PRN):  HYDROmorphone  Injectable 1 milliGRAM(s) IV Push every 4 hours PRN Moderate Pain (4 - 6)  HYDROmorphone  Injectable 2 milliGRAM(s) IV Push every 4 hours PRN Severe Pain (7 - 10)  ondansetron Injectable 4 milliGRAM(s) IV Push every 6 hours PRN Nausea and/or Vomiting  PHENobarbital Injectable 130 milliGRAM(s) IV Push every 15 minutes PRN Agitation      Allergies    No Known Allergies    Intolerances        REVIEW OF SYSTEMS:  Unable to obtain, patient is sedated   Vital Signs Last 24 Hrs  T(C): 37.1 (04 Dec 2018 08:00), Max: 37.1 (04 Dec 2018 08:00)  T(F): 98.7 (04 Dec 2018 08:00), Max: 98.7 (04 Dec 2018 08:00)  HR: 88 (04 Dec 2018 09:00) (79 - 98)  BP: 111/59 (04 Dec 2018 09:00) (92/55 - 122/64)  BP(mean): 79 (04 Dec 2018 09:00) (64 - 90)  RR: 34 (04 Dec 2018 09:00) (12 - 59)  SpO2: 92% (04 Dec 2018 09:00) (89% - 96%)    PHYSICAL EXAM:  GENERAL: NAD, sedated, obese   HEAD:  Atraumatic, Normocephalic  EYES: EOMI, PERRLA, conjunctiva and sclera clear  ENMT: No tonsillar erythema, exudates, or enlargement; Moist mucous membranes  NECK: Supple, No JVD, Normal thyroid  CHEST/LUNG: Clear to auscultation bilaterally; No rales, rhonchi, wheezing, or rubs  HEART: S1S2+,  Regular rate and rhythm  ABDOMEN: Soft, Nontender, Nondistended; Bowel sounds present  EXTREMITIES:  2+ Peripheral Pulses, No clubbing, cyanosis, or edema  LYMPH: No lymphadenopathy noted  SKIN: No rashes or lesions    LABS:                        11.3   9.24  )-----------( 130      ( 04 Dec 2018 06:12 )             32.9     04 Dec 2018 06:12    135    |  102    |  23     ----------------------------<  195    3.8     |  24     |  0.96     Ca    7.5        04 Dec 2018 06:12  Phos  2.3       04 Dec 2018 06:12  Mg     1.9       04 Dec 2018 06:12    TPro  6.4    /  Alb  2.3    /  TBili  0.6    /  DBili  x      /  AST  76     /  ALT  54     /  AlkPhos  197    04 Dec 2018 06:12      CAPILLARY BLOOD GLUCOSE      POCT Blood Glucose.: 210 mg/dL (04 Dec 2018 06:38)  POCT Blood Glucose.: 223 mg/dL (04 Dec 2018 00:18)  POCT Blood Glucose.: 233 mg/dL (03 Dec 2018 19:11)  POCT Blood Glucose.: 114 mg/dL (03 Dec 2018 12:11)    BLOOD CULTURE  12-01 @ 17:01   No growth  --  --    RADIOLOGY & ADDITIONAL TESTS:    Imaging Personally Reviewed:  [ ] YES     Consultant(s) Notes Reviewed:      Care Discussed with Consultants/Other Providers:

## 2018-12-04 NOTE — PROGRESS NOTE ADULT - ATTENDING COMMENTS
63M PMH HTN, HLD, depression, obesity, diet-controlled DM (A1C 7.0), ROSS (on CPAP), ETOH abuse, admitted with acute alcoholic pancreatitis, acute alcoholic hepatitis, hypertriglyceridemia, bandemia. Course complicated by prerenal HERIBERTO, ileus, acute hypoxic respiratory failure due to pulmonary edema + abdominal distention, and alcohol withdrawal.    - Start standing phenobarbital 65 mg IV q8h + 130 mg IV q1h prn. Hold benzo's at this time. Continue dexmedetomidine gtt. Continue thiamine, MVI, folic acid. Restart escitalopram and quetiapine  - Hold hydromorphone  - HD stable, continue aspirin and statin. Continue to hold amlodipine  - Hypoxia likely due to pulmonary edema from IVF hydration + abdominal distention. Improved with diuresis. Keep net negative 1 liter/24hrs  - Continue BiPAP qhs for ROSS. Supplemental oxygen with NC when off NIPPV. Improved respiratory status  - Advance diet to diabetic low fat as tolerated. Improved pancreatitis and pain. Eventually needs MRCP when more stable and improved withdrawal. Improved alcoholic hepatitis, DF<32  - Improved hypertriglyceridemia. Keep off insulin gtt. Continue gemfibrozil 600 mg po bid  - Improved HERIBERTO, strict I/O's, trend kidney function and lytes. Responding well to diuresis. Replete low phos/Mg  - Continue to observe off abx. No evidence of infected necrosis on CT. F/up MRCP  - Enoxaparin for DVT ppx  - Insulin coverage scale for DM2, A1C 7.0  - Discussed with patient, full code, prognosis guarded  CC Time spent: 35 min

## 2018-12-04 NOTE — PROGRESS NOTE ADULT - SUBJECTIVE AND OBJECTIVE BOX
Patient is a 63y old  Male who presents with a chief complaint of pancreatitis (04 Dec 2018 12:33)    24 hour events: ***  PAST MEDICAL & SURGICAL HISTORY:  Low testosterone in male  Depression  Insomnia  HLD (hyperlipidemia)  HTN (hypertension)  Low testosterone level in male  Sprain of right rotator cuff capsule, subsequent encounter  Borderline diabetes mellitus: last A1c unknown  Low back pain, unspecified back pain laterality, unspecified chronicity, with sciatica presence unspecified  Lumbar herniated disc  Depression, unspecified depression type: Lost his son this past June 2016  Essential hypertension  Sleep apnea, unspecified type  Obesity (BMI 30-39.9)  H/O knee surgery: b/l knees  H/O shoulder surgery: right  S/P arthroscopy of right knee: 2010  S/P right inguinal hernia repair: 2010  S/P ACL repair: left knee 2010  S/P rotator cuff repair: Right shoulder 2008      Review of Systems:  Constitutional: No fever, chills, fatigue  Neuro: No headache, numbness, weakness  Resp: No cough, wheezing, shortness of breath  CVS: No chest pain, palpitations, leg swelling  GI: No abdominal pain, nausea, vomiting, diarrhea   : No dysuria, frequency, incontinence  Skin: No itching, burning, rashes, or lesions   Msk: No joint pain or swelling  Psych: No depression, anxiety, mood swings    T(F): 97.5 (12-04-18 @ 20:00), Max: 98.7 (12-04-18 @ 08:00)  HR: 76 (12-04-18 @ 23:25) (72 - 94)  BP: 120/70 (12-04-18 @ 23:00) (102/58 - 131/80)  RR: 14 (12-04-18 @ 23:00) (10 - 34)  SpO2: 91% (12-04-18 @ 23:25) (89% - 96%)  Wt(kg): --        CAPILLARY BLOOD GLUCOSE      POCT Blood Glucose.: 193 mg/dL (04 Dec 2018 23:21)      I&O's Summary    03 Dec 2018 07:01  -  04 Dec 2018 07:00  --------------------------------------------------------  IN: 2546 mL / OUT: 2730 mL / NET: -184 mL    04 Dec 2018 07:01  -  04 Dec 2018 23:36  --------------------------------------------------------  IN: 548.4 mL / OUT: 1255 mL / NET: -706.6 mL        Physical Exam:     Gen:   Neuro: A&Ox3, non-focal  HEENT: NC/AT  Resp: CTA b/l  CVS: nl S1/S2, RRR  Abd: soft, nt, nd, +bs  Ext: no edema, +pulses  Skin: well perfused, warm    Meds:      atorvastatin Oral  gemfibrozil Oral      dexmedetomidine Infusion IV Continuous  escitalopram Oral  ondansetron Injectable IV Push PRN  PHENobarbital Injectable IV Push  PHENobarbital Injectable IV Push PRN  QUEtiapine Oral      aspirin  chewable Oral  enoxaparin Injectable SubCutaneous        folic acid Injectable IV Push  multivitamin Oral  thiamine Injectable IV Push    influenza   Vaccine IntraMuscular                                11.3   9.24  )-----------( 130      ( 04 Dec 2018 06:12 )             32.9     Bands 27.0    12-04    135  |  102  |  23  ----------------------------<  195<H>  3.8   |  24  |  0.96    Ca    7.5<L>      04 Dec 2018 06:12  Phos  2.3     12-04  Mg     1.9     12-04    TPro  6.4  /  Alb  2.3<L>  /  TBili  0.6  /  DBili  x   /  AST  76<H>  /  ALT  54  /  AlkPhos  197<H>  12-04              .Urine Clean Catch (Midstream)   No growth -- 12-01 @ 17:01          Radiology: ***    Bedside lung ultrasound: ***    Bedside ECHO: ***    CENTRAL LINE: Y/N          DATE INSERTED:              REMOVE: Y/N    PEOPLES: Y/N                        DATE INSERTED:              REMOVE: Y/N    A-LINE: Y/N                       DATE INSERTED:              REMOVE: Y/N    GLOBAL ISSUE/BEST PRACTICE:  Analgesia:  Sedation:  HOB elevation: yes  Stress ulcer prophylaxis:  VTE prophylaxis:  Glycemic control:  Nutrition:      CODE STATUS: ***  GOC discussion: Y  Critical care time spent (mins): ***  (Reviewing data, imaging, discussing with multidisciplinary team, non inclusive of procedures, discussing goals of care with patient/family) 63M with PMHx of alcohol induced Pancreatitis, DM (diet-controlled), Depression, HLD, HTN, insomnia, hld, htn, todd, obesity, admitted with acute alcoholic pancreatitis, acute alcoholic hepatitis, hypertriglyceridemia, bandemia. Course complicated by prerenal HERIBERTO, ileus, acute hypoxic respiratory failure due to pulmonary edema + abdominal distention, and alcohol withdrawal.    24 hour events: Pt seen and examined at bedside. Chart/labs reviewed. Pt remains on bipap support. Now off insulin gtt      PAST MEDICAL & SURGICAL HISTORY:  Low testosterone in male  Depression  Insomnia  HLD (hyperlipidemia)  HTN (hypertension)  Low testosterone level in male  Sprain of right rotator cuff capsule, subsequent encounter  Borderline diabetes mellitus: last A1c unknown  Low back pain, unspecified back pain laterality, unspecified chronicity, with sciatica presence unspecified  Lumbar herniated disc  Depression, unspecified depression type: Lost his son this past June 2016  Essential hypertension  Sleep apnea, unspecified type  Obesity (BMI 30-39.9)  H/O knee surgery: b/l knees  H/O shoulder surgery: right  S/P arthroscopy of right knee: 2010  S/P right inguinal hernia repair: 2010  S/P ACL repair: left knee 2010  S/P rotator cuff repair: Right shoulder 2008      Review of Systems:  Constitutional: No fever, chills, fatigue  Neuro: No headache, numbness, weakness  Resp: No cough, wheezing, +shortness of breath  CVS: No chest pain, palpitations, leg swelling  GI: No abdominal pain, nausea, vomiting, diarrhea   : No dysuria, frequency, incontinence  Skin: No itching, burning, rashes, or lesions   Msk: No joint pain or swelling  Psych: No depression, anxiety, mood swings    T(F): 97.5 (12-04-18 @ 20:00), Max: 98.7 (12-04-18 @ 08:00)  HR: 76 (12-04-18 @ 23:25) (72 - 94)  BP: 120/70 (12-04-18 @ 23:00) (102/58 - 131/80)  RR: 14 (12-04-18 @ 23:00) (10 - 34)  SpO2: 91% (12-04-18 @ 23:25) (89% - 96%)  Wt(kg): --        CAPILLARY BLOOD GLUCOSE      POCT Blood Glucose.: 193 mg/dL (04 Dec 2018 23:21)      I&O's Summary    03 Dec 2018 07:01  -  04 Dec 2018 07:00  --------------------------------------------------------  IN: 2546 mL / OUT: 2730 mL / NET: -184 mL    04 Dec 2018 07:01  -  04 Dec 2018 23:36  --------------------------------------------------------  IN: 548.4 mL / OUT: 1255 mL / NET: -706.6 mL        Physical Exam:     Gen: mild distress  Neuro: agitated, following commands  HEENT: NC/AT  Resp: Decreased BS, + wheeze  CVS: nl S1/S2, RRR  Abd: soft, nt, nd, +bs  Ext: no edema, +pulses  Skin: well perfused, warm      Meds:    atorvastatin Oral  gemfibrozil Oral  dexmedetomidine Infusion IV Continuous  escitalopram Oral  ondansetron Injectable IV Push PRN  PHENobarbital Injectable IV Push  PHENobarbital Injectable IV Push PRN  QUEtiapine Oral  aspirin  chewable Oral  enoxaparin Injectable SubCutaneous  folic acid Injectable IV Push  multivitamin Oral  thiamine Injectable IV Push  influenza   Vaccine IntraMuscular                            11.3   9.24  )-----------( 130      ( 04 Dec 2018 06:12 )             32.9     Bands 27.0    12-04    135  |  102  |  23  ----------------------------<  195<H>  3.8   |  24  |  0.96    Ca    7.5<L>      04 Dec 2018 06:12  Phos  2.3     12-04  Mg     1.9     12-04    TPro  6.4  /  Alb  2.3<L>  /  TBili  0.6  /  DBili  x   /  AST  76<H>  /  ALT  54  /  AlkPhos  197<H>  12-04              .Urine Clean Catch (Midstream)   No growth -- 12-01 @ 17:01      GB US:  INTERPRETATION:  History: Abdominal pain, pancreatitis    Gallbladder ultrasound.    There is at least one gallstone. Gallbladder sludge noted. No wall   thickening or pericholecystic fluid. Borderline common duct dilatation of   0.6 cm. Correlate with MRCP. Incidental fatty liver.    Impression: Cholelithiasis and gallbladder sludge. Borderline common duct   dilatation. Correlate with MRCP.    CT A/P:  INTERPRETATION:  History: Abdominal pain history of pancreatitis.    CT abdomen pelvis oral and IV contrast.  95 cc Omnipaque 350 injected intravenously.  Bibasilar dependent atelectasis.  No calcified gallstones or biliary dilatation. Diffuse fatty liver.   Spleen unremarkable.  The tail of pancreas is markedly edematous. There is severe surrounding   inflammatory changes with peripancreatic fluid, stranding and phlegmonous   change. Findings consistent with severe acute pancreatitis. There is no   discrete organized collection to suggest abscess or pseudocyst.   Hypoenhancement in the pancreatic tail may reflect pancreatic edema.   Early necrosis not excluded. No pancreatic ductal dilatation.  Right renal cysts.  Nonaneurysmal abdominal aorta.  No suspicious adenopathy.  No obstructed or inflamed bowel.  Prostate bladder not remarkable.  No acute skeletal abnormality    Impression: Severe acute phlegmonous pancreatitis. Early pancreatic   necrosis not excluded. Close follow-up recommended.      ***Please see the addendum at the top of this report. It may contain   additional important information or changes.****    CENTRAL LINE: no    PEOPLES: yes    A-LINE: no    GLOBAL ISSUE/BEST PRACTICE:  Analgesia: no  Sedation: yes  HOB elevation: yes  Stress ulcer prophylaxis: yes  VTE prophylaxis: yes  Glycemic control: yes  Nutrition: npo      CODE STATUS: Full  GOC discussion: Y  Critical care time spent (mins): 37  (Reviewing data, imaging, discussing with multidisciplinary team, non inclusive of procedures, discussing goals of care with patient/family)

## 2018-12-04 NOTE — CONSULT NOTE ADULT - SUBJECTIVE AND OBJECTIVE BOX
HPI:  Pt is a 64 y/o M with PMHx of alcohol induced Pancreatitis, AODM (diet-controlled), Depression, HLD, HTN, BIBEMS from home complaining of gradual onset of upper abdominal pain, radiating to the back with nausea and vomiting. Pain started last night, rated 10/10. Pt states he took Advil and peptobismol to no relief. Of note pt states he had a similar episode in May when he was diagnosed with pancreatitis. Pt states he had a sonogram one month ago that did show gall stones. 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 Pt admits abd pain radiating to back, N/V, and blood streaked stool with BRBPR. Denies CP, SOB, fever, chills, urinary symptoms.     In the ED: VS T 98.3 HR 98 /70 RR18 98%RA. Labs significant for WBC 12.69, Na 130, K 5.6, Cl 94, CO2 19, glu 197, Ca 7.5, Alb 3.2, , , , lipase 12,651, amylase 358, lactate 2.3. CXR WNL. US GB showing Cholelithiasis and gallbladder sludge. Borderline common duct dilatation. CT Abd/pelvis showing Severe acute phlegmonous pancreatitis. Early pancreatic necrosis not excluded. Pt was given 1x dose zosyn, 3L NS bolus, 2x doses Dilaudid, 1x dose morphine, 2x doses Zofran. UCx sent. Pt evaluated and admitted to ICU. (01 Dec 2018 11:59)    Detox:  Chart reviewed History of Alcoholism   confused agitated   on Precedex and Phenobarbital PRN   Case discussed with attending   On 1:1 Observation      PAST MEDICAL & SURGICAL HISTORY:  Low testosterone in male  Depression  Insomnia  HLD (hyperlipidemia)  HTN (hypertension)  Low testosterone level in male  Sprain of right rotator cuff capsule, subsequent encounter  Borderline diabetes mellitus: last A1c unknown  Low back pain, unspecified back pain laterality, unspecified chronicity, with sciatica presence unspecified  Lumbar herniated disc  Depression, unspecified depression type: Lost his son this past June 2016  Essential hypertension  Sleep apnea, unspecified type  Obesity (BMI 30-39.9)  H/O knee surgery: b/l knees  H/O shoulder surgery: right  S/P arthroscopy of right knee: 2010  S/P right inguinal hernia repair: 2010  S/P ACL repair: left knee 2010  S/P rotator cuff repair: Right shoulder 2008      Allergies    No Known Allergies    Intolerances        FAMILY HISTORY:  Family history of squamous cell carcinoma: of parotid  Family history of hypertension  Family history of cancer (Father)      SOCIAL HISTORY:    REVIEW OF SYSTEMS:    Constitutional: No fever, weight loss or fatigue  ENT:  No difficulty hearing, tinnitus, vertigo; No sinus or throat pain  Neck: No pain or stiffness  Respiratory: No cough, wheezing, chills or hemoptysis  Cardiovascular: No chest pain, palpitations, shortness of breath, dizziness or leg swelling  Gastrointestinal: positive  abdominal or epigastric pain. No nausea, vomiting or hematemesis; No diarrhea or constipation. No melena or hematochezia.  Neurological: No headaches, memory loss, loss of strength, numbness or tremors  Musculoskeletal: No joint pain or swelling; No muscle, back or extremity pain  Psychiatric: No depression, anxiety, mood swings or difficulty sleeping    MEDICATIONS  (STANDING):  ALBUTerol/ipratropium for Nebulization. 3 milliLiter(s) Nebulizer once  aspirin  chewable 81 milliGRAM(s) Oral daily  atorvastatin 20 milliGRAM(s) Oral at bedtime  dexmedetomidine Infusion 0.2 MICROgram(s)/kG/Hr (4.99 mL/Hr) IV Continuous <Continuous>  enoxaparin Injectable 40 milliGRAM(s) SubCutaneous daily  folic acid Injectable 1 milliGRAM(s) IV Push daily  gemfibrozil 600 milliGRAM(s) Oral two times a day  influenza   Vaccine 0.5 milliLiter(s) IntraMuscular once  multivitamin 1 Tablet(s) Oral daily  thiamine Injectable 100 milliGRAM(s) IV Push daily    MEDICATIONS  (PRN):  HYDROmorphone  Injectable 1 milliGRAM(s) IV Push every 4 hours PRN Moderate Pain (4 - 6)  HYDROmorphone  Injectable 2 milliGRAM(s) IV Push every 4 hours PRN Severe Pain (7 - 10)  ondansetron Injectable 4 milliGRAM(s) IV Push every 6 hours PRN Nausea and/or Vomiting  PHENobarbital Injectable 130 milliGRAM(s) IV Push every 15 minutes PRN Agitation      Vital Signs Last 24 Hrs  T(C): 37.1 (04 Dec 2018 08:00), Max: 37.1 (04 Dec 2018 08:00)  T(F): 98.7 (04 Dec 2018 08:00), Max: 98.7 (04 Dec 2018 08:00)  HR: 80 (04 Dec 2018 07:00) (79 - 117)  BP: 114/61 (04 Dec 2018 07:00) (92/55 - 150/70)  BP(mean): 80 (04 Dec 2018 07:00) (64 - 100)  RR: 27 (04 Dec 2018 07:00) (12 - 59)  SpO2: 93% (04 Dec 2018 07:00) (87% - 96%)    PHYSICAL EXAM:    Constitutional: NAD, well-groomed, well-developed  HEENT: PERRLA, EOMI, Normal Hearing, MMM  Neck: No LAD, No JVD  Back: Normal spine flexure, No CVA tenderness  Respiratory: CTAB/L  Cardiovascular: S1 and S2, RRR, no M/G/R  Gastrointestinal: BS+,   Extremities: No peripheral edema  Vascular: 2+ peripheral pulses  Neurological: Confused agitated    LABS:                        11.3   x     )-----------( 130      ( 04 Dec 2018 06:12 )             32.9     12-04    135  |  102  |  23  ----------------------------<  195<H>  3.8   |  24  |  0.96    Ca    7.5<L>      04 Dec 2018 06:12  Phos  2.3     12-04  Mg     1.9     12-04    TPro  6.4  /  Alb  2.3<L>  /  TBili  0.6  /  DBili  x   /  AST  76<H>  /  ALT  54  /  AlkPhos  197<H>  12-04        Drug Screen Urine:  Alcohol Level  Alcohol, Blood: <10 mg/dL (12-01-18 @ 20:46)        RADIOLOGY & ADDITIONAL STUDIES:      Assessment and Plan:    Alcoholism/DT's: Continue Precedex                             Use Phenobarbital as prescribed

## 2018-12-04 NOTE — PROGRESS NOTE ADULT - SUBJECTIVE AND OBJECTIVE BOX
INTERVAL HPI/OVERNIGHT EVENTS:  pt seen and examined earlier this am  reports some improvement in abd pain, no vomiting, +bm, tolerated clears  labs noted  pt agitated overnight per nursing, off insulin gtt    MEDICATIONS  (STANDING):  aspirin  chewable 81 milliGRAM(s) Oral daily  atorvastatin 20 milliGRAM(s) Oral at bedtime  dexmedetomidine Infusion 0.2 MICROgram(s)/kG/Hr (4.99 mL/Hr) IV Continuous <Continuous>  enoxaparin Injectable 40 milliGRAM(s) SubCutaneous daily  escitalopram 20 milliGRAM(s) Oral daily  folic acid Injectable 1 milliGRAM(s) IV Push daily  gemfibrozil 600 milliGRAM(s) Oral two times a day  influenza   Vaccine 0.5 milliLiter(s) IntraMuscular once  multivitamin 1 Tablet(s) Oral daily  PHENobarbital Injectable 65 milliGRAM(s) IV Push every 8 hours  QUEtiapine 50 milliGRAM(s) Oral at bedtime  thiamine Injectable 100 milliGRAM(s) IV Push daily    MEDICATIONS  (PRN):  HYDROmorphone  Injectable 1 milliGRAM(s) IV Push every 4 hours PRN Moderate Pain (4 - 6)  HYDROmorphone  Injectable 2 milliGRAM(s) IV Push every 4 hours PRN Severe Pain (7 - 10)  ondansetron Injectable 4 milliGRAM(s) IV Push every 6 hours PRN Nausea and/or Vomiting  PHENobarbital Injectable 130 milliGRAM(s) IV Push every 1 hour PRN Agitation/rescue      Allergies    No Known Allergies    Intolerances        Review of Systems:    General:  No wt loss, fevers, chills, night sweats, fatigue   Eyes:  Good vision, no reported pain  ENT:  No sore throat, pain, runny nose, dysphagia  CV:  No pain, palpitations, hypo/hypertension  Resp:  No dyspnea, cough, tachypnea, wheezing  GI:  +pain, No nausea, No vomiting, No diarrhea, No constipation, No weight loss, No fever, No pruritis, No rectal bleeding, No melena, No dysphagia  :  No pain, bleeding, incontinence, nocturia  Muscle:  No pain, weakness  Neuro:  No weakness, tingling, memory problems  Psych:  No fatigue, insomnia, mood problems, depression  Endocrine:  No polyuria, polydypsia, cold/heat intolerance  Heme:  No petechiae, ecchymosis, easy bruisability  Skin:  No rash, tattoos, scars, edema      Vital Signs Last 24 Hrs  T(C): 36.4 (04 Dec 2018 12:00), Max: 37.1 (04 Dec 2018 08:00)  T(F): 97.5 (04 Dec 2018 12:00), Max: 98.7 (04 Dec 2018 08:00)  HR: 92 (04 Dec 2018 10:30) (79 - 98)  BP: 111/59 (04 Dec 2018 09:00) (92/55 - 122/64)  BP(mean): 79 (04 Dec 2018 09:00) (64 - 87)  RR: 34 (04 Dec 2018 09:00) (12 - 59)  SpO2: 91% (04 Dec 2018 10:30) (90% - 96%)    PHYSICAL EXAM:    Constitutional: NAD  HEENT: ncat  Neck: No LAD  Respiratory: dec bs  Cardiovascular: S1 and S2, RRR  Gastrointestinal: soft generalized ttp no guardng +dt  Extremities: No peripheral edema  Vascular: 2+ peripheral pulses  Neurological: awake alert   Skin: No rashes    LABS:                        11.3   9.24  )-----------( 130      ( 04 Dec 2018 06:12 )             32.9     12-04    135  |  102  |  23  ----------------------------<  195<H>  3.8   |  24  |  0.96    Ca    7.5<L>      04 Dec 2018 06:12  Phos  2.3     12-04  Mg     1.9     12-04    TPro  6.4  /  Alb  2.3<L>  /  TBili  0.6  /  DBili  x   /  AST  76<H>  /  ALT  54  /  AlkPhos  197<H>  12-04          RADIOLOGY & ADDITIONAL TESTS:

## 2018-12-04 NOTE — PROGRESS NOTE ADULT - SUBJECTIVE AND OBJECTIVE BOX
Brief interval history: 62yo male presenting to the Emergency Department with new onset mid epigastric pain 10/10 that began day prior to admission. He has similar pain in May 2018 and had been diagnosed with pancreatitis. CT scan and US abdomen were performed with evidence of pancreatitis again this admit. He admits to drinking heavily over the past month. He drinks Vodka in excess for past 2 years after the loss of his son.  Pt was admitted to ICU for aggressive resuscitation and EtOH withdrawal.     24 hour events:     Review of Systems:  Constitutional: No fever, chills, fatigue  Neuro: No headache, numbness, weakness  Resp: No cough, wheezing, shortness of breath  CVS: No chest pain, palpitations, leg swelling  GI: No abdominal pain, nausea, vomiting, diarrhea   : No dysuria, frequency, incontinence  Skin: No itching, burning, rashes, or lesions   Msk: No joint pain or swelling  Psych: No depression, anxiety, mood swings    ICU Vital Signs Last 24 Hrs  T(C): 36.6 (04 Dec 2018 00:06), Max: 37.1 (03 Dec 2018 08:01)  T(F): 97.8 (04 Dec 2018 00:06), Max: 98.7 (03 Dec 2018 08:01)  HR: 83 (04 Dec 2018 06:00) (79 - 117)  BP: 115/63 (04 Dec 2018 05:00) (92/55 - 150/70)  BP(mean): 83 (04 Dec 2018 05:00) (64 - 100)  RR: 29 (04 Dec 2018 05:00) (12 - 59)  SpO2: 96% (04 Dec 2018 06:00) (87% - 96%)    POCT Blood Glucose.: 210 mg/dL (04 Dec 2018 06:38)      I&O's Summary    03 Dec 2018 07:01  -  04 Dec 2018 07:00  --------------------------------------------------------  IN: 2506 mL / OUT: 2670 mL / NET: -164 mL      Physical Exam:   Gen:  Neuro:  HEENT:  Resp:  CVS:  Abd:  Ext:  Skin:    MEDICATIONS  (STANDING):  aspirin  chewable 81 milliGRAM(s) Oral daily  atorvastatin 20 milliGRAM(s) Oral at bedtime  dexmedetomidine Infusion 0.2 MICROgram(s)/kG/Hr (4.99 mL/Hr) IV Continuous <Continuous>  enoxaparin Injectable 40 milliGRAM(s) SubCutaneous daily  folic acid Injectable 1 milliGRAM(s) IV Push daily  gemfibrozil 600 milliGRAM(s) Oral two times a day  influenza   Vaccine 0.5 milliLiter(s) IntraMuscular once  multivitamin 1 Tablet(s) Oral daily  thiamine Injectable 100 milliGRAM(s) IV Push daily    MEDICATIONS  (PRN):  HYDROmorphone  Injectable 1 milliGRAM(s) IV Push every 4 hours PRN Moderate Pain (4 - 6)  HYDROmorphone  Injectable 2 milliGRAM(s) IV Push every 4 hours PRN Severe Pain (7 - 10)  ondansetron Injectable 4 milliGRAM(s) IV Push every 6 hours PRN Nausea and/or Vomiting  PHENobarbital Injectable 130 milliGRAM(s) IV Push every 15 minutes PRN Agitation      Labs:                         11.3   x     )-----------( 130      ( 04 Dec 2018 06:12 )             32.9     12-04    x   |  x   |  23  ----------------------------<  195<H>  x    |  24  |  0.96    Ca    7.5<L>      04 Dec 2018 06:12  Phos  2.9     12-03  Mg     2.2     12-03    TPro  x   /  Alb  2.3<L>  /  TBili  x   /  DBili  x   /  AST  76<H>  /  ALT  54  /  AlkPhos  x   12-04                        Radiology:     Bedside ultrasound:     CENTRAL LINE: N/Y          DATE INSERTED:              REMOVE: Y/N  PEOPLES: N/Y                        DATE INSERTED:              REMOVE: Y/N  A-LINE: N/Y                       DATE INSERTED:              REMOVE: Y/N    GLOBAL ISSUE/BEST PRACTICE:  Analgesia:  Sedation:  CAM-ICU:   HOB elevation:     Stress ulcer prophylaxis:  VTE prophylaxis:  Glycemic control:    Nutrition:     CODE STATUS: Brief interval history: 62yo male presenting to the Emergency Department with new onset mid epigastric pain 10/10 that began day prior to admission. He has similar pain in May 2018 and had been diagnosed with pancreatitis. CT scan and US abdomen were performed with evidence of pancreatitis again this admit. He admits to drinking heavily over the past month. He drinks Vodka in excess for past 2 years after the loss of his son.  Pt was admitted to ICU for aggressive resuscitation and EtOH withdrawal.     24 hour events: No acute events overnight with BiPAP while sleeping. Pt required two more doses of Phenobarbital for agitation overnight.    Review of Systems:  Constitutional: No fever, chills, fatigue  Neuro: No headache, numbness, weakness  Resp: No cough, wheezing, shortness of breath  CVS: No chest pain, palpitations, leg swelling  GI: No abdominal pain, nausea, vomiting, diarrhea   : No dysuria, frequency, incontinence  Skin: No itching, burning, rashes, or lesions   Msk: No joint pain or swelling  Psych: No depression, anxiety, mood swings    ICU Vital Signs Last 24 Hrs  T(C): 36.6 (04 Dec 2018 00:06), Max: 37.1 (03 Dec 2018 08:01)  T(F): 97.8 (04 Dec 2018 00:06), Max: 98.7 (03 Dec 2018 08:01)  HR: 83 (04 Dec 2018 06:00) (79 - 117)  BP: 115/63 (04 Dec 2018 05:00) (92/55 - 150/70)  BP(mean): 83 (04 Dec 2018 05:00) (64 - 100)  RR: 29 (04 Dec 2018 05:00) (12 - 59)  SpO2: 96% (04 Dec 2018 06:00) (87% - 96%)    POCT Blood Glucose.: 210 mg/dL (04 Dec 2018 06:38)      I&O's Summary    03 Dec 2018 07:01  -  04 Dec 2018 07:00  --------------------------------------------------------  IN: 2506 mL / OUT: 2670 mL / NET: -164 mL      Physical Exam:   Gen:  Neuro:  HEENT:  Resp:  CVS:  Abd:  Ext:  Skin:    MEDICATIONS  (STANDING):  aspirin  chewable 81 milliGRAM(s) Oral daily  atorvastatin 20 milliGRAM(s) Oral at bedtime  dexmedetomidine Infusion 0.2 MICROgram(s)/kG/Hr (4.99 mL/Hr) IV Continuous <Continuous>  enoxaparin Injectable 40 milliGRAM(s) SubCutaneous daily  folic acid Injectable 1 milliGRAM(s) IV Push daily  gemfibrozil 600 milliGRAM(s) Oral two times a day  influenza   Vaccine 0.5 milliLiter(s) IntraMuscular once  multivitamin 1 Tablet(s) Oral daily  thiamine Injectable 100 milliGRAM(s) IV Push daily    MEDICATIONS  (PRN):  HYDROmorphone  Injectable 1 milliGRAM(s) IV Push every 4 hours PRN Moderate Pain (4 - 6)  HYDROmorphone  Injectable 2 milliGRAM(s) IV Push every 4 hours PRN Severe Pain (7 - 10)  ondansetron Injectable 4 milliGRAM(s) IV Push every 6 hours PRN Nausea and/or Vomiting  PHENobarbital Injectable 130 milliGRAM(s) IV Push every 15 minutes PRN Agitation      Labs:                         11.3   x     )-----------( 130      ( 04 Dec 2018 06:12 )             32.9     12-04    x   |  x   |  23  ----------------------------<  195<H>  x    |  24  |  0.96    Ca    7.5<L>      04 Dec 2018 06:12  Phos  2.9     12-03  Mg     2.2     12-03    TPro  x   /  Alb  2.3<L>  /  TBili  x   /  DBili  x   /  AST  76<H>  /  ALT  54  /  AlkPhos  x   12-04      Radiology:   EXAM:  US GALLBLADDER                            PROCEDURE DATE:  12/01/2018          INTERPRETATION:  History: Abdominal pain, pancreatitis    Gallbladder ultrasound.    There is at least one gallstone. Gallbladder sludge noted. No wall   thickening or pericholecystic fluid. Borderline common duct dilatation of   0.6 cm. Correlate with MRCP. Incidental fatty liver.    Impression: Cholelithiasis and gallbladder sludge. Borderline common duct   dilatation. Correlate with MRCP.                MARA VELA M.D., ATTENDING RADIOLOGIST  This document has been electronically signed. Dec  1 2018 10:45AM    EXAM:  CT ABDOMEN AND PELVIS OC IC                            *** ADDENDUM 12/01/2018  ***    A tiny gallstone is noted.      *** END OF ADDENDUM 12/01/2018  ***      PROCEDURE DATE:  12/01/2018          INTERPRETATION:  History: Abdominal pain history of pancreatitis.    CT abdomen pelvis oral and IV contrast.  95 cc Omnipaque 350 injected intravenously.  Bibasilar dependent atelectasis.  No calcified gallstones or biliary dilatation. Diffuse fatty liver.   Spleen unremarkable.  The tail of pancreas is markedly edematous. There is severe surrounding   inflammatory changes with peripancreatic fluid, stranding and phlegmonous   change. Findings consistent with severe acute pancreatitis. There is no   discrete organized collection to suggest abscess or pseudocyst.   Hypoenhancement in the pancreatic tail may reflect pancreatic edema.   Early necrosis not excluded. No pancreatic ductal dilatation.  Right renal cysts.  Nonaneurysmal abdominal aorta.  No suspicious adenopathy.  No obstructed or inflamed bowel.  Prostate bladder not remarkable.  No acute skeletal abnormality    Impression: Severe acute phlegmonous pancreatitis. Early pancreatic   necrosis not excluded. Close follow-up recommended.      ***Please see the addendum at the top of this report. It may contain   additional important information or changes.****          MARA VELA M.D., ATTENDING RADIOLOGIST  This document has been electronically signed. Dec  1 2018  9:19AM  Addend:  MARA VELA M.D., ATTENDING RADIOLOGIST  This addendum was electronically signed on: Dec  1 2018 10:47AM.    Bedside ultrasound:     CENTRAL LINE: N  PEOPLES: Y                        DATE INSERTED: 12/1/18             REMOVE: N  A-LINE: N    GLOBAL ISSUE/BEST PRACTICE:  Analgesia: Dilaudid PRN  Sedation: N  CAM-ICU:   HOB elevation: N    Stress ulcer prophylaxis: N  VTE prophylaxis: SQ Enoxaparin  Glycemic control: Insulin coverage    Nutrition: Clears when off BiPAP and mentating well.    CODE STATUS: Brief interval history: 62yo male presenting to the Emergency Department with new onset mid epigastric pain 10/10 that began day prior to admission. He has similar pain in May 2018 and had been diagnosed with pancreatitis. CT scan and US abdomen were performed with evidence of pancreatitis again this admit. He admits to drinking heavily over the past month. He drinks Vodka in excess for past 2 years after the loss of his son.  Pt was admitted to ICU for aggressive resuscitation and EtOH withdrawal.     24 hour events: No acute events overnight with BiPAP while sleeping. Pt required two more doses of Phenobarbital for agitation overnight. Pt found to be wheezing this morning and given Duoneb and Dexamethasone with improvement of wheezing and Pt's shortness of breath.    Review of Systems:  Constitutional: No fever, chills, fatigue  Neuro: No headache, numbness, weakness  Resp: + shortness of breath, wheezing. No cough  CVS: No chest pain, palpitations, leg swelling  GI: No abdominal pain, nausea, vomiting, diarrhea   : No dysuria, frequency, incontinence  Skin: No itching, burning, rashes, or lesions   Msk: No joint pain or swelling  Psych: No depression, anxiety, mood swings    ICU Vital Signs Last 24 Hrs  T(C): 36.6 (04 Dec 2018 00:06), Max: 37.1 (03 Dec 2018 08:01)  T(F): 97.8 (04 Dec 2018 00:06), Max: 98.7 (03 Dec 2018 08:01)  HR: 83 (04 Dec 2018 06:00) (79 - 117)  BP: 115/63 (04 Dec 2018 05:00) (92/55 - 150/70)  BP(mean): 83 (04 Dec 2018 05:00) (64 - 100)  RR: 29 (04 Dec 2018 05:00) (12 - 59)  SpO2: 96% (04 Dec 2018 06:00) (87% - 96%)    POCT Blood Glucose.: 210 mg/dL (04 Dec 2018 06:38)      I&O's Summary    03 Dec 2018 07:01  -  04 Dec 2018 07:00  --------------------------------------------------------  IN: 2506 mL / OUT: 2670 mL / NET: -164 mL      Physical Exam:   Gen: Pt A&O x2 and agitated. Pt appears stated age with increased work of breathing.   Neuro: GCS 14 (4/4/6). CN II-XII grossly intact. Moves all extremities spontaneously. Strength 5/5 in all extremities.   HEENT: Head: normocephalic atraumatic. Eyes: PERRL 3mm and reactive. Mouth/Throat: Dry mucous membranes with no erythema or hives noted.  Resp: + decreased breath sounds in all fields L > R with wheezing throughout. No crackles.  CVS: +S1/S2, no murmurs, rubs, or gallops  Abd: Distended, non-tender  Ext: 2+ pulses in all extremities, neurovascularly intact  Skin: No lesions, ecchymoses, rashes    MEDICATIONS  (STANDING):  aspirin  chewable 81 milliGRAM(s) Oral daily  atorvastatin 20 milliGRAM(s) Oral at bedtime  dexmedetomidine Infusion 0.2 MICROgram(s)/kG/Hr (4.99 mL/Hr) IV Continuous <Continuous>  enoxaparin Injectable 40 milliGRAM(s) SubCutaneous daily  folic acid Injectable 1 milliGRAM(s) IV Push daily  gemfibrozil 600 milliGRAM(s) Oral two times a day  influenza   Vaccine 0.5 milliLiter(s) IntraMuscular once  multivitamin 1 Tablet(s) Oral daily  thiamine Injectable 100 milliGRAM(s) IV Push daily    MEDICATIONS  (PRN):  HYDROmorphone  Injectable 1 milliGRAM(s) IV Push every 4 hours PRN Moderate Pain (4 - 6)  HYDROmorphone  Injectable 2 milliGRAM(s) IV Push every 4 hours PRN Severe Pain (7 - 10)  ondansetron Injectable 4 milliGRAM(s) IV Push every 6 hours PRN Nausea and/or Vomiting  PHENobarbital Injectable 130 milliGRAM(s) IV Push every 15 minutes PRN Agitation      Labs:                         11.3   x     )-----------( 130      ( 04 Dec 2018 06:12 )             32.9     12-04    x   |  x   |  23  ----------------------------<  195<H>  x    |  24  |  0.96    Ca    7.5<L>      04 Dec 2018 06:12  Phos  2.9     12-03  Mg     2.2     12-03    TPro  x   /  Alb  2.3<L>  /  TBili  x   /  DBili  x   /  AST  76<H>  /  ALT  54  /  AlkPhos  x   12-04      Radiology:   EXAM:  US GALLBLADDER                            PROCEDURE DATE:  12/01/2018          INTERPRETATION:  History: Abdominal pain, pancreatitis    Gallbladder ultrasound.    There is at least one gallstone. Gallbladder sludge noted. No wall   thickening or pericholecystic fluid. Borderline common duct dilatation of   0.6 cm. Correlate with MRCP. Incidental fatty liver.    Impression: Cholelithiasis and gallbladder sludge. Borderline common duct   dilatation. Correlate with MRCP.                MARA VELA M.D., ATTENDING RADIOLOGIST  This document has been electronically signed. Dec  1 2018 10:45AM    EXAM:  CT ABDOMEN AND PELVIS OC IC                            *** ADDENDUM 12/01/2018  ***    A tiny gallstone is noted.      *** END OF ADDENDUM 12/01/2018  ***      PROCEDURE DATE:  12/01/2018          INTERPRETATION:  History: Abdominal pain history of pancreatitis.    CT abdomen pelvis oral and IV contrast.  95 cc Omnipaque 350 injected intravenously.  Bibasilar dependent atelectasis.  No calcified gallstones or biliary dilatation. Diffuse fatty liver.   Spleen unremarkable.  The tail of pancreas is markedly edematous. There is severe surrounding   inflammatory changes with peripancreatic fluid, stranding and phlegmonous   change. Findings consistent with severe acute pancreatitis. There is no   discrete organized collection to suggest abscess or pseudocyst.   Hypoenhancement in the pancreatic tail may reflect pancreatic edema.   Early necrosis not excluded. No pancreatic ductal dilatation.  Right renal cysts.  Nonaneurysmal abdominal aorta.  No suspicious adenopathy.  No obstructed or inflamed bowel.  Prostate bladder not remarkable.  No acute skeletal abnormality    Impression: Severe acute phlegmonous pancreatitis. Early pancreatic   necrosis not excluded. Close follow-up recommended.      ***Please see the addendum at the top of this report. It may contain   additional important information or changes.****          MARA VELA M.D., ATTENDING RADIOLOGIST  This document has been electronically signed. Dec  1 2018  9:19AM  Addend:  MARA VELA M.D., ATTENDING RADIOLOGIST  This addendum was electronically signed on: Dec  1 2018 10:47AM.    Bedside ultrasound: a-lines throughout.     CENTRAL LINE: N  PEOPLES: Y                        DATE INSERTED: 12/1/18             REMOVE: N  A-LINE: N    GLOBAL ISSUE/BEST PRACTICE:  Analgesia: Dilaudid PRN  Sedation: N  CAM-ICU:   HOB elevation: N    Stress ulcer prophylaxis: N  VTE prophylaxis: SQ Enoxaparin  Glycemic control: Insulin coverage    Nutrition: Clears when off BiPAP and mentating well.    CODE STATUS: FULL Brief interval history: 62yo male presenting to the Emergency Department with new onset mid epigastric pain 10/10 that began day prior to admission. He has similar pain in May 2018 and had been diagnosed with pancreatitis. CT scan and US abdomen were performed with evidence of pancreatitis again this admit. He admits to drinking heavily over the past month. He drinks Vodka in excess for past 2 years after the loss of his son.  Pt was admitted to ICU for aggressive resuscitation and EtOH withdrawal.     24 hour events: No acute events overnight with BiPAP while sleeping. Pt required two more doses of Phenobarbital for agitation overnight. Pt found to be wheezing this morning and given Duoneb and Dexamethasone with improvement of wheezing and Pt's shortness of breath.    Review of Systems:  Constitutional: No fever, chills, fatigue  Neuro: No headache, numbness, weakness  Resp: + shortness of breath, wheezing. No cough  CVS: No chest pain, palpitations, leg swelling  GI: No abdominal pain, nausea, vomiting, diarrhea   : No dysuria, frequency, incontinence  Skin: No itching, burning, rashes, or lesions   Msk: No joint pain or swelling  Psych: No depression, anxiety, mood swings    ICU Vital Signs Last 24 Hrs  T(C): 36.4 (04 Dec 2018 12:00), Max: 37.1 (04 Dec 2018 08:00)  T(F): 97.5 (04 Dec 2018 12:00), Max: 98.7 (04 Dec 2018 08:00)  HR: 92 (04 Dec 2018 10:30) (79 - 98)  BP: 111/59 (04 Dec 2018 09:00) (92/55 - 122/64)  BP(mean): 79 (04 Dec 2018 09:00) (64 - 87)  RR: 34 (04 Dec 2018 09:00) (12 - 59)  SpO2: 91% (04 Dec 2018 10:30) (90% - 96%)    POCT Blood Glucose.: 210 mg/dL (04 Dec 2018 06:38)      I&O's Summary    03 Dec 2018 07:01  -  04 Dec 2018 07:00  --------------------------------------------------------  IN: 2546 mL / OUT: 2730 mL / NET: -184 mL    04 Dec 2018 07:01  -  04 Dec 2018 12:25  --------------------------------------------------------  IN: 40 mL / OUT: 115 mL / NET: -75 mL        Physical Exam:   Gen: Pt A&O x2 and agitated. Pt appears stated age with increased work of breathing.   Neuro: GCS 14 (4/4/6). CN II-XII grossly intact. Moves all extremities spontaneously. Strength 5/5 in all extremities.   HEENT: Head: normocephalic atraumatic. Eyes: PERRL 3mm and reactive. Mouth/Throat: Dry mucous membranes with no erythema or hives noted.  Resp: + decreased breath sounds in all fields L > R with wheezing throughout. No crackles.  CVS: +S1/S2, no murmurs, rubs, or gallops  Abd: Distended, non-tender  Ext: 2+ pulses in all extremities, neurovascularly intact  Skin: No lesions, ecchymoses, rashes    MEDICATIONS  (STANDING):  aspirin  chewable 81 milliGRAM(s) Oral daily  atorvastatin 20 milliGRAM(s) Oral at bedtime  dexmedetomidine Infusion 0.2 MICROgram(s)/kG/Hr (4.99 mL/Hr) IV Continuous <Continuous>  enoxaparin Injectable 40 milliGRAM(s) SubCutaneous daily  escitalopram 20 milliGRAM(s) Oral daily  folic acid Injectable 1 milliGRAM(s) IV Push daily  gemfibrozil 600 milliGRAM(s) Oral two times a day  influenza   Vaccine 0.5 milliLiter(s) IntraMuscular once  multivitamin 1 Tablet(s) Oral daily  QUEtiapine 50 milliGRAM(s) Oral at bedtime  thiamine Injectable 100 milliGRAM(s) IV Push daily    MEDICATIONS  (PRN):  HYDROmorphone  Injectable 1 milliGRAM(s) IV Push every 4 hours PRN Moderate Pain (4 - 6)  HYDROmorphone  Injectable 2 milliGRAM(s) IV Push every 4 hours PRN Severe Pain (7 - 10)  ondansetron Injectable 4 milliGRAM(s) IV Push every 6 hours PRN Nausea and/or Vomiting  PHENobarbital Injectable 130 milliGRAM(s) IV Push every 15 minutes PRN Agitation        Labs:                         11.3   9.24  )-----------( 130      ( 04 Dec 2018 06:12 )             32.9     12-04    135  |  102  |  23  ----------------------------<  195<H>  3.8   |  24  |  0.96    Ca    7.5<L>      04 Dec 2018 06:12  Phos  2.3     12-04  Mg     1.9     12-04    TPro  6.4  /  Alb  2.3<L>  /  TBili  0.6  /  DBili  x   /  AST  76<H>  /  ALT  54  /  AlkPhos  197<H>  12-04        Radiology:  EXAM:  US GALLBLADDER                            PROCEDURE DATE:  12/01/2018          INTERPRETATION:  History: Abdominal pain, pancreatitis    Gallbladder ultrasound.    There is at least one gallstone. Gallbladder sludge noted. No wall   thickening or pericholecystic fluid. Borderline common duct dilatation of   0.6 cm. Correlate with MRCP. Incidental fatty liver.    Impression: Cholelithiasis and gallbladder sludge. Borderline common duct   dilatation. Correlate with MRCP.                MARA VELA M.D., ATTENDING RADIOLOGIST  This document has been electronically signed. Dec  1 2018 10:45AM    EXAM:  CT ABDOMEN AND PELVIS OC IC                            *** ADDENDUM 12/01/2018  ***    A tiny gallstone is noted.      *** END OF ADDENDUM 12/01/2018  ***      PROCEDURE DATE:  12/01/2018          INTERPRETATION:  History: Abdominal pain history of pancreatitis.    CT abdomen pelvis oral and IV contrast.  95 cc Omnipaque 350 injected intravenously.  Bibasilar dependent atelectasis.  No calcified gallstones or biliary dilatation. Diffuse fatty liver.   Spleen unremarkable.  The tail of pancreas is markedly edematous. There is severe surrounding   inflammatory changes with peripancreatic fluid, stranding and phlegmonous   change. Findings consistent with severe acute pancreatitis. There is no   discrete organized collection to suggest abscess or pseudocyst.   Hypoenhancement in the pancreatic tail may reflect pancreatic edema.   Early necrosis not excluded. No pancreatic ductal dilatation.  Right renal cysts.  Nonaneurysmal abdominal aorta.  No suspicious adenopathy.  No obstructed or inflamed bowel.  Prostate bladder not remarkable.  No acute skeletal abnormality    Impression: Severe acute phlegmonous pancreatitis. Early pancreatic   necrosis not excluded. Close follow-up recommended.      ***Please see the addendum at the top of this report. It may contain   additional important information or changes.****          MARA VELA M.D., ATTENDING RADIOLOGIST  This document has been electronically signed. Dec  1 2018  9:19AM  Addend:  MARA VELA M.D., ATTENDING RADIOLOGIST  This addendum was electronically signed on: Dec  1 2018 10:47AM.    Bedside ultrasound: a-lines throughout.     CENTRAL LINE: N  PEOPLES: Y                        DATE INSERTED: 12/1/18             REMOVE: N  A-LINE: N    GLOBAL ISSUE/BEST PRACTICE:  Analgesia: Dilaudid PRN  Sedation: N  CAM-ICU:   HOB elevation: N    Stress ulcer prophylaxis: N  VTE prophylaxis: SQ Enoxaparin  Glycemic control: Insulin coverage    Nutrition: DM low fat diet when off BiPAP and mentating well.    CODE STATUS: FULL

## 2018-12-04 NOTE — PROGRESS NOTE ADULT - PROBLEM SELECTOR PLAN 1
recurrent, 2/2 etoh use and hypertriglyceridemia   labs improved  diet advanced, monitor tolerance  surgical eval  trend labs  pain control  monitor abd exam/stool output  etoh abstinence  continue icu monitoring  will need eventual mrcp when medically optimized

## 2018-12-05 ENCOUNTER — TRANSCRIPTION ENCOUNTER (OUTPATIENT)
Age: 63
End: 2018-12-05

## 2018-12-05 DIAGNOSIS — K85.90 ACUTE PANCREATITIS WITHOUT NECROSIS OR INFECTION, UNSPECIFIED: ICD-10-CM

## 2018-12-05 LAB
ALBUMIN SERPL ELPH-MCNC: 2.1 G/DL — LOW (ref 3.3–5)
ALP SERPL-CCNC: 191 U/L — HIGH (ref 40–120)
ALT FLD-CCNC: 52 U/L — SIGNIFICANT CHANGE UP (ref 12–78)
ANION GAP SERPL CALC-SCNC: 9 MMOL/L — SIGNIFICANT CHANGE UP (ref 5–17)
AST SERPL-CCNC: 63 U/L — HIGH (ref 15–37)
BILIRUB SERPL-MCNC: 0.4 MG/DL — SIGNIFICANT CHANGE UP (ref 0.2–1.2)
BUN SERPL-MCNC: 21 MG/DL — SIGNIFICANT CHANGE UP (ref 7–23)
CALCIUM SERPL-MCNC: 8.2 MG/DL — LOW (ref 8.5–10.1)
CHLORIDE SERPL-SCNC: 104 MMOL/L — SIGNIFICANT CHANGE UP (ref 96–108)
CO2 SERPL-SCNC: 26 MMOL/L — SIGNIFICANT CHANGE UP (ref 22–31)
CREAT SERPL-MCNC: 0.71 MG/DL — SIGNIFICANT CHANGE UP (ref 0.5–1.3)
GLUCOSE SERPL-MCNC: 175 MG/DL — HIGH (ref 70–99)
HCT VFR BLD CALC: 31.3 % — LOW (ref 39–50)
HGB BLD-MCNC: 10.6 G/DL — LOW (ref 13–17)
MAGNESIUM SERPL-MCNC: 2.2 MG/DL — SIGNIFICANT CHANGE UP (ref 1.6–2.6)
MCHC RBC-ENTMCNC: 33.4 PG — SIGNIFICANT CHANGE UP (ref 27–34)
MCHC RBC-ENTMCNC: 33.9 GM/DL — SIGNIFICANT CHANGE UP (ref 32–36)
MCV RBC AUTO: 98.7 FL — SIGNIFICANT CHANGE UP (ref 80–100)
NRBC # BLD: 0 /100 WBCS — SIGNIFICANT CHANGE UP (ref 0–0)
PHOSPHATE SERPL-MCNC: 2.6 MG/DL — SIGNIFICANT CHANGE UP (ref 2.5–4.5)
PLATELET # BLD AUTO: 136 K/UL — LOW (ref 150–400)
POTASSIUM SERPL-MCNC: 4 MMOL/L — SIGNIFICANT CHANGE UP (ref 3.5–5.3)
POTASSIUM SERPL-SCNC: 4 MMOL/L — SIGNIFICANT CHANGE UP (ref 3.5–5.3)
PROT SERPL-MCNC: 6.6 G/DL — SIGNIFICANT CHANGE UP (ref 6–8.3)
RBC # BLD: 3.17 M/UL — LOW (ref 4.2–5.8)
RBC # FLD: 13 % — SIGNIFICANT CHANGE UP (ref 10.3–14.5)
SODIUM SERPL-SCNC: 139 MMOL/L — SIGNIFICANT CHANGE UP (ref 135–145)
WBC # BLD: 8.86 K/UL — SIGNIFICANT CHANGE UP (ref 3.8–10.5)
WBC # FLD AUTO: 8.86 K/UL — SIGNIFICANT CHANGE UP (ref 3.8–10.5)

## 2018-12-05 PROCEDURE — 74183 MRI ABD W/O CNTR FLWD CNTR: CPT | Mod: 26

## 2018-12-05 PROCEDURE — 99233 SBSQ HOSP IP/OBS HIGH 50: CPT | Mod: GC

## 2018-12-05 PROCEDURE — 99232 SBSQ HOSP IP/OBS MODERATE 35: CPT

## 2018-12-05 PROCEDURE — 99233 SBSQ HOSP IP/OBS HIGH 50: CPT

## 2018-12-05 RX ORDER — DEXTROSE 50 % IN WATER 50 %
15 SYRINGE (ML) INTRAVENOUS ONCE
Qty: 0 | Refills: 0 | Status: DISCONTINUED | OUTPATIENT
Start: 2018-12-05 | End: 2018-12-06

## 2018-12-05 RX ORDER — QUETIAPINE FUMARATE 200 MG/1
50 TABLET, FILM COATED ORAL AT BEDTIME
Qty: 0 | Refills: 0 | Status: DISCONTINUED | OUTPATIENT
Start: 2018-12-05 | End: 2018-12-06

## 2018-12-05 RX ORDER — HYDROMORPHONE HYDROCHLORIDE 2 MG/ML
1 INJECTION INTRAMUSCULAR; INTRAVENOUS; SUBCUTANEOUS EVERY 4 HOURS
Qty: 0 | Refills: 0 | Status: DISCONTINUED | OUTPATIENT
Start: 2018-12-05 | End: 2018-12-06

## 2018-12-05 RX ORDER — INSULIN LISPRO 100/ML
VIAL (ML) SUBCUTANEOUS
Qty: 0 | Refills: 0 | Status: DISCONTINUED | OUTPATIENT
Start: 2018-12-05 | End: 2018-12-06

## 2018-12-05 RX ORDER — ACETAMINOPHEN 500 MG
650 TABLET ORAL EVERY 6 HOURS
Qty: 0 | Refills: 0 | Status: DISCONTINUED | OUTPATIENT
Start: 2018-12-05 | End: 2018-12-06

## 2018-12-05 RX ORDER — LEVALBUTEROL 1.25 MG/.5ML
0.63 SOLUTION, CONCENTRATE RESPIRATORY (INHALATION) ONCE
Qty: 0 | Refills: 0 | Status: COMPLETED | OUTPATIENT
Start: 2018-12-05 | End: 2018-12-05

## 2018-12-05 RX ORDER — DEXTROSE 50 % IN WATER 50 %
12.5 SYRINGE (ML) INTRAVENOUS ONCE
Qty: 0 | Refills: 0 | Status: DISCONTINUED | OUTPATIENT
Start: 2018-12-05 | End: 2018-12-06

## 2018-12-05 RX ORDER — HYDROMORPHONE HYDROCHLORIDE 2 MG/ML
0.5 INJECTION INTRAMUSCULAR; INTRAVENOUS; SUBCUTANEOUS EVERY 4 HOURS
Qty: 0 | Refills: 0 | Status: DISCONTINUED | OUTPATIENT
Start: 2018-12-05 | End: 2018-12-06

## 2018-12-05 RX ORDER — SODIUM CHLORIDE 9 MG/ML
1000 INJECTION, SOLUTION INTRAVENOUS
Qty: 0 | Refills: 0 | Status: DISCONTINUED | OUTPATIENT
Start: 2018-12-05 | End: 2018-12-06

## 2018-12-05 RX ORDER — PHENOBARBITAL 60 MG
64.8 TABLET ORAL EVERY 12 HOURS
Qty: 0 | Refills: 0 | Status: DISCONTINUED | OUTPATIENT
Start: 2018-12-05 | End: 2018-12-06

## 2018-12-05 RX ORDER — DEXTROSE 50 % IN WATER 50 %
25 SYRINGE (ML) INTRAVENOUS ONCE
Qty: 0 | Refills: 0 | Status: DISCONTINUED | OUTPATIENT
Start: 2018-12-05 | End: 2018-12-06

## 2018-12-05 RX ORDER — GLUCAGON INJECTION, SOLUTION 0.5 MG/.1ML
1 INJECTION, SOLUTION SUBCUTANEOUS ONCE
Qty: 0 | Refills: 0 | Status: DISCONTINUED | OUTPATIENT
Start: 2018-12-05 | End: 2018-12-06

## 2018-12-05 RX ORDER — INSULIN LISPRO 100/ML
VIAL (ML) SUBCUTANEOUS AT BEDTIME
Qty: 0 | Refills: 0 | Status: DISCONTINUED | OUTPATIENT
Start: 2018-12-05 | End: 2018-12-06

## 2018-12-05 RX ADMIN — HYDROMORPHONE HYDROCHLORIDE 1 MILLIGRAM(S): 2 INJECTION INTRAMUSCULAR; INTRAVENOUS; SUBCUTANEOUS at 16:21

## 2018-12-05 RX ADMIN — Medication 650 MILLIGRAM(S): at 12:48

## 2018-12-05 RX ADMIN — LEVALBUTEROL 0.63 MILLIGRAM(S): 1.25 SOLUTION, CONCENTRATE RESPIRATORY (INHALATION) at 13:13

## 2018-12-05 RX ADMIN — Medication 1 MILLIGRAM(S): at 12:48

## 2018-12-05 RX ADMIN — DEXMEDETOMIDINE HYDROCHLORIDE IN 0.9% SODIUM CHLORIDE 4.99 MICROGRAM(S)/KG/HR: 4 INJECTION INTRAVENOUS at 03:35

## 2018-12-05 RX ADMIN — Medication 64.8 MILLIGRAM(S): at 18:05

## 2018-12-05 RX ADMIN — Medication 600 MILLIGRAM(S): at 18:05

## 2018-12-05 RX ADMIN — QUETIAPINE FUMARATE 50 MILLIGRAM(S): 200 TABLET, FILM COATED ORAL at 21:59

## 2018-12-05 RX ADMIN — HYDROMORPHONE HYDROCHLORIDE 0.5 MILLIGRAM(S): 2 INJECTION INTRAMUSCULAR; INTRAVENOUS; SUBCUTANEOUS at 18:03

## 2018-12-05 RX ADMIN — Medication 650 MILLIGRAM(S): at 20:33

## 2018-12-05 RX ADMIN — Medication 1: at 18:06

## 2018-12-05 RX ADMIN — HYDROMORPHONE HYDROCHLORIDE 0.5 MILLIGRAM(S): 2 INJECTION INTRAMUSCULAR; INTRAVENOUS; SUBCUTANEOUS at 18:20

## 2018-12-05 RX ADMIN — Medication 81 MILLIGRAM(S): at 12:48

## 2018-12-05 RX ADMIN — HYDROMORPHONE HYDROCHLORIDE 1 MILLIGRAM(S): 2 INJECTION INTRAMUSCULAR; INTRAVENOUS; SUBCUTANEOUS at 20:12

## 2018-12-05 RX ADMIN — Medication 100 MILLIGRAM(S): at 12:49

## 2018-12-05 RX ADMIN — ATORVASTATIN CALCIUM 20 MILLIGRAM(S): 80 TABLET, FILM COATED ORAL at 21:59

## 2018-12-05 RX ADMIN — HYDROMORPHONE HYDROCHLORIDE 1 MILLIGRAM(S): 2 INJECTION INTRAMUSCULAR; INTRAVENOUS; SUBCUTANEOUS at 16:35

## 2018-12-05 RX ADMIN — Medication 650 MILLIGRAM(S): at 13:10

## 2018-12-05 RX ADMIN — Medication 1 TABLET(S): at 12:48

## 2018-12-05 RX ADMIN — ENOXAPARIN SODIUM 40 MILLIGRAM(S): 100 INJECTION SUBCUTANEOUS at 12:48

## 2018-12-05 RX ADMIN — ESCITALOPRAM OXALATE 20 MILLIGRAM(S): 10 TABLET, FILM COATED ORAL at 12:48

## 2018-12-05 RX ADMIN — Medication 650 MILLIGRAM(S): at 21:33

## 2018-12-05 RX ADMIN — DEXMEDETOMIDINE HYDROCHLORIDE IN 0.9% SODIUM CHLORIDE 4.99 MICROGRAM(S)/KG/HR: 4 INJECTION INTRAVENOUS at 00:00

## 2018-12-05 RX ADMIN — Medication 600 MILLIGRAM(S): at 06:20

## 2018-12-05 RX ADMIN — Medication 65 MILLIGRAM(S): at 06:21

## 2018-12-05 RX ADMIN — HYDROMORPHONE HYDROCHLORIDE 1 MILLIGRAM(S): 2 INJECTION INTRAMUSCULAR; INTRAVENOUS; SUBCUTANEOUS at 20:42

## 2018-12-05 NOTE — PROGRESS NOTE ADULT - PROBLEM SELECTOR PLAN 1
recurrent, 2/2 etoh use and hypertriglyceridemia   labs improved, abd pain improved  diet advanced, monitor tolerance  surgical eval  trend labs  pain control  monitor abd exam/stool output  etoh abstinence  continue icu monitoring  will need eventual mrcp when medically optimized

## 2018-12-05 NOTE — PROGRESS NOTE ADULT - ATTENDING COMMENTS
None 63M PMH HTN, HLD, depression, obesity, diet-controlled DM (A1C 7.0), ROSS (on CPAP), ETOH abuse, BIBEMS from home complaining of gradual onset of upper abdominal pain, radiating to the back with nausea and vomiting, admitted with acute alcoholic pancreatitis, acute alcoholic hepatitis, hypertriglyceridemia, SIRS secondary to acute pancreatitis. Course complicated by prerenal HERIBERTO, ileus, acute hypoxic respiratory failure due to pulmonary edema + abdominal distention, and alcohol withdrawal.

## 2018-12-05 NOTE — PROGRESS NOTE ADULT - ASSESSMENT
63M with PMHx as above, admitted with acute alcoholic pancreatitis, acute alcoholic hepatitis, hypertriglyceridemia, bandemia. Course complicated by prerenal HERIBERTO, ileus, acute hypoxic respiratory failure due to pulmonary edema + abdominal distention, and alcohol withdrawal.    -Continue phenobarb. Holding benzo's  -Continue to hold narcotics for now  -Continue precedex gtt for lght sedation  -[facility] protocol  -MVI/Thiamine/FA  -Monitor HD's. Continue asa/statin  -Continue bipap. Wean FiO2 as tolerated. Trial NC in am  -Continue aggressive diuresis with lasix. Goal net negative 1 liter  -ADAT. PPI  -MRCP findings discussed with GI/Surgery earlier in day. No abx at this time given no fevers or signs of sepsis. Repeat CT abdomen with contrast in 1 week  -Trend LFT's. Now off insulin gtt for hypertriglyceridemia.   -Monitor UOP/Lytes. Continue aggressive diuresis  -Replete hypophosphatemia and hypomagnesemia  -Monitor off abx.   -DVT ppx  -Supportive care

## 2018-12-05 NOTE — DISCHARGE NOTE ADULT - HOSPITAL COURSE
63M PMH HTN, HLD, depression, obesity, diet-controlled DM (A1C 7.0), ROSS (on CPAP), ETOH abuse, BIBEMS from home complaining of gradual onset of upper abdominal pain, radiating to the back with nausea and vomiting, admitted with acute alcoholic pancreatitis, acute alcoholic hepatitis, hypertriglyceridemia, SIRS secondary to acute pancreatitis. Course complicated by prerenal HERIBERTO, ileus, acute hypoxic respiratory failure due to pulmonary edema + abdominal distention, and alcohol withdrawal. Patient was managed in ICU. Was started on CIWA protocol, Precedex, PRN Phenobarb. Also found to have elevate triglycerides. Gemfibrozil was added. Patient was seen by detox and GI. Will likely need MRCP once stable. FROM ADMISSION H+P:   HPI:  Pt is a 62 y/o M with PMHx of alcohol induced Pancreatitis, AODM (diet-controlled), Depression, HLD, HTN, BIBEMS from home complaining of gradual onset of upper abdominal pain, radiating to the back with nausea and vomiting. Pain started last night, rated 10/10. Pt states he took Advil and peptobismol to no relief. Of note pt states he had a similar episode in May when he was diagnosed with pancreatitis. Pt states he had a sonogram one month ago that did show gall stones. 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 Pt admits abd pain radiating to back, N/V, and blood streaked stool with BRBPR. Denies CP, SOB, fever, chills, urinary symptoms.     In the ED: VS T 98.3 HR 98 /70 RR18 98%RA. Labs significant for WBC 12.69, Na 130, K 5.6, Cl 94, CO2 19, glu 197, Ca 7.5, Alb 3.2, , , , lipase 12,651, amylase 358, lactate 2.3. CXR WNL. US GB showing Cholelithiasis and gallbladder sludge. Borderline common duct dilatation. CT Abd/pelvis showing Severe acute phlegmonous pancreatitis. Early pancreatic necrosis not excluded. Pt was given 1x dose zosyn, 3L NS bolus, 2x doses Dilaudid, 1x dose morphine, 2x doses Zofran. UCx sent. Pt evaluated and admitted to ICU. (01 Dec 2018 11:59)      ---  HOSPITAL COURSE:   63M PMH HTN, HLD, depression, obesity, diet-controlled DM (A1C 7.0), ROSS (on CPAP), ETOH abuse, BIBEMS from home complaining of gradual onset of upper abdominal pain, radiating to the back with nausea and vomiting, admitted with acute alcoholic pancreatitis, acute alcoholic hepatitis, hypertriglyceridemia, SIRS secondary to acute pancreatitis. Course complicated by prerenal HERIBERTO, ileus, acute hypoxic respiratory failure due to pulmonary edema + abdominal distention, and alcohol withdrawal. Patient was managed in ICU. Was started on CIWA protocol, Precedex, PRN Phenobarb. Also found to have elevate triglycerides. Gemfibrozil was added. Patient was seen by detox and GI. MRCP showed pancreatic tail necrosis. Pt developed fever/sepsis and worsening symptoms. Lipase was downtrending into 300's but then spiked to 1300 over last 24hrs. Due to worsening symptoms, U/S showed some fluid collections and possible abscess formation. Pt was accepted for transfer to [place] for further management including possible surgical intervention.     ---  CONSULTANTS:   GI ()  Sx (Uriel)  Heme (Luis Miguel)  [facility]    ---  TIME SPENT:  The total amount of time spent reviewing the hospital notes, laboratory values, imaging findings, assessing/counseling the patient, discussing with consultant physicians, social work, nursing staff took 60 minutes    ---  FINAL DISCHARGE DIAGNOSIS LIST:  Please see last daily progress note for final discharge diagnoses    ---  Primary care provider was made aware of plan for discharge:      [ x ] NO     [  ] YES

## 2018-12-05 NOTE — PROGRESS NOTE ADULT - SUBJECTIVE AND OBJECTIVE BOX
Patient is a 63y old  Male who presents with a chief complaint of pancreatitis (05 Dec 2018 10:07)       INTERVAL HPI/OVERNIGHT EVENTS: 63M PMH HTN, HLD, depression, obesity, diet-controlled DM (A1C 7.0), ROSS (on CPAP), ETOH abuse, BIBEMS from home complaining of gradual onset of upper abdominal pain, radiating to the back with nausea and vomiting, admitted with acute alcoholic pancreatitis, acute alcoholic hepatitis, hypertriglyceridemia, SIRS secondary to acute pancreatitis. Course complicated by prerenal HERIBERTO, ileus, acute hypoxic respiratory failure due to pulmonary edema + abdominal distention, and alcohol withdrawal. Patient seen and examined at bedside. Off Precedex, still on 1:1 observation. C/o mild LUQ abdominal discomfort, bloating. Denies chest pain, palpitation, sob  or any other new symptoms, complaints         MEDICATIONS  (STANDING):  aspirin  chewable 81 milliGRAM(s) Oral daily  atorvastatin 20 milliGRAM(s) Oral at bedtime  dexmedetomidine Infusion 0.2 MICROgram(s)/kG/Hr (4.99 mL/Hr) IV Continuous <Continuous>  enoxaparin Injectable 40 milliGRAM(s) SubCutaneous daily  escitalopram 20 milliGRAM(s) Oral daily  folic acid Injectable 1 milliGRAM(s) IV Push daily  gemfibrozil 600 milliGRAM(s) Oral two times a day  influenza   Vaccine 0.5 milliLiter(s) IntraMuscular once  multivitamin 1 Tablet(s) Oral daily  PHENobarbital Injectable 65 milliGRAM(s) IV Push every 8 hours  QUEtiapine 50 milliGRAM(s) Oral at bedtime  thiamine Injectable 100 milliGRAM(s) IV Push daily    MEDICATIONS  (PRN):  ondansetron Injectable 4 milliGRAM(s) IV Push every 6 hours PRN Nausea and/or Vomiting  PHENobarbital Injectable 130 milliGRAM(s) IV Push every 1 hour PRN Agitation/rescue      Allergies    No Known Allergies    Intolerances        REVIEW OF SYSTEMS:  CONSTITUTIONAL: No fever,  or fatigue  EYES: No eye pain, visual disturbances, or discharge  ENMT:  No difficulty hearing, tinnitus, vertigo; No sinus or throat pain  NECK: No pain or stiffness  RESPIRATORY: No cough, wheezing, chills or hemoptysis; No shortness of breath  CARDIOVASCULAR: No chest pain, palpitations, dizziness, or leg swelling  GASTROINTESTINAL: Mild LUQ  abdominal pain. No nausea, vomiting, or hematemesis; No diarrhea or constipation. No melena or hematochezia.  SKIN: No itching, burning, rashes, or lesions   LYMPH NODES: No enlarged glands  ENDOCRINE: No heat or cold intolerance; No hair loss; No polydipsia or polyuria  MUSCULOSKELETAL: No joint pain or swelling; No muscle, back, or extremity pain  HEME/LYMPH: No easy bruising, or bleeding gums  ALLERGY AND IMMUNOLOGIC: No hives or eczema    Vital Signs Last 24 Hrs  T(C): 36.7 (05 Dec 2018 07:20), Max: 36.8 (05 Dec 2018 04:00)  T(F): 98.1 (05 Dec 2018 07:20), Max: 98.2 (05 Dec 2018 04:00)  HR: 95 (05 Dec 2018 10:00) (72 - 95)  BP: 130/70 (05 Dec 2018 10:00) (113/63 - 132/71)  BP(mean): 93 (05 Dec 2018 10:00) (82 - 99)  RR: 25 (05 Dec 2018 10:00) (10 - 29)  SpO2: 95% (05 Dec 2018 10:00) (89% - 96%)    PHYSICAL EXAM:  GENERAL: NAD, Obese  HEAD:  Atraumatic, Normocephalic  EYES: EOMI, PERRLA, conjunctiva and sclera clear  ENMT: No tonsillar erythema, exudates, or enlargement; Moist mucous membranes   NECK: Supple, No JVD, Normal thyroid  NERVOUS SYSTEM:  Alert & Awake; Motor Strength 5/5 B/L upper and lower extremities; Sensations intact   CHEST/LUNG: Clear to auscultation bilaterally; No rales, rhonchi, wheezing, or rubs  HEART: S1S2+, Regular rate and rhythm  ABDOMEN: Soft, non tender, + distention   EXTREMITIES:  2+ Peripheral Pulses, No clubbing, cyanosis, or edema  LYMPH: No lymphadenopathy noted  SKIN: No rashes or lesions    LABS:                        10.6   8.86  )-----------( 136      ( 05 Dec 2018 05:59 )             31.3     05 Dec 2018 05:59    139    |  104    |  21     ----------------------------<  175    4.0     |  26     |  0.71     Ca    8.2        05 Dec 2018 05:59  Phos  2.6       05 Dec 2018 05:59  Mg     2.2       05 Dec 2018 05:59    TPro  6.6    /  Alb  2.1    /  TBili  0.4    /  DBili  x      /  AST  63     /  ALT  52     /  AlkPhos  191    05 Dec 2018 05:59      CAPILLARY BLOOD GLUCOSE      POCT Blood Glucose.: 171 mg/dL (05 Dec 2018 06:43)  POCT Blood Glucose.: 193 mg/dL (04 Dec 2018 23:21)  POCT Blood Glucose.: 210 mg/dL (04 Dec 2018 19:06)    BLOOD CULTURE  12-01 @ 17:01   No growth  --  --    RADIOLOGY & ADDITIONAL TESTS:    Imaging Personally Reviewed:  [ ] YES     Consultant(s) Notes Reviewed:      Care Discussed with Consultants/Other Providers:

## 2018-12-05 NOTE — PROGRESS NOTE ADULT - SUBJECTIVE AND OBJECTIVE BOX
Brief interval history: 62yo male presenting to the Emergency Department with new onset mid epigastric pain 10/10 that began day prior to admission. He has similar pain in May 2018 and had been diagnosed with pancreatitis. CT scan and US abdomen were performed with evidence of pancreatitis again this admit. He admits to drinking heavily over the past month. He drinks Vodka in excess for past 2 years after the loss of his son.  Pt was admitted to ICU for aggressive resuscitation and EtOH withdrawal.    24 hour events: Pt had several episodes of agitation that required PRN Phenobarbital. Pt's withdrawal symptoms progressed and decided to start standing order Phenobarbital. Last dose PRN Phenobarbital was 12:38 12/4/18    Review of Systems:  Constitutional: No fever, chills, fatigue  Neuro: No headache, numbness, weakness  Resp: + shortness of breath, wheezing. No cough  CVS: No chest pain, palpitations, leg swelling  GI: No abdominal pain, nausea, vomiting, diarrhea   : No dysuria, frequency, incontinence  Skin: No itching, burning, rashes, or lesions   Msk: No joint pain or swelling  Psych: No depression, anxiety, mood swings    ICU Vital Signs Last 24 Hrs  T(C): 36.7 (05 Dec 2018 07:20), Max: 37.1 (04 Dec 2018 08:00)  T(F): 98.1 (05 Dec 2018 07:20), Max: 98.7 (04 Dec 2018 08:00)  HR: 73 (05 Dec 2018 07:00) (72 - 92)  BP: 114/64 (05 Dec 2018 07:00) (106/66 - 132/71)  BP(mean): 83 (05 Dec 2018 07:00) (79 - 99)  RR: 19 (05 Dec 2018 07:00) (10 - 34)  SpO2: 95% (05 Dec 2018 07:00) (89% - 95%)      POCT Blood Glucose.: 171 mg/dL (05 Dec 2018 06:43)      I&O's Summary    04 Dec 2018 07:01  -  05 Dec 2018 07:00  --------------------------------------------------------  IN: 646 mL / OUT: 1555 mL / NET: -909 mL        Physical Exam:   Gen: Pt A&O x2 and agitated. Pt appears stated age with increased work of breathing.   Neuro: GCS 14 (4/4/6). CN II-XII grossly intact. Moves all extremities spontaneously. Strength 5/5 in all extremities.   HEENT: Head: normocephalic atraumatic. Eyes: PERRL 3mm and reactive. Mouth/Throat: Dry mucous membranes with no erythema or hives noted.  Resp: + decreased breath sounds in all fields L > R with wheezing throughout. No crackles.  CVS: +S1/S2, no murmurs, rubs, or gallops  Abd: Distended, non-tender  Ext: 2+ pulses in all extremities, neurovascularly intact  Skin: No lesions, ecchymoses, rashes    MEDICATIONS  (STANDING):  aspirin  chewable 81 milliGRAM(s) Oral daily  atorvastatin 20 milliGRAM(s) Oral at bedtime  dexmedetomidine Infusion 0.2 MICROgram(s)/kG/Hr (4.99 mL/Hr) IV Continuous <Continuous>  enoxaparin Injectable 40 milliGRAM(s) SubCutaneous daily  escitalopram 20 milliGRAM(s) Oral daily  folic acid Injectable 1 milliGRAM(s) IV Push daily  gemfibrozil 600 milliGRAM(s) Oral two times a day  influenza   Vaccine 0.5 milliLiter(s) IntraMuscular once  multivitamin 1 Tablet(s) Oral daily  PHENobarbital Injectable 65 milliGRAM(s) IV Push every 8 hours  QUEtiapine 50 milliGRAM(s) Oral at bedtime  thiamine Injectable 100 milliGRAM(s) IV Push daily    MEDICATIONS  (PRN):  ondansetron Injectable 4 milliGRAM(s) IV Push every 6 hours PRN Nausea and/or Vomiting  PHENobarbital Injectable 130 milliGRAM(s) IV Push every 1 hour PRN Agitation/rescue        Labs:                         10.6   8.86  )-----------( 136      ( 05 Dec 2018 05:59 )             31.3     12-05    139  |  104  |  21  ----------------------------<  175<H>  4.0   |  26  |  0.71    Ca    8.2<L>      05 Dec 2018 05:59  Phos  2.6     12-05  Mg     2.2     12-05    TPro  6.6  /  Alb  2.1<L>  /  TBili  0.4  /  DBili  x   /  AST  63<H>  /  ALT  52  /  AlkPhos  191<H>  12-05      Radiology:     Bedside ultrasound:     CENTRAL LINE: N  PEOPLES: N  A-LINE: N    GLOBAL ISSUE/BEST PRACTICE:  Analgesia: N  Sedation: N  CAM-ICU:   HOB elevation: N    Stress ulcer prophylaxis: N  VTE prophylaxis: Enoxaparin  Glycemic control: ISS    Nutrition: DM low fat diet    CODE STATUS: FULL Brief interval history: 64yo male presenting to the Emergency Department with new onset mid epigastric pain 10/10 that began day prior to admission. He has similar pain in May 2018 and had been diagnosed with pancreatitis. CT scan and US abdomen were performed with evidence of pancreatitis again this admit. He admits to drinking heavily over the past month. He drinks Vodka in excess for past 2 years after the loss of his son.  Pt was admitted to ICU for aggressive resuscitation and EtOH withdrawal.    24 hour events: Pt had several episodes of agitation that required PRN Phenobarbital. Pt's withdrawal symptoms progressed and decided to start standing order Phenobarbital. Last dose PRN Phenobarbital was 12:38 12/4/18    Review of Systems:  Constitutional: No fever, chills, fatigue  Neuro: No headache, numbness, weakness  Resp: + shortness of breath, wheezing. No cough  CVS: No chest pain, palpitations, leg swelling  GI: No abdominal pain, nausea, vomiting, diarrhea   : No dysuria, frequency, incontinence  Skin: No itching, burning, rashes, or lesions   Msk: No joint pain or swelling  Psych: No depression, anxiety, mood swings    ICU Vital Signs Last 24 Hrs  T(C): 36.7 (05 Dec 2018 07:20), Max: 37.1 (04 Dec 2018 08:00)  T(F): 98.1 (05 Dec 2018 07:20), Max: 98.7 (04 Dec 2018 08:00)  HR: 73 (05 Dec 2018 07:00) (72 - 92)  BP: 114/64 (05 Dec 2018 07:00) (106/66 - 132/71)  BP(mean): 83 (05 Dec 2018 07:00) (79 - 99)  RR: 19 (05 Dec 2018 07:00) (10 - 34)  SpO2: 95% (05 Dec 2018 07:00) (89% - 95%)      POCT Blood Glucose.: 171 mg/dL (05 Dec 2018 06:43)      I&O's Summary    04 Dec 2018 07:01  -  05 Dec 2018 07:00  --------------------------------------------------------  IN: 646 mL / OUT: 1555 mL / NET: -909 mL        Physical Exam:   Gen: Pt A&O x2 resting comfortably. Pt appears stated age with some increased work of breathing.   Neuro: GCS 14 (4/4/6). CN II-XII grossly intact. Moves all extremities spontaneously. Strength 5/5 in all extremities.   HEENT: Head: normocephalic atraumatic. Eyes: PERRL 3mm and reactive. Mouth/Throat: Dry mucous membranes with no erythema or hives noted.  Resp: + decreased breath sounds in all fields L > R with wheezing throughout. No crackles.  CVS: +S1/S2, no murmurs, rubs, or gallops  Abd: Distended, non-tender  Ext: 2+ pulses in all extremities, neurovascularly intact  Skin: No lesions, ecchymoses, rashes    MEDICATIONS  (STANDING):  aspirin  chewable 81 milliGRAM(s) Oral daily  atorvastatin 20 milliGRAM(s) Oral at bedtime  dexmedetomidine Infusion 0.2 MICROgram(s)/kG/Hr (4.99 mL/Hr) IV Continuous <Continuous>  enoxaparin Injectable 40 milliGRAM(s) SubCutaneous daily  escitalopram 20 milliGRAM(s) Oral daily  folic acid Injectable 1 milliGRAM(s) IV Push daily  gemfibrozil 600 milliGRAM(s) Oral two times a day  influenza   Vaccine 0.5 milliLiter(s) IntraMuscular once  multivitamin 1 Tablet(s) Oral daily  PHENobarbital Injectable 65 milliGRAM(s) IV Push every 8 hours  QUEtiapine 50 milliGRAM(s) Oral at bedtime  thiamine Injectable 100 milliGRAM(s) IV Push daily    MEDICATIONS  (PRN):  ondansetron Injectable 4 milliGRAM(s) IV Push every 6 hours PRN Nausea and/or Vomiting  PHENobarbital Injectable 130 milliGRAM(s) IV Push every 1 hour PRN Agitation/rescue        Labs:                         10.6   8.86  )-----------( 136      ( 05 Dec 2018 05:59 )             31.3     12-05    139  |  104  |  21  ----------------------------<  175<H>  4.0   |  26  |  0.71    Ca    8.2<L>      05 Dec 2018 05:59  Phos  2.6     12-05  Mg     2.2     12-05    TPro  6.6  /  Alb  2.1<L>  /  TBili  0.4  /  DBili  x   /  AST  63<H>  /  ALT  52  /  AlkPhos  191<H>  12-05    CENTRAL LINE: N  PEOPLES: N  A-LINE: N    GLOBAL ISSUE/BEST PRACTICE:  Analgesia: N  Sedation: N  CAM-ICU:   HOB elevation: N    Stress ulcer prophylaxis: N  VTE prophylaxis: Enoxaparin  Glycemic control: ISS    Nutrition: DM low fat diet    CODE STATUS: FULL

## 2018-12-05 NOTE — DISCHARGE NOTE ADULT - CARE PROVIDER_API CALL
Michael Wade), Cardiovascular Disease; Internal Medicine  [address] [place] [address]  Phone: [phone]  Fax: [phone]

## 2018-12-05 NOTE — CHART NOTE - NSCHARTNOTEFT_GEN_A_CORE
Assessment:     Factors impacting intake: [ ] none [ ] nausea  [ ] vomiting [ ] diarrhea [ ] constipation  [ ]chewing problems [ ] swallowing issues  [ ] other:     Diet Presciption: Diet, Consistent Carbohydrate w/Evening Snack:   Low Fat (LOWFAT) (12-04-18 @ 10:34)    Intake:     Current Weight: Weight (kg): 108.9 (12-02 @ 19:00)  % Weight Change    Pertinent Medications: MEDICATIONS  (STANDING):  aspirin  chewable 81 milliGRAM(s) Oral daily  atorvastatin 20 milliGRAM(s) Oral at bedtime  enoxaparin Injectable 40 milliGRAM(s) SubCutaneous daily  escitalopram 20 milliGRAM(s) Oral daily  folic acid Injectable 1 milliGRAM(s) IV Push daily  gemfibrozil 600 milliGRAM(s) Oral two times a day  influenza   Vaccine 0.5 milliLiter(s) IntraMuscular once  multivitamin 1 Tablet(s) Oral daily  PHENobarbital 64.8 milliGRAM(s) Oral every 12 hours  QUEtiapine 50 milliGRAM(s) Oral at bedtime  thiamine Injectable 100 milliGRAM(s) IV Push daily    MEDICATIONS  (PRN):  ondansetron Injectable 4 milliGRAM(s) IV Push every 6 hours PRN Nausea and/or Vomiting    Pertinent Labs: 12-05 Na139 mmol/L Glu 175 mg/dL<H> K+ 4.0 mmol/L Cr  0.71 mg/dL BUN 21 mg/dL 12-05 Phos 2.6 mg/dL 12-05 Alb 2.1 g/dL<L> 12-02 [id number] 7.0 %<H> 12-03 Chol --    LDL --    HDL --    Trig 404 mg/dL<H>     CAPILLARY BLOOD GLUCOSE      POCT Blood Glucose.: 171 mg/dL (05 Dec 2018 06:43)  POCT Blood Glucose.: 193 mg/dL (04 Dec 2018 23:21)  POCT Blood Glucose.: 210 mg/dL (04 Dec 2018 19:06)    Skin:     Estimated Needs:   [ ] no change since previous assessment  [ ] recalculated:     Previous Nutrition Diagnosis:   [ ] Inadequate Energy Intake [ ]Inadequate Oral Intake [ ] Excessive Energy Intake   [ ] Underweight [ ] Increased Nutrient Needs [ ] Overweight/Obesity   [ ] Altered GI Function [ ] Unintended Weight Loss [ ] Food & Nutrition Related Knowledge Deficit [ ] Malnutrition     Nutrition Diagnosis is [ ] ongoing  [ ] resolved [ ] not applicable     New Nutrition Diagnosis: [ ] not applicable       Interventions:   Recommend  [ ] Change Diet To:  [ ] Nutrition Supplement  [ ] Nutrition Support  [ ] Other:     Monitoring and Evaluation:   [ ] PO intake [ x ] Tolerance to diet prescription [ x ] weights [ x ] labs[ x ] follow up per protocol  [ ] other: Assessment: 63M PMH HTN, HLD, depression, obesity, diet-controlled DM (A1C 7.0), ROSS (on CPAP), ETOH abuse, BIBEMS from home complaining of gradual onset of upper abdominal pain, radiating to the back with nausea and vomiting, admitted with acute alcoholic pancreatitis, acute alcoholic hepatitis, hypertriglyceridemia, SIRS secondary to acute pancreatitis. Course complicated by prerenal HERIBERTO, ileus, acute hypoxic respiratory failure due to pulmonary edema + abdominal distention, and alcohol withdrawal. Pt c/o mild LUQ abdominal discomfort, bloating this am . Pt is not learner ready for diet education at this time, not feeling well and icu setting is not conducive to education        Factors impacting intake: [ ] none [ ] nausea  [ ] vomiting [ ] diarrhea [ ] constipation  [ ]chewing problems [ ] swallowing issues  [ X] other: see above    Diet Prescription: Diet, Consistent Carbohydrate w/Evening Snack:   Low Fat (LOWFAT) (12-04-18 @ 10:34)    Intake: < 25% - water, apple juice and apple slices    Current Weight: Weight (kg): 108.9 (12-02 @ 19:00) /239.58 12-5-18 wt 113.7 kg/250.6  % Weight Change + 4.2% considering time frame and poor oral intake, change likley due to accuracy in weighing and/or fluid shifts    Pertinent Medications: MEDICATIONS  (STANDING):  aspirin  chewable 81 milliGRAM(s) Oral daily  atorvastatin 20 milliGRAM(s) Oral at bedtime  enoxaparin Injectable 40 milliGRAM(s) SubCutaneous daily  escitalopram 20 milliGRAM(s) Oral daily  folic acid Injectable 1 milliGRAM(s) IV Push daily  gemfibrozil 600 milliGRAM(s) Oral two times a day  influenza   Vaccine 0.5 milliLiter(s) IntraMuscular once  multivitamin 1 Tablet(s) Oral daily  PHENobarbital 64.8 milliGRAM(s) Oral every 12 hours  QUEtiapine 50 milliGRAM(s) Oral at bedtime  thiamine Injectable 100 milliGRAM(s) IV Push daily    MEDICATIONS  (PRN):  ondansetron Injectable 4 milliGRAM(s) IV Push every 6 hours PRN Nausea and/or Vomiting    Pertinent Labs: 12-05 Na139 mmol/L Glu 175 mg/dL<H> K+ 4.0 mmol/L Cr  0.71 mg/dL BUN 21 mg/dL 12-05 Phos 2.6 mg/dL 12-05 Alb 2.1 g/dL<L> 12-02 [id number] 7.0 %<H> 12-03 Chol --    LDL --    HDL --    Trig 404 mg/dL<H>     CAPILLARY BLOOD GLUCOSE      POCT Blood Glucose.: 171 mg/dL (05 Dec 2018 06:43)  POCT Blood Glucose.: 193 mg/dL (04 Dec 2018 23:21)  POCT Blood Glucose.: 210 mg/dL (04 Dec 2018 19:06)    Skin: intact    Estimated Needs:   [ X] no change since previous assessment  [ ] recalculated:     Previous Nutrition Diagnosis:   [ ] Inadequate Energy Intake [ ]Inadequate Oral Intake [ ] Excessive Energy Intake   [ ] Underweight [ ] Increased Nutrient Needs [X ] Overweight/Obesity   [ ] Altered GI Function [ ] Unintended Weight Loss [ ] Food & Nutrition Related Knowledge Deficit [ ] Malnutrition     Nutrition Diagnosis is [ ] ongoing  [ ] resolved [ ] not applicable        New Nutrition Diagnosis: [ ] not applicable   [x]altered nutrition related labs,       Interventions:   Recommend  [ ] Change Diet To:  [ ] Nutrition Supplement  [ ] Nutrition Support  [X ] Other: continue present diet    Monitoring and Evaluation:   [ ] PO intake [ x ] Tolerance to diet prescription [ x ] weights [ x ] labs[ x ] follow up per protocol  [ ] other:

## 2018-12-05 NOTE — DISCHARGE NOTE ADULT - PLAN OF CARE
Improve symptoms - Transfer to tertiary care center for further management, may need surgical intervention if failing aggressive medical measures

## 2018-12-05 NOTE — PROGRESS NOTE ADULT - SUBJECTIVE AND OBJECTIVE BOX
INTERVAL HPI/OVERNIGHT EVENTS:  pt seen and examined  denies n/v/d/abd pain  per overnight rn pt did not eat yesterday, asking for liquids this am, off precedex, still on phenobarb  labs noted    MEDICATIONS  (STANDING):  aspirin  chewable 81 milliGRAM(s) Oral daily  atorvastatin 20 milliGRAM(s) Oral at bedtime  dexmedetomidine Infusion 0.2 MICROgram(s)/kG/Hr (4.99 mL/Hr) IV Continuous <Continuous>  enoxaparin Injectable 40 milliGRAM(s) SubCutaneous daily  escitalopram 20 milliGRAM(s) Oral daily  folic acid Injectable 1 milliGRAM(s) IV Push daily  gemfibrozil 600 milliGRAM(s) Oral two times a day  influenza   Vaccine 0.5 milliLiter(s) IntraMuscular once  multivitamin 1 Tablet(s) Oral daily  PHENobarbital Injectable 65 milliGRAM(s) IV Push every 8 hours  QUEtiapine 50 milliGRAM(s) Oral at bedtime  thiamine Injectable 100 milliGRAM(s) IV Push daily    MEDICATIONS  (PRN):  ondansetron Injectable 4 milliGRAM(s) IV Push every 6 hours PRN Nausea and/or Vomiting  PHENobarbital Injectable 130 milliGRAM(s) IV Push every 1 hour PRN Agitation/rescue      Allergies    No Known Allergies    Intolerances        Review of Systems:    General:  No wt loss, fevers, chills, night sweats, fatigue   Eyes:  Good vision, no reported pain  ENT:  No sore throat, pain, runny nose, dysphagia  CV:  No pain, palpitations, hypo/hypertension  Resp:  No dyspnea, cough, tachypnea, wheezing  GI:  No pain, No nausea, No vomiting, No diarrhea, No constipation, No weight loss, No fever, No pruritis, No rectal bleeding, No melena, No dysphagia  :  No pain, bleeding, incontinence, nocturia  Muscle:  No pain, weakness  Neuro:  No weakness, tingling, memory problems  Psych:  No fatigue, insomnia, mood problems, depression  Endocrine:  No polyuria, polydypsia, cold/heat intolerance  Heme:  No petechiae, ecchymosis, easy bruisability  Skin:  No rash, tattoos, scars, edema      Vital Signs Last 24 Hrs  T(C): 36.7 (05 Dec 2018 07:20), Max: 36.8 (05 Dec 2018 04:00)  T(F): 98.1 (05 Dec 2018 07:20), Max: 98.2 (05 Dec 2018 04:00)  HR: 95 (05 Dec 2018 10:00) (72 - 95)  BP: 130/70 (05 Dec 2018 10:00) (113/63 - 132/71)  BP(mean): 93 (05 Dec 2018 10:00) (82 - 99)  RR: 25 (05 Dec 2018 10:00) (10 - 33)  SpO2: 95% (05 Dec 2018 10:00) (89% - 96%)    PHYSICAL EXAM:    Constitutional: NAD  HEENT: ncat  Neck: No LAD  Respiratory: dec bs  Cardiovascular: S1 and S2, RRR  Gastrointestinal: soft mild generalized ttp no guardng +dt  Extremities: No peripheral edema  Vascular: 2+ peripheral pulses  Neurological: awake alert   Skin: No rashes    LABS:                        10.6   8.86  )-----------( 136      ( 05 Dec 2018 05:59 )             31.3     12-05    139  |  104  |  21  ----------------------------<  175<H>  4.0   |  26  |  0.71    Ca    8.2<L>      05 Dec 2018 05:59  Phos  2.6     12-05  Mg     2.2     12-05    TPro  6.6  /  Alb  2.1<L>  /  TBili  0.4  /  DBili  x   /  AST  63<H>  /  ALT  52  /  AlkPhos  191<H>  12-05          RADIOLOGY & ADDITIONAL TESTS:

## 2018-12-05 NOTE — PROGRESS NOTE ADULT - SUBJECTIVE AND OBJECTIVE BOX
HPI:  Pt is a 62 y/o M with PMHx of alcohol induced Pancreatitis, AODM (diet-controlled), Depression, HLD, HTN, BIBEMS from home complaining of gradual onset of upper abdominal pain, radiating to the back with nausea and vomiting. Pain started last night, rated 10/10. Pt states he took Advil and peptobismol to no relief. Of note pt states he had a similar episode in May when he was diagnosed with pancreatitis. Pt states he had a sonogram one month ago that did show gall stones. 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 Pt admits abd pain radiating to back, N/V, and blood streaked stool with BRBPR. Denies CP, SOB, fever, chills, urinary symptoms.     In the ED: VS T 98.3 HR 98 /70 RR18 98%RA. Labs significant for WBC 12.69, Na 130, K 5.6, Cl 94, CO2 19, glu 197, Ca 7.5, Alb 3.2, , , , lipase 12,651, amylase 358, lactate 2.3. CXR WNL. US GB showing Cholelithiasis and gallbladder sludge. Borderline common duct dilatation. CT Abd/pelvis showing Severe acute phlegmonous pancreatitis. Early pancreatic necrosis not excluded. Pt was given 1x dose zosyn, 3L NS bolus, 2x doses Dilaudid, 1x dose morphine, 2x doses Zofran. UCx sent. Pt evaluated and admitted to ICU. (01 Dec 2018 11:59    Detox:  Looks and feels better  off Precedex  on Phenobarbital   no tremors  May get MRCP today      Allergies    No Known Allergies    Intolerances        REVIEW OF SYSTEMS:    Constitutional: No fever, weight loss or fatigue  ENT:  No difficulty hearing, tinnitus, vertigo; No sinus or throat pain  Neck: No pain or stiffness  Respiratory: No cough, wheezing, chills or hemoptysis  Cardiovascular: No chest pain, palpitations, shortness of breath, dizziness or leg swelling  Gastrointestinal: No abdominal or epigastric pain. No nausea, vomiting or hematemesis; No diarrhea or constipation. No melena or hematochezia.  Neurological: No headaches, memory loss, loss of strength, numbness or tremors  Musculoskeletal: No joint pain or swelling; No muscle, back or extremity pain  Psychiatric: No depression, anxiety, mood swings or difficulty sleeping    MEDICATIONS  (STANDING):  aspirin  chewable 81 milliGRAM(s) Oral daily  atorvastatin 20 milliGRAM(s) Oral at bedtime  dexmedetomidine Infusion 0.2 MICROgram(s)/kG/Hr (4.99 mL/Hr) IV Continuous <Continuous>  enoxaparin Injectable 40 milliGRAM(s) SubCutaneous daily  escitalopram 20 milliGRAM(s) Oral daily  folic acid Injectable 1 milliGRAM(s) IV Push daily  gemfibrozil 600 milliGRAM(s) Oral two times a day  influenza   Vaccine 0.5 milliLiter(s) IntraMuscular once  multivitamin 1 Tablet(s) Oral daily  PHENobarbital Injectable 65 milliGRAM(s) IV Push every 8 hours  QUEtiapine 50 milliGRAM(s) Oral at bedtime  thiamine Injectable 100 milliGRAM(s) IV Push daily    MEDICATIONS  (PRN):  ondansetron Injectable 4 milliGRAM(s) IV Push every 6 hours PRN Nausea and/or Vomiting  PHENobarbital Injectable 130 milliGRAM(s) IV Push every 1 hour PRN Agitation/rescue      Vital Signs Last 24 Hrs  T(C): 36.7 (05 Dec 2018 07:20), Max: 36.8 (05 Dec 2018 04:00)  T(F): 98.1 (05 Dec 2018 07:20), Max: 98.2 (05 Dec 2018 04:00)  HR: 83 (05 Dec 2018 08:00) (72 - 92)  BP: 114/64 (05 Dec 2018 08:00) (113/63 - 132/71)  BP(mean): 82 (05 Dec 2018 08:00) (82 - 99)  RR: 25 (05 Dec 2018 08:00) (10 - 33)  SpO2: 96% (05 Dec 2018 08:00) (89% - 96%)    PHYSICAL EXAM:    Constitutional: NAD, well-groomed, well-developed  HEENT: PERRLA, EOMI, Normal Hearing, MMM  Neck: No LAD, No JVD  Back: Normal spine flexure, No CVA tenderness  Respiratory: CTAB/L  Cardiovascular: S1 and S2, RRR, no M/G/R  Gastrointestinal: BS+, soft, NT/ND  Extremities: No peripheral edema  Vascular: 2+ peripheral pulses  Neurological: A/O x 3, no focal deficits no tremors    LABS:                        10.6   8.86  )-----------( 136      ( 05 Dec 2018 05:59 )             31.3     12-05    139  |  104  |  21  ----------------------------<  175<H>  4.0   |  26  |  0.71    Ca    8.2<L>      05 Dec 2018 05:59  Phos  2.6     12-05  Mg     2.2     12-05    TPro  6.6  /  Alb  2.1<L>  /  TBili  0.4  /  DBili  x   /  AST  63<H>  /  ALT  52  /  AlkPhos  191<H>  12-05          RADIOLOGY & ADDITIONAL STUDIES:    Assessment and Plan:    Alcoholism; Attempt to wean off Phenobarbital discussed with ICU attending

## 2018-12-05 NOTE — PROGRESS NOTE ADULT - ASSESSMENT
62 y/o male admitted for Pancreatitis and EtOH withdrawal.     Neuro:  - Agitation: Likely due to EtOH withdrawal. C/w 1:1 monitoring of Pt. C/w home medication of Seroquel 50 mg at bedtime, Escitalopram 20 mg daily.  - EtOH withdrawal: Improving. May change standing Phenobarbital 65 mg IV q8 hours standing to PO. C/w Phenobarbital 130 mg IV q 15 minutes PRN for agitation with instruction to inform provider when administered. Continue to monitor agitation level. C/w thiamine and folic acid.  - Pain: D/C Dilaudid due to sedative nature of medication. Will start alternative if needed.  CV: Stable. C/w ASA and Atorvastatin. Hold Amlodipine.  Pulm: Hypoxia likely due to pulmonary edema 2/2 IVF hydration and abdominal distention. Will try and wean off BiPAP using venti mask and nasal canula. C/w BiPAP 12/8 on 35% O2 when sleeping or during day PRN for PMH of ROSS on home CPAP. C/w incentive spirometry when off BiPAP.  GI:  - Pancreatitis: EtOH vs. hypertriglyceridemia. [place]'s Criteria on admission 3, Cumulative score 6 points = 40% mortality. Discriminant Function: 19.7 today. LFTs and TG improving. C/w Gemfibrozil. Will try to obtain MRCP today or tomorrow if Pt cooperative for exam. C/w Zofran PRN for nausea.  - Diet: DM low fat diet when off BiPAP and mentating well.  Renal: Given Lasix 40 mg IV this morning. C/w miller to continue to monitor I&O. Will replete Magnesium.  Endo: DM - FS running slightly elevated. C/w insulin coverage.  Heme: Elevated ferritin on admission. Appreciated heme recommendation for follow up ferritin level in one week and follow up outpatient.   ID: Bandemia, infection unlikely at this time, will monitor and continue to follow urine cultures.   DVT PPX: Enoxaparin SQ  Ethics: Full code 62 y/o male admitted for Pancreatitis and EtOH withdrawal.     Neuro:  - Agitation: Much improved. Likely due to EtOH withdrawal. Will consider discontinuing 1:1 monitoring of Pt. C/w home medication of Seroquel 50 mg at bedtime, Escitalopram 20 mg daily.  - EtOH withdrawal: Improving. May change standing Phenobarbital 64.8 mg PO q12 hours standing to PO. D/C PRN Phenobarbital 130 mg Continue to monitor agitation level. C/w thiamine and folic acid.  - Pain: Will start alternative analgesic if needed.  CV: Stable. C/w ASA and Atorvastatin. Hold Amlodipine.  Pulm: Hypoxia likely due to pulmonary edema 2/2 IVF hydration and abdominal distention. Comfortable on nasal cannula. Will start home CPAP settings when sleeing. C/w incentive spirometry.  GI:  - Pancreatitis: EtOH vs. hypertriglyceridemia. [place]'s Criteria on admission 3, Cumulative score 6 points = 40% mortality. Discriminant Function <32 today. LFTs and TG improving. C/w Gemfibrozil. Will try to obtain MRCP today or tomorrow if Pt cooperative for exam. C/w Zofran PRN for nausea.  - Diet: DM low fat diet.  Renal: Will trial of void. Will replete lytes as needed.   Endo: DM - FS running slightly elevated. C/w insulin coverage.  Heme: Elevated ferritin on admission. Appreciated heme recommendation for follow up ferritin level in one week and follow up outpatient.   ID: Cultures negative. No concerns.  DVT PPX: Enoxaparin SQ  Ethics: Full code

## 2018-12-05 NOTE — DISCHARGE NOTE ADULT - PATIENT PORTAL LINK FT
You can access the [facility] Patient Portal, offered by [facility], by registering with the following website: [email or web address]

## 2018-12-05 NOTE — PROGRESS NOTE ADULT - PROBLEM SELECTOR PLAN 4
- Off precedex  - Plan per ICU. On PRN phenobarb    - continue Vit B1, folic acid supplementation  - pt states he did not like the group therapy requirement for Vivitrol, and did not proceed with detox  -  consult  -Dr. Coats following

## 2018-12-05 NOTE — PROGRESS NOTE ADULT - SUBJECTIVE AND OBJECTIVE BOX
63M with PMHx of alcohol induced Pancreatitis, DM (diet-controlled), Depression, HLD, HTN, insomnia, hld, htn, todd, obesity, admitted with acute alcoholic pancreatitis, acute alcoholic hepatitis, hypertriglyceridemia, bandemia. Course complicated by prerenal HERIBERTO, ileus, acute hypoxic respiratory failure due to pulmonary edema + abdominal distention, and alcohol withdrawal.    24 hour events: Pt seen and examined at bedside. Chart/labs reviewed. Phenobarb changed to PO. Glory DC'd. MRCP with multiple collections cysts vs abscess, necrosis of tail of pancreas, occluded splenic vein    PAST MEDICAL & SURGICAL HISTORY:  Low testosterone in male  Depression  Insomnia  HLD (hyperlipidemia)  HTN (hypertension)  Low testosterone level in male  Sprain of right rotator cuff capsule, subsequent encounter  Borderline diabetes mellitus: last A1c unknown  Low back pain, unspecified back pain laterality, unspecified chronicity, with sciatica presence unspecified  Lumbar herniated disc  Depression, unspecified depression type: Lost his son this past June 2016  Essential hypertension  Sleep apnea, unspecified type  Obesity (BMI 30-39.9)  H/O knee surgery: b/l knees  H/O shoulder surgery: right  S/P arthroscopy of right knee: 2010  S/P right inguinal hernia repair: 2010  S/P ACL repair: left knee 2010  S/P rotator cuff repair: Right shoulder 2008      Review of Systems:  Constitutional: No fever, chills, fatigue  Neuro: No headache, numbness, weakness  Resp: No cough, wheezing, shortness of breath  CVS: No chest pain, palpitations, leg swelling  GI: No abdominal pain, nausea, vomiting, diarrhea   : No dysuria, frequency, incontinence  Skin: No itching, burning, rashes, or lesions   Msk: No joint pain or swelling  Psych: No depression, anxiety, mood swings    T(F): 100.7 (12-05-18 @ 20:44), Max: 100.7 (12-05-18 @ 20:44)  HR: 122 (12-05-18 @ 20:00) (73 - 122)  BP: 139/70 (12-05-18 @ 20:00) (114/64 - 149/65)  RR: 27 (12-05-18 @ 20:00) (14 - 32)  SpO2: 92% (12-05-18 @ 20:00) (90% - 97%)  Wt(kg): --        CAPILLARY BLOOD GLUCOSE      POCT Blood Glucose.: 157 mg/dL (05 Dec 2018 17:23)      I&O's Summary    04 Dec 2018 07:01  -  05 Dec 2018 07:00  --------------------------------------------------------  IN: 646 mL / OUT: 1555 mL / NET: -909 mL    05 Dec 2018 07:01  -  05 Dec 2018 21:37  --------------------------------------------------------  IN: 120 mL / OUT: 425 mL / NET: -305 mL        Physical Exam:     Gen: WD/WG male  Neuro: agitated, following commands  HEENT: NC/AT  Resp: Decreased BS, + wheeze  CVS: nl S1/S2, RRR  Abd: soft, nt, nd, +bs  Ext: no edema, +pulses  Skin: well perfused, warm      Meds:    atorvastatin Oral  gemfibrozil Oral  glucagon  Injectable IntraMuscular PRN  insulin lispro (HumaLOG) corrective regimen sliding scale SubCutaneous  insulin lispro (HumaLOG) corrective regimen sliding scale SubCutaneous  acetaminophen   Tablet .. Oral PRN  escitalopram Oral  HYDROmorphone  Injectable IV Push PRN  HYDROmorphone  Injectable IV Push PRN  ondansetron Injectable IV Push PRN  PHENobarbital Oral  QUEtiapine Oral  aspirin  chewable Oral  enoxaparin Injectable SubCutaneous  dextrose 5%. IV Continuous  folic acid Injectable IV Push  multivitamin Oral  thiamine Injectable IV Push  influenza   Vaccine IntraMuscular                            10.6   8.86  )-----------( 136      ( 05 Dec 2018 05:59 )             31.3       12-05    139  |  104  |  21  ----------------------------<  175<H>  4.0   |  26  |  0.71    Ca    8.2<L>      05 Dec 2018 05:59  Phos  2.6     12-05  Mg     2.2     12-05    TPro  6.6  /  Alb  2.1<L>  /  TBili  0.4  /  DBili  x   /  AST  63<H>  /  ALT  52  /  AlkPhos  191<H>  12-05        .Urine Clean Catch (Midstream)   No growth -- 12-01 @ 17:01    MRCP:    INTERPRETATION: History: Acute pancreatitis with phlegmonous change   possible necrosis on recent CT of 12/1/2018.     Noncontrast MRCP and multiphasic contrast enhanced abdominal MR 10 cc   gadolinium injected intravenously.     Study is limited. Suboptimal image quality due to patient's inability to   offer requisite cooperation.   Pancreas diffusely enlarged and edematous consistent with the history of   pancreatitis. Moderate size area of hypoenhancement pancreatic tail   consistent with pancreatic necrosis. No pancreatic ductal dilatation. There   are nonspecific evolving organizing fluid collections in the lesser sac   measuring 10.5 x 5.6 cm, and in the left anterior pararenal space adjacent   to the tail of pancreas measuring 5.2 x 4.8 cm. These could represent   evolving pseudocysts with or without infection. The splenic vein is   occluded. Portal vein is patent.   No gallstones or biliary dilatation. No common duct stone. Diffuse fatty   liver. Spleen not remarkable.   Right renal cysts.     Impression:     Technically limited secondary to uncooperative patient.   Severe acute/subacute pancreatitis with pancreatic tail necrosis. Evolving   fluid collections as described. Infected necrosis, infected collections not   excluded by the imaging appearance alone.   Occluded splenic vein.         CENTRAL LINE: no    PEOPLES: yes    A-LINE: no    GLOBAL ISSUE/BEST PRACTICE:  Analgesia: no  Sedation: yes  HOB elevation: yes  Stress ulcer prophylaxis: yes  VTE prophylaxis: yes  Glycemic control: yes  Nutrition: npo      CODE STATUS: Full  GOC discussion: Y

## 2018-12-05 NOTE — PROGRESS NOTE ADULT - ATTENDING COMMENTS
63M PMH HTN, HLD, depression, obesity, diet-controlled DM (A1C 7.0), ROSS (on CPAP), ETOH abuse, admitted with acute alcoholic pancreatitis, acute alcoholic hepatitis, hypertriglyceridemia, bandemia. Course complicated by prerenal HERIBERTO, ileus, acute hypoxic respiratory failure due to pulmonary edema + abdominal distention, and alcohol withdrawal.    - Continue phenobarbital 65 mg IV q8h + 130 mg IV q1h prn. Hold benzo's at this time. Continue dexmedetomidine gtt. Continue thiamine, MVI, folic acid. Continue escitalopram and quetiapine  - Hold hydromorphone  - HD stable, continue aspirin and statin. Continue to hold amlodipine  - Hypoxia likely due to pulmonary edema from IVF hydration + abdominal distention. Improved with diuresis. Keep net negative 1 liter/24hrs  - Continue BiPAP qhs for ROSS. Supplemental oxygen with NC when off NIPPV. Improved respiratory status  - Continue diabetic low fat diet as tolerated. Improved pancreatitis and pain. Eventually needs MRCP when more stable and improved withdrawal  - Improved hypertriglyceridemia. Keep off insulin gtt. Continue gemfibrozil 600 mg po bid  - Improved HERIBERTO, strict I/O's, trend kidney function and lytes. Responding well to diuresis  - Continue to observe off abx. No evidence of infected necrosis on CT. F/up MRCP  - Enoxaparin for DVT ppx  - Insulin coverage scale for DM2, A1C 7.0  - Discussed with patient, full code, prognosis guarded  CC Time spent: 35 min 63M PMH HTN, HLD, depression, obesity, diet-controlled DM (A1C 7.0), ROSS (on CPAP), ETOH abuse, admitted with acute alcoholic pancreatitis, acute alcoholic hepatitis, hypertriglyceridemia, bandemia. Course complicated by prerenal HERIBERTO, ileus, acute hypoxic respiratory failure due to pulmonary edema + abdominal distention, and alcohol withdrawal.    - Decrease phenobarbital to 65 mg oral twice daily, continue phenobarbital prn. Hold benzo's at this time. D/c dexmedetomidine. Continue thiamine, MVI, folic acid. Continue escitalopram and quetiapine  - Hold hydromorphone  - HD stable, continue aspirin and statin. Continue to hold amlodipine  - Hypoxia likely due to pulmonary edema from IVF hydration + abdominal distention. Improved with diuresis. Keep net negative 1 liter/24hrs  - Continue BiPAP qhs for ROSS. Supplemental oxygen with NC when off NIPPV. Improved respiratory status  - Continue diabetic low fat diet as tolerated. Improved pancreatitis and pain. Eventually needs MRCP when more stable and improved withdrawal  - Improved hypertriglyceridemia. Keep off insulin gtt. Continue gemfibrozil 600 mg po bid  - Improved HERIBERTO, strict I/O's, trend kidney function and lytes. Responding well to diuresis  - Continue to observe off abx. No evidence of infected necrosis on CT. F/up MRCP  - Enoxaparin for DVT ppx  - Insulin coverage scale for DM2, A1C 7.0  - Discussed with patient, full code, prognosis guarded  CC Time spent: 35 min 63M PMH HTN, HLD, depression, obesity, diet-controlled DM (A1C 7.0), ROSS (on CPAP), ETOH abuse, admitted with acute alcoholic pancreatitis, acute alcoholic hepatitis, hypertriglyceridemia, bandemia. Course complicated by prerenal HERIBERTO, ileus, acute hypoxic respiratory failure due to pulmonary edema + abdominal distention, and alcohol withdrawal.    - Improved withdrawal. Decrease phenobarbital to 65 mg oral twice daily, continue phenobarbital prn. Hold benzo's at this time. D/c dexmedetomidine. Continue thiamine, MVI, folic acid. Continue escitalopram and quetiapine  - Pain control with acetaminophen prn  - HD stable, continue aspirin and statin. Continue to hold amlodipine  - Hypoxia likely due to pulmonary edema from IVF hydration + abdominal distention. Improved with diuresis. Keep net negative 1 liter/24hrs  - Continue CPAP qhs for ROSS. Supplemental oxygen with NC when off NIPPV. Improved respiratory status  - Continue diabetic low fat diet as tolerated. Improved pancreatitis and pain. MRCP today  - Improved hypertriglyceridemia. Keep off insulin gtt. Continue gemfibrozil 600 mg po bid  - Resolved HERIBERTO, stable kidney function and lytes  - Continue to observe off abx. No evidence of infected necrosis on CT. F/up MRCP  - Enoxaparin for DVT ppx  - Insulin coverage scale for DM2, A1C 7.0  - Discussed with patient, full code, prognosis guarded 63M PMH HTN, HLD, depression, obesity, diet-controlled DM (A1C 7.0), ROSS (on CPAP), ETOH abuse, admitted with acute alcoholic pancreatitis, acute alcoholic hepatitis, hypertriglyceridemia, bandemia. Course complicated by prerenal HERIBERTO, ileus, acute hypoxic respiratory failure due to pulmonary edema + abdominal distention, and alcohol withdrawal.    - Improved withdrawal. Decrease phenobarbital to 65 mg oral twice daily, continue phenobarbital prn. Hold benzo's at this time. D/c dexmedetomidine. Continue thiamine, MVI, folic acid. Continue escitalopram and quetiapine  - Pain control with acetaminophen prn  - HD stable, continue aspirin and statin. Continue to hold amlodipine  - Hypoxia likely due to pulmonary edema from IVF hydration + abdominal distention. Improved with diuresis. Keep net negative 1 liter/24hrs  - Continue CPAP qhs for ROSS. Supplemental oxygen with NC when off NIPPV. Improved respiratory status  - Continue diabetic low fat diet as tolerated. Improved pancreatitis and pain. MRCP today  - Improved hypertriglyceridemia. Keep off insulin gtt. Continue gemfibrozil 600 mg po bid  - Resolved HERIBERTO, stable kidney function and lytes  - Continue to observe off abx. No evidence of infected necrosis on CT. F/up MRCP  - Enoxaparin for DVT ppx  - Insulin coverage scale for DM2, A1C 7.0  - Discussed with patient, full code, prognosis guarded    ***Addendum: MRCP performed shows multiple peripancreatic fluid collections worrisome for pseudocyst or pancreatic abscesses. Also with necrosis of pancreatic tail and occluded splenic tail. WIll follow-up with surgery and GI regarding further management. 63M PMH HTN, HLD, depression, obesity, diet-controlled DM (A1C 7.0), ROSS (on CPAP), ETOH abuse, admitted with acute alcoholic pancreatitis, acute alcoholic hepatitis, hypertriglyceridemia, bandemia. Course complicated by prerenal HERIBERTO, ileus, acute hypoxic respiratory failure due to pulmonary edema + abdominal distention, and alcohol withdrawal.    - Improved withdrawal. Decrease phenobarbital to 65 mg oral twice daily, continue phenobarbital prn. Hold benzo's at this time. D/c dexmedetomidine. Continue thiamine, MVI, folic acid. Continue escitalopram and quetiapine  - Pain control with acetaminophen prn  - HD stable, continue aspirin and statin. Continue to hold amlodipine  - Hypoxia likely due to pulmonary edema from IVF hydration + abdominal distention. Improved with diuresis. Keep net negative 1 liter/24hrs  - Continue CPAP qhs for ROSS. Supplemental oxygen with NC when off NIPPV. Improved respiratory status  - Continue diabetic low fat diet as tolerated. Improved pancreatitis and pain. MRCP today  - Improved hypertriglyceridemia. Keep off insulin gtt. Continue gemfibrozil 600 mg po bid  - Resolved HERIBERTO, stable kidney function and lytes  - Continue to observe off abx. No evidence of infected necrosis on CT. F/up MRCP  - Enoxaparin for DVT ppx  - Insulin coverage scale for DM2, A1C 7.0  - Discussed with patient, full code, prognosis guarded    ***Addendum: MRCP performed shows multiple peripancreatic fluid collections worrisome for pseudocyst or pancreatic abscesses. Also with necrosis of pancreatic tail and occluded splenic tail. Discussed with surgery and GI. Given no fevers or signs of sepsis at present, will hold off on starting abx at this time. Will need repeat CT Abdomen with contrast in 1 week for follow-up.

## 2018-12-05 NOTE — DISCHARGE NOTE ADULT - CARE PLAN
Principal Discharge DX:	Acute pancreatitis with infected necrosis, unspecified pancreatitis type  Goal:	Improve symptoms  Assessment and plan of treatment:	- Transfer to tertiary care center for further management, may need surgical intervention if failing aggressive medical measures

## 2018-12-05 NOTE — DISCHARGE NOTE ADULT - MEDICATION SUMMARY - MEDICATIONS TO TAKE
I will START or STAY ON the medications listed below when I get home from the hospital:    aspirin 81 mg oral tablet, chewable  -- 1 tab(s) by mouth once a day  -- Indication: For Transfer to [facility]    acetaminophen 325 mg oral tablet  -- 2 tab(s) by mouth every 6 hours, As needed, Mild Pain (1 - 3)  -- Indication: For Transfer to [facility]    enoxaparin  -- Indication: For Transfer to [facility]    Lexapro  -- 30 milligram(s) by mouth once a day  -- Indication: For Transfer to [facility]    ondansetron 2 mg/mL injectable solution  --  injectable   -- Indication: For Transfer to [facility]    Lipitor 20 mg oral tablet  -- 1 tab(s) by mouth once a day  -- Indication: For Transfer to [facility]    Lipitor 20 mg oral tablet  -- 1 tab(s) by mouth once a day  -- Indication: For Transfer to [facility]    gemfibrozil 600 mg oral tablet  -- 1 tab(s) by mouth 2 times a day  -- Indication: For Transfer to [facility]    QUEtiapine 50 mg oral tablet  -- 1 tab(s) by mouth once a day (at bedtime)  -- Indication: For Transfer to [facility]    amLODIPine 10 mg oral tablet  -- 1 tab(s) by mouth once a day  -- Indication: For Transfer to [facility]    meropenem 1000 mg intravenous injection  -- 1000 milligram(s) intravenous every 8 hours  -- Indication: For Transfer to [facility]    Dextrose 5% in Water intravenous solution  -- 1000 milliliter(s) intravenous   -- Indication: For Transfer to [facility]    Lactated Ringers Injection intravenous solution  --  intravenous   -- Indication: For Transfer to [facility]    senna oral tablet  -- 2 tab(s) by mouth once a day (at bedtime)  -- Indication: For Transfer to [facility]    polyethylene glycol 3350 oral powder for reconstitution  -- 17 gram(s) by mouth once a day  -- Indication: For Transfer to [facility]    docusate sodium 100 mg oral capsule  -- 1 cap(s) by mouth 2 times a day  -- Indication: For Transfer to [facility]    Multiple Vitamins oral tablet  -- 1 tab(s) by mouth once a day  -- Indication: For Transfer to [facility]    folic acid 1 mg oral tablet  -- 1 tab(s) by mouth once a day  -- Indication: For Transfer to [facility]    Vitamin B1 100 mg oral tablet  -- 1 tab(s) by mouth once a day  -- Indication: For Transfer to [facility]

## 2018-12-05 NOTE — PROGRESS NOTE ADULT - PROBLEM SELECTOR PLAN 5
-chronic, pt follows psych (Dr. Rinaldi) as outpt  - pt states he has been drinking heavily since loss of his son 2 years ago  - continue home seroquel, lexapro  - psych consult with Dr. Mcwilliams called

## 2018-12-05 NOTE — PROGRESS NOTE ADULT - ASSESSMENT
63M PMH HTN, HLD, depression, obesity, diet-controlled DM (A1C 7.0), ROSS (on CPAP), ETOH abuse, BIBEMS from home complaining of gradual onset of upper abdominal pain, radiating to the back with nausea and vomiting, admitted with acute alcoholic pancreatitis, acute alcoholic hepatitis, hypertriglyceridemia, SIRS secondary to acute pancreatitis. Course complicated by prerenal HERIBERTO, ileus, acute hypoxic respiratory failure due to pulmonary edema + abdominal distention, and alcohol withdrawal.

## 2018-12-06 ENCOUNTER — INPATIENT (INPATIENT)
Facility: HOSPITAL | Age: 63
LOS: 17 days | Discharge: NURSING, REHAB OR LONG-TERM CARE | DRG: 871 | End: 2018-12-24
Attending: SURGERY | Admitting: SURGERY
Payer: COMMERCIAL

## 2018-12-06 VITALS
HEART RATE: 109 BPM | RESPIRATION RATE: 19 BRPM | DIASTOLIC BLOOD PRESSURE: 71 MMHG | SYSTOLIC BLOOD PRESSURE: 124 MMHG | OXYGEN SATURATION: 95 %

## 2018-12-06 VITALS — TEMPERATURE: 99 F | HEART RATE: 112 BPM | OXYGEN SATURATION: 92 % | RESPIRATION RATE: 21 BRPM | WEIGHT: 239.86 LBS

## 2018-12-06 DIAGNOSIS — F10.230 ALCOHOL DEPENDENCE WITH WITHDRAWAL, UNCOMPLICATED: ICD-10-CM

## 2018-12-06 DIAGNOSIS — Z98.89 OTHER SPECIFIED POSTPROCEDURAL STATES: Chronic | ICD-10-CM

## 2018-12-06 DIAGNOSIS — Z98.890 OTHER SPECIFIED POSTPROCEDURAL STATES: Chronic | ICD-10-CM

## 2018-12-06 DIAGNOSIS — F32.9 MAJOR DEPRESSIVE DISORDER, SINGLE EPISODE, UNSPECIFIED: ICD-10-CM

## 2018-12-06 DIAGNOSIS — K85.9 ACUTE PANCREATITIS, UNSPECIFIED: ICD-10-CM

## 2018-12-06 DIAGNOSIS — K85.90 ACUTE PANCREATITIS WITHOUT NECROSIS OR INFECTION, UNSPECIFIED: ICD-10-CM

## 2018-12-06 LAB
ALBUMIN SERPL ELPH-MCNC: 2 G/DL — LOW (ref 3.3–5)
ALP SERPL-CCNC: 302 U/L — HIGH (ref 40–120)
ALT FLD-CCNC: 70 U/L — SIGNIFICANT CHANGE UP (ref 12–78)
ANION GAP SERPL CALC-SCNC: 12 MMOL/L — SIGNIFICANT CHANGE UP (ref 5–17)
APPEARANCE UR: CLEAR — SIGNIFICANT CHANGE UP
APTT BLD: 28.1 SEC — LOW (ref 28.5–37)
APTT BLD: 34 SEC — SIGNIFICANT CHANGE UP (ref 27.5–36.3)
AST SERPL-CCNC: 82 U/L — HIGH (ref 15–37)
BASE EXCESS BLDA CALC-SCNC: 1.8 MMOL/L — SIGNIFICANT CHANGE UP (ref -2–2)
BASOPHILS # BLD AUTO: 0 K/UL — SIGNIFICANT CHANGE UP (ref 0–0.2)
BASOPHILS NFR BLD AUTO: 0 % — SIGNIFICANT CHANGE UP (ref 0–2)
BILIRUB SERPL-MCNC: 0.6 MG/DL — SIGNIFICANT CHANGE UP (ref 0.2–1.2)
BILIRUB UR-MCNC: ABNORMAL
BLOOD GAS COMMENTS ARTERIAL: SIGNIFICANT CHANGE UP
BLOOD GAS COMMENTS ARTERIAL: SIGNIFICANT CHANGE UP
BUN SERPL-MCNC: 14 MG/DL — SIGNIFICANT CHANGE UP (ref 7–23)
CALCIUM SERPL-MCNC: 8.1 MG/DL — LOW (ref 8.5–10.1)
CHLORIDE SERPL-SCNC: 98 MMOL/L — SIGNIFICANT CHANGE UP (ref 96–108)
CO2 SERPL-SCNC: 26 MMOL/L — SIGNIFICANT CHANGE UP (ref 22–31)
COLOR SPEC: YELLOW — SIGNIFICANT CHANGE UP
CREAT SERPL-MCNC: 0.75 MG/DL — SIGNIFICANT CHANGE UP (ref 0.5–1.3)
DIFF PNL FLD: NEGATIVE — SIGNIFICANT CHANGE UP
EOSINOPHIL # BLD AUTO: 0 K/UL — SIGNIFICANT CHANGE UP (ref 0–0.5)
EOSINOPHIL NFR BLD AUTO: 0 % — SIGNIFICANT CHANGE UP (ref 0–6)
GAS PNL BLDV: SIGNIFICANT CHANGE UP
GLUCOSE SERPL-MCNC: 158 MG/DL — HIGH (ref 70–99)
GLUCOSE UR QL: 50 MG/DL
HCO3 BLDA-SCNC: 26 MMOL/L — SIGNIFICANT CHANGE UP (ref 23–27)
HCT VFR BLD CALC: 32.7 % — LOW (ref 39–50)
HCT VFR BLD CALC: 33.9 % — LOW (ref 39–50)
HGB BLD-MCNC: 11.2 G/DL — LOW (ref 13–17)
HGB BLD-MCNC: 12 G/DL — LOW (ref 13–17)
HOROWITZ INDEX BLDA+IHG-RTO: 21 — SIGNIFICANT CHANGE UP
INR BLD: 1.48 RATIO — HIGH (ref 0.88–1.16)
INR BLD: 1.52 RATIO — HIGH (ref 0.88–1.16)
KETONES UR-MCNC: ABNORMAL
LEUKOCYTE ESTERASE UR-ACNC: NEGATIVE — SIGNIFICANT CHANGE UP
LIDOCAIN IGE QN: 1324 U/L — HIGH (ref 73–393)
LYMPHOCYTES # BLD AUTO: 1.07 K/UL — SIGNIFICANT CHANGE UP (ref 1–3.3)
LYMPHOCYTES # BLD AUTO: 8 % — LOW (ref 13–44)
MAGNESIUM SERPL-MCNC: 1.9 MG/DL — SIGNIFICANT CHANGE UP (ref 1.6–2.6)
MCHC RBC-ENTMCNC: 33.2 PG — SIGNIFICANT CHANGE UP (ref 27–34)
MCHC RBC-ENTMCNC: 34.3 GM/DL — SIGNIFICANT CHANGE UP (ref 32–36)
MCHC RBC-ENTMCNC: 35.1 PG — HIGH (ref 27–34)
MCHC RBC-ENTMCNC: 35.3 GM/DL — SIGNIFICANT CHANGE UP (ref 32–36)
MCV RBC AUTO: 97 FL — SIGNIFICANT CHANGE UP (ref 80–100)
MCV RBC AUTO: 99.4 FL — SIGNIFICANT CHANGE UP (ref 80–100)
MONOCYTES # BLD AUTO: 0.67 K/UL — SIGNIFICANT CHANGE UP (ref 0–0.9)
MONOCYTES NFR BLD AUTO: 5 % — SIGNIFICANT CHANGE UP (ref 2–14)
NEUTROPHILS # BLD AUTO: 10.96 K/UL — HIGH (ref 1.8–7.4)
NEUTROPHILS NFR BLD AUTO: 69 % — SIGNIFICANT CHANGE UP (ref 43–77)
NITRITE UR-MCNC: NEGATIVE — SIGNIFICANT CHANGE UP
PCO2 BLDA: 34 MMHG — SIGNIFICANT CHANGE UP (ref 32–46)
PH BLDA: 7.48 — HIGH (ref 7.35–7.45)
PH UR: 5 — SIGNIFICANT CHANGE UP (ref 5–8)
PHENOBARB SERPL-MCNC: 11.9 UG/ML — LOW (ref 15–40)
PHOSPHATE SERPL-MCNC: 3.6 MG/DL — SIGNIFICANT CHANGE UP (ref 2.5–4.5)
PLATELET # BLD AUTO: 199 K/UL — SIGNIFICANT CHANGE UP (ref 150–400)
PLATELET # BLD AUTO: 235 K/UL — SIGNIFICANT CHANGE UP (ref 150–400)
PO2 BLDA: 65 MMHG — LOW (ref 74–108)
POTASSIUM SERPL-MCNC: 3.6 MMOL/L — SIGNIFICANT CHANGE UP (ref 3.5–5.3)
POTASSIUM SERPL-SCNC: 3.6 MMOL/L — SIGNIFICANT CHANGE UP (ref 3.5–5.3)
PROT SERPL-MCNC: 6.4 G/DL — SIGNIFICANT CHANGE UP (ref 6–8.3)
PROT UR-MCNC: 25 MG/DL
PROTHROM AB SERPL-ACNC: 17.1 SEC — HIGH (ref 10–12.9)
PROTHROM AB SERPL-ACNC: 17.7 SEC — HIGH (ref 10–12.9)
RBC # BLD: 3.37 M/UL — LOW (ref 4.2–5.8)
RBC # BLD: 3.41 M/UL — LOW (ref 4.2–5.8)
RBC # FLD: 12.2 % — SIGNIFICANT CHANGE UP (ref 10.3–14.5)
RBC # FLD: 13.3 % — SIGNIFICANT CHANGE UP (ref 10.3–14.5)
SAO2 % BLDA: 92 % — SIGNIFICANT CHANGE UP (ref 92–96)
SODIUM SERPL-SCNC: 136 MMOL/L — SIGNIFICANT CHANGE UP (ref 135–145)
SP GR SPEC: 1.01 — SIGNIFICANT CHANGE UP (ref 1.01–1.02)
UROBILINOGEN FLD QL: 1
WBC # BLD: 13.36 K/UL — HIGH (ref 3.8–10.5)
WBC # BLD: 18.3 K/UL — HIGH (ref 3.8–10.5)
WBC # FLD AUTO: 13.36 K/UL — HIGH (ref 3.8–10.5)
WBC # FLD AUTO: 18.3 K/UL — HIGH (ref 3.8–10.5)

## 2018-12-06 PROCEDURE — 99239 HOSP IP/OBS DSCHRG MGMT >30: CPT

## 2018-12-06 PROCEDURE — 82728 ASSAY OF FERRITIN: CPT

## 2018-12-06 PROCEDURE — 80048 BASIC METABOLIC PNL TOTAL CA: CPT

## 2018-12-06 PROCEDURE — 99291 CRITICAL CARE FIRST HOUR: CPT | Mod: 25

## 2018-12-06 PROCEDURE — 85730 THROMBOPLASTIN TIME PARTIAL: CPT

## 2018-12-06 PROCEDURE — 93005 ELECTROCARDIOGRAM TRACING: CPT

## 2018-12-06 PROCEDURE — 82150 ASSAY OF AMYLASE: CPT

## 2018-12-06 PROCEDURE — 70450 CT HEAD/BRAIN W/O DYE: CPT | Mod: 26

## 2018-12-06 PROCEDURE — 83605 ASSAY OF LACTIC ACID: CPT

## 2018-12-06 PROCEDURE — 80307 DRUG TEST PRSMV CHEM ANLYZR: CPT

## 2018-12-06 PROCEDURE — 84478 ASSAY OF TRIGLYCERIDES: CPT

## 2018-12-06 PROCEDURE — 36415 COLL VENOUS BLD VENIPUNCTURE: CPT

## 2018-12-06 PROCEDURE — 99232 SBSQ HOSP IP/OBS MODERATE 35: CPT

## 2018-12-06 PROCEDURE — 87040 BLOOD CULTURE FOR BACTERIA: CPT

## 2018-12-06 PROCEDURE — A9579: CPT

## 2018-12-06 PROCEDURE — 85610 PROTHROMBIN TIME: CPT

## 2018-12-06 PROCEDURE — 71045 X-RAY EXAM CHEST 1 VIEW: CPT | Mod: 26

## 2018-12-06 PROCEDURE — 74183 MRI ABD W/O CNTR FLWD CNTR: CPT

## 2018-12-06 PROCEDURE — 70450 CT HEAD/BRAIN W/O DYE: CPT

## 2018-12-06 PROCEDURE — 74177 CT ABD & PELVIS W/CONTRAST: CPT | Mod: 26

## 2018-12-06 PROCEDURE — 82550 ASSAY OF CK (CPK): CPT

## 2018-12-06 PROCEDURE — 96374 THER/PROPH/DIAG INJ IV PUSH: CPT

## 2018-12-06 PROCEDURE — 93010 ELECTROCARDIOGRAM REPORT: CPT

## 2018-12-06 PROCEDURE — 83690 ASSAY OF LIPASE: CPT

## 2018-12-06 PROCEDURE — 94660 CPAP INITIATION&MGMT: CPT

## 2018-12-06 PROCEDURE — 84100 ASSAY OF PHOSPHORUS: CPT

## 2018-12-06 PROCEDURE — 96375 TX/PRO/DX INJ NEW DRUG ADDON: CPT

## 2018-12-06 PROCEDURE — 85027 COMPLETE CBC AUTOMATED: CPT

## 2018-12-06 PROCEDURE — 71045 X-RAY EXAM CHEST 1 VIEW: CPT

## 2018-12-06 PROCEDURE — 74177 CT ABD & PELVIS W/CONTRAST: CPT

## 2018-12-06 PROCEDURE — 83615 LACTATE (LD) (LDH) ENZYME: CPT

## 2018-12-06 PROCEDURE — 80184 ASSAY OF PHENOBARBITAL: CPT

## 2018-12-06 PROCEDURE — 83735 ASSAY OF MAGNESIUM: CPT

## 2018-12-06 PROCEDURE — 99285 EMERGENCY DEPT VISIT HI MDM: CPT | Mod: 25

## 2018-12-06 PROCEDURE — 94640 AIRWAY INHALATION TREATMENT: CPT

## 2018-12-06 PROCEDURE — 36620 INSERTION CATHETER ARTERY: CPT | Mod: GC

## 2018-12-06 PROCEDURE — 80053 COMPREHEN METABOLIC PANEL: CPT

## 2018-12-06 PROCEDURE — 96376 TX/PRO/DX INJ SAME DRUG ADON: CPT

## 2018-12-06 PROCEDURE — 82803 BLOOD GASES ANY COMBINATION: CPT

## 2018-12-06 PROCEDURE — 99291 CRITICAL CARE FIRST HOUR: CPT

## 2018-12-06 PROCEDURE — 82962 GLUCOSE BLOOD TEST: CPT

## 2018-12-06 PROCEDURE — 87086 URINE CULTURE/COLONY COUNT: CPT

## 2018-12-06 PROCEDURE — 81001 URINALYSIS AUTO W/SCOPE: CPT

## 2018-12-06 PROCEDURE — 83036 HEMOGLOBIN GLYCOSYLATED A1C: CPT

## 2018-12-06 PROCEDURE — 82140 ASSAY OF AMMONIA: CPT

## 2018-12-06 PROCEDURE — 76705 ECHO EXAM OF ABDOMEN: CPT

## 2018-12-06 PROCEDURE — 94760 N-INVAS EAR/PLS OXIMETRY 1: CPT

## 2018-12-06 PROCEDURE — 82248 BILIRUBIN DIRECT: CPT

## 2018-12-06 RX ORDER — CLONAZEPAM 1 MG
1 TABLET ORAL
Qty: 0 | Refills: 0 | COMMUNITY

## 2018-12-06 RX ORDER — ENOXAPARIN SODIUM 100 MG/ML
0 INJECTION SUBCUTANEOUS
Qty: 0 | Refills: 0 | COMMUNITY
Start: 2018-12-06

## 2018-12-06 RX ORDER — SODIUM CHLORIDE 9 MG/ML
1000 INJECTION, SOLUTION INTRAVENOUS
Qty: 0 | Refills: 0 | Status: DISCONTINUED | OUTPATIENT
Start: 2018-12-06 | End: 2018-12-07

## 2018-12-06 RX ORDER — POLYETHYLENE GLYCOL 3350 17 G/17G
17 POWDER, FOR SOLUTION ORAL DAILY
Qty: 0 | Refills: 0 | Status: DISCONTINUED | OUTPATIENT
Start: 2018-12-06 | End: 2018-12-06

## 2018-12-06 RX ORDER — GEMFIBROZIL 600 MG
1 TABLET ORAL
Qty: 0 | Refills: 0 | COMMUNITY
Start: 2018-12-06

## 2018-12-06 RX ORDER — MAGNESIUM SULFATE 500 MG/ML
2 VIAL (ML) INJECTION ONCE
Qty: 0 | Refills: 0 | Status: COMPLETED | OUTPATIENT
Start: 2018-12-06 | End: 2018-12-06

## 2018-12-06 RX ORDER — SODIUM CHLORIDE 9 MG/ML
1000 INJECTION, SOLUTION INTRAVENOUS
Qty: 0 | Refills: 0 | Status: DISCONTINUED | OUTPATIENT
Start: 2018-12-06 | End: 2018-12-06

## 2018-12-06 RX ORDER — SODIUM CHLORIDE 9 MG/ML
1000 INJECTION, SOLUTION INTRAVENOUS
Qty: 0 | Refills: 0 | COMMUNITY
Start: 2018-12-06

## 2018-12-06 RX ORDER — ACETAMINOPHEN 500 MG
650 TABLET ORAL ONCE
Qty: 0 | Refills: 0 | Status: COMPLETED | OUTPATIENT
Start: 2018-12-06 | End: 2018-12-06

## 2018-12-06 RX ORDER — ONDANSETRON 8 MG/1
0 TABLET, FILM COATED ORAL
Qty: 0 | Refills: 0 | COMMUNITY
Start: 2018-12-06

## 2018-12-06 RX ORDER — DOCUSATE SODIUM 100 MG
100 CAPSULE ORAL
Qty: 0 | Refills: 0 | Status: DISCONTINUED | OUTPATIENT
Start: 2018-12-06 | End: 2018-12-06

## 2018-12-06 RX ORDER — QUETIAPINE FUMARATE 200 MG/1
1 TABLET, FILM COATED ORAL
Qty: 0 | Refills: 0 | COMMUNITY

## 2018-12-06 RX ORDER — ASPIRIN/CALCIUM CARB/MAGNESIUM 324 MG
1 TABLET ORAL
Qty: 0 | Refills: 0 | COMMUNITY
Start: 2018-12-06

## 2018-12-06 RX ORDER — SENNA PLUS 8.6 MG/1
2 TABLET ORAL
Qty: 0 | Refills: 0 | COMMUNITY
Start: 2018-12-06

## 2018-12-06 RX ORDER — POLYETHYLENE GLYCOL 3350 17 G/17G
17 POWDER, FOR SOLUTION ORAL
Qty: 0 | Refills: 0 | COMMUNITY
Start: 2018-12-06

## 2018-12-06 RX ORDER — QUINAPRIL HYDROCHLORIDE 40 MG/1
1 TABLET, FILM COATED ORAL
Qty: 0 | Refills: 0 | COMMUNITY

## 2018-12-06 RX ORDER — IOHEXOL 300 MG/ML
500 INJECTION, SOLUTION INTRAVENOUS ONCE
Qty: 0 | Refills: 0 | Status: COMPLETED | OUTPATIENT
Start: 2018-12-06 | End: 2018-12-06

## 2018-12-06 RX ORDER — MEROPENEM 1 G/30ML
1000 INJECTION INTRAVENOUS EVERY 8 HOURS
Qty: 0 | Refills: 0 | Status: DISCONTINUED | OUTPATIENT
Start: 2018-12-06 | End: 2018-12-10

## 2018-12-06 RX ORDER — AMLODIPINE BESYLATE 2.5 MG/1
10 TABLET ORAL DAILY
Qty: 0 | Refills: 0 | Status: DISCONTINUED | OUTPATIENT
Start: 2018-12-06 | End: 2018-12-06

## 2018-12-06 RX ORDER — SENNA PLUS 8.6 MG/1
2 TABLET ORAL AT BEDTIME
Qty: 0 | Refills: 0 | Status: DISCONTINUED | OUTPATIENT
Start: 2018-12-06 | End: 2018-12-06

## 2018-12-06 RX ORDER — POTASSIUM CHLORIDE 20 MEQ
40 PACKET (EA) ORAL ONCE
Qty: 0 | Refills: 0 | Status: COMPLETED | OUTPATIENT
Start: 2018-12-06 | End: 2018-12-06

## 2018-12-06 RX ORDER — QUETIAPINE FUMARATE 200 MG/1
1 TABLET, FILM COATED ORAL
Qty: 0 | Refills: 0 | COMMUNITY
Start: 2018-12-06

## 2018-12-06 RX ORDER — ANASTROZOLE 1 MG/1
1 TABLET ORAL
Qty: 0 | Refills: 0 | COMMUNITY

## 2018-12-06 RX ORDER — MEROPENEM 1 G/30ML
1000 INJECTION INTRAVENOUS EVERY 8 HOURS
Qty: 0 | Refills: 0 | Status: DISCONTINUED | OUTPATIENT
Start: 2018-12-06 | End: 2018-12-06

## 2018-12-06 RX ORDER — SODIUM CHLORIDE 9 MG/ML
0 INJECTION, SOLUTION INTRAVENOUS
Qty: 0 | Refills: 0 | COMMUNITY
Start: 2018-12-06

## 2018-12-06 RX ORDER — ACETAMINOPHEN 500 MG
2 TABLET ORAL
Qty: 0 | Refills: 0 | COMMUNITY
Start: 2018-12-06

## 2018-12-06 RX ORDER — MEROPENEM 1 G/30ML
1000 INJECTION INTRAVENOUS
Qty: 0 | Refills: 0 | COMMUNITY
Start: 2018-12-06

## 2018-12-06 RX ORDER — DOCUSATE SODIUM 100 MG
1 CAPSULE ORAL
Qty: 0 | Refills: 0 | COMMUNITY
Start: 2018-12-06

## 2018-12-06 RX ORDER — METOPROLOL TARTRATE 50 MG
5 TABLET ORAL EVERY 6 HOURS
Qty: 0 | Refills: 0 | Status: DISCONTINUED | OUTPATIENT
Start: 2018-12-06 | End: 2018-12-09

## 2018-12-06 RX ADMIN — HYDROMORPHONE HYDROCHLORIDE 1 MILLIGRAM(S): 2 INJECTION INTRAMUSCULAR; INTRAVENOUS; SUBCUTANEOUS at 05:21

## 2018-12-06 RX ADMIN — Medication 600 MILLIGRAM(S): at 17:51

## 2018-12-06 RX ADMIN — POLYETHYLENE GLYCOL 3350 17 GRAM(S): 17 POWDER, FOR SOLUTION ORAL at 17:51

## 2018-12-06 RX ADMIN — Medication 650 MILLIGRAM(S): at 15:19

## 2018-12-06 RX ADMIN — Medication 1: at 08:02

## 2018-12-06 RX ADMIN — HYDROMORPHONE HYDROCHLORIDE 1 MILLIGRAM(S): 2 INJECTION INTRAMUSCULAR; INTRAVENOUS; SUBCUTANEOUS at 10:34

## 2018-12-06 RX ADMIN — Medication 50 GRAM(S): at 08:21

## 2018-12-06 RX ADMIN — ENOXAPARIN SODIUM 40 MILLIGRAM(S): 100 INJECTION SUBCUTANEOUS at 12:15

## 2018-12-06 RX ADMIN — HYDROMORPHONE HYDROCHLORIDE 1 MILLIGRAM(S): 2 INJECTION INTRAMUSCULAR; INTRAVENOUS; SUBCUTANEOUS at 09:14

## 2018-12-06 RX ADMIN — ESCITALOPRAM OXALATE 20 MILLIGRAM(S): 10 TABLET, FILM COATED ORAL at 12:16

## 2018-12-06 RX ADMIN — MEROPENEM 100 MILLIGRAM(S): 1 INJECTION INTRAVENOUS at 18:30

## 2018-12-06 RX ADMIN — SODIUM CHLORIDE 100 MILLILITER(S): 9 INJECTION, SOLUTION INTRAVENOUS at 17:52

## 2018-12-06 RX ADMIN — Medication 64.8 MILLIGRAM(S): at 05:44

## 2018-12-06 RX ADMIN — AMLODIPINE BESYLATE 10 MILLIGRAM(S): 2.5 TABLET ORAL at 12:16

## 2018-12-06 RX ADMIN — HYDROMORPHONE HYDROCHLORIDE 1 MILLIGRAM(S): 2 INJECTION INTRAMUSCULAR; INTRAVENOUS; SUBCUTANEOUS at 18:30

## 2018-12-06 RX ADMIN — HYDROMORPHONE HYDROCHLORIDE 1 MILLIGRAM(S): 2 INJECTION INTRAMUSCULAR; INTRAVENOUS; SUBCUTANEOUS at 04:51

## 2018-12-06 RX ADMIN — Medication 81 MILLIGRAM(S): at 12:16

## 2018-12-06 RX ADMIN — Medication 650 MILLIGRAM(S): at 04:11

## 2018-12-06 RX ADMIN — Medication 600 MILLIGRAM(S): at 05:44

## 2018-12-06 RX ADMIN — Medication 40 MILLIEQUIVALENT(S): at 08:22

## 2018-12-06 RX ADMIN — Medication 100 MILLIGRAM(S): at 17:51

## 2018-12-06 RX ADMIN — Medication 650 MILLIGRAM(S): at 05:11

## 2018-12-06 RX ADMIN — Medication 100 MILLIGRAM(S): at 12:16

## 2018-12-06 RX ADMIN — Medication 1 TABLET(S): at 12:16

## 2018-12-06 RX ADMIN — Medication 1: at 17:50

## 2018-12-06 RX ADMIN — IOHEXOL 500 MILLILITER(S): 300 INJECTION, SOLUTION INTRAVENOUS at 11:02

## 2018-12-06 RX ADMIN — MEROPENEM 100 MILLIGRAM(S): 1 INJECTION INTRAVENOUS at 12:15

## 2018-12-06 RX ADMIN — Medication 1 MILLIGRAM(S): at 12:15

## 2018-12-06 NOTE — CONSULT NOTE ADULT - ASSESSMENT
64 y/o M with PMHx of alcohol induced Pancreatitis, AODM (diet-controlled) who was admitted for abdominal pain, found to have acute pancreatitis likely secondary to hypertriglyceridemia as his relatively normal ETOH levels and no signs of biliary obstruction, now with possible infected pancreatitis, developing pseudocysts/ abscesses  - repeat CT Scan today  - continue lopid to treat hypertriglyceridemia  - IV fluid hydration  - would make patient NPO at this time due to possibly worsening condition of pancreatitis  - consider tiger tube ( post jejunal feeding tube ) if imaging does not show developing ileus  - continue meropenem  - follow up blood cultures

## 2018-12-06 NOTE — PROGRESS NOTE ADULT - SUBJECTIVE AND OBJECTIVE BOX
Brief interval history: 62yo male presenting to the Emergency Department with new onset mid epigastric pain 10/10 that began day prior to admission. He has similar pain in May 2018 and had been diagnosed with pancreatitis. CT scan and US abdomen were performed with evidence of pancreatitis again this admit. He admits to drinking heavily over the past month. He drinks Vodka in excess for past 2 years after the loss of his son.  Pt was admitted to ICU for aggressive resuscitation and EtOH withdrawal.    24 hour events: Phenobarbital for EtOH withdrawal changed to PO and D/C'd PRN Phenobarbital. Pt MRCP showed necrosis of pancreatic tail, possible pseudocysts, and renal vein occlusion. Pt spiked fever of 100.7 at 20:00 last night and showing bandemia today.     Review of Systems:  Constitutional: No fever, chills, fatigue  Neuro: No headache, numbness, weakness  Resp: + shortness of breath, wheezing. No cough  CVS: No chest pain, palpitations, leg swelling  GI: + abdominal pain. No nausea, vomiting, diarrhea   : No dysuria, frequency, incontinence  Skin: No itching, burning, rashes, or lesions   Msk: No joint pain or swelling  Psych: No depression, anxiety, mood swings    ICU Vital Signs Last 24 Hrs  T(C): 37.1 (06 Dec 2018 08:06), Max: 38.2 (05 Dec 2018 20:44)  T(F): 98.7 (06 Dec 2018 08:06), Max: 100.7 (05 Dec 2018 20:44)  HR: 104 (06 Dec 2018 06:00) (87 - 122)  BP: 132/63 (06 Dec 2018 06:00) (116/64 - 149/65)  BP(mean): 91 (06 Dec 2018 06:00) (84 - 97)  ABP: --  ABP(mean): --  RR: 15 (06 Dec 2018 06:00) (15 - 32)  SpO2: 94% (06 Dec 2018 06:00) (91% - 97%)    POCT Blood Glucose.: 160 mg/dL (06 Dec 2018 07:44)      I&O's Summary    05 Dec 2018 07:01  -  06 Dec 2018 07:00  --------------------------------------------------------  IN: 860 mL / OUT: 1300 mL / NET: -440 mL      Physical Exam:   Gen: Pt A&O x2 resting comfortably. Pt appears stated age with some increased work of breathing.   Neuro: GCS 14 (4/4/6). CN II-XII grossly intact. Moves all extremities spontaneously. Strength 5/5 in all extremities.   HEENT: Head: normocephalic atraumatic. Eyes: PERRL 3mm and reactive. Mouth/Throat: Moist mucous membranes with no erythema or hives noted.  Resp: + decreased breath sounds in all fields L > R with wheezing throughout. No crackles.  CVS: +S1/S2, no murmurs, rubs, or gallops  Abd: Distended, tender to palpation LUQ  Ext: 2+ pulses in all extremities, neurovascularly intact  Skin: No lesions, ecchymoses, rashes    MEDICATIONS  (STANDING):  aspirin  chewable 81 milliGRAM(s) Oral daily  atorvastatin 20 milliGRAM(s) Oral at bedtime  dextrose 5%. 1000 milliLiter(s) (50 mL/Hr) IV Continuous <Continuous>  dextrose 50% Injectable 12.5 Gram(s) IV Push once  dextrose 50% Injectable 25 Gram(s) IV Push once  dextrose 50% Injectable 25 Gram(s) IV Push once  enoxaparin Injectable 40 milliGRAM(s) SubCutaneous daily  escitalopram 20 milliGRAM(s) Oral daily  folic acid Injectable 1 milliGRAM(s) IV Push daily  gemfibrozil 600 milliGRAM(s) Oral two times a day  influenza   Vaccine 0.5 milliLiter(s) IntraMuscular once  insulin lispro (HumaLOG) corrective regimen sliding scale   SubCutaneous three times a day before meals  insulin lispro (HumaLOG) corrective regimen sliding scale   SubCutaneous at bedtime  magnesium sulfate  IVPB 2 Gram(s) IV Intermittent once  multivitamin 1 Tablet(s) Oral daily  PHENobarbital 64.8 milliGRAM(s) Oral every 12 hours  potassium chloride    Tablet ER 40 milliEquivalent(s) Oral once  QUEtiapine 50 milliGRAM(s) Oral at bedtime  thiamine Injectable 100 milliGRAM(s) IV Push daily    MEDICATIONS  (PRN):  acetaminophen   Tablet .. 650 milliGRAM(s) Oral every 6 hours PRN Mild Pain (1 - 3)  dextrose 40% Gel 15 Gram(s) Oral once PRN Blood Glucose LESS THAN 70 milliGRAM(s)/deciliter  glucagon  Injectable 1 milliGRAM(s) IntraMuscular once PRN Glucose LESS THAN 70 milligrams/deciliter  HYDROmorphone  Injectable 1 milliGRAM(s) IV Push every 4 hours PRN Severe Pain (7 - 10)  HYDROmorphone  Injectable 0.5 milliGRAM(s) IV Push every 4 hours PRN Moderate Pain (4 - 6)  ondansetron Injectable 4 milliGRAM(s) IV Push every 6 hours PRN Nausea and/or Vomiting        Labs:                         11.2   13.36 )-----------( 199      ( 06 Dec 2018 05:39 )             32.7     12-06    136  |  98  |  14  ----------------------------<  158<H>  3.6   |  26  |  0.75    Ca    8.1<L>      06 Dec 2018 05:39  Phos  3.6     12-06  Mg     1.9     12-06    TPro  6.4  /  Alb  2.0<L>  /  TBili  0.6  /  DBili  x   /  AST  82<H>  /  ALT  70  /  AlkPhos  302<H>  12-06    PT/INR - ( 06 Dec 2018 05:39 )   PT: 17.1 sec;   INR: 1.48 ratio       PTT - ( 06 Dec 2018 05:39 )  PTT:28.1 sec      Radiology:   EXAM:  MR MRCP [facility]                            PROCEDURE DATE:  12/05/2018          INTERPRETATION:  History: Acute pancreatitis with phlegmonous change   possible necrosis on recent CT of 12/1/2018.    Noncontrast MRCP and multiphasic contrast enhanced abdominal MR 10 cc   gadolinium injected intravenously.    Study is  limited. Suboptimal image quality due to patient's inability to   offer requisite cooperation.  Pancreas diffusely enlarged and edematous consistent with the history of   pancreatitis. Moderate size area of hypoenhancement pancreatic tail   consistent with  pancreatic necrosis. No pancreatic ductal dilatation.   There are nonspecific evolving organizing fluid collections in the lesser   sac measuring 10.5 x 5.6 cm, and in the left anterior pararenal space   adjacent to the tail of pancreas measuring 5.2 x 4.8 cm. These could   represent evolving pseudocysts with or without infection. The splenic   vein is occluded. Portal vein is patent.  No gallstones or biliary dilatation. No common duct stone. Diffuse fatty   liver. Spleen not remarkable.  Right renal cysts.    Impression:    Technically limited secondary to uncooperative patient.  Severe acute/subacute pancreatitis with pancreatic tail necrosis.   Evolving fluid collections as described. Infected necrosis, infected   collections not excluded by the imaging appearance alone.  Occluded splenic vein.    Findings discussed with the JORGE Voss prior to this dictation prior to this dictation                MARA VELA M.D., ATTENDING RADIOLOGIST  This document has been electronically signed. Dec  5 2018  3:28PM      CENTRAL LINE: N  PEOPLES: N  A-LINE: N    GLOBAL ISSUE/BEST PRACTICE:  Analgesia: Hydromorphone PRN  Sedation: N  CAM-ICU:   HOB elevation: N    Stress ulcer prophylaxis: N  VTE prophylaxis: Enoxaparin  Glycemic control: ISS    Nutrition: DM and Low fat diet    CODE STATUS: Full code

## 2018-12-06 NOTE — PROGRESS NOTE ADULT - ASSESSMENT
62 y/o male admitted for Pancreatitis and EtOH withdrawal.     Neuro:  - Agitation: Much improved. Likely due to EtOH withdrawal. D/C'd 1:1 monitoring of Pt yesterday. C/w home medication of Seroquel 50 mg at bedtime, Escitalopram 20 mg daily.  - EtOH withdrawal: Improving. May change standing Phenobarbital 64.8 mg PO q12 hours standing to PO. D/C PRN Phenobarbital 130 mg Continue to monitor agitation level. C/w thiamine and folic acid.  - Pain: Will start alternative analgesic if needed.  CV: Stable. C/w ASA and Atorvastatin. Hold Amlodipine.  Pulm: Hypoxia likely due to pulmonary edema 2/2 IVF hydration and abdominal distention. Comfortable on nasal cannula. Will start home CPAP settings when sleeping C/w incentive spirometry.  GI:  - Pancreatitis: EtOH vs. hypertriglyceridemia. Adalgisa's Criteria on admission 3, Cumulative score 6 points = 40% mortality. Discriminant Function <32 today. C/w Gemfibrozil. MRCP showed pancreatic tail necrosis, possible pseudocysts with or without infection, occluded splenic vein.   Nausea: C/w Zofran PRN.  - Diet: DM low fat diet.  Renal: Will replete K and Mg.   Endo: DM - FS running slightly elevated. C/w insulin coverage.  Heme: Elevated ferritin on admission. Appreciated heme recommendation for follow up ferritin level in one week and follow up outpatient.   ID: 100.7 fever overnight, bandemia, tachycardic. Will begin Ceftazidime and Metronidazole for necrotic pancreatitis.  DVT PPX: Enoxaparin SQ  Ethics: Full code 64 y/o male admitted for Pancreatitis and EtOH withdrawal.     Neuro:  - Agitation: Resolved. Was likely due to EtOH withdrawal. C/w home medication of Seroquel 50 mg at bedtime, Escitalopram 20 mg daily.  - EtOH withdrawal: Improving. D/C phenobarbital PO. C/w thiamine and folic acid.  - Pain: Hydromorphone and acetaminophen PRN.  CV: Pt was hypertensive overnight. C/w ASA and Atorvastatin. Will begin home dose of Amlodipine 10 mg.  Pulm: Hypoxia likely due to pulmonary edema 2/2 IVF hydration and abdominal distention. Comfortable on nasal cannula. C/w home CPAP settings when sleeping C/w incentive spirometry.  GI:  - Pancreatitis: EtOH vs. hypertriglyceridemia. Adalgisa's Criteria on admission 3, Cumulative score 6 points = 40% mortality. Discriminant Function <32 today. C/w Gemfibrozil. MRCP showed pancreatic tail necrosis, possible pseudocysts with or without infection, occluded splenic vein. Will f/u Lipase, Triglycerides. Will obtain CT with oral and IV contrast to assess pancreatic pathology further.  Nausea: C/w Zofran PRN.  - Diet: NPO today for CT and possibility of surgical intervention for Pancreatitis.  Renal: Will replete K and Mg.   Endo: DM - FS running slightly elevated. C/w insulin coverage.  Heme: Elevated ferritin on admission. Appreciated heme recommendation for follow up ferritin level in one week and follow up outpatient.   ID: 100.7 fever overnight, bandemia, tachycardic. Will draw blood cultures, obtain UA and chest X-ray. Then will begin Meropenem 1 g q8.   DVT PPX: Enoxaparin SQ  Ethics: Full code 64 y/o male admitted for Pancreatitis and EtOH withdrawal.     Neuro:  - Agitation: Resolved. Was likely due to EtOH withdrawal. C/w home medication of Seroquel 50 mg at bedtime, Escitalopram 20 mg daily.  - EtOH withdrawal: Improving. D/C phenobarbital PO. C/w thiamine and folic acid.  - Pain: Hydromorphone and acetaminophen PRN.  CV: Pt was hypertensive overnight. C/w ASA and Atorvastatin. Will begin home dose of Amlodipine 10 mg.  Pulm: Hypoxia likely due to pulmonary edema 2/2 IVF hydration and abdominal distention. Comfortable on nasal cannula. C/w home CPAP settings when sleeping C/w incentive spirometry.  GI:  - Pancreatitis: EtOH vs. hypertriglyceridemia. Adalgisa's Criteria on admission 3, Cumulative score 6 points = 40% mortality. Discriminant Function <32 today. C/w Gemfibrozil. MRCP showed pancreatic tail necrosis, possible pseudocysts with or without infection, occluded splenic vein. Will f/u Lipase, Triglycerides. Will obtain CT with oral and IV contrast to assess pancreatic pathology further.  Nausea: C/w Zofran PRN.  - Diet: NPO today for CT and possibility of surgical intervention for Pancreatitis.  Renal: Will replete K and Mg.   Endo: DM - FS running slightly elevated. C/w insulin coverage.  Heme: Elevated ferritin on admission. Appreciated heme recommendation for follow up ferritin level in one week and follow up outpatient.   ID: 100.7 fever overnight, bandemia, tachycardic. Pt received acetaminophen overnight which may be masking further fever. Will draw blood cultures, obtain UA and chest X-ray. Then will begin Meropenem 1 g q8.   DVT PPX: Enoxaparin SQ  Ethics: Full code

## 2018-12-06 NOTE — PROGRESS NOTE ADULT - PROBLEM SELECTOR PLAN 2
[facility]   etoh abstinence
- Likely secondary to alcohol abuse and hypertriglyceridemia   -CT findings as above  - pt with similar episode in march, counseled on heavy drinking cessation, with instruction to f/u with GI and detox center  - Dilaudid 1mg IVP q3h for severe pain  - mgmt per ICU
- Likely secondary to alcohol abuse and hypertriglyceridemia  - pt with similar episode in 3/2018, counseled on heavy drinking cessation, with instruction to f/u with GI and detox center  - GI is following, spoke w/ PA today  - NPO
- Likely secondary to alcohol abuse and hypertriglyceridemia  - pt with similar episode in march, counseled on heavy drinking cessation, with instruction to f/u with GI and detox center  - Dilaudid 1mg IVP q3h for severe pain  - mgmt per ICU
- Likely secondary to alcohol abuse and hypertriglyceridemia  - pt with similar episode in march, counseled on heavy drinking cessation, with instruction to f/u with GI and detox center  -Will eventually need MRCP. GI f/u
CT findings as above  - pt with similar episode in march, counseled on heavy drinking cessation, with instruction to f/u with GI and detox center  - Dilaudid 1mg IVP q3h for severe pain  - mgmt per ICU
[facility]   etoh abstinence

## 2018-12-06 NOTE — PROGRESS NOTE ADULT - PROBLEM SELECTOR PLAN 1
Likely secondary to acute pancreatitis  Meropenem added today by ICU, agree w/ this given necrotic pancreas and new fever  f/u cultures  consider ID eval

## 2018-12-06 NOTE — CONSULT NOTE ADULT - SUBJECTIVE AND OBJECTIVE BOX
contact information: Office: [phone] Cell: [phone]    GENERAL SURGERY CONSULT NOTE    Patient is a 63y old  Male who presents with a chief complaint of pancreatitis (06 Dec 2018 10:45)      HPI:  Pt is a 64 y/o M with PMHx of alcohol induced Pancreatitis, AODM (diet-controlled), Depression, HLD, HTN, BIBEMS from home complaining of gradual onset of upper abdominal pain, radiating to the back with nausea and vomiting. Pain started last night, rated 10/10. Pt states he took Advil and peptobismol to no relief. Of note pt states he had a similar episode in May when he was diagnosed with pancreatitis. Pt states he had a sonogram one month ago that did show gall stones. 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     In the ED: VS T 98.3 HR 98 /70 RR18 98%RA. Labs significant for WBC 12.69, Na 130, K 5.6, Cl 94, CO2 19, glu 197, Ca 7.5, Alb 3.2, , , , lipase 12,651, amylase 358, lactate 2.3. CXR WNL. US GB showing Cholelithiasis and gallbladder sludge. Borderline common duct dilatation. CT Abd/pelvis showing Severe acute phlegmonous pancreatitis. Early pancreatic necrosis not excluded. Patient was started on Zosyn,     Patient seen at bedside in ICU bed 12. Patient currently with no complaints at this time but does admit to 4/10, periumbilical abdominal pain with some radiation to his back.  Patient does state he had received pain medication which helps his pain. Patient denies any fevers, chills, nausea, vomiting, chest pain, shortness of breath.     PAST MEDICAL & SURGICAL HISTORY:  Low testosterone in male  Depression  Insomnia  HLD (hyperlipidemia)  HTN (hypertension)  Low testosterone level in male  Sprain of right rotator cuff capsule, subsequent encounter  Borderline diabetes mellitus: last A1c unknown  Low back pain, unspecified back pain laterality, unspecified chronicity, with sciatica presence unspecified  Lumbar herniated disc  Depression, unspecified depression type: Lost his son this past June 2016  Essential hypertension  Sleep apnea, unspecified type  Obesity (BMI 30-39.9)  H/O knee surgery: b/l knees  H/O shoulder surgery: right  S/P arthroscopy of right knee: 2010  S/P right inguinal hernia repair: 2010  S/P ACL repair: left knee 2010  S/P rotator cuff repair: Right shoulder 2008      Review of Systems:    I have reviewed 9 systems with the patient and the only positive findings were     MEDICATIONS  (STANDING):  amLODIPine   Tablet 10 milliGRAM(s) Oral daily  aspirin  chewable 81 milliGRAM(s) Oral daily  atorvastatin 20 milliGRAM(s) Oral at bedtime  dextrose 5%. 1000 milliLiter(s) (50 mL/Hr) IV Continuous <Continuous>  dextrose 50% Injectable 12.5 Gram(s) IV Push once  dextrose 50% Injectable 25 Gram(s) IV Push once  dextrose 50% Injectable 25 Gram(s) IV Push once  docusate sodium 100 milliGRAM(s) Oral two times a day  enoxaparin Injectable 40 milliGRAM(s) SubCutaneous daily  escitalopram 20 milliGRAM(s) Oral daily  folic acid Injectable 1 milliGRAM(s) IV Push daily  gemfibrozil 600 milliGRAM(s) Oral two times a day  influenza   Vaccine 0.5 milliLiter(s) IntraMuscular once  insulin lispro (HumaLOG) corrective regimen sliding scale   SubCutaneous three times a day before meals  insulin lispro (HumaLOG) corrective regimen sliding scale   SubCutaneous at bedtime  meropenem  IVPB 1000 milliGRAM(s) IV Intermittent every 8 hours  multivitamin 1 Tablet(s) Oral daily  polyethylene glycol 3350 17 Gram(s) Oral daily  QUEtiapine 50 milliGRAM(s) Oral at bedtime  senna 2 Tablet(s) Oral at bedtime  thiamine Injectable 100 milliGRAM(s) IV Push daily    MEDICATIONS  (PRN):  acetaminophen   Tablet .. 650 milliGRAM(s) Oral every 6 hours PRN Mild Pain (1 - 3)  dextrose 40% Gel 15 Gram(s) Oral once PRN Blood Glucose LESS THAN 70 milliGRAM(s)/deciliter  glucagon  Injectable 1 milliGRAM(s) IntraMuscular once PRN Glucose LESS THAN 70 milligrams/deciliter  HYDROmorphone  Injectable 1 milliGRAM(s) IV Push every 4 hours PRN Severe Pain (7 - 10)  HYDROmorphone  Injectable 0.5 milliGRAM(s) IV Push every 4 hours PRN Moderate Pain (4 - 6)  ondansetron Injectable 4 milliGRAM(s) IV Push every 6 hours PRN Nausea and/or Vomiting      Allergies    No Known Allergies    Intolerances        SOCIAL HISTORY          Smoking: Yes [ ]  No [x ]   ______pk yrs          ETOH  Yes [x ]  No [ ]  Social [ ]          DRUGS:  Yes [ ]  No [x ]  if so what______________    FAMILY HISTORY:  Family history of squamous cell carcinoma: of parotid  Family history of hypertension  Family history of cancer (Father)      Vital Signs Last 24 Hrs  T(C): 37.1 (06 Dec 2018 08:06), Max: 38.2 (05 Dec 2018 20:44)  T(F): 98.7 (06 Dec 2018 08:06), Max: 100.7 (05 Dec 2018 20:44)  HR: 111 (06 Dec 2018 11:00) (102 - 122)  BP: 138/68 (06 Dec 2018 11:00) (127/65 - 151/70)  BP(mean): 96 (06 Dec 2018 11:00) (85 - 100)  RR: 21 (06 Dec 2018 11:00) (15 - 32)  SpO2: 94% (06 Dec 2018 11:00) (90% - 97%)    Physical Exam:    General:  In NAD  Eyes : TRICIA  HENT:  WNL, no JVD  Chest:  clear breath sounds  Cardiovascular:  Regular rate & rhythm  Abdomen:  softly distended, mild tenderness to umbilical region, no hernias noted, + tympany to percussion, + BS  Extremities:  no edema noted, no calf tenderness  Skin:  No rash  Musculoskeletal:  normal strength  Neuro/Psych:  Alert, oriented tp time, place and person       LABS:                        11.2   13.36 )-----------( 199      ( 06 Dec 2018 05:39 )             32.7     12-06    136  |  98  |  14  ----------------------------<  158<H>  3.6   |  26  |  0.75    Ca    8.1<L>      06 Dec 2018 05:39  Phos  3.6     12-06  Mg     1.9     12-06    TPro  6.4  /  Alb  2.0<L>  /  TBili  0.6  /  DBili  x   /  AST  82<H>  /  ALT  70  /  AlkPhos  302<H>  12-06    PT/INR - ( 06 Dec 2018 05:39 )   PT: 17.1 sec;   INR: 1.48 ratio         PTT - ( 06 Dec 2018 05:39 )  PTT:28.1 sec      RADIOLOGY & ADDITIONAL STUDIES:    Risks, benefits, and alternatives to treatment discussed. All questions answered with understanding.

## 2018-12-06 NOTE — BEHAVIORAL HEALTH ASSESSMENT NOTE - DETAILS
Son had multiple emotional problems, and drug dependence Son had polysubstance dependence Abdominal pain

## 2018-12-06 NOTE — PROVIDER CONTACT NOTE (CRITICAL VALUE NOTIFICATION) - ACTION/TREATMENT ORDERED:
ICU PA aware
No orders received at this time
fluids infusing - awaiting other orders
Cont. current treatment at present.
no orders at this time
see order.

## 2018-12-06 NOTE — PROGRESS NOTE ADULT - PROBLEM SELECTOR PROBLEM 1
Acute pancreatitis with infected necrosis, unspecified pancreatitis type
SIRS (systemic inflammatory response syndrome)
Acute pancreatitis with infected necrosis, unspecified pancreatitis type

## 2018-12-06 NOTE — PROGRESS NOTE ADULT - PROVIDER SPECIALTY LIST ADULT
Critical Care
Gastroenterology
Hospitalist
Internal Medicine
Critical Care

## 2018-12-06 NOTE — PROGRESS NOTE ADULT - REASON FOR ADMISSION
Acute Pancreatitis
pancreatitis

## 2018-12-06 NOTE — PROGRESS NOTE ADULT - SUBJECTIVE AND OBJECTIVE BOX
Patient is a 63y old  Male who presents with a chief complaint of pancreatitis (06 Dec 2018 11:57)      FROM ADMISSION H+P:   HPI:  Pt is a 62 y/o M with PMHx of alcohol induced Pancreatitis, AODM (diet-controlled), Depression, HLD, HTN, BIBEMS from home complaining of gradual onset of upper abdominal pain, radiating to the back with nausea and vomiting. Pain started last night, rated 10/10. Pt states he took Advil and peptobismol to no relief. Of note pt states he had a similar episode in May when he was diagnosed with pancreatitis. Pt states he had a sonogram one month ago that did show gall stones. 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 Pt admits abd pain radiating to back, N/V, and blood streaked stool with BRBPR. Denies CP, SOB, fever, chills, urinary symptoms.     In the ED: VS T 98.3 HR 98 /70 RR18 98%RA. Labs significant for WBC 12.69, Na 130, K 5.6, Cl 94, CO2 19, glu 197, Ca 7.5, Alb 3.2, , , , lipase 12,651, amylase 358, lactate 2.3. CXR WNL. US GB showing Cholelithiasis and gallbladder sludge. Borderline common duct dilatation. CT Abd/pelvis showing Severe acute phlegmonous pancreatitis. Early pancreatic necrosis not excluded. Pt was given 1x dose zosyn, 3L NS bolus, 2x doses Dilaudid, 1x dose morphine, 2x doses Zofran. UCx sent. Pt evaluated and admitted to ICU. (01 Dec 2018 11:59)      ----  INTERVAL HPI/OVERNIGHT EVENTS: Pt seen and evaluated at the bedside. No acute overnight events occurred. C/o abd pain today but slightly better than previous few days. Pt febrile overnight, blood cultures were drawn this morning and pt was started on meropenem. No other complaints at this time. Denies anxiety. Calm, not agitated. No other focal complaints at this time.     ----  PAST MEDICAL & SURGICAL HISTORY:  Low testosterone in male  Depression  Insomnia  HLD (hyperlipidemia)  HTN (hypertension)  Low testosterone level in male  Sprain of right rotator cuff capsule, subsequent encounter  Borderline diabetes mellitus: last A1c unknown  Low back pain, unspecified back pain laterality, unspecified chronicity, with sciatica presence unspecified  Lumbar herniated disc  Depression, unspecified depression type: Lost his son this past June 2016  Essential hypertension  Sleep apnea, unspecified type  Obesity (BMI 30-39.9)  H/O knee surgery: b/l knees  H/O shoulder surgery: right  S/P arthroscopy of right knee: 2010  S/P right inguinal hernia repair: 2010  S/P ACL repair: left knee 2010  S/P rotator cuff repair: Right shoulder 2008      FAMILY HISTORY:  Family history of squamous cell carcinoma: of parotid  Family history of hypertension  Family history of cancer (Father)      ----  MEDICATIONS  (STANDING):  amLODIPine   Tablet 10 milliGRAM(s) Oral daily  aspirin  chewable 81 milliGRAM(s) Oral daily  atorvastatin 20 milliGRAM(s) Oral at bedtime  dextrose 5%. 1000 milliLiter(s) (50 mL/Hr) IV Continuous <Continuous>  dextrose 50% Injectable 12.5 Gram(s) IV Push once  dextrose 50% Injectable 25 Gram(s) IV Push once  dextrose 50% Injectable 25 Gram(s) IV Push once  docusate sodium 100 milliGRAM(s) Oral two times a day  enoxaparin Injectable 40 milliGRAM(s) SubCutaneous daily  escitalopram 20 milliGRAM(s) Oral daily  folic acid Injectable 1 milliGRAM(s) IV Push daily  gemfibrozil 600 milliGRAM(s) Oral two times a day  influenza   Vaccine 0.5 milliLiter(s) IntraMuscular once  insulin lispro (HumaLOG) corrective regimen sliding scale   SubCutaneous three times a day before meals  insulin lispro (HumaLOG) corrective regimen sliding scale   SubCutaneous at bedtime  meropenem  IVPB 1000 milliGRAM(s) IV Intermittent every 8 hours  multivitamin 1 Tablet(s) Oral daily  polyethylene glycol 3350 17 Gram(s) Oral daily  QUEtiapine 50 milliGRAM(s) Oral at bedtime  senna 2 Tablet(s) Oral at bedtime  thiamine Injectable 100 milliGRAM(s) IV Push daily    MEDICATIONS  (PRN):  acetaminophen   Tablet .. 650 milliGRAM(s) Oral every 6 hours PRN Mild Pain (1 - 3)  dextrose 40% Gel 15 Gram(s) Oral once PRN Blood Glucose LESS THAN 70 milliGRAM(s)/deciliter  glucagon  Injectable 1 milliGRAM(s) IntraMuscular once PRN Glucose LESS THAN 70 milligrams/deciliter  HYDROmorphone  Injectable 1 milliGRAM(s) IV Push every 4 hours PRN Severe Pain (7 - 10)  HYDROmorphone  Injectable 0.5 milliGRAM(s) IV Push every 4 hours PRN Moderate Pain (4 - 6)  ondansetron Injectable 4 milliGRAM(s) IV Push every 6 hours PRN Nausea and/or Vomiting      ----  REVIEW OF SYSTEMS:  CONSTITUTIONAL: denies chills - admits fatigue and generalized weakness  HEENT: denies blurred vision, sore throat  SKIN: denies new lesions, rash  CARDIOVASCULAR: denies chest pain, chest pressure, palpitations  RESPIRATORY: denies shortness of breath, sputum production  GASTROINTESTINAL: as per HPI  GENITOURINARY: denies dysuria, discharge  NEUROLOGICAL: denies numbness, headache, focal weakness  MUSCULOSKELETAL: denies new joint pain, muscle aches  HEMATOLOGIC: denies gross bleeding, bruising  LYMPHATICS: denies enlarged lymph nodes, extremity swelling  ENDOCRINOLOGIC: denies sweating, cold or heat intolerance    ----  PHYSICAL EXAM:  GENERAL: patient appears sedated, no acute distress  EYES: sclera clear, no exudates  ENMT: oropharynx clear without erythema, no exudates, dry mucous membranes  NECK: supple, soft, no thyromegaly noted  LUNGS: reduced air entry bilaterally, clear to auscultation, symmetric breath sounds, no wheezing or rhonchi appreciated  HEART: soft S1/S2, regular rate and rhythm, no murmurs noted, no lower extremity edema  GASTROINTESTINAL: abdomen is obese, diffusely tender to palpation, mild mid abdominal distention, hypoactive bowel sounds, no palpable masses  INTEGUMENT: good skin turgor, no lesions noted  MUSCULOSKELETAL: no clubbing or cyanosis, no obvious deformity  NEUROLOGIC: awake, alert, answers simple questions appropriately, good muscle tone in 4 extremities, no obvious sensory deficits  HEME/LYMPH: no palpable supraclavicular nodules, no obvious ecchymosis or petechiae     T(C): 38.6 (12-06-18 @ 14:08), Max: 38.6 (12-06-18 @ 14:08)  HR: 120 (12-06-18 @ 14:08) (102 - 122)  BP: 136/92 (12-06-18 @ 14:00) (127/65 - 155/72)  RR: 26 (12-06-18 @ 14:08) (15 - 32)  SpO2: 95% (12-06-18 @ 14:08) (90% - 97%)  Wt(kg): --    ----  I&O's Summary    05 Dec 2018 07:01  -  06 Dec 2018 07:00  --------------------------------------------------------  IN: 860 mL / OUT: 1300 mL / NET: -440 mL    06 Dec 2018 07:01  -  06 Dec 2018 14:43  --------------------------------------------------------  IN: 1300 mL / OUT: 500 mL / NET: 800 mL        LABS:                        11.2   13.36 )-----------( 199      ( 06 Dec 2018 05:39 )             32.7     12-06    136  |  98  |  14  ----------------------------<  158<H>  3.6   |  26  |  0.75    Ca    8.1<L>      06 Dec 2018 05:39  Phos  3.6     12-06  Mg     1.9     12-06    TPro  6.4  /  Alb  2.0<L>  /  TBili  0.6  /  DBili  x   /  AST  82<H>  /  ALT  70  /  AlkPhos  302<H>  12-06    PT/INR - ( 06 Dec 2018 05:39 )   PT: 17.1 sec;   INR: 1.48 ratio         PTT - ( 06 Dec 2018 05:39 )  PTT:28.1 sec    CAPILLARY BLOOD GLUCOSE      POCT Blood Glucose.: 149 mg/dL (06 Dec 2018 12:13)  POCT Blood Glucose.: 160 mg/dL (06 Dec 2018 07:44)  POCT Blood Glucose.: 144 mg/dL (05 Dec 2018 21:55)  POCT Blood Glucose.: 157 mg/dL (05 Dec 2018 17:23)                ----  Personally reviewed:  Vital sign trends: [ x ] yes    [  ] no     [  ] n/a  Laboratory results: [ x ] yes    [  ] no     [  ] n/a  Radiology results: [ x ] yes    [  ] no     [  ] n/a  Culture results: [  ] yes    [  ] no     [ x ] n/a  Consultant recommendations: [ x ] yes    [  ] no     [  ] n/a

## 2018-12-06 NOTE — BEHAVIORAL HEALTH ASSESSMENT NOTE - HPI (INCLUDE ILLNESS QUALITY, SEVERITY, DURATION, TIMING, CONTEXT, MODIFYING FACTORS, ASSOCIATED SIGNS AND SYMPTOMS)
Pt is a 64 y/o M with PMHx of alcohol induced Pancreatitis, AODM (diet-controlled), Depression, HLD, HTN, BIBEMS from home complaining of gradual onset of upper abdominal pain, radiating to the back with nausea and vomiting. Pain started last night, rated 10/10. Pt states he took Advil and peptobismol to no relief. Of note pt states he had a similar episode in May when he was diagnosed with pancreatitis. Pt states he had a sonogram one month ago that did show gall stones. 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 Pt admits abd pain radiating to back, N/V, and blood streaked stool with BRBPR. Denies CP, SOB, fever, chills, urinary symptoms.     Patient reports that he was deeply affected by the death of his son 2 years ago, who likely accidentally overdosed. He currently is lethargic and has difficulty engaging at this time.

## 2018-12-06 NOTE — PROGRESS NOTE ADULT - PROBLEM SELECTOR PROBLEM 2
Alcohol abuse
Acute pancreatitis with infected necrosis, unspecified pancreatitis type
Acute pancreatitis, unspecified complication status, unspecified pancreatitis type
Acute pancreatitis, unspecified complication status, unspecified pancreatitis type
Alcohol abuse

## 2018-12-06 NOTE — PROGRESS NOTE ADULT - SUBJECTIVE AND OBJECTIVE BOX
INTERVAL HPI/OVERNIGHT EVENTS:  pt seen and examined  c/o diffuse abd pain  denies n/v, passing gas  per overnight rn decreased po yesterday, passed small green bm overnight, no vomiting  labs noted low grade temp overnight  sp mrcp    MEDICATIONS  (STANDING):  amLODIPine   Tablet 10 milliGRAM(s) Oral daily  aspirin  chewable 81 milliGRAM(s) Oral daily  atorvastatin 20 milliGRAM(s) Oral at bedtime  dextrose 5%. 1000 milliLiter(s) (50 mL/Hr) IV Continuous <Continuous>  dextrose 50% Injectable 12.5 Gram(s) IV Push once  dextrose 50% Injectable 25 Gram(s) IV Push once  dextrose 50% Injectable 25 Gram(s) IV Push once  docusate sodium 100 milliGRAM(s) Oral two times a day  enoxaparin Injectable 40 milliGRAM(s) SubCutaneous daily  escitalopram 20 milliGRAM(s) Oral daily  folic acid Injectable 1 milliGRAM(s) IV Push daily  gemfibrozil 600 milliGRAM(s) Oral two times a day  influenza   Vaccine 0.5 milliLiter(s) IntraMuscular once  insulin lispro (HumaLOG) corrective regimen sliding scale   SubCutaneous three times a day before meals  insulin lispro (HumaLOG) corrective regimen sliding scale   SubCutaneous at bedtime  iohexol 300 mG (iodine)/mL Oral Solution 500 milliLiter(s) Oral once  meropenem  IVPB 1000 milliGRAM(s) IV Intermittent every 8 hours  multivitamin 1 Tablet(s) Oral daily  polyethylene glycol 3350 17 Gram(s) Oral daily  QUEtiapine 50 milliGRAM(s) Oral at bedtime  senna 2 Tablet(s) Oral at bedtime  thiamine Injectable 100 milliGRAM(s) IV Push daily    MEDICATIONS  (PRN):  acetaminophen   Tablet .. 650 milliGRAM(s) Oral every 6 hours PRN Mild Pain (1 - 3)  dextrose 40% Gel 15 Gram(s) Oral once PRN Blood Glucose LESS THAN 70 milliGRAM(s)/deciliter  glucagon  Injectable 1 milliGRAM(s) IntraMuscular once PRN Glucose LESS THAN 70 milligrams/deciliter  HYDROmorphone  Injectable 1 milliGRAM(s) IV Push every 4 hours PRN Severe Pain (7 - 10)  HYDROmorphone  Injectable 0.5 milliGRAM(s) IV Push every 4 hours PRN Moderate Pain (4 - 6)  ondansetron Injectable 4 milliGRAM(s) IV Push every 6 hours PRN Nausea and/or Vomiting      Allergies    No Known Allergies    Intolerances        Review of Systems:    General:  fever  Eyes:  Good vision, no reported pain  ENT:  No sore throat, pain, runny nose, dysphagia  CV:  No pain, palpitations, hypo/hypertension  Resp:  No dyspnea, cough, tachypnea, wheezing  GI:  +No pain, No nausea, No vomiting, No diarrhea, No constipation, No weight loss, No fever, No pruritis, No rectal bleeding, No melena, No dysphagia  :  No pain, bleeding, incontinence, nocturia  Muscle:  No pain, weakness  Neuro:  No weakness, tingling, memory problems  Psych:  No fatigue, insomnia, mood problems, depression  Endocrine:  No polyuria, polydypsia, cold/heat intolerance  Heme:  No petechiae, ecchymosis, easy bruisability  Skin:  No rash, tattoos, scars, edema      Vital Signs Last 24 Hrs  T(C): 37.1 (06 Dec 2018 08:06), Max: 38.2 (05 Dec 2018 20:44)  T(F): 98.7 (06 Dec 2018 08:06), Max: 100.7 (05 Dec 2018 20:44)  HR: 110 (06 Dec 2018 10:00) (102 - 122)  BP: 151/70 (06 Dec 2018 10:00) (127/65 - 151/70)  BP(mean): 100 (06 Dec 2018 10:00) (85 - 100)  RR: 25 (06 Dec 2018 10:00) (15 - 32)  SpO2: 90% (06 Dec 2018 10:00) (90% - 97%)    PHYSICAL EXAM:    Constitutional: NAD  HEENT: ncat  Neck: No LAD  Respiratory: dec bs  Cardiovascular: S1 and S2, RRR  Gastrointestinal: soft upper quadrant ttp ++dt  Extremities: No peripheral edema  Vascular: 2+ peripheral pulses  Neurological: awake alert   Skin: No rashes      LABS:                        11.2   13.36 )-----------( 199      ( 06 Dec 2018 05:39 )             32.7     12-06    136  |  98  |  14  ----------------------------<  158<H>  3.6   |  26  |  0.75    Ca    8.1<L>      06 Dec 2018 05:39  Phos  3.6     12-06  Mg     1.9     12-06    TPro  6.4  /  Alb  2.0<L>  /  TBili  0.6  /  DBili  x   /  AST  82<H>  /  ALT  70  /  AlkPhos  302<H>  12-06    PT/INR - ( 06 Dec 2018 05:39 )   PT: 17.1 sec;   INR: 1.48 ratio         PTT - ( 06 Dec 2018 05:39 )  PTT:28.1 sec      RADIOLOGY & ADDITIONAL TESTS:  < from: MR MRCP w/wo IV Cont (12.05.18 @ 14:55) >    EXAM:  MR MRCP [facility] IC                            PROCEDURE DATE:  12/05/2018          INTERPRETATION:  History: Acute pancreatitis with phlegmonous change   possible necrosis on recent CT of 12/1/2018.    Noncontrast MRCP and multiphasic contrast enhanced abdominal MR 10 cc   gadolinium injected intravenously.    Study is  limited. Suboptimal image quality due to patient's inability to   offer requisite cooperation.  Pancreas diffusely enlarged and edematous consistent with the history of   pancreatitis. Moderate size area of hypoenhancement pancreatic tail   consistent with  pancreatic necrosis. No pancreatic ductal dilatation.   There are nonspecific evolving organizing fluid collections in the lesser   sac measuring 10.5 x 5.6 cm, and inthe left anterior pararenal space   adjacent to the tail of pancreas measuring 5.2 x 4.8 cm. These could   represent evolving pseudocysts with or without infection. The splenic   vein is occluded. Portal vein is patent.  No gallstones or biliary dilatation. No common duct stone. Diffuse fatty   liver. Spleen not remarkable.  Right renal cysts.    Impression:    Technically limited secondary to uncooperative patient.  Severe acute/subacute pancreatitis with pancreatic tail necrosis.   Evolving fluid collections as described. Infected necrosis, infected   collections not excluded by the imaging appearance alone.  Occluded splenic vein.    Findings discussed with the JORGE Voss prior to this dictation                                   prior to this dictation                MARA VELA M.D., ATTENDING RADIOLOGIST  This document has been electronically signed. Dec  5 2018  3:28PM                < end of copied text >

## 2018-12-06 NOTE — PROGRESS NOTE ADULT - PROBLEM SELECTOR PLAN 4
- Off precedex  - Plan per ICU. On PRN phenobarb    - continue Vit B1, folic acid supplementation  - pt states he did not like the group therapy requirement for Vivitrol, and did not proceed with detox  -  consult  - Dr. Coats following

## 2018-12-06 NOTE — PATIENT PROFILE ADULT - DOES PATIENT HAVE ADVANCE DIRECTIVE
Wound care nurse called the Orthopedic unit to discuss Lavon Score with the Nurse, Piedad JOHNSON. She declined a rental bed for this patient for now. Pt. Is ok to be turned every two hours and other pressure injury prevention measures. Pt. Weighs 90 lbs. She is going to surgery today and will be up moving around within the next 24 hours.    Hema Rowe RN, BSN, [employer] no

## 2018-12-06 NOTE — PROGRESS NOTE ADULT - PROBLEM SELECTOR PLAN 5
- chronic, pt follows psych (Dr. Rinaldi) as outpt  - pt states he has been drinking heavily since loss of his son 2 years ago  - continue home seroquel, lexapro  - psych consult with Dr. Mcwilliams called - no contraindication to d/c home once appropriate from medical perspective

## 2018-12-06 NOTE — PROGRESS NOTE ADULT - ATTENDING COMMENTS
63M PMH HTN, HLD, depression, obesity, diet-controlled DM (A1C 7.0), ROSS (on CPAP), ETOH abuse, admitted with acute alcoholic pancreatitis, acute alcoholic hepatitis, hypertriglyceridemia, bandemia. Course complicated by prerenal HERIBERTO, ileus, acute hypoxic respiratory failure due to pulmonary edema + abdominal distention, and alcohol withdrawal, which all improved.     - Improved withdrawal. Decrease phenobarbital to 65 mg oral twice daily, continue phenobarbital prn. Hold benzo's at this time. D/c dexmedetomidine. Continue thiamine, MVI, folic acid. Continue escitalopram and quetiapine  - Pain control with acetaminophen prn  - HD stable, continue aspirin and statin. Continue to hold amlodipine  - Hypoxia likely due to pulmonary edema from IVF hydration + abdominal distention. Improved with diuresis. Keep net negative 1 liter/24hrs  - Continue CPAP qhs for ROSS. Supplemental oxygen with NC when off NIPPV. Improved respiratory status  - Continue diabetic low fat diet as tolerated. Improved pancreatitis and pain. MRCP today  - Improved hypertriglyceridemia. Keep off insulin gtt. Continue gemfibrozil 600 mg po bid  - Resolved HERIBERTO, stable kidney function and lytes  - Continue to observe off abx. No evidence of infected necrosis on CT. F/up MRCP  - Enoxaparin for DVT ppx  - Insulin coverage scale for DM2, A1C 7.0  - Discussed with patient, full code, prognosis guarded 63M PMH HTN, HLD, depression, obesity, diet-controlled DM (A1C 7.0), ROSS (on CPAP), ETOH abuse, admitted with acute alcoholic pancreatitis, acute alcoholic hepatitis, hypertriglyceridemia, bandemia. Course complicated by prerenal HERIBERTO, ileus, acute hypoxic respiratory failure due to pulmonary edema + abdominal distention, and alcohol withdrawal, which all improved. Now with worsening fevers, abdominal pain, lethargy, leukocytosis, rising lipase, imaging with necrosis and evolving fluid collections worrisome for infected necrosis with abscess formation.    1. Neuro: Lethargy likely due to sepsis vs. opiods vs. residual effect of phenobarbital. Resolved alcohol withdrawal. Hold further phenobarbital. Hold benzo's. Continue opiods for pain. Continue thiamine, MVI, folic acid. Continue escitalopram and quetiapine  2. CV: HD stable, continue aspirin. Hold statin given recurrent pancreatitis. Restart amlodipine for HTN  3. Pulm: improved hypoxia with diuresis. Supplemental oxygen with NC@2LPM, goal SpO2>90%. Continue CPAP qhs for ROSS  4. GI: given recurrent pancreatitis now with infected necrosis and possible abscesses, will arrange for transfer to [facility] SICU for further management as he may need EUS vs. IR-drainage depending on clinical course. Gentle hydration with LR@100/hr, monitor for fluid overload. May require miller. Keep NPO for now with ice chips. Continue gemfibrozil for hypertriglyceridemia. F/up repeat TG level today. Off insulin gtt  5. Renal/lytes: resolved HERIBERTO, stable kidney function and lytes  6. ID: suspect infected necrosis and possible abscess formation. Start meropenem 1 gm IV q8h. F/up cultures. UA negative. No evidence of pneumonia. May need ID eval  7. Heme: enoxaparin for DVT ppx. Trend leukocytosis/bandemia  8. Endo: continue insulin coverage scale for DM2, A1C 7.0  9. Dispo: full code, discussed with patient and wife at bedside, plan for transfer to [facility] SICU tonight. Prognosis guarded  CC time spent: 35 min

## 2018-12-06 NOTE — PROGRESS NOTE ADULT - PROBLEM SELECTOR PLAN 1
recurrent, 2/2 etoh use and hypertriglyceridemia   w worsening abd pain, leukocytosis and fever overnight  mrcp noted, images to be reviewed w attg  cont abx  f/u ct a/p  consider id eval  surgical eval  npo/ivf  trend labs  pain control  bowel regimen  monitor abd exam  continue icu monitoring  etoh abstinence recurrent, 2/2 etoh use and hypertriglyceridemia   w worsening abd pain, leukocytosis and fever overnight  mrcp noted, images to be reviewed w attg  cont abx  f/u cxs  f/u ct a/p  consider id eval  surgical eval  npo/ivf  trend labs  pain control  bowel regimen  monitor abd exam  continue icu monitoring  etoh abstinence

## 2018-12-07 DIAGNOSIS — K85.20 ALCOHOL INDUCED ACUTE PANCREATITIS WITHOUT NECROSIS OR INFECTION: ICD-10-CM

## 2018-12-07 LAB
ALBUMIN SERPL ELPH-MCNC: 2.6 G/DL — LOW (ref 3.3–5)
ALP SERPL-CCNC: 240 U/L — HIGH (ref 40–120)
ALT FLD-CCNC: 43 U/L — SIGNIFICANT CHANGE UP (ref 10–45)
AMYLASE P1 CFR SERPL: 121 U/L — SIGNIFICANT CHANGE UP (ref 25–125)
ANION GAP SERPL CALC-SCNC: 13 MMOL/L — SIGNIFICANT CHANGE UP (ref 5–17)
ANION GAP SERPL CALC-SCNC: 18 MMOL/L — HIGH (ref 5–17)
APTT BLD: 28.9 SEC — SIGNIFICANT CHANGE UP (ref 27.5–36.3)
AST SERPL-CCNC: 44 U/L — HIGH (ref 10–40)
BILIRUB DIRECT SERPL-MCNC: 0.2 MG/DL — SIGNIFICANT CHANGE UP (ref 0–0.2)
BILIRUB INDIRECT FLD-MCNC: 0.3 MG/DL — SIGNIFICANT CHANGE UP (ref 0.2–1)
BILIRUB SERPL-MCNC: 0.5 MG/DL — SIGNIFICANT CHANGE UP (ref 0.2–1.2)
BLD GP AB SCN SERPL QL: NEGATIVE — SIGNIFICANT CHANGE UP
BUN SERPL-MCNC: 11 MG/DL — SIGNIFICANT CHANGE UP (ref 7–23)
BUN SERPL-MCNC: 12 MG/DL — SIGNIFICANT CHANGE UP (ref 7–23)
CA-I BLD-SCNC: 1.16 MMOL/L — SIGNIFICANT CHANGE UP (ref 1.12–1.3)
CALCIUM SERPL-MCNC: 8.4 MG/DL — SIGNIFICANT CHANGE UP (ref 8.4–10.5)
CALCIUM SERPL-MCNC: 8.9 MG/DL — SIGNIFICANT CHANGE UP (ref 8.4–10.5)
CHLORIDE SERPL-SCNC: 94 MMOL/L — LOW (ref 96–108)
CHLORIDE SERPL-SCNC: 96 MMOL/L — SIGNIFICANT CHANGE UP (ref 96–108)
CO2 SERPL-SCNC: 20 MMOL/L — LOW (ref 22–31)
CO2 SERPL-SCNC: 24 MMOL/L — SIGNIFICANT CHANGE UP (ref 22–31)
CREAT SERPL-MCNC: 0.62 MG/DL — SIGNIFICANT CHANGE UP (ref 0.5–1.3)
CREAT SERPL-MCNC: 0.66 MG/DL — SIGNIFICANT CHANGE UP (ref 0.5–1.3)
CULTURE RESULTS: NO GROWTH — SIGNIFICANT CHANGE UP
GAS PNL BLDA: SIGNIFICANT CHANGE UP
GLUCOSE BLDC GLUCOMTR-MCNC: 114 MG/DL — HIGH (ref 70–99)
GLUCOSE BLDC GLUCOMTR-MCNC: 143 MG/DL — HIGH (ref 70–99)
GLUCOSE BLDC GLUCOMTR-MCNC: 172 MG/DL — HIGH (ref 70–99)
GLUCOSE SERPL-MCNC: 157 MG/DL — HIGH (ref 70–99)
GLUCOSE SERPL-MCNC: 171 MG/DL — HIGH (ref 70–99)
HCT VFR BLD CALC: 34.6 % — LOW (ref 39–50)
HGB BLD-MCNC: 11.7 G/DL — LOW (ref 13–17)
INR BLD: 1.54 RATIO — HIGH (ref 0.88–1.16)
LIDOCAIN IGE QN: 389 U/L — HIGH (ref 7–60)
MAGNESIUM SERPL-MCNC: 2.2 MG/DL — SIGNIFICANT CHANGE UP (ref 1.6–2.6)
MAGNESIUM SERPL-MCNC: 2.2 MG/DL — SIGNIFICANT CHANGE UP (ref 1.6–2.6)
MCHC RBC-ENTMCNC: 33.7 GM/DL — SIGNIFICANT CHANGE UP (ref 32–36)
MCHC RBC-ENTMCNC: 33.9 PG — SIGNIFICANT CHANGE UP (ref 27–34)
MCV RBC AUTO: 100 FL — SIGNIFICANT CHANGE UP (ref 80–100)
PHOSPHATE SERPL-MCNC: 3.8 MG/DL — SIGNIFICANT CHANGE UP (ref 2.5–4.5)
PHOSPHATE SERPL-MCNC: 4.2 MG/DL — SIGNIFICANT CHANGE UP (ref 2.5–4.5)
PLATELET # BLD AUTO: 247 K/UL — SIGNIFICANT CHANGE UP (ref 150–400)
POTASSIUM SERPL-MCNC: 3.7 MMOL/L — SIGNIFICANT CHANGE UP (ref 3.5–5.3)
POTASSIUM SERPL-MCNC: 4.1 MMOL/L — SIGNIFICANT CHANGE UP (ref 3.5–5.3)
POTASSIUM SERPL-SCNC: 3.7 MMOL/L — SIGNIFICANT CHANGE UP (ref 3.5–5.3)
POTASSIUM SERPL-SCNC: 4.1 MMOL/L — SIGNIFICANT CHANGE UP (ref 3.5–5.3)
PROCALCITONIN SERPL-MCNC: 0.93 NG/ML — HIGH (ref 0.02–0.1)
PROT SERPL-MCNC: 6.3 G/DL — SIGNIFICANT CHANGE UP (ref 6–8.3)
PROTHROM AB SERPL-ACNC: 17.9 SEC — HIGH (ref 10–12.9)
RBC # BLD: 3.44 M/UL — LOW (ref 4.2–5.8)
RBC # FLD: 12.9 % — SIGNIFICANT CHANGE UP (ref 10.3–14.5)
RH IG SCN BLD-IMP: POSITIVE — SIGNIFICANT CHANGE UP
SODIUM SERPL-SCNC: 132 MMOL/L — LOW (ref 135–145)
SODIUM SERPL-SCNC: 133 MMOL/L — LOW (ref 135–145)
SPECIMEN SOURCE: SIGNIFICANT CHANGE UP
TRIGL SERPL-MCNC: 166 MG/DL — HIGH (ref 10–149)
WBC # BLD: 19.8 K/UL — HIGH (ref 3.8–10.5)
WBC # FLD AUTO: 19.8 K/UL — HIGH (ref 3.8–10.5)

## 2018-12-07 RX ORDER — SODIUM CHLORIDE 9 MG/ML
1000 INJECTION, SOLUTION INTRAVENOUS
Qty: 0 | Refills: 0 | Status: DISCONTINUED | OUTPATIENT
Start: 2018-12-07 | End: 2018-12-08

## 2018-12-07 RX ORDER — HYDROMORPHONE HYDROCHLORIDE 2 MG/ML
1 INJECTION INTRAMUSCULAR; INTRAVENOUS; SUBCUTANEOUS ONCE
Qty: 0 | Refills: 0 | Status: DISCONTINUED | OUTPATIENT
Start: 2018-12-07 | End: 2018-12-08

## 2018-12-07 RX ORDER — SODIUM CHLORIDE 9 MG/ML
1000 INJECTION, SOLUTION INTRAVENOUS
Qty: 0 | Refills: 0 | Status: COMPLETED | OUTPATIENT
Start: 2018-12-07 | End: 2018-12-07

## 2018-12-07 RX ORDER — CHLORHEXIDINE GLUCONATE 213 G/1000ML
1 SOLUTION TOPICAL
Qty: 0 | Refills: 0 | Status: DISCONTINUED | OUTPATIENT
Start: 2018-12-07 | End: 2018-12-09

## 2018-12-07 RX ORDER — INSULIN LISPRO 100/ML
VIAL (ML) SUBCUTANEOUS EVERY 6 HOURS
Qty: 0 | Refills: 0 | Status: DISCONTINUED | OUTPATIENT
Start: 2018-12-07 | End: 2018-12-15

## 2018-12-07 RX ORDER — ACETAMINOPHEN 500 MG
1000 TABLET ORAL ONCE
Qty: 0 | Refills: 0 | Status: COMPLETED | OUTPATIENT
Start: 2018-12-07 | End: 2018-12-07

## 2018-12-07 RX ORDER — ENOXAPARIN SODIUM 100 MG/ML
40 INJECTION SUBCUTANEOUS DAILY
Qty: 0 | Refills: 0 | Status: DISCONTINUED | OUTPATIENT
Start: 2018-12-07 | End: 2018-12-15

## 2018-12-07 RX ADMIN — Medication 400 MILLIGRAM(S): at 21:45

## 2018-12-07 RX ADMIN — SODIUM CHLORIDE 100 MILLILITER(S): 9 INJECTION, SOLUTION INTRAVENOUS at 01:54

## 2018-12-07 RX ADMIN — Medication 1000 MILLIGRAM(S): at 02:00

## 2018-12-07 RX ADMIN — Medication 5 MILLIGRAM(S): at 17:12

## 2018-12-07 RX ADMIN — ENOXAPARIN SODIUM 40 MILLIGRAM(S): 100 INJECTION SUBCUTANEOUS at 11:58

## 2018-12-07 RX ADMIN — Medication 5 MILLIGRAM(S): at 11:58

## 2018-12-07 RX ADMIN — MEROPENEM 100 MILLIGRAM(S): 1 INJECTION INTRAVENOUS at 05:15

## 2018-12-07 RX ADMIN — Medication 400 MILLIGRAM(S): at 01:51

## 2018-12-07 RX ADMIN — MEROPENEM 100 MILLIGRAM(S): 1 INJECTION INTRAVENOUS at 21:50

## 2018-12-07 RX ADMIN — MEROPENEM 100 MILLIGRAM(S): 1 INJECTION INTRAVENOUS at 14:37

## 2018-12-07 RX ADMIN — Medication 5 MILLIGRAM(S): at 05:16

## 2018-12-07 RX ADMIN — CHLORHEXIDINE GLUCONATE 1 APPLICATION(S): 213 SOLUTION TOPICAL at 11:59

## 2018-12-07 RX ADMIN — Medication 5 MILLIGRAM(S): at 01:53

## 2018-12-07 RX ADMIN — SODIUM CHLORIDE 100 MILLILITER(S): 9 INJECTION, SOLUTION INTRAVENOUS at 11:44

## 2018-12-07 RX ADMIN — Medication 1000 MILLIGRAM(S): at 22:15

## 2018-12-07 NOTE — H&P ADULT - HISTORY OF PRESENT ILLNESS
JOHN DHILLON is a 63y Male PMHX of alcohol induced pancreatitis earlier this year, alcohol abuse (1/3 quart of vodka daily), DM, HLD, HTN, Depression, ROSS on CPAP who presents as a transfer from [facility] for acute pancreatitis. At OSH, the pancreatitis was associated with hypertriglyceridemia and not alcohol due to normal alcohol levels at presentation and no evidence of biliary obstruction. Patient transferred to [facility] for further management as patient developed complications such as tail necrosis and fluid collections around the lesser sac concerning for pseudocyst with or without infection.      Patient states he has been drinking on and off for the past 13 years since his son passed away in 2005. JOHN DHILLON is a 63y Male PMHX of alcohol induced pancreatitis earlier this year, alcohol abuse (1/3 quart of vodka daily), DM, HLD, HTN, Depression, ROSS on CPAP who presents as a transfer from [facility] for acute pancreatitis, which was initially complicated by HERIBERTO, ileus, and hypoxia secondary to pulmonary edema. Pt also experienced alcohol withdrawal, which was treated with phenobarbital. After feeling better and tolerating diuresis on Monday and Tuesday with improved pulmonary status, pt then developed fever and leukocytosis (SIRS), with mild tachycardia. CT showed evolving fluid collections and known pancreatic tail necrosis. Patient transferred to [facility] for further management.    Patient states he has been drinking on and off for the past 13 years since his son passed away in 2005.     Currently, pt complains of epigastric and LUQ pain (moderate), without nausea, worsened in the past few days, better with pain medications.

## 2018-12-07 NOTE — CHART NOTE - NSCHARTNOTEFT_GEN_A_CORE
GENERAL SURGERY FLOOR TRANSFER NOTE    63y Male transferred to floor from SICU    SICU COURSE:  Pt was transferred to SICU from [facility] for acute pancreatitis with peripancreatic fluid collections. In SICU, pt was monitored and advanced to sips/chips. GI was consulted and recommended continued IV antibiotics, trending labs, and enteral feeding. He was deemed stable for transfer to floors.    SUBJECTIVE:      OBJECTIVE:    T(C): 36.4 (12-07-18 @ 18:38), Max: 38.2 (12-07-18 @ 00:00)  HR: 100 (12-07-18 @ 18:38) (95 - 115)  BP: 149/78 (12-07-18 @ 18:38) (134/65 - 152/71)  RR: 20 (12-07-18 @ 18:38) (15 - 27)  SpO2: 92% (12-07-18 @ 18:38) (91% - 100%)  Wt(kg): --      I&O's Summary    06 Dec 2018 07:01  -  07 Dec 2018 07:00  --------------------------------------------------------  IN: 950 mL / OUT: 550 mL / NET: 400 mL    07 Dec 2018 07:01  -  07 Dec 2018 19:29  --------------------------------------------------------  IN: 1100 mL / OUT: 665 mL / NET: 435 mL                              11.7   19.8  )-----------( 247      ( 07 Dec 2018 05:38 )             34.6       12-07    133<L>  |  96  |  12  ----------------------------<  171<H>  3.7   |  24  |  0.62    Ca    8.9      07 Dec 2018 05:38  Phos  4.2     12-07  Mg     2.2     12-07    TPro  6.3  /  Alb  2.6<L>  /  TBili  0.5  /  DBili  0.2  /  AST  44<H>  /  ALT  43  /  AlkPhos  240<H>  12-07      MEDICATIONS  (STANDING):  chlorhexidine 4% Liquid 1 Application(s) Topical <User Schedule>  dextrose 5% + lactated ringers. 1000 milliLiter(s) (100 mL/Hr) IV Continuous <Continuous>  enoxaparin Injectable 40 milliGRAM(s) SubCutaneous daily  insulin lispro (HumaLOG) corrective regimen sliding scale   SubCutaneous every 6 hours  meropenem  IVPB 1000 milliGRAM(s) IV Intermittent every 8 hours  metoprolol tartrate Injectable 5 milliGRAM(s) IV Push every 6 hours    MEDICATIONS  (PRN):        PHYSICAL EXAM:  NAD, resting in bed  Non labored respirations  Soft, distended, non-tender  WWP      ASSESSMENT/PLAN:  63 year old male with acute/subacute pancreatitis with necrosis and fluid collection around the lesser sac. Possible infected pseudocyst transfered from [facility] for further management of pancreatitis    - Pain control  - Sips/Chips, IVF  - Continue nightly bipap, monitor respiratory status  - Monitor for signs of alcohol withdrawal  - Cont abx, reassess need based on am CBC  - Trend labs  - Insulin sliding scale  - DVT ppx: Lovenox    Dispo: jose Palma, PGY-1  [facility]  [id number] GENERAL SURGERY FLOOR TRANSFER NOTE    63y Male transferred to floor from SICU    SICU COURSE:  Pt was transferred to SICU from [facility] for acute pancreatitis with peripancreatic fluid collections. In SICU, pt was monitored and advanced to sips/chips. GI was consulted and recommended continued IV antibiotics, trending labs, and enteral feeding. He was deemed stable for transfer to floors.    SUBJECTIVE:  Pt reports severe pain radiating to the back and would very much like to eat. Otherwise denies dizziness, n/v, SOB, chest pain.     OBJECTIVE:    T(C): 36.4 (12-07-18 @ 18:38), Max: 38.2 (12-07-18 @ 00:00)  HR: 100 (12-07-18 @ 18:38) (95 - 115)  BP: 149/78 (12-07-18 @ 18:38) (134/65 - 152/71)  RR: 20 (12-07-18 @ 18:38) (15 - 27)  SpO2: 92% (12-07-18 @ 18:38) (91% - 100%)  Wt(kg): --      I&O's Summary    06 Dec 2018 07:01  -  07 Dec 2018 07:00  --------------------------------------------------------  IN: 950 mL / OUT: 550 mL / NET: 400 mL    07 Dec 2018 07:01  -  07 Dec 2018 19:29  --------------------------------------------------------  IN: 1100 mL / OUT: 665 mL / NET: 435 mL                          11.7   19.8  )-----------( 247      ( 07 Dec 2018 05:38 )             34.6       12-07    133<L>  |  96  |  12  ----------------------------<  171<H>  3.7   |  24  |  0.62    Ca    8.9      07 Dec 2018 05:38  Phos  4.2     12-07  Mg     2.2     12-07    TPro  6.3  /  Alb  2.6<L>  /  TBili  0.5  /  DBili  0.2  /  AST  44<H>  /  ALT  43  /  AlkPhos  240<H>  12-07      MEDICATIONS  (STANDING):  chlorhexidine 4% Liquid 1 Application(s) Topical <User Schedule>  dextrose 5% + lactated ringers. 1000 milliLiter(s) (100 mL/Hr) IV Continuous <Continuous>  enoxaparin Injectable 40 milliGRAM(s) SubCutaneous daily  insulin lispro (HumaLOG) corrective regimen sliding scale   SubCutaneous every 6 hours  meropenem  IVPB 1000 milliGRAM(s) IV Intermittent every 8 hours  metoprolol tartrate Injectable 5 milliGRAM(s) IV Push every 6 hours    MEDICATIONS  (PRN):        PHYSICAL EXAM:  NAD, resting in bed  Non labored respirations  Soft, distended, non-tender  WWP, L foot with intermittent tremor, neurovascularly intact      ASSESSMENT/PLAN:  63 year old male with acute/subacute pancreatitis with necrosis and fluid collection around the lesser sac. Possible infected pseudocyst transfered from [facility] for further management of pancreatitis    - Pain control  - Sips/Chips, IVF  - Continue nightly bipap, monitor respiratory status  - Monitor for signs of alcohol withdrawal  - Cont abx, reassess need based on am CBC  - Trend labs  - Insulin sliding scale  - DVT ppx: Lovenox    Dispo: francias    TIA Palma, PGY-1  [facility]  [id number]

## 2018-12-07 NOTE — H&P ADULT - PMH
Alcohol use disorder, severe, dependence    Alcohol-induced acute pancreatitis, unspecified complication status    Depression, unspecified depression type    Diabetes  type 2  Essential hypertension    ROSS (obstructive sleep apnea)

## 2018-12-07 NOTE — CONSULT NOTE ADULT - PROVIDER SPECIALTY LIST ADULT
SICU See iRewind message below. please advise regarding visit, can patient be added to short/same day spot?  Chantell Mcgill RN

## 2018-12-07 NOTE — PROCEDURE NOTE - NSPROCDETAILS_GEN_ALL_CORE
ultrasound guidance/hemostasis with direct pressure, dressing applied/Seldinger technique/location identified, draped/prepped, sterile technique used, needle inserted/introduced/connected to a pressurized flush line/positive blood return obtained via catheter/sutured in place/all materials/supplies accounted for at end of procedure

## 2018-12-07 NOTE — H&P ADULT - NSHPLABSRESULTS_GEN_ALL_CORE
11.7   19.8  )-----------( 247      ( 07 Dec 2018 05:38 )             34.6     12-07    133<L>  |  96  |  12  ----------------------------<  171<H>  3.7   |  24  |  0.62    Ca    8.9      07 Dec 2018 05:38  Phos  4.2     12-07  Mg     2.2     12-07    TPro  6.3  /  Alb  2.6<L>  /  TBili  0.5  /  DBili  0.2  /  AST  44<H>  /  ALT  43  /  AlkPhos  240<H>  12-07    PT/INR - ( 07 Dec 2018 05:38 )   PT: 17.9 sec;   INR: 1.54 ratio         PTT - ( 07 Dec 2018 05:38 )  PTT:28.9 sec    Amylase 121    Lipase 389    Triglycerides 166      ABG - ( 07 Dec 2018 06:29 )  pH, Arterial: 7.48  pH, Blood: x     /  pCO2: 37    /  pO2: 75    / HCO3: 27    / Base Excess: 3.6   /  SaO2: 96

## 2018-12-07 NOTE — CONSULT NOTE ADULT - ATTENDING COMMENTS
Critical Care Dx    Acute pancreatitis with unclear infectious burden/evolving necrosis  -Recent CT shows evolving fluid collections posterior to the stomach with unclear necrotic/infected burden - transferred on meropenem. Will continue. Procalcitonin elevated.  -likely will form a pseudocyst and possibly cause gastric outlet obstruction  -Establishing enteral acess via Simpson tube/NG for decompression/tube feeds  Respiratory insufficiency/ROSS  -Hx of ROSS on CPAP at home  -currently with bilateral lung infiltrates likely from SIRs response  -BIPAP placed overnight for ventilation   -Follow ABGs accordingly       The patient is a critical care patient with life threatening hemodynamic and respiratory instability in SICU.  I have personally interviewed and examined the patient, reviewed data and laboratory tests/x-rays and all pertinent electronic images.  The SICU team has a constant risk benefit analyzes discussion with the primary team, all consultants, House Staff and PA's on all decisions.  These diagnoses are unrelated to the surgical procedure noted.  Time involved in performance of separately billable procedures was not counted toward my critical care time. There is no overlap.    I spent 60 minutes in total providing critical care for the diagnoses, assessment and plan above.      I was physically present for the key portions of the evaluation and management (E/M) service provided.  I agree with the above history, physical, and plan which I have reviewed and edited where appropriate.     Cristo Tejada MD  Acute and Critical Care Surgery Critical Care Dx  Alcohol-induced Acute pancreatitis with unclear infectious burden/evolving necrosis  -Recent CT shows evolving fluid collections posterior to the stomach with unclear necrotic/infected burden - transferred on meropenem. Will continue. Procalcitonin elevated.  -likely will form a pseudocyst and possibly cause gastric outlet obstruction  -Establishing enteral acess via Newcastle tube/NG for decompression/tube feeds  Respiratory insufficiency/ROSS/COPD  -Hx of ROSS on CPAP at home  -currently with bilateral lung infiltrates likely from SIRs response  -BIPAP placed overnight for ventilation   -Follow ABGs accordingly       The patient is a critical care patient with life threatening hemodynamic and respiratory instability in SICU.  I have personally interviewed and examined the patient, reviewed data and laboratory tests/x-rays and all pertinent electronic images.  The SICU team has a constant risk benefit analyzes discussion with the primary team, all consultants, House Staff and PA's on all decisions.  These diagnoses are unrelated to the surgical procedure noted.  Time involved in performance of separately billable procedures was not counted toward my critical care time. There is no overlap.    I spent 60 minutes in total providing critical care for the diagnoses, assessment and plan above.      I was physically present for the key portions of the evaluation and management (E/M) service provided.  I agree with the above history, physical, and plan which I have reviewed and edited where appropriate.     Cristo Tejada MD  Acute and Critical Care Surgery

## 2018-12-07 NOTE — CONSULT NOTE ADULT - SUBJECTIVE AND OBJECTIVE BOX
HISTORY OF PRESENT ILLNESS:  JOHN DHILLON is a 63y Male PMHX of alcohol induced pancreatitis earlier this year, alcohol abuse ( 1/3 quart of vodka daily), DM, HLD, HTN, Depression, ROSS on CPAP who presents as a transfer from [facility] for acute pancreatitis. At OSH, the pancreatitis was associated with hypertriglyceridemia and not alcohol due to normal alcohol levels at presentation and no evidence of biliary obstruction. Patient transferred to [facility] for further management as patient developed complications such as tail necrosis and fluid collections around the lesser sac concerning for pseudocyst with or without infection.      Patient states he has been drinking on and off for the past 13 years since his son passed away in 2005.     PAST MEDICAL HISTORY: HTN, HLD. ROSS, depression, alcohol abuse, pancreatitis      SOCIAL HISTORY: 1/3 of a bottle of vodka daily drinker. Denies tobacco usage     CODE STATUS: full code     ALLERGIES: No Known Allergies        NEURO  Exam: awake, lethargic but easily aroused, oriented x3       RESPIRATORY  Mechanical Ventilation: none  RR: 18 (06 Dec 2018 23:00) (18 - 23)  SpO2: 96% (06 Dec 2018 23:00) (92% - 96%)  Exam: clear to auscultation bilaterally       CARDIOVASCULAR  VBG - ( 06 Dec 2018 23:15 )  pH: 7.48  /  pCO2: 35    /  pO2: 73    / HCO3: 25    / Base Excess: 2.5   /  SaO2: 96     Lactate: 1.1    HR: 115 (06 Dec 2018 23:00) (112 - 115)  BP: 151/73 (06 Dec 2018 23:00) (150/73 - 151/73)  BP(mean): 104 (06 Dec 2018 23:00) (104 - 104)  Exam: regular rate and rhythm   Cardiac Rhythm: anabel sinus rhythm   metoprolol tartrate Injectable 5 milliGRAM(s) IV Push every 6 hours      GI/NUTRITION  Exam: abdomen with moderate tension, non-tender to palpation   Diet: NPO       GENITOURINARY/RENAL  lactated ringers. 1000 milliLiter(s) IV Continuous <Continuous>      12-06 @ 07:01  -  12-07 @ 00:15  --------------------------------------------------------  IN:  Total IN: 0 mL    OUT:    Ureteral Catheter: 175 mL  Total OUT: 175 mL    Total NET: -175 mL                [ ] Holder catheter, indication: urine output monitoring in critically ill patient    HEMATOLOGIC   [ ] VTE Prophylaxis: Lovenox                           12.0   18.3  )-----------( 235      ( 06 Dec 2018 23:20 )             33.9     PT/INR - ( 06 Dec 2018 23:20 )   PT: 17.7 sec;   INR: 1.52 ratio         PTT - ( 06 Dec 2018 23:20 )  PTT:34.0 sec  Transfusion: [ ] PRBC	[ ] Platelets	[ ] FFP	[ ] Cryoprecipitate      INFECTIOUS DISEASES  T(C): 37 (06 Dec 2018 21:00), Max: 37 (06 Dec 2018 21:00)  T(F): 98.6 (06 Dec 2018 21:00), Max: 98.6 (06 Dec 2018 21:00)  meropenem  IVPB 1000 milliGRAM(s) IV Intermittent every 8 hours    RECENT CULTURES: none      ENDOCRINE    CAPILLARY BLOOD GLUCOSE          PATIENT CARE ACCESS DEVICES:  [x] Peripheral IV  [ ] Central Venous Line	[ ] R	[ ] L	[ ] IJ	[ ] Fem	[ ] SC	Placed:   [ ] Arterial Line		[ ] R	[ ] L	[ ] Fem	[ ] Rad	[ ] Ax	Placed:   [ ] PICC:					[ ] Mediport  [ ] Urinary Catheter, Date Placed:   [x] Necessity of urinary, arterial, and venous catheters discussed    OTHER MEDICATIONS: none

## 2018-12-07 NOTE — PROCEDURE NOTE - PROCEDURE
<<-----Click on this checkbox to enter Procedure Arterial line placement  12/07/2018    Active  [id number]

## 2018-12-07 NOTE — CONSULT NOTE ADULT - ASSESSMENT
63 year old male with acute/subacute pancreatitis with necrosis and fluid collection around the lesser sac. Possible infected pseudocyst Transfer from [facility] for further management of pancreatitis and it's complications.     PLAN    Neuro: Mild lethargy, unclear etiology, patient not retaining CO2  - Pain control with IV tylenol     Respiratory: ROSS on CPAP  - Continue BiPAP   - F/u am CXR    CV: Tachycardia, could be systemic inflammatory response from pancreatitis, vs hypovolemia, vs. sepsis.   - Lopressor 5 q6 with holding parameters  - Monitor urine output with miller catheter.     GI: Acute pancreatitis with necrosis and pseudocyst with possible infection.  - NPO   - F/u am lipase    Renal: No active issues  - LR @ 125ml/hr     Heme: No active issues   - Lovenox for VTE prophylaxis     ID: Possible infected pseudocyst   - Emperic coverage with meropenem  - Culture if febrile    Endo: hx of DM  - ISS    Dispo: SICU full code     - Kedar Patel PA-C

## 2018-12-07 NOTE — H&P ADULT - ATTENDING COMMENTS
Pt seen and examined on 12/7, agree with above. Pt with likely alcoholic pancreatitis with pancreatic tail necrosis, transferred from [facility]. I discussed his hospitalization there with his admitting physician there. Pt was admitted with pancreatitis complicated by ileus, HERIBERTO, and pulmonary edema, as well as alcohol withdrawal. Pt was improving, tolerating diuresis, when he developed fever and leukocytosis. Repeat CT demonstrated known pancreatic tail necrosis, with increasing fluid collections (not well-defined)- I personally reviewed these images.    1. Pancreatic tail necrosis with increased fluid collections, SIRS versus sepsis, likely either infected necrosis or infected fluid collections given initial improvement and subsequent worsening in clinical condition:  - Antibiotics  - Given that antibiotics already started at OSH, fluid aspiration for culture unlikely to be successful; although if patient's status deteriorates, will reconsider  - Will need serial imaging to follow fluid collection development or reassess for other signs of infection or fistula, such as air bubbles in the retroperitoneum  - If pt deteriorates, may benefit from aspiration and/or drain placement once fluid collections are better defined    2. ROSS:  - CPAP at night    3. Alcoholism:  - SW for SA consultation    4. HTN:  - Hold ACEI for now

## 2018-12-07 NOTE — PROGRESS NOTE ADULT - SUBJECTIVE AND OBJECTIVE BOX
INTERVAL HPI/OVERNIGHT EVENTS:    MEDICATIONS  (STANDING):  chlorhexidine 4% Liquid 1 Application(s) Topical <User Schedule>  dextrose 5% + lactated ringers. 1000 milliLiter(s) (100 mL/Hr) IV Continuous <Continuous>  enoxaparin Injectable 40 milliGRAM(s) SubCutaneous daily  insulin lispro (HumaLOG) corrective regimen sliding scale   SubCutaneous every 6 hours  meropenem  IVPB 1000 milliGRAM(s) IV Intermittent every 8 hours  metoprolol tartrate Injectable 5 milliGRAM(s) IV Push every 6 hours    MEDICATIONS  (PRN):      Allergies    No Known Allergies    Intolerances        Review of Systems:    General:  No wt loss, fevers, chills, night sweats,fatigue,   Eyes:  Good vision, no reported pain  ENT:  No sore throat, pain, runny nose, dysphagia  CV:  No pain, palpitations, hypo/hypertension  Resp:  No dyspnea, cough, tachypnea, wheezing  GI:  No pain, No nausea, No vomiting, No diarrhea, No constipation, No weight loss, No fever, No pruritis, No rectal bleeding, No melena, No dysphagia  :  No pain, bleeding, incontinence, nocturia  Muscle:  No pain, weakness  Neuro:  No weakness, tingling, memory problems  Psych:  No fatigue, insomnia, mood problems, depression  Endocrine:  No polyuria, polydypsia, cold/heat intolerance  Heme:  No petechiae, ecchymosis, easy bruisability  Skin:  No rash, tattoos, scars, edema      Vital Signs Last 24 Hrs  T(C): 37.4 (07 Dec 2018 15:00), Max: 38.2 (07 Dec 2018 00:00)  T(F): 99.4 (07 Dec 2018 15:00), Max: 100.8 (07 Dec 2018 00:00)  HR: 107 (07 Dec 2018 15:00) (95 - 115)  BP: 149/69 (07 Dec 2018 10:00) (134/65 - 152/71)  BP(mean): 99 (07 Dec 2018 10:00) (92 - 104)  RR: 26 (07 Dec 2018 15:00) (15 - 27)  SpO2: 94% (07 Dec 2018 15:00) (91% - 100%)    PHYSICAL EXAM:    Constitutional: NAD, well-developed  HEENT: EOMI, throat clear  Neck: No LAD, supple  Respiratory: CTA and P  Cardiovascular: S1 and S2, RRR, no M  Gastrointestinal: BS+, soft, non- tender, moderately distended., eg HSM,  Extremities: No peripheral edema, neg clubing, cyanosis  Vascular: 2+ peripheral pulses  Neurological: awake, lethargic but easily aroused, oriented x3   Psychiatric: Normal mood, normal affect  Skin: No rashes      LABS:                        11.7   19.8  )-----------( 247      ( 07 Dec 2018 05:38 )             34.6     12-07    133<L>  |  96  |  12  ----------------------------<  171<H>  3.7   |  24  |  0.62    Ca    8.9      07 Dec 2018 05:38  Phos  4.2     12-07  Mg     2.2     12-07    TPro  6.3  /  Alb  2.6<L>  /  TBili  0.5  /  DBili  0.2  /  AST  44<H>  /  ALT  43  /  AlkPhos  240<H>  12-07    PT/INR - ( 07 Dec 2018 05:38 )   PT: 17.9 sec;   INR: 1.54 ratio         PTT - ( 07 Dec 2018 05:38 )  PTT:28.9 sec      RADIOLOGY & ADDITIONAL TESTS:

## 2018-12-08 LAB
ALBUMIN SERPL ELPH-MCNC: 2.7 G/DL — LOW (ref 3.3–5)
ALP SERPL-CCNC: 291 U/L — HIGH (ref 40–120)
ALT FLD-CCNC: 40 U/L — SIGNIFICANT CHANGE UP (ref 10–45)
AMYLASE P1 CFR SERPL: 137 U/L — HIGH (ref 25–125)
ANION GAP SERPL CALC-SCNC: 12 MMOL/L — SIGNIFICANT CHANGE UP (ref 5–17)
APTT BLD: 28.3 SEC — SIGNIFICANT CHANGE UP (ref 27.5–36.3)
AST SERPL-CCNC: 46 U/L — HIGH (ref 10–40)
BILIRUB DIRECT SERPL-MCNC: 0.2 MG/DL — SIGNIFICANT CHANGE UP (ref 0–0.2)
BILIRUB INDIRECT FLD-MCNC: 0.2 MG/DL — SIGNIFICANT CHANGE UP (ref 0.2–1)
BILIRUB SERPL-MCNC: 0.4 MG/DL — SIGNIFICANT CHANGE UP (ref 0.2–1.2)
BUN SERPL-MCNC: 12 MG/DL — SIGNIFICANT CHANGE UP (ref 7–23)
CA-I BLD-SCNC: 1.19 MMOL/L — SIGNIFICANT CHANGE UP (ref 1.12–1.3)
CALCIUM SERPL-MCNC: 8.9 MG/DL — SIGNIFICANT CHANGE UP (ref 8.4–10.5)
CHLORIDE SERPL-SCNC: 99 MMOL/L — SIGNIFICANT CHANGE UP (ref 96–108)
CO2 SERPL-SCNC: 26 MMOL/L — SIGNIFICANT CHANGE UP (ref 22–31)
CREAT SERPL-MCNC: 0.58 MG/DL — SIGNIFICANT CHANGE UP (ref 0.5–1.3)
GLUCOSE BLDC GLUCOMTR-MCNC: 150 MG/DL — HIGH (ref 70–99)
GLUCOSE BLDC GLUCOMTR-MCNC: 162 MG/DL — HIGH (ref 70–99)
GLUCOSE BLDC GLUCOMTR-MCNC: 176 MG/DL — HIGH (ref 70–99)
GLUCOSE SERPL-MCNC: 174 MG/DL — HIGH (ref 70–99)
HCT VFR BLD CALC: 32.8 % — LOW (ref 39–50)
HGB BLD-MCNC: 11.1 G/DL — LOW (ref 13–17)
INR BLD: 1.28 RATIO — HIGH (ref 0.88–1.16)
LIDOCAIN IGE QN: 438 U/L — HIGH (ref 7–60)
MAGNESIUM SERPL-MCNC: 2 MG/DL — SIGNIFICANT CHANGE UP (ref 1.6–2.6)
MCHC RBC-ENTMCNC: 33.3 PG — SIGNIFICANT CHANGE UP (ref 27–34)
MCHC RBC-ENTMCNC: 33.8 GM/DL — SIGNIFICANT CHANGE UP (ref 32–36)
MCV RBC AUTO: 98.5 FL — SIGNIFICANT CHANGE UP (ref 80–100)
NRBC # BLD: 1 /100 WBCS — HIGH (ref 0–0)
PHOSPHATE SERPL-MCNC: 3.6 MG/DL — SIGNIFICANT CHANGE UP (ref 2.5–4.5)
PLATELET # BLD AUTO: 270 K/UL — SIGNIFICANT CHANGE UP (ref 150–400)
POTASSIUM SERPL-MCNC: 3.8 MMOL/L — SIGNIFICANT CHANGE UP (ref 3.5–5.3)
POTASSIUM SERPL-SCNC: 3.8 MMOL/L — SIGNIFICANT CHANGE UP (ref 3.5–5.3)
PROT SERPL-MCNC: 6.3 G/DL — SIGNIFICANT CHANGE UP (ref 6–8.3)
PROTHROM AB SERPL-ACNC: 14.7 SEC — HIGH (ref 10–13.1)
RBC # BLD: 3.33 M/UL — LOW (ref 4.2–5.8)
RBC # FLD: 13.9 % — SIGNIFICANT CHANGE UP (ref 10.3–14.5)
SODIUM SERPL-SCNC: 137 MMOL/L — SIGNIFICANT CHANGE UP (ref 135–145)
WBC # BLD: 21.1 K/UL — HIGH (ref 3.8–10.5)
WBC # FLD AUTO: 21.1 K/UL — HIGH (ref 3.8–10.5)

## 2018-12-08 PROCEDURE — 99232 SBSQ HOSP IP/OBS MODERATE 35: CPT

## 2018-12-08 RX ORDER — HYDROMORPHONE HYDROCHLORIDE 2 MG/ML
1 INJECTION INTRAMUSCULAR; INTRAVENOUS; SUBCUTANEOUS EVERY 4 HOURS
Qty: 0 | Refills: 0 | Status: DISCONTINUED | OUTPATIENT
Start: 2018-12-08 | End: 2018-12-13

## 2018-12-08 RX ORDER — POTASSIUM CHLORIDE 20 MEQ
10 PACKET (EA) ORAL
Qty: 0 | Refills: 0 | Status: COMPLETED | OUTPATIENT
Start: 2018-12-08 | End: 2018-12-08

## 2018-12-08 RX ORDER — ANASTROZOLE 1 MG/1
1 TABLET ORAL DAILY
Qty: 0 | Refills: 0 | Status: DISCONTINUED | OUTPATIENT
Start: 2018-12-08 | End: 2018-12-24

## 2018-12-08 RX ORDER — ESCITALOPRAM OXALATE 10 MG/1
20 TABLET, FILM COATED ORAL AT BEDTIME
Qty: 0 | Refills: 0 | Status: DISCONTINUED | OUTPATIENT
Start: 2018-12-08 | End: 2018-12-11

## 2018-12-08 RX ORDER — FOLIC ACID 0.8 MG
1 TABLET ORAL DAILY
Qty: 0 | Refills: 0 | Status: DISCONTINUED | OUTPATIENT
Start: 2018-12-08 | End: 2018-12-11

## 2018-12-08 RX ORDER — HYDROMORPHONE HYDROCHLORIDE 2 MG/ML
0.5 INJECTION INTRAMUSCULAR; INTRAVENOUS; SUBCUTANEOUS EVERY 4 HOURS
Qty: 0 | Refills: 0 | Status: DISCONTINUED | OUTPATIENT
Start: 2018-12-08 | End: 2018-12-13

## 2018-12-08 RX ORDER — QUETIAPINE FUMARATE 200 MG/1
100 TABLET, FILM COATED ORAL AT BEDTIME
Qty: 0 | Refills: 0 | Status: DISCONTINUED | OUTPATIENT
Start: 2018-12-08 | End: 2018-12-11

## 2018-12-08 RX ORDER — CLONAZEPAM 1 MG
1 TABLET ORAL
Qty: 0 | Refills: 0 | Status: DISCONTINUED | OUTPATIENT
Start: 2018-12-08 | End: 2018-12-11

## 2018-12-08 RX ORDER — ACETAMINOPHEN 500 MG
1000 TABLET ORAL ONCE
Qty: 0 | Refills: 0 | Status: COMPLETED | OUTPATIENT
Start: 2018-12-08 | End: 2018-12-08

## 2018-12-08 RX ORDER — SODIUM CHLORIDE 9 MG/ML
1000 INJECTION, SOLUTION INTRAVENOUS
Qty: 0 | Refills: 0 | Status: DISCONTINUED | OUTPATIENT
Start: 2018-12-08 | End: 2018-12-12

## 2018-12-08 RX ORDER — THIAMINE MONONITRATE (VIT B1) 100 MG
100 TABLET ORAL DAILY
Qty: 0 | Refills: 0 | Status: DISCONTINUED | OUTPATIENT
Start: 2018-12-08 | End: 2018-12-11

## 2018-12-08 RX ADMIN — Medication 1000 MILLIGRAM(S): at 20:45

## 2018-12-08 RX ADMIN — Medication 5 MILLIGRAM(S): at 05:23

## 2018-12-08 RX ADMIN — ENOXAPARIN SODIUM 40 MILLIGRAM(S): 100 INJECTION SUBCUTANEOUS at 12:29

## 2018-12-08 RX ADMIN — Medication 100 MILLIEQUIVALENT(S): at 17:43

## 2018-12-08 RX ADMIN — HYDROMORPHONE HYDROCHLORIDE 1 MILLIGRAM(S): 2 INJECTION INTRAMUSCULAR; INTRAVENOUS; SUBCUTANEOUS at 14:55

## 2018-12-08 RX ADMIN — Medication 5 MILLIGRAM(S): at 12:29

## 2018-12-08 RX ADMIN — MEROPENEM 100 MILLIGRAM(S): 1 INJECTION INTRAVENOUS at 14:04

## 2018-12-08 RX ADMIN — Medication 2: at 05:35

## 2018-12-08 RX ADMIN — Medication 2: at 12:29

## 2018-12-08 RX ADMIN — Medication 2: at 00:07

## 2018-12-08 RX ADMIN — Medication 5 MILLIGRAM(S): at 00:07

## 2018-12-08 RX ADMIN — Medication 100 MILLIGRAM(S): at 14:04

## 2018-12-08 RX ADMIN — Medication 1 TABLET(S): at 14:04

## 2018-12-08 RX ADMIN — MEROPENEM 100 MILLIGRAM(S): 1 INJECTION INTRAVENOUS at 05:19

## 2018-12-08 RX ADMIN — SODIUM CHLORIDE 100 MILLILITER(S): 9 INJECTION, SOLUTION INTRAVENOUS at 05:28

## 2018-12-08 RX ADMIN — Medication 100 MILLIEQUIVALENT(S): at 12:29

## 2018-12-08 RX ADMIN — HYDROMORPHONE HYDROCHLORIDE 1 MILLIGRAM(S): 2 INJECTION INTRAMUSCULAR; INTRAVENOUS; SUBCUTANEOUS at 15:25

## 2018-12-08 RX ADMIN — Medication 100 MILLIEQUIVALENT(S): at 14:05

## 2018-12-08 RX ADMIN — Medication 400 MILLIGRAM(S): at 20:23

## 2018-12-08 RX ADMIN — SODIUM CHLORIDE 100 MILLILITER(S): 9 INJECTION, SOLUTION INTRAVENOUS at 14:05

## 2018-12-08 RX ADMIN — Medication 5 MILLIGRAM(S): at 17:43

## 2018-12-08 RX ADMIN — Medication 1 MILLIGRAM(S): at 14:04

## 2018-12-08 NOTE — PROGRESS NOTE ADULT - ATTENDING COMMENTS
Patient seen and examined  Mildly confused, thinks that he is in school  Not agitated  Non-toxic appearing  vitals stable  afebrile  abd - morbidly obese, nontender, soft    - still with significant leukocystosis and lipase levels  - will continue NPO and antibiotics for now, length of treatment with antibiotics unclear at this point  - will likely need small bowel enteral access for nutrition, GI note appreciated

## 2018-12-08 NOTE — PROGRESS NOTE ADULT - ASSESSMENT
63 year old male with acute/subacute pancreatitis with necrosis and fluid collection around the lesser sac. Possible infected pseudocyst transferred from [facility] for further management of pancreatitis    - Pain control  - Sips/Chips, IVF  - Continue nightly bipap, monitor respiratory status  - Monitor for signs of alcohol withdrawal  - Cont abx, trend WBC  - Trend labs  - Insulin sliding scale  - DVT ppx: Lovenox    Dispo: floors    TIA Palma, PGY-1  [facility]  [id number] with any questions

## 2018-12-08 NOTE — PHYSICAL THERAPY INITIAL EVALUATION ADULT - ADDITIONAL COMMENTS
pt confused, as per chart, pt resides in a PH with spouse, 1 step to enter, PTA, pt amb (I), (I) with ADls.

## 2018-12-08 NOTE — PHYSICAL THERAPY INITIAL EVALUATION ADULT - PLANNED THERAPY INTERVENTIONS, PT EVAL
stairs: GOAL: patient will be able to negotiated 1 stairs (I) with one HR in 2 weeks/gait training/bed mobility training/transfer training

## 2018-12-08 NOTE — PROGRESS NOTE ADULT - SUBJECTIVE AND OBJECTIVE BOX
INTERVAL HPI/OVERNIGHT EVENTS:  No new overnight event.  No N/V/D.  npo  confused mildly    Allergies    No Known Allergies    Intolerances          General:  No wt loss, fevers, chills, night sweats, fatigue,   Eyes:  Good vision, no reported pain  ENT:  No sore throat, pain, runny nose, dysphagia  CV:  No pain, palpitations, hypo/hypertension  Resp:  No dyspnea, cough, tachypnea, wheezing  GI:  No pain, No nausea, No vomiting, No diarrhea, No constipation, No weight loss, No fever, No pruritis, No rectal bleeding, No tarry stools, No dysphagia,  :  No pain, bleeding, incontinence, nocturia  Muscle:  No pain, weakness  Neuro:  No weakness, tingling, memory problems  Psych:  No fatigue, insomnia, mood problems, depression  Endocrine:  No polyuria, polydipsia, cold/heat intolerance  Heme:  No petechiae, ecchymosis, easy bruisability  Skin:  No rash, tattoos, scars, edema      PHYSICAL EXAM:   Vital Signs:  Vital Signs Last 24 Hrs  T(C): 36.9 (08 Dec 2018 12:19), Max: 37.4 (07 Dec 2018 15:00)  T(F): 98.4 (08 Dec 2018 12:19), Max: 99.4 (07 Dec 2018 15:00)  HR: 105 (08 Dec 2018 12:19) (98 - 109)  BP: 158/76 (08 Dec 2018 12:19) (146/78 - 158/82)  BP(mean): --  RR: 22 (08 Dec 2018 12:19) (18 - 26)  SpO2: 92% (08 Dec 2018 12:19) (91% - 95%)  Daily     Daily I&O's Summary    07 Dec 2018 07:01  -  08 Dec 2018 07:00  --------------------------------------------------------  IN: 1900 mL / OUT: 2015 mL / NET: -115 mL    08 Dec 2018 07:01  -  08 Dec 2018 13:33  --------------------------------------------------------  IN: 0 mL / OUT: 425 mL / NET: -425 mL        GENERAL:  Appears stated age, well-groomed, well-nourished, no distress  HEENT:  NC/AT,  conjunctivae clear and pink, no thyromegaly, nodules, adenopathy, no JVD, sclera -anicteric  CHEST:  Full & symmetric excursion, no increased effort, breath sounds clear  HEART:  Regular rhythm, S1, S2, no murmur/rub/S3/S4, no abdominal bruit, no edema  ABDOMEN:  Soft, non-tender, non-distended, normoactive bowel sounds,  no masses ,no hepato-splenomegaly, no signs of chronic liver disease  EXTEREMITIES:  no cyanosis,clubbing or edema  SKIN:  No rash/erythema/ecchymoses/petechiae/wounds/abscess/warm/dry  NEURO:  Alert, oriented, no asterixis, no tremor, no encephalopathy      LABS:                        11.1   21.10 )-----------( 270      ( 08 Dec 2018 09:01 )             32.8     12-08    137  |  99  |  12  ----------------------------<  174<H>  3.8   |  26  |  0.58    Ca    8.9      08 Dec 2018 07:17  Phos  3.6     12-08  Mg     2.0     12-08    TPro  6.3  /  Alb  2.7<L>  /  TBili  0.4  /  DBili  0.2  /  AST  46<H>  /  ALT  40  /  AlkPhos  291<H>  12-08    PT/INR - ( 08 Dec 2018 09:05 )   PT: 14.7 sec;   INR: 1.28 ratio         PTT - ( 08 Dec 2018 09:05 )  PTT:28.3 sec    amylaseAmylase, Serum Total: 137 U/L (12-08 @ 07:17)  Amylase, Serum Total: 121 U/L (12-07 @ 05:38)     lipaseLipase, Serum: 438 U/L (12-08 @ 07:17)  Lipase, Serum: 389 U/L (12-07 @ 05:38)    RADIOLOGY & ADDITIONAL TESTS:

## 2018-12-08 NOTE — PROGRESS NOTE ADULT - SUBJECTIVE AND OBJECTIVE BOX
TRAUMA SURGERY DAILY PROGRESS NOTE:    Overnight:  No acute events.    Subjective:  Patient seen and examined. Reports continued pain though improved since yesterday. Denies N/V.       Exam:  Gen: NAD, resting in chair  Resp: Airway patent, non-labored respirations  Abd: Soft, distended, minimally tender, no rebound or guarding    Vital Signs Last 24 Hrs  T(C): 37.4 (08 Dec 2018 08:45), Max: 37.4 (07 Dec 2018 15:00)  T(F): 99.3 (08 Dec 2018 08:45), Max: 99.4 (07 Dec 2018 15:00)  HR: 102 (08 Dec 2018 09:14) (98 - 112)  BP: 146/78 (08 Dec 2018 09:14) (146/78 - 158/82)  BP(mean): --  RR: 20 (08 Dec 2018 09:14) (18 - 27)  SpO2: 93% (08 Dec 2018 09:14) (91% - 95%)    I&O's Detail    07 Dec 2018 07:01  -  08 Dec 2018 07:00  --------------------------------------------------------  IN:    dextrose 5% + lactated ringers.: 1200 mL    IV PiggyBack: 100 mL    lactated ringers.: 400 mL    Solution: 200 mL  Total IN: 1900 mL    OUT:    Ureteral Catheter: 2015 mL  Total OUT: 2015 mL    Total NET: -115 mL      MEDICATIONS  (STANDING):  chlorhexidine 4% Liquid 1 Application(s) Topical <User Schedule>  enoxaparin Injectable 40 milliGRAM(s) SubCutaneous daily  insulin lispro (HumaLOG) corrective regimen sliding scale   SubCutaneous every 6 hours  lactated ringers. 1000 milliLiter(s) (100 mL/Hr) IV Continuous <Continuous>  meropenem  IVPB 1000 milliGRAM(s) IV Intermittent every 8 hours  metoprolol tartrate Injectable 5 milliGRAM(s) IV Push every 6 hours    MEDICATIONS  (PRN):  acetaminophen  IVPB .. 1000 milliGRAM(s) IV Intermittent once PRN Mild Pain (1 - 3)  HYDROmorphone  Injectable 1 milliGRAM(s) IV Push every 4 hours PRN Severe Pain (7 - 10)  HYDROmorphone  Injectable 0.5 milliGRAM(s) IV Push every 4 hours PRN Moderate Pain (4 - 6)      LABS:                        11.1   21.10 )-----------( 270      ( 08 Dec 2018 09:01 )             32.8     12-08    137  |  99  |  12  ----------------------------<  174<H>  3.8   |  26  |  0.58    Ca    8.9      08 Dec 2018 07:17  Phos  3.6     12-08  Mg     2.0     12-08    TPro  6.3  /  Alb  2.7<L>  /  TBili  0.4  /  DBili  0.2  /  AST  46<H>  /  ALT  40  /  AlkPhos  291<H>  12-08    PT/INR - ( 08 Dec 2018 09:05 )   PT: 14.7 sec;   INR: 1.28 ratio         PTT - ( 08 Dec 2018 09:05 )  PTT:28.3 sec

## 2018-12-08 NOTE — PHYSICAL THERAPY INITIAL EVALUATION ADULT - PERTINENT HX OF CURRENT PROBLEM, REHAB EVAL
63yoM with hx of alcohol induced pancreatitis earlier this year, alcohol abuse, DM, HTN, ROSS, p/w pancreatitis again, CXR 12/6, small L pleural effusion, L PNA,

## 2018-12-09 LAB
ALBUMIN SERPL ELPH-MCNC: 2.7 G/DL — LOW (ref 3.3–5)
ALP SERPL-CCNC: 269 U/L — HIGH (ref 40–120)
ALT FLD-CCNC: 35 U/L — SIGNIFICANT CHANGE UP (ref 10–45)
AMYLASE P1 CFR SERPL: 165 U/L — HIGH (ref 25–125)
ANION GAP SERPL CALC-SCNC: 15 MMOL/L — SIGNIFICANT CHANGE UP (ref 5–17)
APTT BLD: 29 SEC — SIGNIFICANT CHANGE UP (ref 27.5–36.3)
AST SERPL-CCNC: 40 U/L — SIGNIFICANT CHANGE UP (ref 10–40)
BILIRUB DIRECT SERPL-MCNC: 0.1 MG/DL — SIGNIFICANT CHANGE UP (ref 0–0.2)
BILIRUB INDIRECT FLD-MCNC: 0.2 MG/DL — SIGNIFICANT CHANGE UP (ref 0.2–1)
BILIRUB SERPL-MCNC: 0.3 MG/DL — SIGNIFICANT CHANGE UP (ref 0.2–1.2)
BUN SERPL-MCNC: 11 MG/DL — SIGNIFICANT CHANGE UP (ref 7–23)
CALCIUM SERPL-MCNC: 9 MG/DL — SIGNIFICANT CHANGE UP (ref 8.4–10.5)
CHLORIDE SERPL-SCNC: 98 MMOL/L — SIGNIFICANT CHANGE UP (ref 96–108)
CO2 SERPL-SCNC: 25 MMOL/L — SIGNIFICANT CHANGE UP (ref 22–31)
CREAT SERPL-MCNC: 0.62 MG/DL — SIGNIFICANT CHANGE UP (ref 0.5–1.3)
GLUCOSE BLDC GLUCOMTR-MCNC: 119 MG/DL — HIGH (ref 70–99)
GLUCOSE BLDC GLUCOMTR-MCNC: 120 MG/DL — HIGH (ref 70–99)
GLUCOSE BLDC GLUCOMTR-MCNC: 132 MG/DL — HIGH (ref 70–99)
GLUCOSE BLDC GLUCOMTR-MCNC: 139 MG/DL — HIGH (ref 70–99)
GLUCOSE SERPL-MCNC: 126 MG/DL — HIGH (ref 70–99)
HCT VFR BLD CALC: 32.4 % — LOW (ref 39–50)
HGB BLD-MCNC: 10.8 G/DL — LOW (ref 13–17)
INR BLD: 1.37 RATIO — HIGH (ref 0.88–1.16)
LIDOCAIN IGE QN: 449 U/L — HIGH (ref 7–60)
MAGNESIUM SERPL-MCNC: 2 MG/DL — SIGNIFICANT CHANGE UP (ref 1.6–2.6)
MCHC RBC-ENTMCNC: 32.8 PG — SIGNIFICANT CHANGE UP (ref 27–34)
MCHC RBC-ENTMCNC: 33.3 GM/DL — SIGNIFICANT CHANGE UP (ref 32–36)
MCV RBC AUTO: 98.5 FL — SIGNIFICANT CHANGE UP (ref 80–100)
PHOSPHATE SERPL-MCNC: 4.1 MG/DL — SIGNIFICANT CHANGE UP (ref 2.5–4.5)
PLATELET # BLD AUTO: 319 K/UL — SIGNIFICANT CHANGE UP (ref 150–400)
POTASSIUM SERPL-MCNC: 3.7 MMOL/L — SIGNIFICANT CHANGE UP (ref 3.5–5.3)
POTASSIUM SERPL-SCNC: 3.7 MMOL/L — SIGNIFICANT CHANGE UP (ref 3.5–5.3)
PROT SERPL-MCNC: 6.3 G/DL — SIGNIFICANT CHANGE UP (ref 6–8.3)
PROTHROM AB SERPL-ACNC: 15.5 SEC — HIGH (ref 10–13.1)
RBC # BLD: 3.29 M/UL — LOW (ref 4.2–5.8)
RBC # FLD: 13.9 % — SIGNIFICANT CHANGE UP (ref 10.3–14.5)
SODIUM SERPL-SCNC: 138 MMOL/L — SIGNIFICANT CHANGE UP (ref 135–145)
WBC # BLD: 22.97 K/UL — HIGH (ref 3.8–10.5)
WBC # FLD AUTO: 22.97 K/UL — HIGH (ref 3.8–10.5)

## 2018-12-09 PROCEDURE — 99232 SBSQ HOSP IP/OBS MODERATE 35: CPT

## 2018-12-09 PROCEDURE — 71045 X-RAY EXAM CHEST 1 VIEW: CPT | Mod: 26

## 2018-12-09 RX ORDER — POTASSIUM CHLORIDE 20 MEQ
10 PACKET (EA) ORAL
Qty: 0 | Refills: 0 | Status: COMPLETED | OUTPATIENT
Start: 2018-12-09 | End: 2018-12-09

## 2018-12-09 RX ORDER — IPRATROPIUM/ALBUTEROL SULFATE 18-103MCG
3 AEROSOL WITH ADAPTER (GRAM) INHALATION EVERY 6 HOURS
Qty: 0 | Refills: 0 | Status: DISCONTINUED | OUTPATIENT
Start: 2018-12-09 | End: 2018-12-18

## 2018-12-09 RX ORDER — METOPROLOL TARTRATE 50 MG
25 TABLET ORAL
Qty: 0 | Refills: 0 | Status: DISCONTINUED | OUTPATIENT
Start: 2018-12-09 | End: 2018-12-11

## 2018-12-09 RX ADMIN — QUETIAPINE FUMARATE 100 MILLIGRAM(S): 200 TABLET, FILM COATED ORAL at 00:25

## 2018-12-09 RX ADMIN — Medication 1 MILLIGRAM(S): at 11:10

## 2018-12-09 RX ADMIN — SODIUM CHLORIDE 100 MILLILITER(S): 9 INJECTION, SOLUTION INTRAVENOUS at 21:22

## 2018-12-09 RX ADMIN — Medication 100 MILLIEQUIVALENT(S): at 15:52

## 2018-12-09 RX ADMIN — MEROPENEM 100 MILLIGRAM(S): 1 INJECTION INTRAVENOUS at 21:22

## 2018-12-09 RX ADMIN — Medication 5 MILLIGRAM(S): at 00:26

## 2018-12-09 RX ADMIN — Medication 1 TABLET(S): at 11:10

## 2018-12-09 RX ADMIN — MEROPENEM 100 MILLIGRAM(S): 1 INJECTION INTRAVENOUS at 05:11

## 2018-12-09 RX ADMIN — Medication 1 MILLIGRAM(S): at 05:31

## 2018-12-09 RX ADMIN — Medication 100 MILLIGRAM(S): at 11:10

## 2018-12-09 RX ADMIN — MEROPENEM 100 MILLIGRAM(S): 1 INJECTION INTRAVENOUS at 00:25

## 2018-12-09 RX ADMIN — Medication 3 MILLILITER(S): at 18:12

## 2018-12-09 RX ADMIN — Medication 100 MILLIEQUIVALENT(S): at 11:11

## 2018-12-09 RX ADMIN — MEROPENEM 100 MILLIGRAM(S): 1 INJECTION INTRAVENOUS at 13:13

## 2018-12-09 RX ADMIN — HYDROMORPHONE HYDROCHLORIDE 1 MILLIGRAM(S): 2 INJECTION INTRAMUSCULAR; INTRAVENOUS; SUBCUTANEOUS at 16:23

## 2018-12-09 RX ADMIN — HYDROMORPHONE HYDROCHLORIDE 1 MILLIGRAM(S): 2 INJECTION INTRAMUSCULAR; INTRAVENOUS; SUBCUTANEOUS at 11:50

## 2018-12-09 RX ADMIN — QUETIAPINE FUMARATE 100 MILLIGRAM(S): 200 TABLET, FILM COATED ORAL at 21:22

## 2018-12-09 RX ADMIN — Medication 25 MILLIGRAM(S): at 18:13

## 2018-12-09 RX ADMIN — HYDROMORPHONE HYDROCHLORIDE 1 MILLIGRAM(S): 2 INJECTION INTRAMUSCULAR; INTRAVENOUS; SUBCUTANEOUS at 11:20

## 2018-12-09 RX ADMIN — ENOXAPARIN SODIUM 40 MILLIGRAM(S): 100 INJECTION SUBCUTANEOUS at 11:11

## 2018-12-09 RX ADMIN — Medication 3 MILLILITER(S): at 12:34

## 2018-12-09 RX ADMIN — HYDROMORPHONE HYDROCHLORIDE 1 MILLIGRAM(S): 2 INJECTION INTRAMUSCULAR; INTRAVENOUS; SUBCUTANEOUS at 15:53

## 2018-12-09 RX ADMIN — ESCITALOPRAM OXALATE 20 MILLIGRAM(S): 10 TABLET, FILM COATED ORAL at 21:22

## 2018-12-09 RX ADMIN — Medication 100 MILLIEQUIVALENT(S): at 13:13

## 2018-12-09 RX ADMIN — ESCITALOPRAM OXALATE 20 MILLIGRAM(S): 10 TABLET, FILM COATED ORAL at 00:25

## 2018-12-09 RX ADMIN — Medication 5 MILLIGRAM(S): at 05:11

## 2018-12-09 NOTE — PROGRESS NOTE ADULT - ATTENDING COMMENTS
Patient seen and examined  Still mildly confused  No acute complaints  vitals stable  afebrile  abd - obese, mild epigastric tenderness    WBC=21 ->22  Lipase = 389 -> 438 ->449    - leukocytosis and lipase not normalizing, still likely with inflammatory process  - Agree with need for N-J tube by GI for enteral nutrition

## 2018-12-09 NOTE — PROGRESS NOTE ADULT - SUBJECTIVE AND OBJECTIVE BOX
INTERVAL HPI/OVERNIGHT EVENTS:  No new overnight event.  No N/V/D. still npo  less confused    Allergies    No Known Allergies    Intolerances    General:  No wt loss, fevers, chills, night sweats, fatigue,   Eyes:  Good vision, no reported pain  ENT:  No sore throat, pain, runny nose, dysphagia  CV:  No pain, palpitations, hypo/hypertension  Resp:  No dyspnea, cough, tachypnea, wheezing  GI:  No pain, No nausea, No vomiting, No diarrhea, No constipation, No weight loss, No fever, No pruritis, No rectal bleeding, No tarry stools, No dysphagia,  :  No pain, bleeding, incontinence, nocturia  Muscle:  No pain, weakness  Neuro:  No weakness, tingling, memory problems  Psych:  No fatigue, insomnia, mood problems, depression  Endocrine:  No polyuria, polydipsia, cold/heat intolerance  Heme:  No petechiae, ecchymosis, easy bruisability  Skin:  No rash, tattoos, scars, edema      PHYSICAL EXAM:   Vital Signs:  Vital Signs Last 24 Hrs  T(C): 36.9 (09 Dec 2018 13:13), Max: 37.8 (08 Dec 2018 17:34)  T(F): 98.5 (09 Dec 2018 13:13), Max: 100 (08 Dec 2018 17:34)  HR: 104 (09 Dec 2018 13:13) (92 - 107)  BP: 155/76 (09 Dec 2018 13:13) (133/61 - 164/82)  BP(mean): --  RR: 18 (09 Dec 2018 13:13) (17 - 18)  SpO2: 94% (09 Dec 2018 13:13) (92% - 95%)  Daily     Daily I&O's Summary    08 Dec 2018 07:01  -  09 Dec 2018 07:00  --------------------------------------------------------  IN: 2900 mL / OUT: 1500 mL / NET: 1400 mL    09 Dec 2018 07:01  -  09 Dec 2018 15:01  --------------------------------------------------------  IN: 0 mL / OUT: 550 mL / NET: -550 mL        GENERAL:  Appears stated age, well-groomed, well-nourished, no distress  HEENT:  NC/AT,  conjunctivae clear and pink, no thyromegaly, nodules, adenopathy, no JVD, sclera -anicteric  CHEST:  Full & symmetric excursion, no increased effort, breath sounds clear  HEART:  Regular rhythm, S1, S2, no murmur/rub/S3/S4, no abdominal bruit, no edema  ABDOMEN:  Soft, non-tender, non-distended, normoactive bowel sounds,  no masses ,no hepato-splenomegaly, no signs of chronic liver disease  EXTEREMITIES:  no cyanosis,clubbing or edema  SKIN:  No rash/erythema/ecchymoses/petechiae/wounds/abscess/warm/dry  NEURO:  Alert, oriented, no asterixis, no tremor, no encephalopathy      LABS:                        10.8   22.97 )-----------( 319      ( 09 Dec 2018 09:03 )             32.4     12-09    138  |  98  |  11  ----------------------------<  126<H>  3.7   |  25  |  0.62    Ca    9.0      09 Dec 2018 07:42  Phos  4.1     12-09  Mg     2.0     12-09    TPro  6.3  /  Alb  2.7<L>  /  TBili  0.3  /  DBili  0.1  /  AST  40  /  ALT  35  /  AlkPhos  269<H>  12-09    PT/INR - ( 09 Dec 2018 09:03 )   PT: 15.5 sec;   INR: 1.37 ratio         PTT - ( 09 Dec 2018 09:03 )  PTT:29.0 sec    amylaseAmylase, Serum Total: 165 U/L (12-09 @ 07:42)  Amylase, Serum Total: 137 U/L (12-08 @ 07:17)  Amylase, Serum Total: 121 U/L (12-07 @ 05:38)     lipaseLipase, Serum: 449 U/L (12-09 @ 07:42)  Lipase, Serum: 438 U/L (12-08 @ 07:17)  Lipase, Serum: 389 U/L (12-07 @ 05:38)    RADIOLOGY & ADDITIONAL TESTS:

## 2018-12-09 NOTE — PROGRESS NOTE ADULT - ASSESSMENT
63M transferred from [place] with acute/subacute pancreatitis with necrosis and fluid collection around the lesser sac    - Pain control  - Sips/Chips, IVF  - Continue nightly bipap, monitor respiratory status  - Monitor for signs of alcohol withdrawal  - Cont abx, trend WBC  - Trend labs  - Insulin sliding scale  - Appreciate GI input  - DVT ppx: Lovenox    Dispo: floors    TIA Palma, PGY-1  [facility] Surgery  [id number] with any questions 63M transferred from [place] with acute/subacute pancreatitis with necrosis and fluid collection around the lesser sac    - Pain control  - Sips/Chips, IVF  - Would like to place endoscopic NJ tube with GI tomorrow, appreciate input  - Continue nightly bipap, monitor respiratory status  - Monitor for signs of alcohol withdrawal  - Cont meropenem, trend WBC  - Trend labs  - Insulin sliding scale  - DVT ppx: Lovenox    Dispo: floors    TIA Palma, PGY-1  [facility] Surgery  [id number] with any questions

## 2018-12-09 NOTE — PROGRESS NOTE ADULT - SUBJECTIVE AND OBJECTIVE BOX
TRAUMA SURGERY DAILY PROGRESS NOTE:    Overnight:  No acute events.    Subjective:  Patient seen and examined. Reports pain is well controlled. Denies N/V.     Exam:  Gen: NAD, resting in bed  Resp: Airway patent, non-labored respirations  Abd: Soft, distended, mildly tender in epigastrum  Ext: WWP    Vital Signs Last 24 Hrs  T(C): 36.9 (09 Dec 2018 04:57), Max: 37.8 (08 Dec 2018 17:34)  T(F): 98.4 (09 Dec 2018 04:57), Max: 100 (08 Dec 2018 17:34)  HR: 92 (09 Dec 2018 04:57) (92 - 107)  BP: 153/72 (09 Dec 2018 04:57) (133/61 - 162/72)  BP(mean): --  RR: 18 (09 Dec 2018 04:57) (17 - 22)  SpO2: 93% (09 Dec 2018 04:57) (92% - 95%)    I&O's Detail    07 Dec 2018 07:01  -  08 Dec 2018 07:00  --------------------------------------------------------  IN:    dextrose 5% + lactated ringers.: 1200 mL    IV PiggyBack: 100 mL    lactated ringers.: 400 mL    Solution: 200 mL  Total IN: 1900 mL    OUT:    Ureteral Catheter: 2015 mL  Total OUT: 2015 mL    Total NET: -115 mL      08 Dec 2018 07:01  -  09 Dec 2018 06:52  --------------------------------------------------------  IN:    IV PiggyBack: 300 mL    lactated ringers.: 2400 mL    Solution: 200 mL  Total IN: 2900 mL    OUT:    Ureteral Catheter: 1500 mL  Total OUT: 1500 mL    Total NET: 1400 mL      MEDICATIONS  (STANDING):  anastrozole 1 milliGRAM(s) Oral daily  chlorhexidine 4% Liquid 1 Application(s) Topical <User Schedule>  clonazePAM Tablet 1 milliGRAM(s) Oral two times a day  enoxaparin Injectable 40 milliGRAM(s) SubCutaneous daily  escitalopram 20 milliGRAM(s) Oral at bedtime  folic acid 1 milliGRAM(s) Oral daily  insulin lispro (HumaLOG) corrective regimen sliding scale   SubCutaneous every 6 hours  lactated ringers. 1000 milliLiter(s) (100 mL/Hr) IV Continuous <Continuous>  meropenem  IVPB 1000 milliGRAM(s) IV Intermittent every 8 hours  metoprolol tartrate Injectable 5 milliGRAM(s) IV Push every 6 hours  multivitamin 1 Tablet(s) Oral daily  QUEtiapine 100 milliGRAM(s) Oral at bedtime  thiamine 100 milliGRAM(s) Oral daily    MEDICATIONS  (PRN):  HYDROmorphone  Injectable 1 milliGRAM(s) IV Push every 4 hours PRN Severe Pain (7 - 10)  HYDROmorphone  Injectable 0.5 milliGRAM(s) IV Push every 4 hours PRN Moderate Pain (4 - 6)      LABS:                        11.1   21.10 )-----------( 270      ( 08 Dec 2018 09:01 )             32.8     12-08    137  |  99  |  12  ----------------------------<  174<H>  3.8   |  26  |  0.58    Ca    8.9      08 Dec 2018 07:17  Phos  3.6     12-08  Mg     2.0     12-08    TPro  6.3  /  Alb  2.7<L>  /  TBili  0.4  /  DBili  0.2  /  AST  46<H>  /  ALT  40  /  AlkPhos  291<H>  12-08    PT/INR - ( 08 Dec 2018 09:05 )   PT: 14.7 sec;   INR: 1.28 ratio         PTT - ( 08 Dec 2018 09:05 )  PTT:28.3 sec TRAUMA SURGERY DAILY PROGRESS NOTE:    Overnight:  No acute events.    Subjective:  Patient seen and examined. Reports pain is well controlled. Denies N/V. Passing flatus and had BM yesterday.    Exam:  Gen: NAD, resting in bed, oriented to place, month, and self  Resp: Airway patent, non-labored respirations  Abd: Soft, distended, mildly tender in epigastrium  Ext: WWP    Vital Signs Last 24 Hrs  T(C): 36.9 (09 Dec 2018 04:57), Max: 37.8 (08 Dec 2018 17:34)  T(F): 98.4 (09 Dec 2018 04:57), Max: 100 (08 Dec 2018 17:34)  HR: 92 (09 Dec 2018 04:57) (92 - 107)  BP: 153/72 (09 Dec 2018 04:57) (133/61 - 162/72)  BP(mean): --  RR: 18 (09 Dec 2018 04:57) (17 - 22)  SpO2: 93% (09 Dec 2018 04:57) (92% - 95%)    I&O's Detail    07 Dec 2018 07:01  -  08 Dec 2018 07:00  --------------------------------------------------------  IN:    dextrose 5% + lactated ringers.: 1200 mL    IV PiggyBack: 100 mL    lactated ringers.: 400 mL    Solution: 200 mL  Total IN: 1900 mL    OUT:    Ureteral Catheter: 2015 mL  Total OUT: 2015 mL    Total NET: -115 mL      08 Dec 2018 07:01  -  09 Dec 2018 06:52  --------------------------------------------------------  IN:    IV PiggyBack: 300 mL    lactated ringers.: 2400 mL    Solution: 200 mL  Total IN: 2900 mL    OUT:    Ureteral Catheter: 1500 mL  Total OUT: 1500 mL    Total NET: 1400 mL      MEDICATIONS  (STANDING):  anastrozole 1 milliGRAM(s) Oral daily  chlorhexidine 4% Liquid 1 Application(s) Topical <User Schedule>  clonazePAM Tablet 1 milliGRAM(s) Oral two times a day  enoxaparin Injectable 40 milliGRAM(s) SubCutaneous daily  escitalopram 20 milliGRAM(s) Oral at bedtime  folic acid 1 milliGRAM(s) Oral daily  insulin lispro (HumaLOG) corrective regimen sliding scale   SubCutaneous every 6 hours  lactated ringers. 1000 milliLiter(s) (100 mL/Hr) IV Continuous <Continuous>  meropenem  IVPB 1000 milliGRAM(s) IV Intermittent every 8 hours  metoprolol tartrate Injectable 5 milliGRAM(s) IV Push every 6 hours  multivitamin 1 Tablet(s) Oral daily  QUEtiapine 100 milliGRAM(s) Oral at bedtime  thiamine 100 milliGRAM(s) Oral daily    MEDICATIONS  (PRN):  HYDROmorphone  Injectable 1 milliGRAM(s) IV Push every 4 hours PRN Severe Pain (7 - 10)  HYDROmorphone  Injectable 0.5 milliGRAM(s) IV Push every 4 hours PRN Moderate Pain (4 - 6)      LABS:                        11.1   21.10 )-----------( 270      ( 08 Dec 2018 09:01 )             32.8     12-08    137  |  99  |  12  ----------------------------<  174<H>  3.8   |  26  |  0.58    Ca    8.9      08 Dec 2018 07:17  Phos  3.6     12-08  Mg     2.0     12-08    TPro  6.3  /  Alb  2.7<L>  /  TBili  0.4  /  DBili  0.2  /  AST  46<H>  /  ALT  40  /  AlkPhos  291<H>  12-08    PT/INR - ( 08 Dec 2018 09:05 )   PT: 14.7 sec;   INR: 1.28 ratio         PTT - ( 08 Dec 2018 09:05 )  PTT:28.3 sec

## 2018-12-10 DIAGNOSIS — Z71.89 OTHER SPECIFIED COUNSELING: ICD-10-CM

## 2018-12-10 DIAGNOSIS — Z29.9 ENCOUNTER FOR PROPHYLACTIC MEASURES, UNSPECIFIED: ICD-10-CM

## 2018-12-10 DIAGNOSIS — F10.20 ALCOHOL DEPENDENCE, UNCOMPLICATED: ICD-10-CM

## 2018-12-10 DIAGNOSIS — R79.1 ABNORMAL COAGULATION PROFILE: ICD-10-CM

## 2018-12-10 DIAGNOSIS — E11.9 TYPE 2 DIABETES MELLITUS WITHOUT COMPLICATIONS: ICD-10-CM

## 2018-12-10 DIAGNOSIS — F32.9 MAJOR DEPRESSIVE DISORDER, SINGLE EPISODE, UNSPECIFIED: ICD-10-CM

## 2018-12-10 DIAGNOSIS — I10 ESSENTIAL (PRIMARY) HYPERTENSION: ICD-10-CM

## 2018-12-10 DIAGNOSIS — G47.33 OBSTRUCTIVE SLEEP APNEA (ADULT) (PEDIATRIC): ICD-10-CM

## 2018-12-10 LAB
ALBUMIN SERPL ELPH-MCNC: 2.8 G/DL — LOW (ref 3.3–5)
ALP SERPL-CCNC: 230 U/L — HIGH (ref 40–120)
ALT FLD-CCNC: 34 U/L — SIGNIFICANT CHANGE UP (ref 10–45)
AMYLASE P1 CFR SERPL: 156 U/L — HIGH (ref 25–125)
ANION GAP SERPL CALC-SCNC: 14 MMOL/L — SIGNIFICANT CHANGE UP (ref 5–17)
APTT BLD: 29.1 SEC — SIGNIFICANT CHANGE UP (ref 27.5–36.3)
AST SERPL-CCNC: 35 U/L — SIGNIFICANT CHANGE UP (ref 10–40)
BILIRUB DIRECT SERPL-MCNC: 0.1 MG/DL — SIGNIFICANT CHANGE UP (ref 0–0.2)
BILIRUB INDIRECT FLD-MCNC: 0.2 MG/DL — SIGNIFICANT CHANGE UP (ref 0.2–1)
BILIRUB SERPL-MCNC: 0.3 MG/DL — SIGNIFICANT CHANGE UP (ref 0.2–1.2)
BLD GP AB SCN SERPL QL: NEGATIVE — SIGNIFICANT CHANGE UP
BUN SERPL-MCNC: 9 MG/DL — SIGNIFICANT CHANGE UP (ref 7–23)
CALCIUM SERPL-MCNC: 9 MG/DL — SIGNIFICANT CHANGE UP (ref 8.4–10.5)
CHLORIDE SERPL-SCNC: 96 MMOL/L — SIGNIFICANT CHANGE UP (ref 96–108)
CO2 SERPL-SCNC: 27 MMOL/L — SIGNIFICANT CHANGE UP (ref 22–31)
CREAT SERPL-MCNC: 0.71 MG/DL — SIGNIFICANT CHANGE UP (ref 0.5–1.3)
GLUCOSE BLDC GLUCOMTR-MCNC: 108 MG/DL — HIGH (ref 70–99)
GLUCOSE BLDC GLUCOMTR-MCNC: 113 MG/DL — HIGH (ref 70–99)
GLUCOSE BLDC GLUCOMTR-MCNC: 113 MG/DL — HIGH (ref 70–99)
GLUCOSE BLDC GLUCOMTR-MCNC: 138 MG/DL — HIGH (ref 70–99)
GLUCOSE SERPL-MCNC: 102 MG/DL — HIGH (ref 70–99)
HCT VFR BLD CALC: 32.8 % — LOW (ref 39–50)
HGB BLD-MCNC: 10.6 G/DL — LOW (ref 13–17)
INR BLD: 1.45 RATIO — HIGH (ref 0.88–1.16)
LIDOCAIN IGE QN: 347 U/L — HIGH (ref 7–60)
MAGNESIUM SERPL-MCNC: 2 MG/DL — SIGNIFICANT CHANGE UP (ref 1.6–2.6)
MCHC RBC-ENTMCNC: 32.3 GM/DL — SIGNIFICANT CHANGE UP (ref 32–36)
MCHC RBC-ENTMCNC: 32.3 PG — SIGNIFICANT CHANGE UP (ref 27–34)
MCV RBC AUTO: 100 FL — SIGNIFICANT CHANGE UP (ref 80–100)
PHOSPHATE SERPL-MCNC: 4.9 MG/DL — HIGH (ref 2.5–4.5)
PLATELET # BLD AUTO: 326 K/UL — SIGNIFICANT CHANGE UP (ref 150–400)
POTASSIUM SERPL-MCNC: 3.7 MMOL/L — SIGNIFICANT CHANGE UP (ref 3.5–5.3)
POTASSIUM SERPL-SCNC: 3.7 MMOL/L — SIGNIFICANT CHANGE UP (ref 3.5–5.3)
PROT SERPL-MCNC: 6.4 G/DL — SIGNIFICANT CHANGE UP (ref 6–8.3)
PROTHROM AB SERPL-ACNC: 16.4 SEC — HIGH (ref 10–13.1)
RBC # BLD: 3.28 M/UL — LOW (ref 4.2–5.8)
RBC # FLD: 14.1 % — SIGNIFICANT CHANGE UP (ref 10.3–14.5)
RH IG SCN BLD-IMP: POSITIVE — SIGNIFICANT CHANGE UP
SODIUM SERPL-SCNC: 137 MMOL/L — SIGNIFICANT CHANGE UP (ref 135–145)
WBC # BLD: 22.39 K/UL — HIGH (ref 3.8–10.5)
WBC # FLD AUTO: 22.39 K/UL — HIGH (ref 3.8–10.5)

## 2018-12-10 PROCEDURE — 99232 SBSQ HOSP IP/OBS MODERATE 35: CPT

## 2018-12-10 PROCEDURE — 99223 1ST HOSP IP/OBS HIGH 75: CPT

## 2018-12-10 PROCEDURE — 74177 CT ABD & PELVIS W/CONTRAST: CPT | Mod: 26

## 2018-12-10 RX ORDER — POTASSIUM CHLORIDE 20 MEQ
10 PACKET (EA) ORAL
Qty: 0 | Refills: 0 | Status: COMPLETED | OUTPATIENT
Start: 2018-12-10 | End: 2018-12-10

## 2018-12-10 RX ADMIN — Medication 3 MILLILITER(S): at 00:37

## 2018-12-10 RX ADMIN — Medication 0: at 00:37

## 2018-12-10 RX ADMIN — HYDROMORPHONE HYDROCHLORIDE 1 MILLIGRAM(S): 2 INJECTION INTRAMUSCULAR; INTRAVENOUS; SUBCUTANEOUS at 17:48

## 2018-12-10 RX ADMIN — Medication 100 MILLIGRAM(S): at 08:58

## 2018-12-10 RX ADMIN — Medication 0: at 06:21

## 2018-12-10 RX ADMIN — Medication 1 MILLIGRAM(S): at 17:47

## 2018-12-10 RX ADMIN — Medication 3 MILLILITER(S): at 06:15

## 2018-12-10 RX ADMIN — ESCITALOPRAM OXALATE 20 MILLIGRAM(S): 10 TABLET, FILM COATED ORAL at 22:09

## 2018-12-10 RX ADMIN — Medication 100 MILLIEQUIVALENT(S): at 16:50

## 2018-12-10 RX ADMIN — Medication 1 MILLIGRAM(S): at 06:14

## 2018-12-10 RX ADMIN — Medication 100 MILLIEQUIVALENT(S): at 14:31

## 2018-12-10 RX ADMIN — HYDROMORPHONE HYDROCHLORIDE 1 MILLIGRAM(S): 2 INJECTION INTRAMUSCULAR; INTRAVENOUS; SUBCUTANEOUS at 08:54

## 2018-12-10 RX ADMIN — Medication 3 MILLILITER(S): at 14:30

## 2018-12-10 RX ADMIN — Medication 100 MILLIEQUIVALENT(S): at 17:50

## 2018-12-10 RX ADMIN — Medication 25 MILLIGRAM(S): at 17:51

## 2018-12-10 RX ADMIN — HYDROMORPHONE HYDROCHLORIDE 1 MILLIGRAM(S): 2 INJECTION INTRAMUSCULAR; INTRAVENOUS; SUBCUTANEOUS at 22:07

## 2018-12-10 RX ADMIN — QUETIAPINE FUMARATE 100 MILLIGRAM(S): 200 TABLET, FILM COATED ORAL at 22:09

## 2018-12-10 RX ADMIN — ENOXAPARIN SODIUM 40 MILLIGRAM(S): 100 INJECTION SUBCUTANEOUS at 14:30

## 2018-12-10 RX ADMIN — HYDROMORPHONE HYDROCHLORIDE 1 MILLIGRAM(S): 2 INJECTION INTRAMUSCULAR; INTRAVENOUS; SUBCUTANEOUS at 03:40

## 2018-12-10 RX ADMIN — Medication 1 TABLET(S): at 08:59

## 2018-12-10 RX ADMIN — Medication 1 MILLIGRAM(S): at 08:58

## 2018-12-10 RX ADMIN — ANASTROZOLE 1 MILLIGRAM(S): 1 TABLET ORAL at 14:31

## 2018-12-10 RX ADMIN — HYDROMORPHONE HYDROCHLORIDE 1 MILLIGRAM(S): 2 INJECTION INTRAMUSCULAR; INTRAVENOUS; SUBCUTANEOUS at 09:39

## 2018-12-10 RX ADMIN — MEROPENEM 100 MILLIGRAM(S): 1 INJECTION INTRAVENOUS at 06:14

## 2018-12-10 RX ADMIN — Medication 25 MILLIGRAM(S): at 06:14

## 2018-12-10 RX ADMIN — HYDROMORPHONE HYDROCHLORIDE 1 MILLIGRAM(S): 2 INJECTION INTRAMUSCULAR; INTRAVENOUS; SUBCUTANEOUS at 03:10

## 2018-12-10 RX ADMIN — HYDROMORPHONE HYDROCHLORIDE 1 MILLIGRAM(S): 2 INJECTION INTRAMUSCULAR; INTRAVENOUS; SUBCUTANEOUS at 22:37

## 2018-12-10 NOTE — CONSULT NOTE ADULT - ASSESSMENT
63y Male PMHX of alcohol induced pancreatitis earlier this year, alcohol abuse (1/3 quart of vodka daily), DM, HLD, HTN, Depression, ROSS on CPAP who presents as a transfer from [facility] for management of acute pancreatitis complicated by suspected infected necrosis

## 2018-12-10 NOTE — DIETITIAN INITIAL EVALUATION ADULT. - PERTINENT MEDS FT
MEDICATIONS  (STANDING):  ALBUTerol/ipratropium for Nebulization 3 milliLiter(s) Nebulizer every 6 hours  anastrozole 1 milliGRAM(s) Oral daily  clonazePAM Tablet 1 milliGRAM(s) Oral two times a day  enoxaparin Injectable 40 milliGRAM(s) SubCutaneous daily  escitalopram 20 milliGRAM(s) Oral at bedtime  folic acid 1 milliGRAM(s) Oral daily  insulin lispro (HumaLOG) corrective regimen sliding scale   SubCutaneous every 6 hours  lactated ringers. 1000 milliLiter(s) (100 mL/Hr) IV Continuous <Continuous>  metoprolol tartrate 25 milliGRAM(s) Oral two times a day  multivitamin 1 Tablet(s) Oral daily  potassium chloride  10 mEq/100 mL IVPB 10 milliEquivalent(s) IV Intermittent every 1 hour  QUEtiapine 100 milliGRAM(s) Oral at bedtime  thiamine 100 milliGRAM(s) Oral daily

## 2018-12-10 NOTE — CONSULT NOTE ADULT - PROBLEM SELECTOR RECOMMENDATION 9
Initial concern for infected necrosis however now off abx as suspicion for infected necrosis is low  - continue monitoring off abx  - pain control and hydration as per surgery  - J tube placement tmrw for enteral feeding  - trend lipase/amylase, white count

## 2018-12-10 NOTE — DIETITIAN INITIAL EVALUATION ADULT. - ORAL INTAKE PTA
Pt reported he was eating okay PTA, eating around 3 meals/day but could not give detailed food recall stated "it depends", noted history of alcohol abuse, normally consumes 1/3 bottle of vodka each day as per H&P note, NKFA, multivitamin, thiamin and folic acid taken at home./fair

## 2018-12-10 NOTE — DIETITIAN INITIAL EVALUATION ADULT. - NS AS NUTRI INTERV ENTERAL NUTRITION
If EN started recommend Jevity 1.2 total volume for 24 hours: 1920 mls. Start at 30 ml/hr and increase as tolerated until goal rate of 80ml/hr x 24 hours is met. This provides 2304 calories (27 cals/kg) and 106.5 g of protein (1.3 g/kg).

## 2018-12-10 NOTE — DIETITIAN INITIAL EVALUATION ADULT. - ADHERENCE
n/a/Pt followed no dietary restrictions at home. Pt followed no dietary restrictions at home. Checks blood sugars 1x daily./n/a

## 2018-12-10 NOTE — DIETITIAN INITIAL EVALUATION ADULT. - NS AS NUTRI INTERV MEALS SNACK
If advance to PO diet, recommend diet order of clear liquids, advance as tolerated/General/healthful diet General/healthful diet/If advance to PO diet, recommend diet order of clear liquids, advance as tolerated to low fat

## 2018-12-10 NOTE — PROGRESS NOTE ADULT - SUBJECTIVE AND OBJECTIVE BOX
TRAUMA SURGERY DAILY PROGRESS NOTE:    Overnight:  No acute events.    Subjective:  Patient seen and examined. Reports pain is well controlled.    Exam:  Gen: NAD, resting in chair  Resp: Airway patent, non-labored respirations  Abd: Soft, distended, mildly tender in epigastrium  Ext: WWP    Vital Signs Last 24 Hrs  T(C): 36.3 (10 Dec 2018 01:00), Max: 37.1 (09 Dec 2018 21:55)  T(F): 97.4 (10 Dec 2018 01:00), Max: 98.7 (09 Dec 2018 21:55)  HR: 97 (10 Dec 2018 01:15) (97 - 110)  BP: 155/74 (10 Dec 2018 01:00) (155/74 - 164/82)  BP(mean): --  RR: 18 (10 Dec 2018 01:00) (18 - 18)  SpO2: 95% (10 Dec 2018 01:15) (92% - 95%)    I&O's Detail    08 Dec 2018 07:01  -  09 Dec 2018 07:00  --------------------------------------------------------  IN:    IV PiggyBack: 300 mL    lactated ringers.: 2400 mL    Solution: 200 mL  Total IN: 2900 mL    OUT:    Ureteral Catheter: 1500 mL  Total OUT: 1500 mL    Total NET: 1400 mL      09 Dec 2018 07:01  -  10 Dec 2018 05:49  --------------------------------------------------------  IN:    IV PiggyBack: 300 mL    lactated ringers.: 1200 mL    Solution: 150 mL  Total IN: 1650 mL    OUT:    Ureteral Catheter: 1275 mL  Total OUT: 1275 mL    Total NET: 375 mL    MEDICATIONS  (STANDING):  ALBUTerol/ipratropium for Nebulization 3 milliLiter(s) Nebulizer every 6 hours  anastrozole 1 milliGRAM(s) Oral daily  clonazePAM Tablet 1 milliGRAM(s) Oral two times a day  enoxaparin Injectable 40 milliGRAM(s) SubCutaneous daily  escitalopram 20 milliGRAM(s) Oral at bedtime  folic acid 1 milliGRAM(s) Oral daily  insulin lispro (HumaLOG) corrective regimen sliding scale   SubCutaneous every 6 hours  lactated ringers. 1000 milliLiter(s) (100 mL/Hr) IV Continuous <Continuous>  meropenem  IVPB 1000 milliGRAM(s) IV Intermittent every 8 hours  metoprolol tartrate 25 milliGRAM(s) Oral two times a day  multivitamin 1 Tablet(s) Oral daily  QUEtiapine 100 milliGRAM(s) Oral at bedtime  thiamine 100 milliGRAM(s) Oral daily    MEDICATIONS  (PRN):  HYDROmorphone  Injectable 1 milliGRAM(s) IV Push every 4 hours PRN Severe Pain (7 - 10)  HYDROmorphone  Injectable 0.5 milliGRAM(s) IV Push every 4 hours PRN Moderate Pain (4 - 6)      LABS:                        10.8   22.97 )-----------( 319      ( 09 Dec 2018 09:03 )             32.4     12-09    138  |  98  |  11  ----------------------------<  126<H>  3.7   |  25  |  0.62    Ca    9.0      09 Dec 2018 07:42  Phos  4.1     12-09  Mg     2.0     12-09    TPro  6.3  /  Alb  2.7<L>  /  TBili  0.3  /  DBili  0.1  /  AST  40  /  ALT  35  /  AlkPhos  269<H>  12-09    PT/INR - ( 09 Dec 2018 09:03 )   PT: 15.5 sec;   INR: 1.37 ratio         PTT - ( 09 Dec 2018 09:03 )  PTT:29.0 sec TRAUMA SURGERY DAILY PROGRESS NOTE:    Overnight:  No acute events.    Subjective:  Patient seen and examined. Reports pain is well controlled. reports pain LUQ    Exam:  Gen: NAD, resting in bed  Resp: Airway patent, non-labored respirations  Abd: Soft, distended, mildly tender in epigastrium  Ext: WWP    Vital Signs Last 24 Hrs  T(C): 36.3 (10 Dec 2018 01:00), Max: 37.1 (09 Dec 2018 21:55)  T(F): 97.4 (10 Dec 2018 01:00), Max: 98.7 (09 Dec 2018 21:55)  HR: 97 (10 Dec 2018 01:15) (97 - 110)  BP: 155/74 (10 Dec 2018 01:00) (155/74 - 164/82)  BP(mean): --  RR: 18 (10 Dec 2018 01:00) (18 - 18)  SpO2: 95% (10 Dec 2018 01:15) (92% - 95%)    I&O's Detail    08 Dec 2018 07:01  -  09 Dec 2018 07:00  --------------------------------------------------------  IN:    IV PiggyBack: 300 mL    lactated ringers.: 2400 mL    Solution: 200 mL  Total IN: 2900 mL    OUT:    Ureteral Catheter: 1500 mL  Total OUT: 1500 mL    Total NET: 1400 mL      09 Dec 2018 07:01  -  10 Dec 2018 05:49  --------------------------------------------------------  IN:    IV PiggyBack: 300 mL    lactated ringers.: 1200 mL    Solution: 150 mL  Total IN: 1650 mL    OUT:    Ureteral Catheter: 1275 mL  Total OUT: 1275 mL    Total NET: 375 mL    MEDICATIONS  (STANDING):  ALBUTerol/ipratropium for Nebulization 3 milliLiter(s) Nebulizer every 6 hours  anastrozole 1 milliGRAM(s) Oral daily  clonazePAM Tablet 1 milliGRAM(s) Oral two times a day  enoxaparin Injectable 40 milliGRAM(s) SubCutaneous daily  escitalopram 20 milliGRAM(s) Oral at bedtime  folic acid 1 milliGRAM(s) Oral daily  insulin lispro (HumaLOG) corrective regimen sliding scale   SubCutaneous every 6 hours  lactated ringers. 1000 milliLiter(s) (100 mL/Hr) IV Continuous <Continuous>  meropenem  IVPB 1000 milliGRAM(s) IV Intermittent every 8 hours  metoprolol tartrate 25 milliGRAM(s) Oral two times a day  multivitamin 1 Tablet(s) Oral daily  QUEtiapine 100 milliGRAM(s) Oral at bedtime  thiamine 100 milliGRAM(s) Oral daily    MEDICATIONS  (PRN):  HYDROmorphone  Injectable 1 milliGRAM(s) IV Push every 4 hours PRN Severe Pain (7 - 10)  HYDROmorphone  Injectable 0.5 milliGRAM(s) IV Push every 4 hours PRN Moderate Pain (4 - 6)      LABS:                        10.8   22.97 )-----------( 319      ( 09 Dec 2018 09:03 )             32.4     12-09    138  |  98  |  11  ----------------------------<  126<H>  3.7   |  25  |  0.62    Ca    9.0      09 Dec 2018 07:42  Phos  4.1     12-09  Mg     2.0     12-09    TPro  6.3  /  Alb  2.7<L>  /  TBili  0.3  /  DBili  0.1  /  AST  40  /  ALT  35  /  AlkPhos  269<H>  12-09    PT/INR - ( 09 Dec 2018 09:03 )   PT: 15.5 sec;   INR: 1.37 ratio         PTT - ( 09 Dec 2018 09:03 )  PTT:29.0 sec TRAUMA SURGERY DAILY PROGRESS NOTE:    Overnight:  No acute events.    Subjective:  Patient seen and examined. Reports pain in LUQ, it is well controlled with pain medication. States he is on a boat.    Exam:  Gen: NAD, resting in bed  Resp: Airway patent, non-labored respirations  Abd: Soft, distended, mildly tender in epigastrium  Ext: WWP    Vital Signs Last 24 Hrs  T(C): 36.3 (10 Dec 2018 01:00), Max: 37.1 (09 Dec 2018 21:55)  T(F): 97.4 (10 Dec 2018 01:00), Max: 98.7 (09 Dec 2018 21:55)  HR: 97 (10 Dec 2018 01:15) (97 - 110)  BP: 155/74 (10 Dec 2018 01:00) (155/74 - 164/82)  BP(mean): --  RR: 18 (10 Dec 2018 01:00) (18 - 18)  SpO2: 95% (10 Dec 2018 01:15) (92% - 95%)    I&O's Detail    08 Dec 2018 07:01  -  09 Dec 2018 07:00  --------------------------------------------------------  IN:    IV PiggyBack: 300 mL    lactated ringers.: 2400 mL    Solution: 200 mL  Total IN: 2900 mL    OUT:    Ureteral Catheter: 1500 mL  Total OUT: 1500 mL    Total NET: 1400 mL      09 Dec 2018 07:01  -  10 Dec 2018 05:49  --------------------------------------------------------  IN:    IV PiggyBack: 300 mL    lactated ringers.: 1200 mL    Solution: 150 mL  Total IN: 1650 mL    OUT:    Ureteral Catheter: 1275 mL  Total OUT: 1275 mL    Total NET: 375 mL    MEDICATIONS  (STANDING):  ALBUTerol/ipratropium for Nebulization 3 milliLiter(s) Nebulizer every 6 hours  anastrozole 1 milliGRAM(s) Oral daily  clonazePAM Tablet 1 milliGRAM(s) Oral two times a day  enoxaparin Injectable 40 milliGRAM(s) SubCutaneous daily  escitalopram 20 milliGRAM(s) Oral at bedtime  folic acid 1 milliGRAM(s) Oral daily  insulin lispro (HumaLOG) corrective regimen sliding scale   SubCutaneous every 6 hours  lactated ringers. 1000 milliLiter(s) (100 mL/Hr) IV Continuous <Continuous>  meropenem  IVPB 1000 milliGRAM(s) IV Intermittent every 8 hours  metoprolol tartrate 25 milliGRAM(s) Oral two times a day  multivitamin 1 Tablet(s) Oral daily  QUEtiapine 100 milliGRAM(s) Oral at bedtime  thiamine 100 milliGRAM(s) Oral daily    MEDICATIONS  (PRN):  HYDROmorphone  Injectable 1 milliGRAM(s) IV Push every 4 hours PRN Severe Pain (7 - 10)  HYDROmorphone  Injectable 0.5 milliGRAM(s) IV Push every 4 hours PRN Moderate Pain (4 - 6)      LABS:                        10.8   22.97 )-----------( 319      ( 09 Dec 2018 09:03 )             32.4     12-09    138  |  98  |  11  ----------------------------<  126<H>  3.7   |  25  |  0.62    Ca    9.0      09 Dec 2018 07:42  Phos  4.1     12-09  Mg     2.0     12-09    TPro  6.3  /  Alb  2.7<L>  /  TBili  0.3  /  DBili  0.1  /  AST  40  /  ALT  35  /  AlkPhos  269<H>  12-09    PT/INR - ( 09 Dec 2018 09:03 )   PT: 15.5 sec;   INR: 1.37 ratio         PTT - ( 09 Dec 2018 09:03 )  PTT:29.0 sec

## 2018-12-10 NOTE — PROGRESS NOTE ADULT - ASSESSMENT
63M transferred from [place] with acute/subacute pancreatitis with necrosis and fluid collection around the lesser sac    - Pain control  - Sips/Chips, IVF  - Would like to place endoscopic NJ tube with GI, appreciate input  - Continue nightly bipap, monitor respiratory status  - Cont meropenem, trend WBC  - Trend labs  - Insulin sliding scale  - DVT ppx: Lovenox    Dispo: floors    TIA Palma, PGY-1  [facility]  [id number] with any questions 63M transferred from [place] with acute/subacute pancreatitis with necrosis and fluid collection around the lesser sac    - Pain control  - Sips/Chips, IVF  - Would like to place endoscopic NJ tube with GI, appreciate input,  - Continue nightly bipap, monitor respiratory status  - Cont meropenem, trend WBC  - Trend labs  - Insulin sliding scale  - DVT ppx: Lovenox    Dispo: floors    TIA Palma, PGY-1  [facility]  [phone] with any questions 63M transferred from [place] with acute/subacute pancreatitis with necrosis and fluid collection around the lesser sac    - Pain control  - Sips/Chips, IVF  - Endoscopic NJ tube with GI, appreciate input,  - Continue nightly bipap, monitor respiratory status  - d/c meropenem, trend WBC  - Repeat CT A/P tomorrow  - Trend labs  - Insulin sliding scale  - DVT ppx: Lovenox    Dispo: floors    TIA Palma, PGY-1  [facility]  [phone] with any questions

## 2018-12-10 NOTE — CONSULT NOTE ADULT - PROBLEM SELECTOR RECOMMENDATION 5
Likely 2/2 malnutrition and having not eaten in approximately a week  - continue monitoring  - pt to get J tube for feeds tmrw

## 2018-12-10 NOTE — CONSULT NOTE ADULT - SUBJECTIVE AND OBJECTIVE BOX
Hospitalist- Isac Curry MD  Pager: [phone]  If no response or off-hours, page [phone]  -------------------------------------  Patient is a 63y old  Male who presents with a chief complaint of acute pancreatitis (10 Dec 2018 14:18)  HPI:  JOHN DHILLON is a 63y Male PMHX of alcohol induced pancreatitis earlier this year, alcohol abuse (1/3 quart of vodka daily), DM, HLD, HTN, Depression, ROSS on CPAP who presents as a transfer from [facility] for acute pancreatitis, which was initially complicated by HERIBERTO, ileus, and hypoxia secondary to pulmonary edema. Pt also experienced alcohol withdrawal, which was treated with phenobarbital. After feeling better and tolerating diuresis on Monday and Tuesday with improved pulmonary status, pt then developed fever and leukocytosis (SIRS), with mild tachycardia. CT showed evolving fluid collections and known pancreatic tail necrosis. Patient transferred to [facility] for further management of acute pancreatitis with suspected infected necrosis. This AM, patient asleep in bed but arousable. Reports he still has some L sided abd pain but manageable. No fevers/chills. No nausea/vomiting. No sob/cp/palpitations.    14 point ros otherwise negative.     Home Medications:  KlonoPIN 1 mg oral tablet: 1 milligram(s) orally 2 times a day (07 Dec 2018 14:22)  Lexapro 10 mg oral tablet: 1 milligram(s) orally once a day (07 Dec 2018 14:23)  Lexapro 20 mg oral tablet: 1 tab(s) orally once a day (at bedtime) (07 Dec 2018 14:23)  Lipitor 20 mg oral tablet: 1 tab(s) orally once a day (07 Dec 2018 14:23)  quinapril 40 mg oral tablet: 1 milligram(s) orally 2 times a day (07 Dec 2018 14:24)  SEROquel 100 mg oral tablet: 1 milligram(s) orally once a day (07 Dec 2018 14:24)    PAST MEDICAL & SURGICAL HISTORY:  Diabetes: type 2  ROSS (obstructive sleep apnea)  Depression, unspecified depression type  Alcohol use disorder, severe, dependence  Alcohol-induced acute pancreatitis, unspecified complication status  Essential hypertension  No significant past surgical history    Review of Systems:   CONSTITUTIONAL: No fever, weight loss, or fatigue  EYES: No eye pain, visual disturbances, or discharge  ENMT:  No difficulty hearing, tinnitus, vertigo; No sinus or throat pain  NECK: No pain or stiffness  BREASTS: No pain, masses, or nipple discharge  RESPIRATORY: No cough, wheezing, chills or hemoptysis; No shortness of breath  CARDIOVASCULAR: No chest pain, palpitations, dizziness, or leg swelling  GASTROINTESTINAL: as above  GENITOURINARY: No dysuria, frequency, hematuria, or incontinence  NEUROLOGICAL: No headaches, memory loss, loss of strength, numbness, or tremors  SKIN: No itching, burning, rashes, or lesions   LYMPH NODES: No enlarged glands  ENDOCRINE: No heat or cold intolerance; No hair loss  MUSCULOSKELETAL: No joint pain or swelling; No muscle, back, or extremity pain  PSYCHIATRIC: No depression, anxiety, mood swings, or difficulty sleeping  HEME/LYMPH: No easy bruising, or bleeding gums  ALLERY AND IMMUNOLOGIC: No hives or eczema    Allergies    No Known Allergies        Social History: nonsmoker, active drinker although states he has now quit, no drugs, lives at home with wife and daughter. Works as restaurant owner. Functionally independent.    FAMILY HISTORY:  No pertinent family history in first degree relatives   Noncontributory    MEDICATIONS  (STANDING):  ALBUTerol/ipratropium for Nebulization 3 milliLiter(s) Nebulizer every 6 hours  anastrozole 1 milliGRAM(s) Oral daily  clonazePAM Tablet 1 milliGRAM(s) Oral two times a day  enoxaparin Injectable 40 milliGRAM(s) SubCutaneous daily  escitalopram 20 milliGRAM(s) Oral at bedtime  folic acid 1 milliGRAM(s) Oral daily  insulin lispro (HumaLOG) corrective regimen sliding scale   SubCutaneous every 6 hours  lactated ringers. 1000 milliLiter(s) (100 mL/Hr) IV Continuous <Continuous>  metoprolol tartrate 25 milliGRAM(s) Oral two times a day  multivitamin 1 Tablet(s) Oral daily  potassium chloride  10 mEq/100 mL IVPB 10 milliEquivalent(s) IV Intermittent every 1 hour  QUEtiapine 100 milliGRAM(s) Oral at bedtime  thiamine 100 milliGRAM(s) Oral daily    MEDICATIONS  (PRN):  HYDROmorphone  Injectable 1 milliGRAM(s) IV Push every 4 hours PRN Severe Pain (7 - 10)  HYDROmorphone  Injectable 0.5 milliGRAM(s) IV Push every 4 hours PRN Moderate Pain (4 - 6)      Vital Signs Last 24 Hrs  T(C): 37.1 (10 Dec 2018 12:41), Max: 37.1 (09 Dec 2018 21:55)  T(F): 98.7 (10 Dec 2018 12:41), Max: 98.7 (09 Dec 2018 21:55)  HR: 101 (10 Dec 2018 12:41) (93 - 110)  BP: 162/75 (10 Dec 2018 12:41) (136/66 - 162/75)  BP(mean): --  RR: 20 (10 Dec 2018 12:41) (18 - 20)  SpO2: 92% (10 Dec 2018 12:41) (91% - 95%)  CAPILLARY BLOOD GLUCOSE      POCT Blood Glucose.: 108 mg/dL (10 Dec 2018 12:51)  POCT Blood Glucose.: 113 mg/dL (10 Dec 2018 06:15)  POCT Blood Glucose.: 138 mg/dL (10 Dec 2018 00:20)  POCT Blood Glucose.: 119 mg/dL (09 Dec 2018 17:48)    PHYSICAL EXAM:  GENERAL: NAD, well-developed  HEAD:  Atraumatic, Normocephalic  EYES: EOMI, PERRLA, conjunctiva and sclera clear  NECK: Supple, No JVD, plethoric  CHEST/LUNG: Clear to auscultation bilaterally; No wheeze  HEART: Regular rate and rhythm; No murmurs, rubs, or gallops  ABDOMEN: +distended, +mild L sided tenderness, +LUQ area of nontender firmness without fluctuance or overlying skin changes  : miller in place  EXTREMITIES:  2+ Peripheral Pulses, No clubbing, cyanosis, or edema  PSYCH: AAOx3  NEUROLOGY: non-focal  SKIN: No rashes or lesions    LABS:                        10.6   22.39 )-----------( 326      ( 10 Dec 2018 09:38 )             32.8     12-10    137  |  96  |  9   ----------------------------<  102<H>  3.7   |  27  |  0.71    Ca    9.0      10 Dec 2018 08:13  Phos  4.9     12-10  Mg     2.0     12-10    TPro  6.4  /  Alb  2.8<L>  /  TBili  0.3  /  DBili  0.1  /  AST  35  /  ALT  34  /  AlkPhos  230<H>  12-10    PT/INR - ( 10 Dec 2018 09:38 )   PT: 16.4 sec;   INR: 1.45 ratio         PTT - ( 10 Dec 2018 09:38 )  PTT:29.1 sec      RADIOLOGY & ADDITIONAL TESTS:    Case discussed with: Dr. Villagomez and Dr. Parham

## 2018-12-10 NOTE — PROGRESS NOTE ADULT - SUBJECTIVE AND OBJECTIVE BOX
Interval Events: Pt remains confused.  He denies abdominal pain, n/v  NPO with ice chips     MEDICATIONS  (STANDING):  ALBUTerol/ipratropium for Nebulization 3 milliLiter(s) Nebulizer every 6 hours  anastrozole 1 milliGRAM(s) Oral daily  clonazePAM Tablet 1 milliGRAM(s) Oral two times a day  enoxaparin Injectable 40 milliGRAM(s) SubCutaneous daily  escitalopram 20 milliGRAM(s) Oral at bedtime  folic acid 1 milliGRAM(s) Oral daily  insulin lispro (HumaLOG) corrective regimen sliding scale   SubCutaneous every 6 hours  lactated ringers. 1000 milliLiter(s) (100 mL/Hr) IV Continuous <Continuous>  metoprolol tartrate 25 milliGRAM(s) Oral two times a day  multivitamin 1 Tablet(s) Oral daily  potassium chloride  10 mEq/100 mL IVPB 10 milliEquivalent(s) IV Intermittent every 1 hour  QUEtiapine 100 milliGRAM(s) Oral at bedtime  thiamine 100 milliGRAM(s) Oral daily    MEDICATIONS  (PRN):  HYDROmorphone  Injectable 1 milliGRAM(s) IV Push every 4 hours PRN Severe Pain (7 - 10)  HYDROmorphone  Injectable 0.5 milliGRAM(s) IV Push every 4 hours PRN Moderate Pain (4 - 6)      Allergies    No Known Allergies    Intolerances        Review of Systems:    General:  No wt loss, fevers, chills, night sweats,fatigue,   Eyes:  Good vision, no reported pain  ENT:  No sore throat, pain, runny nose, dysphagia  CV:  No pain, palpitations, hypo/hypertension  Resp:  No dyspnea, cough, tachypnea, wheezing  GI:  No pain, No nausea, No vomiting, No diarrhea, No constipation, No weight loss, No fever, No pruritis, No rectal bleeding, No melena, No dysphagia  :  No pain, bleeding, incontinence, nocturia  Muscle:  No pain, weakness  Neuro:  No weakness, tingling, memory problems  Psych:  No fatigue, insomnia, mood problems, depression  Endocrine:  No polyuria, polydypsia, cold/heat intolerance  Heme:  No petechiae, ecchymosis, easy bruisability  Skin:  No rash, tattoos, scars, edema      Vital Signs Last 24 Hrs  T(C): 37.1 (10 Dec 2018 12:41), Max: 37.1 (09 Dec 2018 21:55)  T(F): 98.7 (10 Dec 2018 12:41), Max: 98.7 (09 Dec 2018 21:55)  HR: 101 (10 Dec 2018 12:41) (93 - 110)  BP: 162/75 (10 Dec 2018 12:41) (136/66 - 162/75)  BP(mean): --  RR: 20 (10 Dec 2018 12:41) (18 - 20)  SpO2: 92% (10 Dec 2018 12:41) (91% - 95%)    PHYSICAL EXAM:    Constitutional: NAD, confused   HEENT: EOMI, throat clear  Neck: No LAD, supple  Respiratory: CTA and P  Cardiovascular: S1 and S2, RRR, no M  Gastrointestinal: BS+, soft, NT/ND, neg HSM,  Extremities: No peripheral edema, neg clubing, cyanosis  Vascular: 2+ peripheral pulses  Neurological: A/O x 1, no focal deficits  Psychiatric: Normal mood, normal affect  Skin: No rashes      LABS:                        10.6   22.39 )-----------( 326      ( 10 Dec 2018 09:38 )             32.8     12-10    137  |  96  |  9   ----------------------------<  102<H>  3.7   |  27  |  0.71    Ca    9.0      10 Dec 2018 08:13  Phos  4.9     12-10  Mg     2.0     12-10    TPro  6.4  /  Alb  2.8<L>  /  TBili  0.3  /  DBili  0.1  /  AST  35  /  ALT  34  /  AlkPhos  230<H>  12-10    PT/INR - ( 10 Dec 2018 09:38 )   PT: 16.4 sec;   INR: 1.45 ratio         PTT - ( 10 Dec 2018 09:38 )  PTT:29.1 sec      RADIOLOGY & ADDITIONAL TESTS:

## 2018-12-10 NOTE — PROGRESS NOTE ADULT - PROBLEM SELECTOR PLAN 2
Advanced care planning was discussed with patient and family.  Advanced care planning forms were reviewed and discussed.  Risks, benefits and alternatives of gastroenterologic procedures were discussed in detail and all questions were answered.    30 minutes spent

## 2018-12-10 NOTE — DIETITIAN INITIAL EVALUATION ADULT. - ENERGY NEEDS
Ht: 70" (stated)        Wt: 240 lbs        BMI: 34.4 kg/m2         IBW: 166 lbs      %IBW:  144%  As per chart: no pressure ulcers;  +1 generalized edema Ht: 70" (stated)        Wt: 240 lbs        BMI: 34.4 kg/m2         IBW: 166 lbs      %IBW:  144%  As per chart: no pressure ulcers;  +1 generalized edema  64 y/o male admitted for acute pancreatitis. PMH of alcohol abuse, DM, HLD, HTN, Depression

## 2018-12-10 NOTE — DIETITIAN INITIAL EVALUATION ADULT. - PERTINENT LABORATORY DATA
12-10 Na137 mmol/L Glu 102 mg/dL<H> K+ 3.7 mmol/L Cr  0.71 mg/dL BUN 9 mg/dL Phos 4.9 mg/dL<H> Alb 2.8 g/dL<L>

## 2018-12-10 NOTE — DIETITIAN INITIAL EVALUATION ADULT. - OTHER INFO
Pt seen for NPO x 4 days. No c/o nausea/ vomiting or constipation or diarrhea. Last BM 12/10. No difficulties chewing or swallowing reported. As per GI, establishing enteral access via [place] tube/NG for decompression/tube feeds

## 2018-12-10 NOTE — PROGRESS NOTE ADULT - ATTENDING COMMENTS
Patient seen and examined and agree with above.   Patient with complaints of back pain likely secondary to acute pancreatitis.  Has remained afebrile.  Hemodynamically stable.  Abdomen is soft tender in epigastric region with no signs of peritonitis.  WBC remains elevated at 22  Plan to rescan abdomen tomorrow as WBC remains elevated to rule out infected necrotizing pancreatitis.  Will follow up with GI for NJ tube   continue NPO  Encourage ambulation Patient seen and examined and agree with above.   Patient with complaints of back pain likely secondary to acute pancreatitis.  Has remained afebrile.  Hemodynamically stable.  Abdomen is soft tender in epigastric region with no signs of peritonitis.  WBC remains elevated at 22  Plan to rescan abdomen  as WBC remains elevated to rule out infected necrotizing pancreatitis.  Will follow up with GI for NJ tube   continue NPO  Encourage ambulation

## 2018-12-11 DIAGNOSIS — M54.9 DORSALGIA, UNSPECIFIED: ICD-10-CM

## 2018-12-11 DIAGNOSIS — Z79.899 OTHER LONG TERM (CURRENT) DRUG THERAPY: ICD-10-CM

## 2018-12-11 LAB
ALBUMIN SERPL ELPH-MCNC: 2.6 G/DL — LOW (ref 3.3–5)
ALP SERPL-CCNC: 225 U/L — HIGH (ref 40–120)
ALT FLD-CCNC: 32 U/L — SIGNIFICANT CHANGE UP (ref 10–45)
AMYLASE P1 CFR SERPL: 136 U/L — HIGH (ref 25–125)
ANION GAP SERPL CALC-SCNC: 16 MMOL/L — SIGNIFICANT CHANGE UP (ref 5–17)
APTT BLD: 30.3 SEC — SIGNIFICANT CHANGE UP (ref 27.5–36.3)
AST SERPL-CCNC: 41 U/L — HIGH (ref 10–40)
BILIRUB DIRECT SERPL-MCNC: 0.1 MG/DL — SIGNIFICANT CHANGE UP (ref 0–0.2)
BILIRUB INDIRECT FLD-MCNC: 0.2 MG/DL — SIGNIFICANT CHANGE UP (ref 0.2–1)
BILIRUB SERPL-MCNC: 0.3 MG/DL — SIGNIFICANT CHANGE UP (ref 0.2–1.2)
BUN SERPL-MCNC: 8 MG/DL — SIGNIFICANT CHANGE UP (ref 7–23)
CALCIUM SERPL-MCNC: 8.7 MG/DL — SIGNIFICANT CHANGE UP (ref 8.4–10.5)
CHLORIDE SERPL-SCNC: 95 MMOL/L — LOW (ref 96–108)
CO2 SERPL-SCNC: 26 MMOL/L — SIGNIFICANT CHANGE UP (ref 22–31)
CREAT SERPL-MCNC: 0.63 MG/DL — SIGNIFICANT CHANGE UP (ref 0.5–1.3)
CULTURE RESULTS: SIGNIFICANT CHANGE UP
CULTURE RESULTS: SIGNIFICANT CHANGE UP
GAS PNL BLDA: SIGNIFICANT CHANGE UP
GLUCOSE BLDC GLUCOMTR-MCNC: 105 MG/DL — HIGH (ref 70–99)
GLUCOSE BLDC GLUCOMTR-MCNC: 107 MG/DL — HIGH (ref 70–99)
GLUCOSE BLDC GLUCOMTR-MCNC: 115 MG/DL — HIGH (ref 70–99)
GLUCOSE BLDC GLUCOMTR-MCNC: 117 MG/DL — HIGH (ref 70–99)
GLUCOSE BLDC GLUCOMTR-MCNC: 129 MG/DL — HIGH (ref 70–99)
GLUCOSE SERPL-MCNC: 70 MG/DL — SIGNIFICANT CHANGE UP (ref 70–99)
HCT VFR BLD CALC: 29.8 % — LOW (ref 39–50)
HGB BLD-MCNC: 9.5 G/DL — LOW (ref 13–17)
INR BLD: 1.5 RATIO — HIGH (ref 0.88–1.16)
LIDOCAIN IGE QN: 349 U/L — HIGH (ref 7–60)
MAGNESIUM SERPL-MCNC: 2 MG/DL — SIGNIFICANT CHANGE UP (ref 1.6–2.6)
MCHC RBC-ENTMCNC: 31.8 PG — SIGNIFICANT CHANGE UP (ref 27–34)
MCHC RBC-ENTMCNC: 31.9 GM/DL — LOW (ref 32–36)
MCV RBC AUTO: 99.7 FL — SIGNIFICANT CHANGE UP (ref 80–100)
PHOSPHATE SERPL-MCNC: 3.9 MG/DL — SIGNIFICANT CHANGE UP (ref 2.5–4.5)
PLATELET # BLD AUTO: 337 K/UL — SIGNIFICANT CHANGE UP (ref 150–400)
POTASSIUM SERPL-MCNC: 3.9 MMOL/L — SIGNIFICANT CHANGE UP (ref 3.5–5.3)
POTASSIUM SERPL-SCNC: 3.9 MMOL/L — SIGNIFICANT CHANGE UP (ref 3.5–5.3)
PROT SERPL-MCNC: 6.2 G/DL — SIGNIFICANT CHANGE UP (ref 6–8.3)
PROTHROM AB SERPL-ACNC: 17 SEC — HIGH (ref 10–13.1)
RBC # BLD: 2.99 M/UL — LOW (ref 4.2–5.8)
RBC # FLD: 14 % — SIGNIFICANT CHANGE UP (ref 10.3–14.5)
SODIUM SERPL-SCNC: 137 MMOL/L — SIGNIFICANT CHANGE UP (ref 135–145)
SPECIMEN SOURCE: SIGNIFICANT CHANGE UP
SPECIMEN SOURCE: SIGNIFICANT CHANGE UP
WBC # BLD: 18.1 K/UL — HIGH (ref 3.8–10.5)
WBC # FLD AUTO: 18.1 K/UL — HIGH (ref 3.8–10.5)

## 2018-12-11 PROCEDURE — 74018 RADEX ABDOMEN 1 VIEW: CPT | Mod: 26

## 2018-12-11 PROCEDURE — 99233 SBSQ HOSP IP/OBS HIGH 50: CPT

## 2018-12-11 RX ORDER — QUETIAPINE FUMARATE 200 MG/1
100 TABLET, FILM COATED ORAL AT BEDTIME
Qty: 0 | Refills: 0 | Status: DISCONTINUED | OUTPATIENT
Start: 2018-12-11 | End: 2018-12-14

## 2018-12-11 RX ORDER — CLONAZEPAM 1 MG
0.5 TABLET ORAL
Qty: 0 | Refills: 0 | Status: DISCONTINUED | OUTPATIENT
Start: 2018-12-11 | End: 2018-12-11

## 2018-12-11 RX ORDER — ESCITALOPRAM OXALATE 10 MG/1
20 TABLET, FILM COATED ORAL AT BEDTIME
Qty: 0 | Refills: 0 | Status: DISCONTINUED | OUTPATIENT
Start: 2018-12-11 | End: 2018-12-14

## 2018-12-11 RX ORDER — CLONAZEPAM 1 MG
0.5 TABLET ORAL
Qty: 0 | Refills: 0 | Status: DISCONTINUED | OUTPATIENT
Start: 2018-12-11 | End: 2018-12-14

## 2018-12-11 RX ORDER — THIAMINE MONONITRATE (VIT B1) 100 MG
100 TABLET ORAL DAILY
Qty: 0 | Refills: 0 | Status: DISCONTINUED | OUTPATIENT
Start: 2018-12-11 | End: 2018-12-14

## 2018-12-11 RX ORDER — FOLIC ACID 0.8 MG
1 TABLET ORAL DAILY
Qty: 0 | Refills: 0 | Status: DISCONTINUED | OUTPATIENT
Start: 2018-12-11 | End: 2018-12-14

## 2018-12-11 RX ORDER — METOPROLOL TARTRATE 50 MG
25 TABLET ORAL
Qty: 0 | Refills: 0 | Status: DISCONTINUED | OUTPATIENT
Start: 2018-12-11 | End: 2018-12-14

## 2018-12-11 RX ADMIN — Medication 0: at 00:21

## 2018-12-11 RX ADMIN — HYDROMORPHONE HYDROCHLORIDE 1 MILLIGRAM(S): 2 INJECTION INTRAMUSCULAR; INTRAVENOUS; SUBCUTANEOUS at 06:40

## 2018-12-11 RX ADMIN — ESCITALOPRAM OXALATE 20 MILLIGRAM(S): 10 TABLET, FILM COATED ORAL at 22:23

## 2018-12-11 RX ADMIN — HYDROMORPHONE HYDROCHLORIDE 1 MILLIGRAM(S): 2 INJECTION INTRAMUSCULAR; INTRAVENOUS; SUBCUTANEOUS at 06:10

## 2018-12-11 RX ADMIN — Medication 1 MILLIGRAM(S): at 06:05

## 2018-12-11 RX ADMIN — Medication 25 MILLIGRAM(S): at 06:05

## 2018-12-11 RX ADMIN — QUETIAPINE FUMARATE 100 MILLIGRAM(S): 200 TABLET, FILM COATED ORAL at 22:23

## 2018-12-11 RX ADMIN — Medication 3 MILLILITER(S): at 00:22

## 2018-12-11 RX ADMIN — Medication 3 MILLILITER(S): at 22:24

## 2018-12-11 RX ADMIN — Medication 0.5 MILLIGRAM(S): at 22:23

## 2018-12-11 RX ADMIN — Medication 3 MILLILITER(S): at 13:17

## 2018-12-11 RX ADMIN — SODIUM CHLORIDE 100 MILLILITER(S): 9 INJECTION, SOLUTION INTRAVENOUS at 00:22

## 2018-12-11 RX ADMIN — Medication 0: at 06:05

## 2018-12-11 RX ADMIN — Medication 3 MILLILITER(S): at 06:05

## 2018-12-11 NOTE — PROGRESS NOTE ADULT - PROBLEM SELECTOR PLAN 2
Possibly muskuloskeletal related to being in bed, however must consider possible retroperitoneal hemorrhage or fluid collection given drop in Hb from 10's to 9's today  - continue to monitor for now  - trend CBC, if continues to drop, would rescan to r/o hemorrhage

## 2018-12-11 NOTE — PROGRESS NOTE ADULT - SUBJECTIVE AND OBJECTIVE BOX
Patient is a 63y old  Male who presents with a chief complaint of acute pancreatitis (11 Dec 2018 13:54)  Subjective: no acute events overnight. Today, patient reports new onset achy lower back pain, unclear on time of onset, no other alleviating/exacerbating factors defined. Nurse reports pt often disoriented to time and intermittently somnolent. No fevers/chills, no sob, cough. Abd pain controlled, no nausea/vomiting. No BM.     14 point ros otherwise negative.    VITAL SIGNS:  Vital Signs Last 24 Hrs  T(C): 37.8 (11 Dec 2018 12:23), Max: 37.8 (11 Dec 2018 12:23)  T(F): 100 (11 Dec 2018 12:23), Max: 100 (11 Dec 2018 12:23)  HR: 99 (11 Dec 2018 12:23) (88 - 106)  BP: 148/74 (11 Dec 2018 12:23) (129/73 - 156/75)  BP(mean): --  RR: 18 (11 Dec 2018 12:23) (18 - 20)  SpO2: 92% (11 Dec 2018 12:23) (90% - 94%)      PHYSICAL EXAM:     GENERAL: no acute distress  HEENT: PERRLA, EOMI, moist oropharynx   RESPIRATORY: +decreased breath sounds anterior L field, otherwise clear   CARDIOVASCULAR: RRR, no murmurs, gallops, rubs  ABDOMINAL: soft, less distended today, moderately tender L side, +mass like area of firmness in LUQ  EXTREMITIES: no clubbing, cyanosis, +trace pitting edema bilaterally  NEUROLOGICAL: alert and oriented x 2 (self, place) non-focal  SKIN: no rashes or lesions   MUSCULOSKELETAL: no gross joint deformity                          9.5    18.10 )-----------( 337      ( 11 Dec 2018 09:04 )             29.8     12-11    137  |  95<L>  |  8   ----------------------------<  70  3.9   |  26  |  0.63    Ca    8.7      11 Dec 2018 07:18  Phos  3.9     12-11  Mg     2.0     12-11    TPro  6.2  /  Alb  2.6<L>  /  TBili  0.3  /  DBili  0.1  /  AST  41<H>  /  ALT  32  /  AlkPhos  225<H>  12-11      CAPILLARY BLOOD GLUCOSE      POCT Blood Glucose.: 105 mg/dL (11 Dec 2018 12:21)  POCT Blood Glucose.: 129 mg/dL (11 Dec 2018 07:34)  POCT Blood Glucose.: 115 mg/dL (11 Dec 2018 06:01)  POCT Blood Glucose.: 117 mg/dL (11 Dec 2018 00:17)  POCT Blood Glucose.: 113 mg/dL (10 Dec 2018 17:28)      MEDICATIONS  (STANDING):  ALBUTerol/ipratropium for Nebulization 3 milliLiter(s) Nebulizer every 6 hours  anastrozole 1 milliGRAM(s) Oral daily  clonazePAM Tablet 1 milliGRAM(s) Oral two times a day  enoxaparin Injectable 40 milliGRAM(s) SubCutaneous daily  escitalopram 20 milliGRAM(s) Oral at bedtime  folic acid 1 milliGRAM(s) Oral daily  insulin lispro (HumaLOG) corrective regimen sliding scale   SubCutaneous every 6 hours  lactated ringers. 1000 milliLiter(s) (100 mL/Hr) IV Continuous <Continuous>  metoprolol tartrate 25 milliGRAM(s) Oral two times a day  multivitamin 1 Tablet(s) Oral daily  QUEtiapine 100 milliGRAM(s) Oral at bedtime  thiamine 100 milliGRAM(s) Oral daily    MEDICATIONS  (PRN):  HYDROmorphone  Injectable 1 milliGRAM(s) IV Push every 4 hours PRN Severe Pain (7 - 10)  HYDROmorphone  Injectable 0.5 milliGRAM(s) IV Push every 4 hours PRN Moderate Pain (4 - 6)

## 2018-12-11 NOTE — PROGRESS NOTE ADULT - ASSESSMENT
63M transferred from [place] with acute/subacute pancreatitis with necrosis and fluid collection around the lesser sac, repeat CT (12/10) grossly stable    - Pain control  - Sips/Chips, IVF  - Endoscopic NJ tube with GI, appreciate input  - Continue nightly bipap, monitor respiratory status  - Trend labs  - Insulin sliding scale  - DVT ppx: Lovenox    Dispo: floors    TIA Palma, PGY-1  [facility]  [phone] with any questions

## 2018-12-11 NOTE — PROGRESS NOTE ADULT - PROBLEM SELECTOR PLAN 10
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

## 2018-12-11 NOTE — PROGRESS NOTE ADULT - SUBJECTIVE AND OBJECTIVE BOX
Pre-Endoscopy Evaluation      Referring Physician: dr. nallely sanders                                   Procedure: NJ tube placement    Indication for Procedure: Acute pancreatitis    Pertinent History: 63y Male PMHX of alcohol induced pancreatitis earlier this year, alcohol abuse (1/3 quart of vodka daily), DM, HLD, HTN, Depression, ROSS on CPAP who was transferred from [facility] for management of acute/subacute pancreatitis complicated by necrosis and fluid collection     Sedation by Anesthesia [X]    PAST MEDICAL & SURGICAL HISTORY:  Diabetes: type 2  ROSS (obstructive sleep apnea)  Depression, unspecified depression type  Alcohol use disorder, severe, dependence  Alcohol-induced acute pancreatitis, unspecified complication status  Essential hypertension  No significant past surgical history      PMH of Gastroparesis [ ]  Gastric Surgery [ ]  Gastric Outlet Obstruction [ ]    Allergies    No Known Allergies    Intolerances    Latex allergy: [ ] yes [X] no    Medications:MEDICATIONS  (STANDING):  ALBUTerol/ipratropium for Nebulization 3 milliLiter(s) Nebulizer every 6 hours  anastrozole 1 milliGRAM(s) Oral daily  clonazePAM Tablet 1 milliGRAM(s) Oral two times a day  enoxaparin Injectable 40 milliGRAM(s) SubCutaneous daily  escitalopram 20 milliGRAM(s) Oral at bedtime  folic acid 1 milliGRAM(s) Oral daily  insulin lispro (HumaLOG) corrective regimen sliding scale   SubCutaneous every 6 hours  lactated ringers. 1000 milliLiter(s) (100 mL/Hr) IV Continuous <Continuous>  metoprolol tartrate 25 milliGRAM(s) Oral two times a day  multivitamin 1 Tablet(s) Oral daily  QUEtiapine 100 milliGRAM(s) Oral at bedtime  thiamine 100 milliGRAM(s) Oral daily    MEDICATIONS  (PRN):  HYDROmorphone  Injectable 1 milliGRAM(s) IV Push every 4 hours PRN Severe Pain (7 - 10)  HYDROmorphone  Injectable 0.5 milliGRAM(s) IV Push every 4 hours PRN Moderate Pain (4 - 6)      Smoking: [ ] yes  [X] no    AICD/PPM: [ ] yes   [X] no    Pertinent lab data:                        9.5    18.10 )-----------( 337      ( 11 Dec 2018 09:04 )             29.8     12-11    137  |  95<L>  |  8   ----------------------------<  70  3.9   |  26  |  0.63    Ca    8.7      11 Dec 2018 07:18  Phos  3.9     12-11  Mg     2.0     12-11    TPro  6.2  /  Alb  2.6<L>  /  TBili  0.3  /  DBili  0.1  /  AST  41<H>  /  ALT  32  /  AlkPhos  225<H>  12-11    PT/INR - ( 11 Dec 2018 09:04 )   PT: 17.0 sec;   INR: 1.50 ratio       PTT - ( 11 Dec 2018 09:04 )  PTT:30.3 sec    CAPILLARY BLOOD GLUCOSE  POCT Blood Glucose.: 105 mg/dL (11 Dec 2018 12:21)      Physical Examination:  Daily   Vital Signs Last 24 Hrs  T(C): 37.8 (11 Dec 2018 12:23), Max: 37.8 (11 Dec 2018 12:23)  T(F): 100 (11 Dec 2018 12:23), Max: 100 (11 Dec 2018 12:23)  HR: 99 (11 Dec 2018 12:23) (88 - 106)  BP: 148/74 (11 Dec 2018 12:23) (129/73 - 156/75)  BP(mean): --  RR: 18 (11 Dec 2018 12:23) (18 - 20)  SpO2: 92% (11 Dec 2018 12:23) (90% - 94%)    Drug Dosing Weight  Weight (kg): 108.8 (06 Dec 2018 21:00)    Constitutional: NAD     Neck:  No JVD    Respiratory: CTAB/L    Cardiovascular: S1 and S2    Gastrointestinal: BS+, soft, NT/ND    Extremities: No peripheral edema    Neurological: A/O x 3    : No Holder    Skin: No rashes    Comments:    ASA Class: I [ ]  II [ ]  III [ ]  IV [X]    The patient is a suitable candidate for the planned procedure unless box checked [ ]  No, explain:

## 2018-12-11 NOTE — PROGRESS NOTE ADULT - ASSESSMENT
63y Male PMHX of alcohol induced pancreatitis earlier this year, alcohol abuse (1/3 quart of vodka daily), DM, HLD, HTN, Depression, ROSS on CPAP who presents as a transfer from [facility] for management of acute pancreatitis complicated by necrosis and enlarging abdominal fluid collections

## 2018-12-11 NOTE — PROGRESS NOTE ADULT - ATTENDING COMMENTS
Patient seen and examined  Still with mild confusion  vitals stable  O2 sats in low 90's on 3L NC O2  abd - obese, mild epigatric tenderness, soft, no peritoneal signs    WBC=18 (decreasing)    - Discussed with GI service - for N-J tube placement today for enteral nutrition  - Repeat CT reviewed - likely with developing pseudocyst, may need drainage after pseudocyst matures

## 2018-12-11 NOTE — PROGRESS NOTE ADULT - PROBLEM SELECTOR PLAN 9
- ppx: lovenox  - diet: pending J tube placement  - dispo: pending clinical progress verified with patient's pharmacy  - norvasc 10mg po daily  - anastrozole 1mg po daily  - lipitor 20mg po qhs  - klonopin 1mg po bid  - lexapro 30mg po daily (10+20)  - seroquel 200mg po qhs  - quinapril 40mg po daily  - MVI

## 2018-12-11 NOTE — PROGRESS NOTE ADULT - SUBJECTIVE AND OBJECTIVE BOX
TRAUMA SURGERY DAILY PROGRESS NOTE:    Overnight:  No acute events.    Subjective:  Patient seen and examined. Arousable but not able to stay awake. Reports pain is well controlled. Denies N/V.     Exam:  Gen: NAD, resting in bed, oxygen mask in place  Resp: Airway patent, non-labored respirations  Abd: Soft, distended, NT  Neuro: oriented to place    Vital Signs Last 24 Hrs  T(C): 37.2 (11 Dec 2018 09:28), Max: 37.4 (10 Dec 2018 16:57)  T(F): 98.9 (11 Dec 2018 09:28), Max: 99.3 (10 Dec 2018 16:57)  HR: 96 (11 Dec 2018 09:) (88 - 106)  BP: 153/76 (11 Dec 2018 09:) (129/73 - 162/75)  BP(mean): --  RR: 18 (11 Dec 2018 09:28) (18 - 20)  SpO2: 92% (11 Dec 2018 09:28) (90% - 94%)    I&O's Detail    10 Dec 2018 07:01  -  11 Dec 2018 07:00  --------------------------------------------------------  IN:    IV PiggyBack: 200 mL    lactated ringers.: 2200 mL  Total IN: 2400 mL    OUT:    Ureteral Catheter: 1925 mL  Total OUT: 1925 mL    Total NET: 475 mL      11 Dec 2018 07:01  -  11 Dec 2018 09:39  --------------------------------------------------------  IN:  Total IN: 0 mL    OUT:  Total OUT: 0 mL    Total NET: 0 mL    Daily Weight in k.4 (10 Dec 2018 12:46)    MEDICATIONS  (STANDING):  ALBUTerol/ipratropium for Nebulization 3 milliLiter(s) Nebulizer every 6 hours  anastrozole 1 milliGRAM(s) Oral daily  clonazePAM Tablet 1 milliGRAM(s) Oral two times a day  enoxaparin Injectable 40 milliGRAM(s) SubCutaneous daily  escitalopram 20 milliGRAM(s) Oral at bedtime  folic acid 1 milliGRAM(s) Oral daily  insulin lispro (HumaLOG) corrective regimen sliding scale   SubCutaneous every 6 hours  lactated ringers. 1000 milliLiter(s) (100 mL/Hr) IV Continuous <Continuous>  metoprolol tartrate 25 milliGRAM(s) Oral two times a day  multivitamin 1 Tablet(s) Oral daily  QUEtiapine 100 milliGRAM(s) Oral at bedtime  thiamine 100 milliGRAM(s) Oral daily    MEDICATIONS  (PRN):  HYDROmorphone  Injectable 1 milliGRAM(s) IV Push every 4 hours PRN Severe Pain (7 - 10)  HYDROmorphone  Injectable 0.5 milliGRAM(s) IV Push every 4 hours PRN Moderate Pain (4 - 6)      LABS:                        9.5    18.10 )-----------( 337      ( 11 Dec 2018 09:04 )             29.8     12-11    137  |  95<L>  |  8   ----------------------------<  70  3.9   |  26  |  0.63    Ca    8.7      11 Dec 2018 07:18  Phos  3.9     12-11  Mg     2.0     12-    TPro  6.2  /  Alb  2.6<L>  /  TBili  0.3  /  DBili  0.1  /  AST  41<H>  /  ALT  32  /  AlkPhos  225<H>  12-    PT/INR - ( 11 Dec 2018 09:04 )   PT: 17.0 sec;   INR: 1.50 ratio         PTT - ( 11 Dec 2018 09:04 )  PTT:30.3 sec

## 2018-12-12 DIAGNOSIS — R41.0 DISORIENTATION, UNSPECIFIED: ICD-10-CM

## 2018-12-12 LAB
ALBUMIN SERPL ELPH-MCNC: 2.5 G/DL — LOW (ref 3.3–5)
ALP SERPL-CCNC: 213 U/L — HIGH (ref 40–120)
ALT FLD-CCNC: 41 U/L — SIGNIFICANT CHANGE UP (ref 10–45)
AMYLASE P1 CFR SERPL: 134 U/L — HIGH (ref 25–125)
ANION GAP SERPL CALC-SCNC: 16 MMOL/L — SIGNIFICANT CHANGE UP (ref 5–17)
AST SERPL-CCNC: 59 U/L — HIGH (ref 10–40)
BILIRUB SERPL-MCNC: 0.3 MG/DL — SIGNIFICANT CHANGE UP (ref 0.2–1.2)
BUN SERPL-MCNC: 7 MG/DL — SIGNIFICANT CHANGE UP (ref 7–23)
CALCIUM SERPL-MCNC: 8.3 MG/DL — LOW (ref 8.4–10.5)
CHLORIDE SERPL-SCNC: 95 MMOL/L — LOW (ref 96–108)
CO2 SERPL-SCNC: 25 MMOL/L — SIGNIFICANT CHANGE UP (ref 22–31)
CREAT SERPL-MCNC: 0.6 MG/DL — SIGNIFICANT CHANGE UP (ref 0.5–1.3)
GLUCOSE BLDC GLUCOMTR-MCNC: 136 MG/DL — HIGH (ref 70–99)
GLUCOSE BLDC GLUCOMTR-MCNC: 139 MG/DL — HIGH (ref 70–99)
GLUCOSE BLDC GLUCOMTR-MCNC: 146 MG/DL — HIGH (ref 70–99)
GLUCOSE SERPL-MCNC: 110 MG/DL — HIGH (ref 70–99)
HCT VFR BLD CALC: 29.9 % — LOW (ref 39–50)
HGB BLD-MCNC: 9.7 G/DL — LOW (ref 13–17)
LIDOCAIN IGE QN: 401 U/L — HIGH (ref 7–60)
MAGNESIUM SERPL-MCNC: 1.9 MG/DL — SIGNIFICANT CHANGE UP (ref 1.6–2.6)
MCHC RBC-ENTMCNC: 32.3 PG — SIGNIFICANT CHANGE UP (ref 27–34)
MCHC RBC-ENTMCNC: 32.4 GM/DL — SIGNIFICANT CHANGE UP (ref 32–36)
MCV RBC AUTO: 99.7 FL — SIGNIFICANT CHANGE UP (ref 80–100)
PHOSPHATE SERPL-MCNC: 3.2 MG/DL — SIGNIFICANT CHANGE UP (ref 2.5–4.5)
PLATELET # BLD AUTO: 368 K/UL — SIGNIFICANT CHANGE UP (ref 150–400)
POTASSIUM SERPL-MCNC: 3.7 MMOL/L — SIGNIFICANT CHANGE UP (ref 3.5–5.3)
POTASSIUM SERPL-SCNC: 3.7 MMOL/L — SIGNIFICANT CHANGE UP (ref 3.5–5.3)
PROT SERPL-MCNC: 6 G/DL — SIGNIFICANT CHANGE UP (ref 6–8.3)
RBC # BLD: 3 M/UL — LOW (ref 4.2–5.8)
RBC # FLD: 14 % — SIGNIFICANT CHANGE UP (ref 10.3–14.5)
SODIUM SERPL-SCNC: 136 MMOL/L — SIGNIFICANT CHANGE UP (ref 135–145)
WBC # BLD: 14.18 K/UL — HIGH (ref 3.8–10.5)
WBC # FLD AUTO: 14.18 K/UL — HIGH (ref 3.8–10.5)

## 2018-12-12 PROCEDURE — 99233 SBSQ HOSP IP/OBS HIGH 50: CPT

## 2018-12-12 PROCEDURE — 74019 RADEX ABDOMEN 2 VIEWS: CPT | Mod: 26

## 2018-12-12 RX ORDER — DEXTROSE MONOHYDRATE, SODIUM CHLORIDE, AND POTASSIUM CHLORIDE 50; .745; 4.5 G/1000ML; G/1000ML; G/1000ML
1000 INJECTION, SOLUTION INTRAVENOUS
Qty: 0 | Refills: 0 | Status: DISCONTINUED | OUTPATIENT
Start: 2018-12-12 | End: 2018-12-13

## 2018-12-12 RX ORDER — LISINOPRIL 2.5 MG/1
10 TABLET ORAL DAILY
Qty: 0 | Refills: 0 | Status: DISCONTINUED | OUTPATIENT
Start: 2018-12-12 | End: 2018-12-13

## 2018-12-12 RX ORDER — POTASSIUM CHLORIDE 20 MEQ
10 PACKET (EA) ORAL
Qty: 0 | Refills: 0 | Status: COMPLETED | OUTPATIENT
Start: 2018-12-12 | End: 2018-12-12

## 2018-12-12 RX ADMIN — ANASTROZOLE 1 MILLIGRAM(S): 1 TABLET ORAL at 11:49

## 2018-12-12 RX ADMIN — QUETIAPINE FUMARATE 100 MILLIGRAM(S): 200 TABLET, FILM COATED ORAL at 21:21

## 2018-12-12 RX ADMIN — Medication 100 MILLIEQUIVALENT(S): at 11:48

## 2018-12-12 RX ADMIN — DEXTROSE MONOHYDRATE, SODIUM CHLORIDE, AND POTASSIUM CHLORIDE 100 MILLILITER(S): 50; .745; 4.5 INJECTION, SOLUTION INTRAVENOUS at 10:07

## 2018-12-12 RX ADMIN — Medication 0.5 MILLIGRAM(S): at 21:21

## 2018-12-12 RX ADMIN — Medication 1 TABLET(S): at 11:50

## 2018-12-12 RX ADMIN — LISINOPRIL 10 MILLIGRAM(S): 2.5 TABLET ORAL at 10:07

## 2018-12-12 RX ADMIN — Medication 3 MILLILITER(S): at 06:42

## 2018-12-12 RX ADMIN — Medication 25 MILLIGRAM(S): at 17:42

## 2018-12-12 RX ADMIN — ESCITALOPRAM OXALATE 20 MILLIGRAM(S): 10 TABLET, FILM COATED ORAL at 21:21

## 2018-12-12 RX ADMIN — Medication 100 MILLIEQUIVALENT(S): at 21:18

## 2018-12-12 RX ADMIN — ENOXAPARIN SODIUM 40 MILLIGRAM(S): 100 INJECTION SUBCUTANEOUS at 11:50

## 2018-12-12 RX ADMIN — Medication 3 MILLILITER(S): at 11:49

## 2018-12-12 RX ADMIN — Medication 3 MILLILITER(S): at 17:42

## 2018-12-12 RX ADMIN — Medication 25 MILLIGRAM(S): at 06:42

## 2018-12-12 RX ADMIN — Medication 1 MILLIGRAM(S): at 11:50

## 2018-12-12 RX ADMIN — Medication 0.5 MILLIGRAM(S): at 10:07

## 2018-12-12 RX ADMIN — Medication 100 MILLIEQUIVALENT(S): at 17:47

## 2018-12-12 RX ADMIN — Medication 100 MILLIGRAM(S): at 11:50

## 2018-12-12 NOTE — PROGRESS NOTE ADULT - PROBLEM SELECTOR PLAN 10
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

## 2018-12-12 NOTE — PROGRESS NOTE ADULT - SUBJECTIVE AND OBJECTIVE BOX
Trauma Surgery Progress Note    Subjective:   Pt seen & examined at bedside, delirious/confused this morning.  NJ tube placed yesterday, tube feeds started with goal of 20mL/h.      Medications:  enoxaparin Injectable 40  lisinopril 10  metoprolol tartrate 25      Objective:  Vital Signs Last 24 Hrs  T(C): 37.2 (12 Dec 2018 09:28), Max: 37.8 (11 Dec 2018 12:23)  T(F): 99 (12 Dec 2018 09:28), Max: 100 (11 Dec 2018 12:23)  HR: 103 (12 Dec 2018 09:48) (97 - 110)  BP: 166/86 (12 Dec 2018 09:48) (148/74 - 169/79)  BP(mean): --  RR: 18 (12 Dec 2018 09:28) (18 - 18)  SpO2: 95% (12 Dec 2018 09:48) (92% - 95%)    I&O's Summary    11 Dec 2018 07:01  -  12 Dec 2018 07:00  --------------------------------------------------------  IN: 1800 mL / OUT: 500 mL / NET: 1300 mL    12 Dec 2018 07:01  -  12 Dec 2018 09:55  --------------------------------------------------------  IN: 0 mL / OUT: 0 mL / NET: 0 mL        Physical Exam:  Gen: Uncomfortable in mild distress, delirious.  Alert, oriented x1.    Resp: No increased WOB on NC 3L  Abd: tender to palpation over epigastrum, mildly distended, soft.    Extr: WWP distally, cap refill <2s    LABS:                        9.7    14.18 )-----------( 368      ( 12 Dec 2018 09:04 )             29.9     12-12    136  |  95<L>  |  7   ----------------------------<  110<H>  3.7   |  25  |  0.60    Ca    8.3<L>      12 Dec 2018 07:28  Phos  3.2     12-12  Mg     1.9     12-12    TPro  6.0  /  Alb  2.5<L>  /  TBili  0.3  /  DBili  x   /  AST  59<H>  /  ALT  41  /  AlkPhos  213<H>  12-12    PT/INR - ( 11 Dec 2018 09:04 )   PT: 17.0 sec;   INR: 1.50 ratio         PTT - ( 11 Dec 2018 09:04 )  PTT:30.3 sec    Imaging:    < from: Xray Abdomen 1 View PORTABLE -Urgent (12.11.18 @ 21:25) >    INTERPRETATION:  Enteric tube in place with tip in proximal jejunum.      < end of copied text >

## 2018-12-12 NOTE — PROGRESS NOTE ADULT - SUBJECTIVE AND OBJECTIVE BOX
Hospitalist- Isac Curry MD  Pager: [phone]  If no response or off-hours, page [phone]  -------------------------------------  Patient is a 63y old  Male who presents with a chief complaint of acute pancreatitis (12 Dec 2018 09:54)  Subjective: Underwent successful placement of NJ tube yesterday. No other acute events overnight. This AM, patient still confused/delirious, but easily directable, calm, cooperative, following commands. +abd pain controlled, no n/v, no fevers/chills. No cough/sob.    14 point ros otherwise negative.     VITAL SIGNS:  Vital Signs Last 24 Hrs  T(C): 37.2 (12 Dec 2018 09:28), Max: 37.5 (12 Dec 2018 00:33)  T(F): 99 (12 Dec 2018 09:28), Max: 99.5 (12 Dec 2018 00:33)  HR: 103 (12 Dec 2018 09:48) (97 - 110)  BP: 166/86 (12 Dec 2018 09:48) (157/70 - 169/79)  BP(mean): --  RR: 18 (12 Dec 2018 09:28) (18 - 18)  SpO2: 95% (12 Dec 2018 09:48) (92% - 95%)    PHYSICAL EXAM:     GENERAL: no acute distress  HEENT: PERRLA, EOMI, moist oropharynx   RESPIRATORY: CTAB, no w/c   CARDIOVASCULAR: RRR, no murmurs, gallops, rubs  ABDOMINAL: soft, +moderate L sided tenderness, mildly distended, positive bowel sounds   EXTREMITIES: no clubbing, cyanosis, +trace pitting edema B/L.  NEUROLOGICAL: alert and oriented x 2, non-focal  SKIN: no rashes or lesions   MUSCULOSKELETAL: no gross joint deformity                          9.7    14.18 )-----------( 368      ( 12 Dec 2018 09:04 )             29.9     12-12    136  |  95<L>  |  7   ----------------------------<  110<H>  3.7   |  25  |  0.60    Ca    8.3<L>      12 Dec 2018 07:28  Phos  3.2     12-12  Mg     1.9     12-12    TPro  6.0  /  Alb  2.5<L>  /  TBili  0.3  /  DBili  x   /  AST  59<H>  /  ALT  41  /  AlkPhos  213<H>  12-12      CAPILLARY BLOOD GLUCOSE      POCT Blood Glucose.: 146 mg/dL (12 Dec 2018 11:42)  POCT Blood Glucose.: 136 mg/dL (12 Dec 2018 06:13)  POCT Blood Glucose.: 107 mg/dL (11 Dec 2018 23:15)      MEDICATIONS  (STANDING):  ALBUTerol/ipratropium for Nebulization 3 milliLiter(s) Nebulizer every 6 hours  anastrozole 1 milliGRAM(s) Oral daily  clonazePAM Tablet 0.5 milliGRAM(s) Oral two times a day  dextrose 5% + sodium chloride 0.45% with potassium chloride 20 mEq/L 1000 milliLiter(s) (100 mL/Hr) IV Continuous <Continuous>  enoxaparin Injectable 40 milliGRAM(s) SubCutaneous daily  escitalopram 20 milliGRAM(s) Oral at bedtime  folic acid 1 milliGRAM(s) Oral daily  insulin lispro (HumaLOG) corrective regimen sliding scale   SubCutaneous every 6 hours  lisinopril 10 milliGRAM(s) Oral daily  metoprolol tartrate 25 milliGRAM(s) Oral two times a day  multivitamin 1 Tablet(s) Oral daily  potassium chloride  10 mEq/100 mL IVPB 10 milliEquivalent(s) IV Intermittent every 1 hour  QUEtiapine 100 milliGRAM(s) Oral at bedtime  thiamine 100 milliGRAM(s) Oral daily    MEDICATIONS  (PRN):  HYDROmorphone  Injectable 1 milliGRAM(s) IV Push every 4 hours PRN Severe Pain (7 - 10)  HYDROmorphone  Injectable 0.5 milliGRAM(s) IV Push every 4 hours PRN Moderate Pain (4 - 6)

## 2018-12-12 NOTE — PROGRESS NOTE ADULT - ASSESSMENT
Hospitalist- Isac Curry MD  Pager: [phone]  If no response or off-hours, page [phone]  -------------------------------------  Patient is a 63y old  Male who presents with a chief complaint of acute pancreatitis (12 Dec 2018 09:54)  Subjective: Underwent successful placement of NJ tube yesterday. No other acute events overnight. This AM, patient still confused/delirious, but easily directable, calm, cooperative, following commands. +abd pain controlled, no n/v, no fevers/chills. No cough/sob.    14 point ros otherwise negative.     VITAL SIGNS:  Vital Signs Last 24 Hrs  T(C): 37.2 (12 Dec 2018 09:28), Max: 37.5 (12 Dec 2018 00:33)  T(F): 99 (12 Dec 2018 09:28), Max: 99.5 (12 Dec 2018 00:33)  HR: 103 (12 Dec 2018 09:48) (97 - 110)  BP: 166/86 (12 Dec 2018 09:48) (157/70 - 169/79)  BP(mean): --  RR: 18 (12 Dec 2018 09:28) (18 - 18)  SpO2: 95% (12 Dec 2018 09:48) (92% - 95%)    PHYSICAL EXAM:     GENERAL: no acute distress  HEENT: PERRLA, EOMI, moist oropharynx   RESPIRATORY: CTAB, no w/c   CARDIOVASCULAR: RRR, no murmurs, gallops, rubs  ABDOMINAL: soft, +moderate L sided tenderness, mildly distended, positive bowel sounds   EXTREMITIES: no clubbing, cyanosis, +trace pitting edema B/L.  NEUROLOGICAL: alert and oriented x 2, non-focal  SKIN: no rashes or lesions   MUSCULOSKELETAL: no gross joint deformity                          9.7    14.18 )-----------( 368      ( 12 Dec 2018 09:04 )             29.9     12-12    136  |  95<L>  |  7   ----------------------------<  110<H>  3.7   |  25  |  0.60    Ca    8.3<L>      12 Dec 2018 07:28  Phos  3.2     12-12  Mg     1.9     12-12    TPro  6.0  /  Alb  2.5<L>  /  TBili  0.3  /  DBili  x   /  AST  59<H>  /  ALT  41  /  AlkPhos  213<H>  12-12      CAPILLARY BLOOD GLUCOSE      POCT Blood Glucose.: 146 mg/dL (12 Dec 2018 11:42)  POCT Blood Glucose.: 136 mg/dL (12 Dec 2018 06:13)  POCT Blood Glucose.: 107 mg/dL (11 Dec 2018 23:15)      MEDICATIONS  (STANDING):  ALBUTerol/ipratropium for Nebulization 3 milliLiter(s) Nebulizer every 6 hours  anastrozole 1 milliGRAM(s) Oral daily  clonazePAM Tablet 0.5 milliGRAM(s) Oral two times a day  dextrose 5% + sodium chloride 0.45% with potassium chloride 20 mEq/L 1000 milliLiter(s) (100 mL/Hr) IV Continuous <Continuous>  enoxaparin Injectable 40 milliGRAM(s) SubCutaneous daily  escitalopram 20 milliGRAM(s) Oral at bedtime  folic acid 1 milliGRAM(s) Oral daily  insulin lispro (HumaLOG) corrective regimen sliding scale   SubCutaneous every 6 hours  lisinopril 10 milliGRAM(s) Oral daily  metoprolol tartrate 25 milliGRAM(s) Oral two times a day  multivitamin 1 Tablet(s) Oral daily  potassium chloride  10 mEq/100 mL IVPB 10 milliEquivalent(s) IV Intermittent every 1 hour  QUEtiapine 100 milliGRAM(s) Oral at bedtime  thiamine 100 milliGRAM(s) Oral daily    MEDICATIONS  (PRN):  HYDROmorphone  Injectable 1 milliGRAM(s) IV Push every 4 hours PRN Severe Pain (7 - 10)  HYDROmorphone  Injectable 0.5 milliGRAM(s) IV Push every 4 hours PRN Moderate Pain (4 - 6) 63y Male PMHX of alcohol induced pancreatitis earlier this year, alcohol abuse (1/3 quart of vodka daily), DM, HLD, HTN, Depression, ROSS on CPAP who presents as a transfer from [facility] for management of acute pancreatitis complicated by necrosis and enlarging abdominal fluid collections, as well as persistent delirium.

## 2018-12-12 NOTE — PROGRESS NOTE ADULT - PROBLEM SELECTOR PLAN 2
Likely hospital delirium in the setting of prolonged hospital stay with critical illness, with possible component of benzodiazepine effect  - would not abruptly stop benzos- continue  klonopin 0.5mg po bid, plan to decrease to 0.5mg po daily tomorrow  - conservative measures to decrease delirium: PT eval and ambulation, OOB, frequent reorientation, minimize sleep disturbances at night, taper off any deleriogenic medications such as benzos  - if worsens, would obtain ABG as patient with ROSS and may have hypoventilation syndrome, would consider head CT as well. Likely hospital delirium in the setting of prolonged hospital stay with critical illness, with possible component of benzodiazepine effect  - would not abruptly stop benzos- continue  klonopin 0.5mg po bid, plan to decrease to 0.5mg po daily tomorrow  - conservative measures to decrease delirium: PT eval and ambulation, OOB, frequent reorientation, minimize sleep disturbances at night, taper off any deleriogenic medications such as benzos  - continue seroquel 100mg po qhs for now- if persists, would consider stopping this as well  - if worsens, would obtain ABG as patient with ROSS and may have hypoventilation syndrome, would consider head CT as well.

## 2018-12-12 NOTE — PROGRESS NOTE ADULT - ATTENDING COMMENTS
Patient seen and examined on AM rounds  Still mildly confused  No acute complaints  non-toxic appearing  vitals stable  abd- obese, nondistended, soft    WBC= 22 -> 18 -> 14 over the last 3 hours  Starting nutrition via N-J tube  Will likely eventually need cyst-gastrostomy given large fluid peripancreatic collection posterior to stomach

## 2018-12-12 NOTE — PROGRESS NOTE ADULT - SUBJECTIVE AND OBJECTIVE BOX
Interval Events: s/p  upper gastrointestinal endoscopy with normal upper third of esophagus, middle third of esophagus and lower third of esophagus.   - Chronic gastritis.  - Normal first part of the duodenum, 2nd part of the duodenum, 3rd part of     the duodenum and 4th part of the duodenum.  - Feeding tube was successfully placed    Pt s/e. Still confused. No abdominal pain, n/v. Feeds started.         MEDICATIONS  (STANDING):  ALBUTerol/ipratropium for Nebulization 3 milliLiter(s) Nebulizer every 6 hours  anastrozole 1 milliGRAM(s) Oral daily  clonazePAM Tablet 0.5 milliGRAM(s) Oral two times a day  dextrose 5% + sodium chloride 0.45% with potassium chloride 20 mEq/L 1000 milliLiter(s) (100 mL/Hr) IV Continuous <Continuous>  enoxaparin Injectable 40 milliGRAM(s) SubCutaneous daily  escitalopram 20 milliGRAM(s) Oral at bedtime  folic acid 1 milliGRAM(s) Oral daily  insulin lispro (HumaLOG) corrective regimen sliding scale   SubCutaneous every 6 hours  lisinopril 10 milliGRAM(s) Oral daily  metoprolol tartrate 25 milliGRAM(s) Oral two times a day  multivitamin 1 Tablet(s) Oral daily  potassium chloride  10 mEq/100 mL IVPB 10 milliEquivalent(s) IV Intermittent every 1 hour  QUEtiapine 100 milliGRAM(s) Oral at bedtime  thiamine 100 milliGRAM(s) Oral daily    MEDICATIONS  (PRN):  HYDROmorphone  Injectable 1 milliGRAM(s) IV Push every 4 hours PRN Severe Pain (7 - 10)  HYDROmorphone  Injectable 0.5 milliGRAM(s) IV Push every 4 hours PRN Moderate Pain (4 - 6)      Allergies    No Known Allergies    Intolerances        Review of Systems:    General:  No wt loss, fevers, chills, night sweats,fatigue,   Eyes:  Good vision, no reported pain  ENT:  No sore throat, pain, runny nose, dysphagia  CV:  No pain, palpitations, hypo/hypertension  Resp:  No dyspnea, cough, tachypnea, wheezing  GI:  No pain, No nausea, No vomiting, No diarrhea, No constipation, No weight loss, No fever, No pruritis, No rectal bleeding, No melena, No dysphagia  :  No pain, bleeding, incontinence, nocturia  Muscle:  No pain, weakness  Neuro:  No weakness, tingling, memory problems  Psych:  No fatigue, insomnia, mood problems, depression  Endocrine:  No polyuria, polydypsia, cold/heat intolerance  Heme:  No petechiae, ecchymosis, easy bruisability  Skin:  No rash, tattoos, scars, edema      Vital Signs Last 24 Hrs  T(C): 37.2 (12 Dec 2018 09:28), Max: 37.5 (12 Dec 2018 00:33)  T(F): 99 (12 Dec 2018 09:28), Max: 99.5 (12 Dec 2018 00:33)  HR: 101 (12 Dec 2018 13:10) (97 - 110)  BP: 166/86 (12 Dec 2018 09:48) (157/70 - 169/79)  BP(mean): --  RR: 18 (12 Dec 2018 09:28) (18 - 18)  SpO2: 94% (12 Dec 2018 13:10) (92% - 95%)    PHYSICAL EXAM:    Constitutional: NAD, well-developed  HEENT: EOMI, throat clear  Neck: No LAD, supple  Respiratory: CTA and P  Cardiovascular: S1 and S2, RRR, no M  Gastrointestinal: BS+, soft, NT/ND, neg HSM,  Extremities: No peripheral edema, neg clubing, cyanosis  Vascular: 2+ peripheral pulses  Neurological: A/O x 1-2, no focal deficits  Psychiatric: Normal mood, normal affect  Skin: No rashes      LABS:                        9.7    14.18 )-----------( 368      ( 12 Dec 2018 09:04 )             29.9     12-12    136  |  95<L>  |  7   ----------------------------<  110<H>  3.7   |  25  |  0.60    Ca    8.3<L>      12 Dec 2018 07:28  Phos  3.2     12-12  Mg     1.9     12-12    TPro  6.0  /  Alb  2.5<L>  /  TBili  0.3  /  DBili  x   /  AST  59<H>  /  ALT  41  /  AlkPhos  213<H>  12-12    PT/INR - ( 11 Dec 2018 09:04 )   PT: 17.0 sec;   INR: 1.50 ratio         PTT - ( 11 Dec 2018 09:04 )  PTT:30.3 sec      RADIOLOGY & ADDITIONAL TESTS:

## 2018-12-12 NOTE — CHART NOTE - NSCHARTNOTEFT_GEN_A_CORE
Nutrition Follow Up Note  Patient seen for: Follow up/ Consult for tube feeding    63M with acute/subacute pancreatitis with necrosis and fluid collection around the lesser sac, repeat CT (12/10) grossly stable  NJ tube placed yesterday, tube feeds started with goal of 20mL/h. Pt still confused.    Source: pt, medical records, RN, previous RD note    Diet : NPO with tube feed Jevity 1.2 total volume for 24 hours: 480 ml, goal rate of 20 ml x 24 hours    Patient reports: slight nausea and slight abdominal discomfort, last BM this morning. Tolerating tube feeds at goal rate.     Enteral /Parenteral Nutrition: Jevity 1.2 total volume of 480 mls provides 576 calories and 26 g of protein. At current body weight of 109 kg this provides .3 g protein/kg and 5 calories/kg. This order does not meet pts, estimated energy needs. Spoke with RN for recommendations for goal rate.    Daily weight: Weight in k (), Weight in k.8 ()  no current weight taken, recommend obtain current weight to better assess needs.    Pertinent Medications: MEDICATIONS  (STANDING):  ALBUTerol/ipratropium for Nebulization 3 milliLiter(s) Nebulizer every 6 hours  anastrozole 1 milliGRAM(s) Oral daily  clonazePAM Tablet 0.5 milliGRAM(s) Oral two times a day  dextrose 5% + sodium chloride 0.45% with potassium chloride 20 mEq/L 1000 milliLiter(s) (100 mL/Hr) IV Continuous <Continuous>  enoxaparin Injectable 40 milliGRAM(s) SubCutaneous daily  escitalopram 20 milliGRAM(s) Oral at bedtime  folic acid 1 milliGRAM(s) Oral daily  insulin lispro (HumaLOG) corrective regimen sliding scale   SubCutaneous every 6 hours  lisinopril 10 milliGRAM(s) Oral daily  metoprolol tartrate 25 milliGRAM(s) Oral two times a day  multivitamin 1 Tablet(s) Oral daily  potassium chloride  10 mEq/100 mL IVPB 10 milliEquivalent(s) IV Intermittent every 1 hour  QUEtiapine 100 milliGRAM(s) Oral at bedtime  thiamine 100 milliGRAM(s) Oral daily    MEDICATIONS  (PRN):  HYDROmorphone  Injectable 1 milliGRAM(s) IV Push every 4 hours PRN Severe Pain (7 - 10)  HYDROmorphone  Injectable 0.5 milliGRAM(s) IV Push every 4 hours PRN Moderate Pain (4 - 6)    Pertinent Labs:  @ 07:28: Na 136, BUN 7, Cr 0.60, <H>, K+ 3.7, Phos 3.2, Mg 1.9, Alk Phos 213<H>, ALT/SGPT 41, AST/SGOT 59<H>, HbA1c --    Finger Sticks:  POCT Blood Glucose.: 136 mg/dL ( @ 06:13)  POCT Blood Glucose.: 107 mg/dL ( @ 23:15)  POCT Blood Glucose.: 105 mg/dL ( @ 12:21)      Skin per nursing documentation: intact  Edema: +1 in b/l ankles; +2 in scrotum     Estimated Needs:   [ X] no change since previous assessment  [ ] recalculated:     Previous Nutrition Diagnosis: inadequate protein/energy intake  Nutrition Diagnosis is: ongoing, being addressed with EN      Recommend  1) Change diet order to Jevity 1.2 total volume for 24 hours: 1920 mls. Start at 30 ml/hr and increase as tolerated until goal rate of 80ml/hr x 24 hours is met.  Using current body weight of 109 kg this order provides 2304 calories (21 cals/kg)  and 106.5 g of protein (1g/kg) to better meet needs.    Monitoring and Evaluation:     Continue to monitor Nutritional intake, Tolerance to diet prescription, weights, labs, skin integrity    RD remains available upon request and will follow up per protocol Nutrition Follow Up Note  Patient seen for: Follow up/ Consult for tube feeding    63M with acute/subacute pancreatitis with necrosis and fluid collection around the lesser sac, repeat CT (12/10) grossly stable  NJ tube placed yesterday, tube feeds started with goal of 20mL/h. Pt still confused.    Source: pt, medical records, RN, previous RD note    Diet : NPO with tube feed Jevity 1.2 total volume for 24 hours: 480 ml, goal rate of 20 ml x 24 hours    Patient reports: slight nausea and slight abdominal discomfort, last BM this morning. Tolerating tube feeds at goal rate.     Enteral /Parenteral Nutrition: Jevity 1.2 total volume of 480 mls provides 576 calories and 26 g of protein. At current body weight of 109 kg this provides .3 g protein/kg and 5 calories/kg. This order does not meet pts, estimated energy needs. Spoke with PA for recommendations for goal rate, noted below.    Daily weight: Weight in k (), Weight in k.8 ()  no current weight taken, recommend obtain current weight to better assess needs.    Pertinent Medications: MEDICATIONS  (STANDING):  ALBUTerol/ipratropium for Nebulization 3 milliLiter(s) Nebulizer every 6 hours  anastrozole 1 milliGRAM(s) Oral daily  clonazePAM Tablet 0.5 milliGRAM(s) Oral two times a day  dextrose 5% + sodium chloride 0.45% with potassium chloride 20 mEq/L 1000 milliLiter(s) (100 mL/Hr) IV Continuous <Continuous>  enoxaparin Injectable 40 milliGRAM(s) SubCutaneous daily  escitalopram 20 milliGRAM(s) Oral at bedtime  folic acid 1 milliGRAM(s) Oral daily  insulin lispro (HumaLOG) corrective regimen sliding scale   SubCutaneous every 6 hours  lisinopril 10 milliGRAM(s) Oral daily  metoprolol tartrate 25 milliGRAM(s) Oral two times a day  multivitamin 1 Tablet(s) Oral daily  potassium chloride  10 mEq/100 mL IVPB 10 milliEquivalent(s) IV Intermittent every 1 hour  QUEtiapine 100 milliGRAM(s) Oral at bedtime  thiamine 100 milliGRAM(s) Oral daily    MEDICATIONS  (PRN):  HYDROmorphone  Injectable 1 milliGRAM(s) IV Push every 4 hours PRN Severe Pain (7 - 10)  HYDROmorphone  Injectable 0.5 milliGRAM(s) IV Push every 4 hours PRN Moderate Pain (4 - 6)    Pertinent Labs:  @ 07:28: Na 136, BUN 7, Cr 0.60, <H>, K+ 3.7, Phos 3.2, Mg 1.9, Alk Phos 213<H>, ALT/SGPT 41, AST/SGOT 59<H>, HbA1c --    Finger Sticks:  POCT Blood Glucose.: 136 mg/dL ( @ 06:13)  POCT Blood Glucose.: 107 mg/dL ( @ 23:15)  POCT Blood Glucose.: 105 mg/dL ( @ 12:21)      Skin per nursing documentation: intact  Edema: +1 in b/l ankles; +2 in scrotum     Estimated Needs:   [ X] no change since previous assessment  [ ] recalculated:     Previous Nutrition Diagnosis: inadequate protein/energy intake  Nutrition Diagnosis is: ongoing, being addressed with EN      Recommend  1) Change diet order to Jevity 1.2 total volume for 24 hours: 1920 mls. Start at 30 ml/hr and increase as tolerated until goal rate of 80ml/hr x 24 hours is met.  Using current body weight of 109 kg this order provides 2304 calories (21 cals/kg)  and 106.5 g of protein (1g/kg) to better meet needs.    Monitoring and Evaluation:     Continue to monitor Nutritional intake, Tolerance to diet prescription, weights, labs, skin integrity    RD remains available upon request and will follow up per protocol

## 2018-12-12 NOTE — PROGRESS NOTE ADULT - ASSESSMENT
63M transferred from [place] with acute/subacute pancreatitis with necrosis and fluid collection around the lesser sac, repeat CT (12/10) grossly stable    - Pain control  - NPO with tube feeds, Jevity @ 20  - Continue nightly bipap, monitor respiratory status  - Trend labs  - Insulin sliding scale  - DVT ppx: Lovenox  - decreased clonazepam, may have contributed to delirium   - PT/OOB 63M transferred from [place] with acute/subacute pancreatitis with necrosis and fluid collection around the lesser sac, repeat CT (12/10) grossly stable    - Pain control  - NPO with tube feeds, Jevity @ 20.  If stops tolerating tube feeds, hold tube feeds and obtain urgent abd xray  - Continue nightly bipap, monitor respiratory status  - Trend labs  - Insulin sliding scale  - DVT ppx: Lovenox  - decreased clonazepam, may have contributed to delirium   - PT/OOB 63M transferred from [place] with acute/subacute pancreatitis with necrosis and fluid collection around the lesser sac, repeat CT (12/10) grossly stable    - Pain control  - NPO with tube feeds, Jevity @ 20.  If stops tolerating tube feeds, hold tube feeds and obtain urgent abd xray  - FU nutrition recs, adjust tube feeds accordingly  - Continue nightly bipap, monitor respiratory status  - Trend labs  - Insulin sliding scale  - DVT ppx: Lovenox  - decreased clonazepam, may have contributed to delirium   - PT/OOB

## 2018-12-13 ENCOUNTER — TRANSCRIPTION ENCOUNTER (OUTPATIENT)
Age: 63
End: 2018-12-13

## 2018-12-13 LAB
ALBUMIN SERPL ELPH-MCNC: 2.5 G/DL — LOW (ref 3.3–5)
ALBUMIN SERPL ELPH-MCNC: 2.7 G/DL — LOW (ref 3.3–5)
ALP SERPL-CCNC: 208 U/L — HIGH (ref 40–120)
ALP SERPL-CCNC: 209 U/L — HIGH (ref 40–120)
ALT FLD-CCNC: 46 U/L — HIGH (ref 10–45)
ALT FLD-CCNC: 48 U/L — HIGH (ref 10–45)
AMYLASE P1 CFR SERPL: 129 U/L — HIGH (ref 25–125)
ANION GAP SERPL CALC-SCNC: 12 MMOL/L — SIGNIFICANT CHANGE UP (ref 5–17)
ANION GAP SERPL CALC-SCNC: 14 MMOL/L — SIGNIFICANT CHANGE UP (ref 5–17)
AST SERPL-CCNC: 54 U/L — HIGH (ref 10–40)
AST SERPL-CCNC: 62 U/L — HIGH (ref 10–40)
BILIRUB SERPL-MCNC: 0.3 MG/DL — SIGNIFICANT CHANGE UP (ref 0.2–1.2)
BILIRUB SERPL-MCNC: 0.4 MG/DL — SIGNIFICANT CHANGE UP (ref 0.2–1.2)
BLD GP AB SCN SERPL QL: NEGATIVE — SIGNIFICANT CHANGE UP
BUN SERPL-MCNC: 4 MG/DL — LOW (ref 7–23)
BUN SERPL-MCNC: 4 MG/DL — LOW (ref 7–23)
CALCIUM SERPL-MCNC: 8.3 MG/DL — LOW (ref 8.4–10.5)
CALCIUM SERPL-MCNC: 8.5 MG/DL — SIGNIFICANT CHANGE UP (ref 8.4–10.5)
CHLORIDE SERPL-SCNC: 97 MMOL/L — SIGNIFICANT CHANGE UP (ref 96–108)
CHLORIDE SERPL-SCNC: 99 MMOL/L — SIGNIFICANT CHANGE UP (ref 96–108)
CO2 SERPL-SCNC: 23 MMOL/L — SIGNIFICANT CHANGE UP (ref 22–31)
CO2 SERPL-SCNC: 26 MMOL/L — SIGNIFICANT CHANGE UP (ref 22–31)
CREAT SERPL-MCNC: 0.49 MG/DL — LOW (ref 0.5–1.3)
CREAT SERPL-MCNC: 0.54 MG/DL — SIGNIFICANT CHANGE UP (ref 0.5–1.3)
GAS PNL BLDV: SIGNIFICANT CHANGE UP
GLUCOSE BLDC GLUCOMTR-MCNC: 140 MG/DL — HIGH (ref 70–99)
GLUCOSE BLDC GLUCOMTR-MCNC: 148 MG/DL — HIGH (ref 70–99)
GLUCOSE BLDC GLUCOMTR-MCNC: 152 MG/DL — HIGH (ref 70–99)
GLUCOSE BLDC GLUCOMTR-MCNC: 154 MG/DL — HIGH (ref 70–99)
GLUCOSE SERPL-MCNC: 144 MG/DL — HIGH (ref 70–99)
GLUCOSE SERPL-MCNC: 160 MG/DL — HIGH (ref 70–99)
HCT VFR BLD CALC: 31.1 % — LOW (ref 39–50)
HCT VFR BLD CALC: 31.6 % — LOW (ref 39–50)
HGB BLD-MCNC: 10 G/DL — LOW (ref 13–17)
HGB BLD-MCNC: 10.8 G/DL — LOW (ref 13–17)
LIDOCAIN IGE QN: 477 U/L — HIGH (ref 7–60)
MAGNESIUM SERPL-MCNC: 1.8 MG/DL — SIGNIFICANT CHANGE UP (ref 1.6–2.6)
MCHC RBC-ENTMCNC: 32.2 GM/DL — SIGNIFICANT CHANGE UP (ref 32–36)
MCHC RBC-ENTMCNC: 32.6 PG — SIGNIFICANT CHANGE UP (ref 27–34)
MCHC RBC-ENTMCNC: 33.7 PG — SIGNIFICANT CHANGE UP (ref 27–34)
MCHC RBC-ENTMCNC: 34 GM/DL — SIGNIFICANT CHANGE UP (ref 32–36)
MCV RBC AUTO: 101.3 FL — HIGH (ref 80–100)
MCV RBC AUTO: 99.1 FL — SIGNIFICANT CHANGE UP (ref 80–100)
PHOSPHATE SERPL-MCNC: 3.3 MG/DL — SIGNIFICANT CHANGE UP (ref 2.5–4.5)
PLATELET # BLD AUTO: 453 K/UL — HIGH (ref 150–400)
PLATELET # BLD AUTO: 503 K/UL — HIGH (ref 150–400)
POTASSIUM SERPL-MCNC: 3.7 MMOL/L — SIGNIFICANT CHANGE UP (ref 3.5–5.3)
POTASSIUM SERPL-MCNC: 4.1 MMOL/L — SIGNIFICANT CHANGE UP (ref 3.5–5.3)
POTASSIUM SERPL-SCNC: 3.7 MMOL/L — SIGNIFICANT CHANGE UP (ref 3.5–5.3)
POTASSIUM SERPL-SCNC: 4.1 MMOL/L — SIGNIFICANT CHANGE UP (ref 3.5–5.3)
PROT SERPL-MCNC: 6.4 G/DL — SIGNIFICANT CHANGE UP (ref 6–8.3)
PROT SERPL-MCNC: 6.6 G/DL — SIGNIFICANT CHANGE UP (ref 6–8.3)
RBC # BLD: 3.07 M/UL — LOW (ref 4.2–5.8)
RBC # BLD: 3.19 M/UL — LOW (ref 4.2–5.8)
RBC # FLD: 12.4 % — SIGNIFICANT CHANGE UP (ref 10.3–14.5)
RBC # FLD: 13.7 % — SIGNIFICANT CHANGE UP (ref 10.3–14.5)
RH IG SCN BLD-IMP: POSITIVE — SIGNIFICANT CHANGE UP
SODIUM SERPL-SCNC: 135 MMOL/L — SIGNIFICANT CHANGE UP (ref 135–145)
SODIUM SERPL-SCNC: 136 MMOL/L — SIGNIFICANT CHANGE UP (ref 135–145)
WBC # BLD: 13.28 K/UL — HIGH (ref 3.8–10.5)
WBC # BLD: 14.2 K/UL — HIGH (ref 3.8–10.5)
WBC # FLD AUTO: 13.28 K/UL — HIGH (ref 3.8–10.5)
WBC # FLD AUTO: 14.2 K/UL — HIGH (ref 3.8–10.5)

## 2018-12-13 PROCEDURE — 99233 SBSQ HOSP IP/OBS HIGH 50: CPT

## 2018-12-13 PROCEDURE — 71045 X-RAY EXAM CHEST 1 VIEW: CPT | Mod: 26

## 2018-12-13 PROCEDURE — 74177 CT ABD & PELVIS W/CONTRAST: CPT | Mod: 26

## 2018-12-13 PROCEDURE — 74018 RADEX ABDOMEN 1 VIEW: CPT | Mod: 26

## 2018-12-13 RX ORDER — HYDROMORPHONE HYDROCHLORIDE 2 MG/ML
0.5 INJECTION INTRAMUSCULAR; INTRAVENOUS; SUBCUTANEOUS EVERY 4 HOURS
Qty: 0 | Refills: 0 | Status: DISCONTINUED | OUTPATIENT
Start: 2018-12-13 | End: 2018-12-15

## 2018-12-13 RX ORDER — HYDROMORPHONE HYDROCHLORIDE 2 MG/ML
1 INJECTION INTRAMUSCULAR; INTRAVENOUS; SUBCUTANEOUS EVERY 4 HOURS
Qty: 0 | Refills: 0 | Status: DISCONTINUED | OUTPATIENT
Start: 2018-12-13 | End: 2018-12-15

## 2018-12-13 RX ORDER — DEXTROSE MONOHYDRATE, SODIUM CHLORIDE, AND POTASSIUM CHLORIDE 50; .745; 4.5 G/1000ML; G/1000ML; G/1000ML
1000 INJECTION, SOLUTION INTRAVENOUS
Qty: 0 | Refills: 0 | Status: DISCONTINUED | OUTPATIENT
Start: 2018-12-13 | End: 2018-12-18

## 2018-12-13 RX ORDER — PANTOPRAZOLE SODIUM 20 MG/1
40 TABLET, DELAYED RELEASE ORAL
Qty: 0 | Refills: 0 | Status: DISCONTINUED | OUTPATIENT
Start: 2018-12-13 | End: 2018-12-18

## 2018-12-13 RX ORDER — LISINOPRIL 2.5 MG/1
20 TABLET ORAL DAILY
Qty: 0 | Refills: 0 | Status: DISCONTINUED | OUTPATIENT
Start: 2018-12-14 | End: 2018-12-19

## 2018-12-13 RX ORDER — DEXTROSE MONOHYDRATE, SODIUM CHLORIDE, AND POTASSIUM CHLORIDE 50; .745; 4.5 G/1000ML; G/1000ML; G/1000ML
1000 INJECTION, SOLUTION INTRAVENOUS
Qty: 0 | Refills: 0 | Status: DISCONTINUED | OUTPATIENT
Start: 2018-12-13 | End: 2018-12-13

## 2018-12-13 RX ADMIN — DEXTROSE MONOHYDRATE, SODIUM CHLORIDE, AND POTASSIUM CHLORIDE 75 MILLILITER(S): 50; .745; 4.5 INJECTION, SOLUTION INTRAVENOUS at 06:20

## 2018-12-13 RX ADMIN — HYDROMORPHONE HYDROCHLORIDE 0.5 MILLIGRAM(S): 2 INJECTION INTRAMUSCULAR; INTRAVENOUS; SUBCUTANEOUS at 06:52

## 2018-12-13 RX ADMIN — Medication 25 MILLIGRAM(S): at 17:57

## 2018-12-13 RX ADMIN — Medication 3 MILLILITER(S): at 17:57

## 2018-12-13 RX ADMIN — Medication 2: at 06:19

## 2018-12-13 RX ADMIN — Medication 1 MILLIGRAM(S): at 13:00

## 2018-12-13 RX ADMIN — Medication 1 TABLET(S): at 13:00

## 2018-12-13 RX ADMIN — Medication 0.5 MILLIGRAM(S): at 11:00

## 2018-12-13 RX ADMIN — DEXTROSE MONOHYDRATE, SODIUM CHLORIDE, AND POTASSIUM CHLORIDE 75 MILLILITER(S): 50; .745; 4.5 INJECTION, SOLUTION INTRAVENOUS at 18:00

## 2018-12-13 RX ADMIN — LISINOPRIL 10 MILLIGRAM(S): 2.5 TABLET ORAL at 06:59

## 2018-12-13 RX ADMIN — ENOXAPARIN SODIUM 40 MILLIGRAM(S): 100 INJECTION SUBCUTANEOUS at 13:00

## 2018-12-13 RX ADMIN — Medication 3 MILLILITER(S): at 05:43

## 2018-12-13 RX ADMIN — Medication 25 MILLIGRAM(S): at 06:59

## 2018-12-13 RX ADMIN — ANASTROZOLE 1 MILLIGRAM(S): 1 TABLET ORAL at 13:00

## 2018-12-13 RX ADMIN — Medication 3 MILLILITER(S): at 13:01

## 2018-12-13 RX ADMIN — Medication 3 MILLILITER(S): at 00:56

## 2018-12-13 RX ADMIN — HYDROMORPHONE HYDROCHLORIDE 0.5 MILLIGRAM(S): 2 INJECTION INTRAMUSCULAR; INTRAVENOUS; SUBCUTANEOUS at 07:28

## 2018-12-13 RX ADMIN — Medication 100 MILLIGRAM(S): at 13:02

## 2018-12-13 RX ADMIN — Medication 2: at 18:35

## 2018-12-13 NOTE — DISCHARGE NOTE ADULT - MEDICATION SUMMARY - MEDICATIONS TO STOP TAKING
I will STOP taking the medications listed below when I get home from the hospital:  None I will STOP taking the medications listed below when I get home from the hospital:    quinapril 40 mg oral tablet  -- 1 milligram(s) by mouth 2 times a day

## 2018-12-13 NOTE — DISCHARGE NOTE ADULT - PATIENT PORTAL LINK FT
You can access the [email or web address] Patient Portal, offered by [facility], by registering with the following website: [email or web address]

## 2018-12-13 NOTE — PROGRESS NOTE ADULT - SUBJECTIVE AND OBJECTIVE BOX
Trauma Surgery Progress Note    Subjective:   Pt seen & examined at bedside, more oriented today as patient knows that he is in the hospital.  Receiving NJ tube feeds, bipap overnight.  No current complaints, pain well controlled.   Pt pulling on bipap/NJ overnight, need repeat xray to confirm NJ placement.     Medications:  enoxaparin Injectable 40  lisinopril 10  metoprolol tartrate 25      Objective:  Vital Signs Last 24 Hrs  T(C): 37.1 (13 Dec 2018 06:40), Max: 37.6 (13 Dec 2018 00:38)  T(F): 98.7 (13 Dec 2018 06:40), Max: 99.6 (13 Dec 2018 00:38)  HR: 103 (13 Dec 2018 06:40) (101 - 111)  BP: 167/74 (13 Dec 2018 06:40) (112/65 - 167/74)  BP(mean): --  RR: 18 (13 Dec 2018 06:40) (18 - 18)  SpO2: 92% (13 Dec 2018 06:40) (92% - 96%)    I&O's Summary    12 Dec 2018 07:01  -  13 Dec 2018 07:00  --------------------------------------------------------  IN: 2490 mL / OUT: 0 mL / NET: 2490 mL        Physical Exam:  Gen: NAD, resting comfortably in bed.  Alert and oriented x2.  Resp: No increased WOB on 3L NC  Abd: mildly distended, soft, compressible, moderately tender to palpation over epigastrum.  Extr: WWP distally, cap refill <2s    LABS:                        10.0   13.28 )-----------( 453      ( 13 Dec 2018 08:13 )             31.1     12-13    135  |  97  |  4<L>  ----------------------------<  144<H>  3.7   |  26  |  0.54    Ca    8.5      13 Dec 2018 07:12  Phos  3.3     12-13  Mg     1.8     12-13    TPro  6.4  /  Alb  2.5<L>  /  TBili  0.4  /  DBili  x   /  AST  62<H>  /  ALT  48<H>  /  AlkPhos  209<H>  12-13      Imaging:    J tube correctly placed on CXR

## 2018-12-13 NOTE — DISCHARGE NOTE ADULT - CARE PROVIDERS DIRECT ADDRESSES
[email or web address]

## 2018-12-13 NOTE — PROGRESS NOTE ADULT - ASSESSMENT
63M transferred from [place] with acute/subacute pancreatitis with necrosis and fluid collection around the lesser sac, repeat CT (12/10) grossly stable.  NJ tube placed, tube feeds currently at 40.    - Advance NJ tube feeds by 20 every 6 hours, pause if he ceases to tolerate  - Pt can continue tube feeds while undergoing bipap as this is an NJ tube  - Pain control  - FU nutrition recs  - Continue nightly bipap, monitor respiratory status  - Trend labs: WBC count downtrending, if continues to downtrend may allow to eat in the next few days  - Insulin sliding scale  - DVT ppx: Lovenox  - PT/OOB

## 2018-12-13 NOTE — DISCHARGE NOTE ADULT - NS AS ACTIVITY OBS
Walking-Outdoors allowed/DO NOT DRIVE IF TAKING NARCOTIC PAIN MEDICATION/Walking-Indoors allowed/Showering allowed/No Heavy lifting/straining

## 2018-12-13 NOTE — PROGRESS NOTE ADULT - SUBJECTIVE AND OBJECTIVE BOX
Interval Events: Pt remains confused, no complaints.  Tolerating feeds via NJ tube.     MEDICATIONS  (STANDING):  ALBUTerol/ipratropium for Nebulization 3 milliLiter(s) Nebulizer every 6 hours  anastrozole 1 milliGRAM(s) Oral daily  clonazePAM Tablet 0.5 milliGRAM(s) Oral two times a day  dextrose 5% + sodium chloride 0.45% with potassium chloride 20 mEq/L 1000 milliLiter(s) (75 mL/Hr) IV Continuous <Continuous>  enoxaparin Injectable 40 milliGRAM(s) SubCutaneous daily  escitalopram 20 milliGRAM(s) Oral at bedtime  folic acid 1 milliGRAM(s) Oral daily  insulin lispro (HumaLOG) corrective regimen sliding scale   SubCutaneous every 6 hours  lisinopril 10 milliGRAM(s) Oral daily  metoprolol tartrate 25 milliGRAM(s) Oral two times a day  multivitamin 1 Tablet(s) Oral daily  QUEtiapine 100 milliGRAM(s) Oral at bedtime  thiamine 100 milliGRAM(s) Oral daily    MEDICATIONS  (PRN):  HYDROmorphone  Injectable 1 milliGRAM(s) IV Push every 4 hours PRN Severe Pain (7 - 10)  HYDROmorphone  Injectable 0.5 milliGRAM(s) IV Push every 4 hours PRN Moderate Pain (4 - 6)      Allergies    No Known Allergies    Intolerances        Review of Systems:    General:  No wt loss, fevers, chills, night sweats,fatigue,   Eyes:  Good vision, no reported pain  ENT:  No sore throat, pain, runny nose, dysphagia  CV:  No pain, palpitations, hypo/hypertension  Resp:  No dyspnea, cough, tachypnea, wheezing  GI:  No pain, No nausea, No vomiting, No diarrhea, No constipation, No weight loss, No fever, No pruritis, No rectal bleeding, No melena, No dysphagia  :  No pain, bleeding, incontinence, nocturia  Muscle:  No pain, weakness  Neuro:  No weakness, tingling, memory problems  Psych:  No fatigue, insomnia, mood problems, depression  Endocrine:  No polyuria, polydypsia, cold/heat intolerance  Heme:  No petechiae, ecchymosis, easy bruisability  Skin:  No rash, tattoos, scars, edema      Vital Signs Last 24 Hrs  T(C): 37 (13 Dec 2018 11:08), Max: 37.6 (13 Dec 2018 00:38)  T(F): 98.6 (13 Dec 2018 11:08), Max: 99.6 (13 Dec 2018 00:38)  HR: 98 (13 Dec 2018 11:08) (98 - 111)  BP: 158/78 (13 Dec 2018 11:08) (112/65 - 167/74)  BP(mean): --  RR: 18 (13 Dec 2018 11:08) (18 - 18)  SpO2: 94% (13 Dec 2018 11:08) (92% - 96%)    PHYSICAL EXAM:    Constitutional: NAD, well-developed  HEENT: EOMI, throat clear  Neck: No LAD, supple  Respiratory: CTA and P  Cardiovascular: S1 and S2, RRR, no M  Gastrointestinal: BS+, soft, NT/ND, neg HSM,  Extremities: No peripheral edema, neg clubing, cyanosis  Vascular: 2+ peripheral pulses  Neurological: A/O x 1-2, no focal deficits  Psychiatric: Normal mood, normal affect  Skin: No rashes    LABS:                        10.0   13.28 )-----------( 453      ( 13 Dec 2018 08:13 )             31.1     12-13    135  |  97  |  4<L>  ----------------------------<  144<H>  3.7   |  26  |  0.54    Ca    8.5      13 Dec 2018 07:12  Phos  3.3     12-13  Mg     1.8     12-13    TPro  6.4  /  Alb  2.5<L>  /  TBili  0.4  /  DBili  x   /  AST  62<H>  /  ALT  48<H>  /  AlkPhos  209<H>  12-13          RADIOLOGY & ADDITIONAL TESTS:

## 2018-12-13 NOTE — PROGRESS NOTE ADULT - SUBJECTIVE AND OBJECTIVE BOX
Patient is a 63y old  Male who presents with a chief complaint of acute pancreatitis (13 Dec 2018 09:35)  Subjective: No acute events overnight, patient tolerating tube feeds. This AM, he is still delirium but is orientated to self and place ('hospital'). No anxiety, n/v, no ha, no tactile disturbances or signs of alcohol withdrawal. Abd pain stable.    14 point ros otherwise negative.     VITAL SIGNS:  Vital Signs Last 24 Hrs  T(C): 37.1 (13 Dec 2018 06:40), Max: 37.6 (13 Dec 2018 00:38)  T(F): 98.7 (13 Dec 2018 06:40), Max: 99.6 (13 Dec 2018 00:38)  HR: 103 (13 Dec 2018 06:40) (101 - 111)  BP: 167/74 (13 Dec 2018 06:40) (112/65 - 167/74)  BP(mean): --  RR: 18 (13 Dec 2018 06:40) (18 - 18)  SpO2: 92% (13 Dec 2018 06:40) (92% - 96%)      PHYSICAL EXAM:     GENERAL: no acute distress  HEENT: PERRLA, EOMI, moist oropharynx, +NJ tube in place   RESPIRATORY: CTAB, no w/c   CARDIOVASCULAR: RRR, no murmurs, gallops, rubs  ABDOMINAL: moderately distended, +mild TTP, positive bowel sounds  EXTREMITIES: no clubbing, cyanosis, +trace pitting edema  NEUROLOGICAL: alert and oriented x 2, non-focal  SKIN: no rashes or lesions   MUSCULOSKELETAL: no gross joint deformity                          10.0   13.28 )-----------( 453      ( 13 Dec 2018 08:13 )             31.1     12-13    135  |  97  |  4<L>  ----------------------------<  144<H>  3.7   |  26  |  0.54    Ca    8.5      13 Dec 2018 07:12  Phos  3.3     12-13  Mg     1.8     12-13    TPro  6.4  /  Alb  2.5<L>  /  TBili  0.4  /  DBili  x   /  AST  62<H>  /  ALT  48<H>  /  AlkPhos  209<H>  12-13      CAPILLARY BLOOD GLUCOSE      POCT Blood Glucose.: 152 mg/dL (13 Dec 2018 06:00)  POCT Blood Glucose.: 148 mg/dL (13 Dec 2018 00:41)  POCT Blood Glucose.: 139 mg/dL (12 Dec 2018 17:30)  POCT Blood Glucose.: 146 mg/dL (12 Dec 2018 11:42)      MEDICATIONS  (STANDING):  ALBUTerol/ipratropium for Nebulization 3 milliLiter(s) Nebulizer every 6 hours  anastrozole 1 milliGRAM(s) Oral daily  clonazePAM Tablet 0.5 milliGRAM(s) Oral two times a day  dextrose 5% + sodium chloride 0.45% with potassium chloride 20 mEq/L 1000 milliLiter(s) (75 mL/Hr) IV Continuous <Continuous>  enoxaparin Injectable 40 milliGRAM(s) SubCutaneous daily  escitalopram 20 milliGRAM(s) Oral at bedtime  folic acid 1 milliGRAM(s) Oral daily  insulin lispro (HumaLOG) corrective regimen sliding scale   SubCutaneous every 6 hours  lisinopril 10 milliGRAM(s) Oral daily  metoprolol tartrate 25 milliGRAM(s) Oral two times a day  multivitamin 1 Tablet(s) Oral daily  QUEtiapine 100 milliGRAM(s) Oral at bedtime  thiamine 100 milliGRAM(s) Oral daily    MEDICATIONS  (PRN):  HYDROmorphone  Injectable 1 milliGRAM(s) IV Push every 4 hours PRN Severe Pain (7 - 10)  HYDROmorphone  Injectable 0.5 milliGRAM(s) IV Push every 4 hours PRN Moderate Pain (4 - 6)

## 2018-12-13 NOTE — PROVIDER CONTACT NOTE (OTHER) - ACTION/TREATMENT ORDERED:
MD to bedside, assessed pt. pending results. Will continue to monitor. Pt resting comfortably in bed. call bell in reach

## 2018-12-13 NOTE — PROGRESS NOTE ADULT - PROBLEM SELECTOR PLAN 2
Likely hospital delirium in the setting of prolonged hospital stay with critical illness, with possible component of benzodiazepine effect  - would not abruptly stop benzos- will decrease to klonopin 0.5mg po daily today  - conservative measures to decrease delirium: PT eval and ambulation, OOB, frequent reorientation, minimize sleep disturbances at night, taper off any deleriogenic medications such as benzos  - continue seroquel 100mg po qhs for now- if persists, would consider stopping this as well  - if worsens, would obtain ABG as patient with ROSS and may have hypoventilation syndrome, would consider head CT as well.

## 2018-12-13 NOTE — PROGRESS NOTE ADULT - PROBLEM SELECTOR PLAN 10
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

## 2018-12-13 NOTE — PROVIDER CONTACT NOTE (OTHER) - ACTION/TREATMENT ORDERED:
MD transporting pt. to CT. Pending blood results. awaiting further instruction. Pt. safety maintained. Pt. stable at present time.

## 2018-12-13 NOTE — DISCHARGE NOTE ADULT - ADDITIONAL INSTRUCTIONS
Please follow up with Dr. Painter in the office in 2 weeks. You may call [phone] to make an appointment. Please follow up with Dr. Painter in the office in 2 weeks. You may call [phone] to make an appointment.  Please follow up with Dr. Levi in the office in 2 weeks. You may call [phone] to make an appointment.

## 2018-12-13 NOTE — DISCHARGE NOTE ADULT - MEDICATION SUMMARY - MEDICATIONS TO TAKE
I will START or STAY ON the medications listed below when I get home from the hospital:    acetaminophen 325 mg oral tablet  -- 2 tab(s) by mouth every 6 hours, As needed, Mild Pain (1 - 3)  -- Indication: For Pain control    oxyCODONE 5 mg oral tablet  -- 1 tab(s) by mouth every 6 hours, As Needed - for severe pain MDD:4 tabs  -- Indication: For Pain control    quinapril 40 mg oral tablet  -- 1 milligram(s) by mouth 2 times a day  -- Indication: For home med    KlonoPIN 1 mg oral tablet  -- 1 milligram(s) by mouth 2 times a day  -- Indication: For home med    Lexapro 10 mg oral tablet  -- 1 milligram(s) by mouth once a day  -- Indication: For home med    Lexapro 20 mg oral tablet  -- 1 tab(s) by mouth once a day (at bedtime)  -- Indication: For home med    Lipitor 20 mg oral tablet  -- 1 tab(s) by mouth once a day  -- Indication: For home med    anastrozole 1 mg oral tablet  -- 1 tab(s) by mouth once a day  -- Indication: For home med    SEROquel 100 mg oral tablet  -- 1 milligram(s) by mouth once a day  -- Indication: For home med    amLODIPine 10 mg oral tablet  -- 1 tab(s) by mouth once a day  -- Indication: For home med    pantoprazole 40 mg oral delayed release tablet  -- 1 tab(s) by mouth once a day (before a meal)  -- Indication: For home med    Multiple Vitamins oral tablet  -- 1 tab(s) by mouth once a day  -- Indication: For home med    folic acid 1 mg oral tablet  -- 1 tab(s) by mouth once a day  -- Indication: For home med    thiamine 100 mg oral tablet  -- 1 tab(s) by mouth once a day  -- Indication: For home med I will START or STAY ON the medications listed below when I get home from the hospital:    acetaminophen 325 mg oral tablet  -- 2 tab(s) by mouth every 6 hours, As needed, Mild Pain (1 - 3)  -- Indication: For Pain control    oxyCODONE 5 mg oral tablet  -- 1 tab(s) by mouth every 6 hours, As Needed - for severe pain MDD:4 tabs  -- Indication: For Pain control    quinapril 40 mg oral tablet  -- 1 milligram(s) by mouth 2 times a day  -- Indication: For home med    KlonoPIN 1 mg oral tablet  -- 1 milligram(s) by mouth 2 times a day  -- Indication: For home med    Lexapro 10 mg oral tablet  -- 1 milligram(s) by mouth once a day  -- Indication: For home med    Lexapro 20 mg oral tablet  -- 1 tab(s) by mouth once a day (at bedtime)  -- Indication: For home med    Lipitor 20 mg oral tablet  -- 1 tab(s) by mouth once a day  -- Indication: For home med    anastrozole 1 mg oral tablet  -- 1 tab(s) by mouth 2 times a week   -- Indication: For home med    SEROquel 100 mg oral tablet  -- 1 milligram(s) by mouth once a day  -- Indication: For home med    amLODIPine 10 mg oral tablet  -- 1 tab(s) by mouth once a day  -- Indication: For home med    pantoprazole 40 mg oral delayed release tablet  -- 1 tab(s) by mouth once a day (before a meal)  -- Indication: For home med    Multiple Vitamins oral tablet  -- 1 tab(s) by mouth once a day  -- Indication: For home med    folic acid 1 mg oral tablet  -- 1 tab(s) by mouth once a day  -- Indication: For home med    thiamine 100 mg oral tablet  -- 1 tab(s) by mouth once a day  -- Indication: For home med I will START or STAY ON the medications listed below when I get home from the hospital:    acetaminophen 325 mg oral tablet  -- 2 tab(s) by mouth every 6 hours, As needed, Mild Pain (1 - 3)  -- Indication: For Pain control    oxyCODONE 5 mg oral tablet  -- 1 tab(s) by mouth every 6 hours, As Needed - for severe pain MDD:4 tabs  -- Indication: For Pain control    KlonoPIN 1 mg oral tablet  -- 1 milligram(s) by mouth 2 times a day  -- Indication: For home med    Lexapro  -- 30 milligram(s) by mouth once a day (at bedtime)  -- Indication: For Anxiety    Lipitor 20 mg oral tablet  -- 1 tab(s) by mouth once a day  -- Indication: For home med    anastrozole 1 mg oral tablet  -- 1 tab(s) by mouth 2 times a week   -- Indication: For home med    SEROquel 100 mg oral tablet  -- 1 milligram(s) by mouth once a day  -- Indication: For home med    metoprolol tartrate 25 mg oral tablet  -- 1 tab(s) by mouth 2 times a day  -- Indication: For high blood pressure    amLODIPine 10 mg oral tablet  -- 1 tab(s) by mouth once a day  -- Indication: For home med    pantoprazole 40 mg oral delayed release tablet  -- 1 tab(s) by mouth once a day (before a meal)  -- Indication: For home med    Multiple Vitamins oral tablet  -- 1 tab(s) by mouth once a day  -- Indication: For home med    folic acid 1 mg oral tablet  -- 1 tab(s) by mouth once a day  -- Indication: For home med    thiamine 100 mg oral tablet  -- 1 tab(s) by mouth once a day  -- Indication: For home med

## 2018-12-13 NOTE — CHART NOTE - NSCHARTNOTEFT_GEN_A_CORE
Patient with 2 large bloody bowel movements with clots ~20 minutes apart. Denies abdominal pain. Minimal diffuse tenderness. No masses palpated on rectal exam. Tachycardic to 110. BP stable. +UOP. Stat labs sent. CTA a/p ordered. IVF increased to 125cc/hr. Patient made NPO. Patient also evaluated Sonja Muller, PGY2 and Cathleen Barth, PGY5. Discussed with Bank. Patient with 2 large bloody bowel movements with clots ~20 minutes apart. Denies abdominal pain. Minimal diffuse tenderness. Solid organ palpated LUQ. No masses palpated on rectal exam. Tachycardic to 110. BP stable. +UOP. Stat labs sent. CTA a/p ordered. IVF increased to 125cc/hr. Patient made NPO. Patient also evaluated by Sonja Muller, PGY2 and Cathleen Barth, PGY5. Discussed with Dr. Neely. Patient with 2 large bloody bowel movements with clots ~20 minutes apart. Denies abdominal pain. Minimal diffuse tenderness. Solid organ palpated LUQ. No masses palpated on rectal exam. Tachycardic to 110. BP stable. +UOP. Stat labs sent. CTA a/p ordered. IVF increased to 125cc/hr. Patient made NPO. Patient also evaluated by Sonja Muller, PGY2 and Cathleen Barth, PGY5. Discussed with Dr. Neely.    ADDENDUM 2305:  CTA reviewed with radiology, preliminary read negative for active bleed. Labs pending, drawn prior to CT scan. Will ask for repeat set of vitals, follow up final read of CT scan. Patient with 2 large bloody bowel movements with clots ~20 minutes apart. Denies abdominal pain. Minimal diffuse tenderness. Solid organ palpated LUQ, stable. No masses palpated on rectal exam. Tachycardic to 110. BP stable. +UOP. Stat labs sent. CTA a/p ordered. IVF increased to 125cc/hr. Patient made NPO. Patient also evaluated by Sonja Muller, PGY2 and Cathleen Barth, PGY5. Discussed with Dr. Neely.    ÓSCAR Dubon pager [phone]    ADDENDUM 2300:  CTA reviewed with radiology, preliminary read negative for active bleed. Labs pending, drawn prior to CT scan. Will ask for repeat set of vitals, follow up final read of CT scan.    Viola Muller PGY-2  Surgery Pager [phone] Patient with 2 large bloody bowel movements with clots ~20 minutes apart. Denies abdominal pain. Minimal diffuse tenderness. Solid organ palpated LUQ, stable. No masses palpated on rectal exam. Tachycardic to 110. BP stable. +UOP. Stat labs sent. CTA a/p ordered. IVF increased to 125cc/hr. Patient made NPO. Patient also evaluated by Sonja Muller, PGY2 and Cathleen Barth, PGY5. Discussed with Dr. Neely.    ÓSCAR Dubon pager [phone]    ADDENDUM 7456:  CTA reviewed with radiology, preliminary read negative for active bleed. Labs pending, drawn prior to CT scan. Will ask for repeat set of vitals, follow up final read of CT scan.  Viola Muller PGY-2  Surgery Pager [phone]    ADDENDUM 3455:   H/H stable at 10.8/31.6. Will continue to monitor overnight  ÓSCAR Dubon pager [phone]    ADDENDUM 0330:   Blood pressure 95/62. HR 93. +UOP. No additional bloody BMs. Will repeat CBC and give 1L bolus NS  ÓSCAR Dubon pager [phone] Patient with 2 large bloody bowel movements with clots ~20 minutes apart. Denies abdominal pain. Minimal diffuse tenderness. Solid organ palpated LUQ, stable. No masses palpated on rectal exam. Tachycardic to 110. BP stable. +UOP. Stat labs sent. CTA a/p ordered. IVF increased to 125cc/hr. Patient made NPO. Patient also evaluated by Sonja Muller, PGY2 and Cathleen Barth, PGY5. Discussed with Dr. Neely.    ÓSCAR Dubon pager [phone]    ADDENDUM 5403:  CTA reviewed with radiology, preliminary read negative for active bleed. Labs pending, drawn prior to CT scan. Will ask for repeat set of vitals, follow up final read of CT scan.  Viola Muller PGY-2  Surgery Pager [phone]    ADDENDUM 0265:   H/H stable at 10.8/31.6. Will continue to monitor overnight  ÓSCAR Dubon pager [phone]    ADDENDUM 0330:   Blood pressure 95/62. HR 93. +UOP. No additional bloody BMs. Will repeat CBC and give 1L bolus NS  ÓSCAR Dubon pager [phone]    ADDENDUM 0345:  Rechecked BP. 170 systolic. Will hold off on bolus. Sending morning labs now including CBC. Patient states mild diffuse abdominal pain. My exam is unchanged. Minimal diffuse abdominal tenderness. Solid organ palpated LUQ, stable.  ÓSCAR Dubon pager [phone]

## 2018-12-13 NOTE — PROVIDER CONTACT NOTE (OTHER) - ASSESSMENT
Pt. had bright red bloody BM with clots. /77, , no complaints of pain, no SOB. Abd hard to palpation in the LUQ.

## 2018-12-13 NOTE — PROGRESS NOTE ADULT - ASSESSMENT
63y Male PMHX of alcohol induced pancreatitis earlier this year, alcohol abuse (1/3 quart of vodka daily), DM, HLD, HTN, Depression, ROSS on CPAP who presents as a transfer from [facility] for management of acute pancreatitis complicated by necrosis and enlarging abdominal fluid collections, as well as persistent delirium.

## 2018-12-13 NOTE — DISCHARGE NOTE ADULT - PLAN OF CARE
Pain controlled, tolerating low fat diet If you develop worsening abdominal pain, nausea/vomiting, fevers/chills please return to the ER for further evaluation. Your lisinopril was held because of an increase in creatinine, please do not resume this medication until outpatient follow up

## 2018-12-13 NOTE — DISCHARGE NOTE ADULT - CARE PROVIDER_API CALL
Nadeem Painter), Surgery; Surgical Critical Care  [address]  [address] [address]  Phone: [phone]  Fax: [phone] Nadeem Painter), Surgery; Surgical Critical Care  [address]  [address]  [place] [address]  Phone: [phone]  Fax: [phone]    Devin Levi (DO), Gastroenterology; Internal Medicine  [address] [place] [address]  Phone: [phone]  Fax: [phone]

## 2018-12-13 NOTE — DISCHARGE NOTE ADULT - INSTRUCTIONS
Low fat diet Low fat diet.  Your lisinopril was held because of an increase in creatinine, please do not resume this medication until outpatient follow up ACTIVITY: No heavy lifting anything more than 10-15lbs or straining. Otherwise, you may return to your usual level of physical activity. If you are taking narcotic pain medication (such as Percocet), do NOT drive a car, operate machinery or make important decisions.  NOTIFY YOUR SURGEON IF: any fever (over 100.4 F) or chills, persistent nausea/vomiting with inability to tolerate food or liquids, persistent diarrhea, or if your pain is not controlled on your discharge pain medications.  FOLLOW-UP:  1. Please call to make a follow-up appointment within one week of discharge   2. Please follow up with your primary care physician in one week regarding your hospitalization.       DIET: Low fat diet.  OTHER: Your lisinopril was held because of an increase in creatinine, please do not resume this medication until outpatient follow up.

## 2018-12-13 NOTE — DISCHARGE NOTE ADULT - HOSPITAL COURSE
Mr. Cochran is a 63y Male PMHX of alcohol induced pancreatitis earlier this year, alcohol abuse (1/3 quart of vodka daily), DM, HLD, HTN, Depression, ROSS on CPAP who presents as a transfer from [facility] for acute pancreatitis, which was initially complicated by HERIBERTO, ileus, and hypoxia secondary to pulmonary edema. Pt also experienced alcohol withdrawal, which was treated with phenobarbital. After feeling better and tolerating diuresis on Monday and Tuesday with improved pulmonary status, pt then developed fever and leukocytosis, with mild tachycardia. CT showed evolving fluid collections and known pancreatic tail necrosis. Patient transferred to [facility] SICU for further management. In SICU, pt was monitored and advanced to sips/chips. GI was consulted and recommended continued IV antibiotics, trending labs, and enteral feeding. He was deemed stable for transfer to floors. On the floors, pt continued to be persistently tachycardic to the low 100s, distended, mildly tender in the epigastrium, and with fluctuating levels of orientation. On HD5, a nasojejunal feeding tube was placed endoscopically by GI to provide the patient with nutrition while he remained NPO for his pancreatitis. His WBC initially trended upwards to a peak of 23 on HD3, after which it trended downwards to 13.3 by HD7. Mr. Cochran is a 63y Male PMHX of alcohol induced pancreatitis earlier this year, alcohol abuse (1/3 quart of vodka daily), DM, HLD, HTN, Depression, ROSS on CPAP who presents as a transfer from [facility] for acute pancreatitis, which was initially complicated by HERIBERTO, ileus, and hypoxia secondary to pulmonary edema. Pt also experienced alcohol withdrawal, which was treated with phenobarbital. After feeling better and tolerating diuresis on Monday and Tuesday with improved pulmonary status, pt then developed fever and leukocytosis, with mild tachycardia. CT showed evolving fluid collections and known pancreatic tail necrosis. Patient transferred to [facility] SICU for further management. In SICU, pt was monitored and advanced to sips/chips. GI was consulted and recommended continued IV antibiotics, trending labs, and enteral feeding. He was deemed stable for transfer to floors. On the floors, pt continued to be persistently tachycardic to the low 100s, distended, mildly tender in the epigastrium, and with fluctuating levels of orientation. On HD5, a nasojejunal feeding tube was placed endoscopically by GI to provide the patient with nutrition while he remained NPO for his pancreatitis. His WBC initially trended upwards to a peak of 23 on HD3, after which it trended downwards to 13.3 by HD7. On HD8 patient developed BRBPR and undwerwent EGD showing normal upper third of esophagus, middle third of esophagus and lower third of esophagus. Chronic gastritis. Normal first part of the duodenum, 2nd part of the duodenum, 3rd part of the duodenum and 4th part of the duodenum. He also underwent emergent bleeding scan which did not reveal a source for the bleeding. A follow up CT scan of the A/P showed evolving fluid collections posterior to the stomach with unclear necrotic/infected burden. Mr. Cochran is a 63y Male PMHX of alcohol induced pancreatitis earlier this year, alcohol abuse (1/3 quart of vodka daily), DM, HLD, HTN, Depression, ROSS on CPAP who presents as a transfer from [facility] for acute pancreatitis, which was initially complicated by HERIBERTO, ileus, and hypoxia secondary to pulmonary edema. Pt also experienced alcohol withdrawal, which was treated with phenobarbital. After feeling better and tolerating diuresis on Monday and Tuesday with improved pulmonary status, pt then developed fever and leukocytosis, with mild tachycardia. CT showed evolving fluid collections and known pancreatic tail necrosis. Patient transferred to [facility] SICU for further management. In SICU, pt was monitored and advanced to sips/chips. GI was consulted and recommended continued IV antibiotics, trending labs, and enteral feeding. He was deemed stable for transfer to floors. On the floors, pt continued to be persistently tachycardic to the low 100s, distended, mildly tender in the epigastrium, and with fluctuating levels of orientation. On HD5, a nasojejunal feeding tube was placed endoscopically by GI to provide the patient with nutrition while he remained NPO for his pancreatitis. His WBC initially trended upwards to a peak of 23 on HD3, after which it trended downwards to 13.3 by HD7. On HD8 patient developed BRBPR and undwerwent EGD showing normal upper third of esophagus, middle third of esophagus and lower third of esophagus. Chronic gastritis. Normal first part of the duodenum, 2nd part of the duodenum, 3rd part of the duodenum and 4th part of the duodenum. He also underwent emergent bleeding scan which did not reveal a source for the bleeding. A follow up CT scan of the A/P showed evolving fluid collections posterior to the stomach with unclear necrotic/infected burden. BRBPR subsequently resolved and patient underwent colonoscopy which showed internal hemorrhoids and mucosal ulceration which was biopsied. The patient was seen by physical therapy who recommended rehab. However, the patient opted to be discharged with outpatient services due to high co-pay for rehab. The patient was pain was controlled and he was advanced to a low-fat diet and tolerated it well. The patients lipase level downtrended from 477 (max) to 314. The patient was hemodynamically stable and placed on home medications. The patient was told to follow up with Dr. Painter and Dr. Levi in 2 weeks and had no other issues. Mr. Cochran is a 63y Male PMHX of alcohol induced pancreatitis earlier this year, alcohol abuse (1/3 quart of vodka daily), DM, HLD, HTN, Depression, ROSS on CPAP who presents as a transfer from [facility] for acute pancreatitis, which was initially complicated by HERIBERTO, ileus, and hypoxia secondary to pulmonary edema. Pt also experienced alcohol withdrawal, which was treated with phenobarbital. After feeling better and tolerating diuresis on Monday and Tuesday with improved pulmonary status, pt then developed fever and leukocytosis, with mild tachycardia. CT showed evolving fluid collections and known pancreatic tail necrosis. Patient transferred to [facility] SICU for further management. In SICU, pt was monitored and advanced to sips/chips. GI was consulted and recommended continued IV antibiotics, trending labs, and enteral feeding. He was deemed stable for transfer to floors. On the floors, pt continued to be persistently tachycardic to the low 100s, distended, mildly tender in the epigastrium, and with fluctuating levels of orientation. On HD5, a nasojejunal feeding tube was placed endoscopically by GI to provide the patient with nutrition while he remained NPO for his pancreatitis. His WBC initially trended upwards to a peak of 23 on HD3, after which it trended downwards to 13.3 by HD7. On HD8 patient developed BRBPR and undwerwent EGD showing normal upper third of esophagus, middle third of esophagus and lower third of esophagus. Chronic gastritis. Normal first part of the duodenum, 2nd part of the duodenum, 3rd part of the duodenum and 4th part of the duodenum. He also underwent emergent bleeding scan which did not reveal a source for the bleeding. A follow up CT scan of the A/P showed evolving fluid collections posterior to the stomach with unclear necrotic/infected burden. BRBPR subsequently resolved and patient underwent colonoscopy which showed internal hemorrhoids and mucosal ulceration which was biopsied. The patient was seen by physical therapy who recommended rehab. However, the patient opted to be discharged with outpatient services due to high co-pay for rehab, as well as denial from insurance company.  The patient was pain was controlled and he was advanced to a low-fat diet and tolerated it well. The patients lipase level downtrended from 477 (max) to 314. The patient was hemodynamically stable and placed on home medications. The patient was told to follow up with Dr. Painter and Dr. Levi in 2 weeks and had no other issues. Mr. Cochran is a 63y Male PMHX of alcohol induced pancreatitis earlier this year, alcohol abuse (1/3 quart of vodka daily), DM, HLD, HTN, Depression, ROSS on CPAP who presents as a transfer from [facility] for acute pancreatitis, which was initially complicated by HERIBERTO, ileus, and hypoxia secondary to pulmonary edema. Pt also experienced alcohol withdrawal, which was treated with phenobarbital. After feeling better and tolerating diuresis on Monday and Tuesday with improved pulmonary status, pt then developed fever and leukocytosis, with mild tachycardia. CT showed evolving fluid collections and known pancreatic tail necrosis. Patient transferred to [facility] SICU for further management. In SICU, pt was monitored and advanced to sips/chips. GI was consulted and recommended continued IV antibiotics, trending labs, and enteral feeding. He was deemed stable for transfer to floors. On the floors, pt continued to be persistently tachycardic to the low 100s, distended, mildly tender in the epigastrium, and with fluctuating levels of orientation. On HD5, a nasojejunal feeding tube was placed endoscopically by GI to provide the patient with nutrition while he remained NPO for his pancreatitis. His WBC initially trended upwards to a peak of 23 on HD3, after which it trended downwards to 13.3 by HD7. On HD8 patient developed BRBPR and undwerwent EGD showing normal upper third of esophagus, middle third of esophagus and lower third of esophagus. Chronic gastritis. Normal first part of the duodenum, 2nd part of the duodenum, 3rd part of the duodenum and 4th part of the duodenum. He also underwent emergent bleeding scan which did not reveal a source for the bleeding. A follow up CT scan of the A/P showed evolving fluid collections posterior to the stomach with unclear necrotic/infected burden. BRBPR subsequently resolved and patient underwent colonoscopy which showed internal hemorrhoids and mucosal ulceration which was biopsied. On 12/23 patients creatinine was elevated to 2.44.  Urine lytes were sent indicating pre-renal HERIBERTO and patients lisinopril was held and was given hydration with improvement in creatinine (1.84-->1.35).  A renal US was obtained and showed no hydronephrosis.  The patient was seen by physical therapy who recommended rehab. However, the patient opted to be discharged with outpatient services due to high co-pay for rehab, as well as denial from insurance company. A [id number] was done with the insurance company and the patient was approved for rehab.  The patient was pain was controlled and he was advanced to a low-fat diet and tolerated it well. The patients lipase level downtrended from 477 (max) to 314. The patient was hemodynamically stable and placed on home medications. On day of discharge, the patient was tolerating diet, ambulating well and pain controlled.  The patient will be discharged to [facility] with outpatient follow up with Dr. Painter and Dr. Levi in 2 weeks and had no other issues.

## 2018-12-13 NOTE — DISCHARGE NOTE ADULT - CARE PLAN
Principal Discharge DX:	Alcohol-induced acute pancreatitis with uninfected necrosis  Goal:	Pain controlled, tolerating low fat diet  Assessment and plan of treatment:	If you develop worsening abdominal pain, nausea/vomiting, fevers/chills please return to the ER for further evaluation. Principal Discharge DX:	Alcohol-induced acute pancreatitis with uninfected necrosis  Goal:	Pain controlled, tolerating low fat diet  Assessment and plan of treatment:	If you develop worsening abdominal pain, nausea/vomiting, fevers/chills please return to the ER for further evaluation.  Secondary Diagnosis:	HERIBERTO (acute kidney injury)  Assessment and plan of treatment:	Your lisinopril was held because of an increase in creatinine, please do not resume this medication until outpatient follow up

## 2018-12-14 DIAGNOSIS — K62.5 HEMORRHAGE OF ANUS AND RECTUM: ICD-10-CM

## 2018-12-14 LAB
ALBUMIN SERPL ELPH-MCNC: 2.8 G/DL — LOW (ref 3.3–5)
ALP SERPL-CCNC: 189 U/L — HIGH (ref 40–120)
ALT FLD-CCNC: 42 U/L — SIGNIFICANT CHANGE UP (ref 10–45)
AMYLASE P1 CFR SERPL: 126 U/L — HIGH (ref 25–125)
ANION GAP SERPL CALC-SCNC: 11 MMOL/L — SIGNIFICANT CHANGE UP (ref 5–17)
APPEARANCE UR: CLEAR — SIGNIFICANT CHANGE UP
AST SERPL-CCNC: 42 U/L — HIGH (ref 10–40)
BACTERIA # UR AUTO: NEGATIVE — SIGNIFICANT CHANGE UP
BILIRUB SERPL-MCNC: 0.3 MG/DL — SIGNIFICANT CHANGE UP (ref 0.2–1.2)
BILIRUB UR-MCNC: NEGATIVE — SIGNIFICANT CHANGE UP
BUN SERPL-MCNC: <4 MG/DL — LOW (ref 7–23)
CALCIUM SERPL-MCNC: 8.5 MG/DL — SIGNIFICANT CHANGE UP (ref 8.4–10.5)
CHLORIDE SERPL-SCNC: 99 MMOL/L — SIGNIFICANT CHANGE UP (ref 96–108)
CO2 SERPL-SCNC: 25 MMOL/L — SIGNIFICANT CHANGE UP (ref 22–31)
COLOR SPEC: YELLOW — SIGNIFICANT CHANGE UP
CREAT SERPL-MCNC: 0.51 MG/DL — SIGNIFICANT CHANGE UP (ref 0.5–1.3)
DIFF PNL FLD: ABNORMAL
EPI CELLS # UR: 1 /HPF — SIGNIFICANT CHANGE UP
GLUCOSE BLDC GLUCOMTR-MCNC: 162 MG/DL — HIGH (ref 70–99)
GLUCOSE BLDC GLUCOMTR-MCNC: 164 MG/DL — HIGH (ref 70–99)
GLUCOSE BLDC GLUCOMTR-MCNC: 164 MG/DL — HIGH (ref 70–99)
GLUCOSE BLDC GLUCOMTR-MCNC: 180 MG/DL — HIGH (ref 70–99)
GLUCOSE BLDC GLUCOMTR-MCNC: 182 MG/DL — HIGH (ref 70–99)
GLUCOSE SERPL-MCNC: 166 MG/DL — HIGH (ref 70–99)
GLUCOSE UR QL: NEGATIVE — SIGNIFICANT CHANGE UP
HCT VFR BLD CALC: 29.2 % — LOW (ref 39–50)
HCT VFR BLD CALC: 29.7 % — LOW (ref 39–50)
HGB BLD-MCNC: 10.3 G/DL — LOW (ref 13–17)
HGB BLD-MCNC: 9.8 G/DL — LOW (ref 13–17)
HYALINE CASTS # UR AUTO: 1 /LPF — SIGNIFICANT CHANGE UP (ref 0–2)
KETONES UR-MCNC: NEGATIVE — SIGNIFICANT CHANGE UP
LEUKOCYTE ESTERASE UR-ACNC: NEGATIVE — SIGNIFICANT CHANGE UP
LIDOCAIN IGE QN: 455 U/L — HIGH (ref 7–60)
MAGNESIUM SERPL-MCNC: 1.8 MG/DL — SIGNIFICANT CHANGE UP (ref 1.6–2.6)
MCHC RBC-ENTMCNC: 33 PG — SIGNIFICANT CHANGE UP (ref 27–34)
MCHC RBC-ENTMCNC: 33.4 GM/DL — SIGNIFICANT CHANGE UP (ref 32–36)
MCHC RBC-ENTMCNC: 34.1 PG — HIGH (ref 27–34)
MCHC RBC-ENTMCNC: 34.6 GM/DL — SIGNIFICANT CHANGE UP (ref 32–36)
MCV RBC AUTO: 98.5 FL — SIGNIFICANT CHANGE UP (ref 80–100)
MCV RBC AUTO: 98.6 FL — SIGNIFICANT CHANGE UP (ref 80–100)
NITRITE UR-MCNC: NEGATIVE — SIGNIFICANT CHANGE UP
PH UR: 5.5 — SIGNIFICANT CHANGE UP (ref 5–8)
PHOSPHATE SERPL-MCNC: 3.2 MG/DL — SIGNIFICANT CHANGE UP (ref 2.5–4.5)
PLATELET # BLD AUTO: 501 K/UL — HIGH (ref 150–400)
PLATELET # BLD AUTO: 527 K/UL — HIGH (ref 150–400)
POTASSIUM SERPL-MCNC: 3.8 MMOL/L — SIGNIFICANT CHANGE UP (ref 3.5–5.3)
POTASSIUM SERPL-SCNC: 3.8 MMOL/L — SIGNIFICANT CHANGE UP (ref 3.5–5.3)
PROT SERPL-MCNC: 6.6 G/DL — SIGNIFICANT CHANGE UP (ref 6–8.3)
PROT UR-MCNC: ABNORMAL
RBC # BLD: 2.97 M/UL — LOW (ref 4.2–5.8)
RBC # BLD: 3.02 M/UL — LOW (ref 4.2–5.8)
RBC # FLD: 12.4 % — SIGNIFICANT CHANGE UP (ref 10.3–14.5)
RBC # FLD: 12.9 % — SIGNIFICANT CHANGE UP (ref 10.3–14.5)
RBC CASTS # UR COMP ASSIST: 15 /HPF — HIGH (ref 0–4)
SODIUM SERPL-SCNC: 135 MMOL/L — SIGNIFICANT CHANGE UP (ref 135–145)
SP GR SPEC: 1.02 — SIGNIFICANT CHANGE UP (ref 1.01–1.02)
UROBILINOGEN FLD QL: NEGATIVE — SIGNIFICANT CHANGE UP
WBC # BLD: 14.3 K/UL — HIGH (ref 3.8–10.5)
WBC # BLD: 16.6 K/UL — HIGH (ref 3.8–10.5)
WBC # FLD AUTO: 14.3 K/UL — HIGH (ref 3.8–10.5)
WBC # FLD AUTO: 16.6 K/UL — HIGH (ref 3.8–10.5)
WBC UR QL: 6 /HPF — HIGH (ref 0–5)

## 2018-12-14 PROCEDURE — 99233 SBSQ HOSP IP/OBS HIGH 50: CPT

## 2018-12-14 PROCEDURE — 71045 X-RAY EXAM CHEST 1 VIEW: CPT | Mod: 26

## 2018-12-14 PROCEDURE — 78278 ACUTE GI BLOOD LOSS IMAGING: CPT | Mod: 26

## 2018-12-14 PROCEDURE — 99232 SBSQ HOSP IP/OBS MODERATE 35: CPT

## 2018-12-14 RX ORDER — METOPROLOL TARTRATE 50 MG
25 TABLET ORAL
Qty: 0 | Refills: 0 | Status: DISCONTINUED | OUTPATIENT
Start: 2018-12-14 | End: 2018-12-23

## 2018-12-14 RX ORDER — THIAMINE MONONITRATE (VIT B1) 100 MG
100 TABLET ORAL DAILY
Qty: 0 | Refills: 0 | Status: DISCONTINUED | OUTPATIENT
Start: 2018-12-14 | End: 2018-12-24

## 2018-12-14 RX ORDER — SODIUM CHLORIDE 9 MG/ML
1000 INJECTION INTRAMUSCULAR; INTRAVENOUS; SUBCUTANEOUS ONCE
Qty: 0 | Refills: 0 | Status: COMPLETED | OUTPATIENT
Start: 2018-12-14 | End: 2018-12-14

## 2018-12-14 RX ORDER — POTASSIUM CHLORIDE 20 MEQ
10 PACKET (EA) ORAL
Qty: 0 | Refills: 0 | Status: COMPLETED | OUTPATIENT
Start: 2018-12-14 | End: 2018-12-14

## 2018-12-14 RX ORDER — ESCITALOPRAM OXALATE 10 MG/1
20 TABLET, FILM COATED ORAL AT BEDTIME
Qty: 0 | Refills: 0 | Status: DISCONTINUED | OUTPATIENT
Start: 2018-12-14 | End: 2018-12-24

## 2018-12-14 RX ORDER — MAGNESIUM SULFATE 500 MG/ML
2 VIAL (ML) INJECTION ONCE
Qty: 0 | Refills: 0 | Status: COMPLETED | OUTPATIENT
Start: 2018-12-14 | End: 2018-12-14

## 2018-12-14 RX ORDER — CLONAZEPAM 1 MG
0.5 TABLET ORAL
Qty: 0 | Refills: 0 | Status: DISCONTINUED | OUTPATIENT
Start: 2018-12-14 | End: 2018-12-17

## 2018-12-14 RX ORDER — FOLIC ACID 0.8 MG
1 TABLET ORAL DAILY
Qty: 0 | Refills: 0 | Status: DISCONTINUED | OUTPATIENT
Start: 2018-12-14 | End: 2018-12-24

## 2018-12-14 RX ORDER — QUETIAPINE FUMARATE 200 MG/1
100 TABLET, FILM COATED ORAL AT BEDTIME
Qty: 0 | Refills: 0 | Status: DISCONTINUED | OUTPATIENT
Start: 2018-12-14 | End: 2018-12-24

## 2018-12-14 RX ADMIN — Medication 3 MILLILITER(S): at 06:28

## 2018-12-14 RX ADMIN — Medication 2: at 00:56

## 2018-12-14 RX ADMIN — Medication 100 MILLIGRAM(S): at 17:30

## 2018-12-14 RX ADMIN — ANASTROZOLE 1 MILLIGRAM(S): 1 TABLET ORAL at 17:29

## 2018-12-14 RX ADMIN — DEXTROSE MONOHYDRATE, SODIUM CHLORIDE, AND POTASSIUM CHLORIDE 125 MILLILITER(S): 50; .745; 4.5 INJECTION, SOLUTION INTRAVENOUS at 17:59

## 2018-12-14 RX ADMIN — Medication 1 MILLIGRAM(S): at 17:30

## 2018-12-14 RX ADMIN — ESCITALOPRAM OXALATE 20 MILLIGRAM(S): 10 TABLET, FILM COATED ORAL at 22:15

## 2018-12-14 RX ADMIN — Medication 2: at 11:58

## 2018-12-14 RX ADMIN — Medication 3 MILLILITER(S): at 17:29

## 2018-12-14 RX ADMIN — Medication 0.5 MILLIGRAM(S): at 22:15

## 2018-12-14 RX ADMIN — Medication 1 TABLET(S): at 17:30

## 2018-12-14 RX ADMIN — Medication 0.5 MILLIGRAM(S): at 10:20

## 2018-12-14 RX ADMIN — Medication 100 MILLIEQUIVALENT(S): at 15:45

## 2018-12-14 RX ADMIN — Medication 25 MILLIGRAM(S): at 17:30

## 2018-12-14 RX ADMIN — Medication 100 MILLIEQUIVALENT(S): at 09:10

## 2018-12-14 RX ADMIN — SODIUM CHLORIDE 2000 MILLILITER(S): 9 INJECTION INTRAMUSCULAR; INTRAVENOUS; SUBCUTANEOUS at 03:38

## 2018-12-14 RX ADMIN — Medication 2: at 06:42

## 2018-12-14 RX ADMIN — Medication 3 MILLILITER(S): at 00:57

## 2018-12-14 RX ADMIN — HYDROMORPHONE HYDROCHLORIDE 0.5 MILLIGRAM(S): 2 INJECTION INTRAMUSCULAR; INTRAVENOUS; SUBCUTANEOUS at 20:12

## 2018-12-14 RX ADMIN — Medication 100 MILLIEQUIVALENT(S): at 18:00

## 2018-12-14 RX ADMIN — QUETIAPINE FUMARATE 100 MILLIGRAM(S): 200 TABLET, FILM COATED ORAL at 22:15

## 2018-12-14 RX ADMIN — Medication 50 GRAM(S): at 09:11

## 2018-12-14 RX ADMIN — HYDROMORPHONE HYDROCHLORIDE 0.5 MILLIGRAM(S): 2 INJECTION INTRAMUSCULAR; INTRAVENOUS; SUBCUTANEOUS at 19:35

## 2018-12-14 RX ADMIN — ENOXAPARIN SODIUM 40 MILLIGRAM(S): 100 INJECTION SUBCUTANEOUS at 17:29

## 2018-12-14 RX ADMIN — PANTOPRAZOLE SODIUM 40 MILLIGRAM(S): 20 TABLET, DELAYED RELEASE ORAL at 06:29

## 2018-12-14 RX ADMIN — DEXTROSE MONOHYDRATE, SODIUM CHLORIDE, AND POTASSIUM CHLORIDE 125 MILLILITER(S): 50; .745; 4.5 INJECTION, SOLUTION INTRAVENOUS at 06:21

## 2018-12-14 RX ADMIN — DEXTROSE MONOHYDRATE, SODIUM CHLORIDE, AND POTASSIUM CHLORIDE 125 MILLILITER(S): 50; .745; 4.5 INJECTION, SOLUTION INTRAVENOUS at 00:57

## 2018-12-14 RX ADMIN — PANTOPRAZOLE SODIUM 40 MILLIGRAM(S): 20 TABLET, DELAYED RELEASE ORAL at 17:29

## 2018-12-14 RX ADMIN — Medication 25 MILLIGRAM(S): at 06:28

## 2018-12-14 RX ADMIN — Medication 2: at 18:14

## 2018-12-14 RX ADMIN — LISINOPRIL 20 MILLIGRAM(S): 2.5 TABLET ORAL at 06:28

## 2018-12-14 NOTE — PROGRESS NOTE ADULT - PROBLEM SELECTOR PLAN 2
Likely hospital delirium in the setting of prolonged hospital stay with critical illness, with possible component of benzodiazepine effect  - may continue klonopin 0.5mg po daily PRN anxiety  - conservative measures to decrease delirium: PT eval and ambulation, OOB, frequent reorientation, minimize sleep disturbances at night, continue to taper off any deleriogenic medications such as benzos  - continue seroquel 100mg po qhs for now- if persists, would consider stopping this as well  - if worsens, would obtain ABG as patient with ROSS and may have hypoventilation syndrome, would consider head CT as well.

## 2018-12-14 NOTE — PROVIDER CONTACT NOTE (OTHER) - ACTION/TREATMENT ORDERED:
CBC sent prior to episode. MDs aware. No signs of distress at present time. will continue to monitor. Safety maintained

## 2018-12-14 NOTE — PROGRESS NOTE ADULT - PROBLEM SELECTOR PLAN 10
- ppx: lovenox  - diet: tube feeds with possible PO challenge if WBC continues to improve  - dispo: pending clinical progress    Problem 11: Low testosterone- likely 2/2 alcohol abuse  - pt has been prescribed anastrozole 1mg po daily, will continue for now  - continue OP follow up for repeat T testing

## 2018-12-14 NOTE — PROGRESS NOTE ADULT - ASSESSMENT
63y Male PMHX of alcohol induced pancreatitis earlier this year, alcohol abuse (1/3 quart of vodka daily), DM, HLD, HTN, Depression, ROSS on CPAP who presents as a transfer from [facility] for management of acute pancreatitis complicated by necrosis and enlarging abdominal fluid collections, as well as persistent delirium.

## 2018-12-14 NOTE — PROGRESS NOTE ADULT - SUBJECTIVE AND OBJECTIVE BOX
Interval Events: Pt seen and examined.   + brbpr with clots x 5 episodes  no n/v  pt remains confused    MEDICATIONS  (STANDING):  ALBUTerol/ipratropium for Nebulization 3 milliLiter(s) Nebulizer every 6 hours  anastrozole 1 milliGRAM(s) Oral daily  clonazePAM Tablet 0.5 milliGRAM(s) Oral two times a day  dextrose 5% + sodium chloride 0.45% with potassium chloride 20 mEq/L 1000 milliLiter(s) (125 mL/Hr) IV Continuous <Continuous>  enoxaparin Injectable 40 milliGRAM(s) SubCutaneous daily  escitalopram 20 milliGRAM(s) Oral at bedtime  folic acid 1 milliGRAM(s) Oral daily  insulin lispro (HumaLOG) corrective regimen sliding scale   SubCutaneous every 6 hours  lisinopril 20 milliGRAM(s) Oral daily  metoprolol tartrate 25 milliGRAM(s) Oral two times a day  multivitamin 1 Tablet(s) Oral daily  pantoprazole  Injectable 40 milliGRAM(s) IV Push two times a day  potassium chloride  10 mEq/100 mL IVPB 10 milliEquivalent(s) IV Intermittent every 1 hour  QUEtiapine 100 milliGRAM(s) Oral at bedtime  thiamine 100 milliGRAM(s) Oral daily    MEDICATIONS  (PRN):  HYDROmorphone  Injectable 1 milliGRAM(s) IV Push every 4 hours PRN Severe Pain (7 - 10)  HYDROmorphone  Injectable 0.5 milliGRAM(s) IV Push every 4 hours PRN Moderate Pain (4 - 6)      Allergies    No Known Allergies    Intolerances        Review of Systems:    General:  No wt loss, fevers, chills, night sweats,fatigue,   Eyes:  Good vision, no reported pain  ENT:  No sore throat, pain, runny nose, dysphagia  CV:  No pain, palpitations, hypo/hypertension  Resp:  No dyspnea, cough, tachypnea, wheezing  GI:  + rectal bleeding, No pain, No nausea, No vomiting, No diarrhea, No constipation, No weight loss, No fever, No pruritis,, No melena, No dysphagia  :  No pain, bleeding, incontinence, nocturia  Muscle:  No pain, weakness  Neuro:  No weakness, tingling, memory problems  Psych:  No fatigue, insomnia, mood problems, depression  Endocrine:  No polyuria, polydypsia, cold/heat intolerance  Heme:  No petechiae, ecchymosis, easy bruisability  Skin:  No rash, tattoos, scars, edema      Vital Signs Last 24 Hrs  T(C): 36.6 (14 Dec 2018 08:08), Max: 37.8 (13 Dec 2018 23:15)  T(F): 97.9 (14 Dec 2018 08:08), Max: 100.1 (13 Dec 2018 23:15)  HR: 96 (14 Dec 2018 08:08) (93 - 110)  BP: 155/72 (14 Dec 2018 08:08) (95/62 - 166/83)  BP(mean): --  RR: 22 (14 Dec 2018 08:08) (18 - 22)  SpO2: 95% (14 Dec 2018 08:08) (91% - 96%)    PHYSICAL EXAM:    Constitutional: NAD, well-developed  HEENT: EOMI, throat clear, + NJ tube  Neck: No LAD, supple  Respiratory: CTA and P  Cardiovascular: S1 and S2, RRR, no M  Gastrointestinal: BS+, soft, NT/ND, neg HSM,  Extremities: No peripheral edema, neg clubing, cyanosis  Vascular: 2+ peripheral pulses  Neurological: A/O x 1-2, no focal deficits  Psychiatric: Normal mood, normal affect  Skin: No rashes    LABS:                        10.3   14.3  )-----------( 501      ( 14 Dec 2018 04:21 )             29.7     12-14    135  |  99  |  <4<L>  ----------------------------<  166<H>  3.8   |  25  |  0.51    Ca    8.5      14 Dec 2018 04:21  Phos  3.2     12-14  Mg     1.8     12-14    TPro  6.6  /  Alb  2.8<L>  /  TBili  0.3  /  DBili  x   /  AST  42<H>  /  ALT  42  /  AlkPhos  189<H>  12-14          RADIOLOGY & ADDITIONAL TESTS:

## 2018-12-14 NOTE — PROGRESS NOTE ADULT - ATTENDING COMMENTS
Patient seen and examined on rounds  Continues to be confused.   Patient had lower GI bleed today, 2 episodes. H/H has remained stable, 29 --. 29 --> 31  Gastroenterology consulted.   Bleeding scan obtained and no signs of active bleeding. No signs of bleeding from CTA.  non-toxic appearing  vitals stable  abd- obese, nondistended, soft  Labs reviewed.     NJT fell out during bleeding scan and will need to be replaced.  Will likely eventually need cyst-gastrostomy given large fluid peripancreatic collection posterior to stomach but will need more time to mature.

## 2018-12-14 NOTE — PROVIDER CONTACT NOTE (OTHER) - ASSESSMENT
Pt. having bright red blood with clots around rectal area. No signs and symptoms of distress or pain.

## 2018-12-14 NOTE — PROGRESS NOTE ADULT - SUBJECTIVE AND OBJECTIVE BOX
TRAUMA SURGERY DAILY PROGRESS NOTE:    Overnight:  Large bloody BM x2 around 22:00. CBC and abdominal exam stable. CTA A/P obtained, grossly unchanged on prelim. BP 95/62 improved to 160/81 on repeat.     Subjective:  Patient seen and examined. Reports pain is well controlled. Denies N/V. Receiving tube feeds       Exam:  Gen: NAD, resting in bed  Resp: Airway patent, non-labored respirations  Abd: Soft, distended, minimally tender in epigastrium, no rebound or guarding      Vital Signs Last 24 Hrs  T(C): 36.7 (14 Dec 2018 04:52), Max: 37.8 (13 Dec 2018 23:15)  T(F): 98 (14 Dec 2018 04:52), Max: 100.1 (13 Dec 2018 23:15)  HR: 100 (14 Dec 2018 04:52) (93 - 110)  BP: 160/81 (14 Dec 2018 04:52) (95/62 - 167/74)  BP(mean): --  RR: 20 (14 Dec 2018 04:52) (18 - 20)  SpO2: 93% (14 Dec 2018 04:52) (91% - 96%)    I&O's Detail    12 Dec 2018 07:01  -  13 Dec 2018 07:00  --------------------------------------------------------  IN:    dextrose 5% + sodium chloride 0.45% with potassium chloride 20 mEq/L: 1750 mL    lactated ringers.: 100 mL    ns in tub fed  gaetds06: 380 mL    Solution: 260 mL  Total IN: 2490 mL    OUT:  Total OUT: 0 mL    Total NET: 2490 mL      13 Dec 2018 07:01  -  14 Dec 2018 06:04  --------------------------------------------------------  IN:    Oral Fluid: 120 mL  Total IN: 120 mL    OUT:  Total OUT: 0 mL    Total NET: 120 mL          Daily     Daily     MEDICATIONS  (STANDING):  ALBUTerol/ipratropium for Nebulization 3 milliLiter(s) Nebulizer every 6 hours  anastrozole 1 milliGRAM(s) Oral daily  clonazePAM Tablet 0.5 milliGRAM(s) Oral two times a day  dextrose 5% + sodium chloride 0.45% with potassium chloride 20 mEq/L 1000 milliLiter(s) (125 mL/Hr) IV Continuous <Continuous>  enoxaparin Injectable 40 milliGRAM(s) SubCutaneous daily  escitalopram 20 milliGRAM(s) Oral at bedtime  folic acid 1 milliGRAM(s) Oral daily  insulin lispro (HumaLOG) corrective regimen sliding scale   SubCutaneous every 6 hours  lisinopril 20 milliGRAM(s) Oral daily  metoprolol tartrate 25 milliGRAM(s) Oral two times a day  multivitamin 1 Tablet(s) Oral daily  pantoprazole  Injectable 40 milliGRAM(s) IV Push two times a day  QUEtiapine 100 milliGRAM(s) Oral at bedtime  thiamine 100 milliGRAM(s) Oral daily    MEDICATIONS  (PRN):  HYDROmorphone  Injectable 1 milliGRAM(s) IV Push every 4 hours PRN Severe Pain (7 - 10)  HYDROmorphone  Injectable 0.5 milliGRAM(s) IV Push every 4 hours PRN Moderate Pain (4 - 6)      LABS:                        10.3   14.3  )-----------( 501      ( 14 Dec 2018 04:21 )             29.7     12-14    135  |  99  |  <4<L>  ----------------------------<  166<H>  3.8   |  25  |  0.51    Ca    8.5      14 Dec 2018 04:21  Phos  3.2     12-14  Mg     1.8     12-14    TPro  6.6  /  Alb  2.8<L>  /  TBili  0.3  /  DBili  x   /  AST  42<H>  /  ALT  42  /  AlkPhos  189<H>  12-14            D Louie, PGY-1  [facility]  [id number] with any questions TRAUMA SURGERY DAILY PROGRESS NOTE:    Overnight:  Large bloody BM x2 around 22:00. CBC and abdominal exam stable. CTA A/P obtained, grossly unchanged on prelim. BP 95/62 improved to 160/81 on repeat.     Subjective:  Patient seen and examined. Reports pain in LUQ, otherwise well controlled. Denies N/V. Receiving tube feeds. States he is in the hospital, and it is January 18-something.       Exam:  Gen: NAD, resting in bed  Resp: Airway patent, non-labored respirations  Abd: Soft, distended, minimally tender in epigastrium, no rebound or guarding      Vital Signs Last 24 Hrs  T(C): 36.7 (14 Dec 2018 04:52), Max: 37.8 (13 Dec 2018 23:15)  T(F): 98 (14 Dec 2018 04:52), Max: 100.1 (13 Dec 2018 23:15)  HR: 100 (14 Dec 2018 04:52) (93 - 110)  BP: 160/81 (14 Dec 2018 04:52) (95/62 - 167/74)  BP(mean): --  RR: 20 (14 Dec 2018 04:52) (18 - 20)  SpO2: 93% (14 Dec 2018 04:52) (91% - 96%)    I&O's Detail    12 Dec 2018 07:01  -  13 Dec 2018 07:00  --------------------------------------------------------  IN:    dextrose 5% + sodium chloride 0.45% with potassium chloride 20 mEq/L: 1750 mL    lactated ringers.: 100 mL    ns in tub fed  okolpr51: 380 mL    Solution: 260 mL  Total IN: 2490 mL    OUT:  Total OUT: 0 mL    Total NET: 2490 mL      13 Dec 2018 07:01  -  14 Dec 2018 06:04  --------------------------------------------------------  IN:    Oral Fluid: 120 mL  Total IN: 120 mL    OUT:  Total OUT: 0 mL    Total NET: 120 mL      MEDICATIONS  (STANDING):  ALBUTerol/ipratropium for Nebulization 3 milliLiter(s) Nebulizer every 6 hours  anastrozole 1 milliGRAM(s) Oral daily  clonazePAM Tablet 0.5 milliGRAM(s) Oral two times a day  dextrose 5% + sodium chloride 0.45% with potassium chloride 20 mEq/L 1000 milliLiter(s) (125 mL/Hr) IV Continuous <Continuous>  enoxaparin Injectable 40 milliGRAM(s) SubCutaneous daily  escitalopram 20 milliGRAM(s) Oral at bedtime  folic acid 1 milliGRAM(s) Oral daily  insulin lispro (HumaLOG) corrective regimen sliding scale   SubCutaneous every 6 hours  lisinopril 20 milliGRAM(s) Oral daily  metoprolol tartrate 25 milliGRAM(s) Oral two times a day  multivitamin 1 Tablet(s) Oral daily  pantoprazole  Injectable 40 milliGRAM(s) IV Push two times a day  QUEtiapine 100 milliGRAM(s) Oral at bedtime  thiamine 100 milliGRAM(s) Oral daily    MEDICATIONS  (PRN):  HYDROmorphone  Injectable 1 milliGRAM(s) IV Push every 4 hours PRN Severe Pain (7 - 10)  HYDROmorphone  Injectable 0.5 milliGRAM(s) IV Push every 4 hours PRN Moderate Pain (4 - 6)      LABS:                        10.3   14.3  )-----------( 501      ( 14 Dec 2018 04:21 )             29.7     12-14    135  |  99  |  <4<L>  ----------------------------<  166<H>  3.8   |  25  |  0.51    Ca    8.5      14 Dec 2018 04:21  Phos  3.2     12-14  Mg     1.8     12-14    TPro  6.6  /  Alb  2.8<L>  /  TBili  0.3  /  DBili  x   /  AST  42<H>  /  ALT  42  /  AlkPhos  189<H>  12-14

## 2018-12-14 NOTE — PROGRESS NOTE ADULT - ASSESSMENT
FINAL PLAN TO FOLLOW    63M transferred from [place] with acute/subacute pancreatitis with necrosis and fluid collection around the lesser sac, repeat CT (12/10) grossly stable.  NJ tube placed, tube feeds currently at 40.    - Advance NJ tube feeds by 20 every 6 hours, pause if he ceases to tolerate  - Pt can continue tube feeds while undergoing bipap as this is an NJ tube  - Pain control  - FU nutrition recs  - Continue nightly bipap, monitor respiratory status  - Trend labs: WBC count downtrending, if continues to downtrend may allow to eat in the next few days  - Insulin sliding scale  - DVT ppx: Lovenox  - PT/OOB 63M transferred from [place] with acute/subacute pancreatitis with necrosis and fluid collection around the lesser sac, repeat CT (12/10) grossly stable, receiving NJ feeds.    - Pain control  - Advance NJ tube feeds by 20 every 6 hours, pause if he ceases to tolerate  - Pt can continue tube feeds while undergoing bipap as this is an NJ tube  - Appreciate nutrition recs  - Continue to monitor for bleeding, vital signs  - Continue nightly bipap, monitor respiratory status  - Trend labs: WBC count downtrending, if continues to downtrend may allow to eat in the next few days  - Insulin sliding scale  - DVT ppx: Lovenox  - PT/OOB    Dispo: jose Palma, PGY-1  [facility]  [id number] with any questions

## 2018-12-14 NOTE — PROGRESS NOTE ADULT - SUBJECTIVE AND OBJECTIVE BOX
Patient is a 63y old  Male who presents with a chief complaint of acute pancreatitis (14 Dec 2018 10:47)  Subjective: pt reports multiple bloody bm's overnight, with passage of clots. Some lightheadedness this morning, but no loc/falls. +ongoing LUQ abd pain, not worsened. No cp/palpitations, no sob/cough, no fevers/chills, no n/v.    VITAL SIGNS:  Vital Signs Last 24 Hrs  T(C): 36.6 (14 Dec 2018 08:08), Max: 37.8 (13 Dec 2018 23:15)  T(F): 97.9 (14 Dec 2018 08:08), Max: 100.1 (13 Dec 2018 23:15)  HR: 103 (14 Dec 2018 09:30) (93 - 110)  BP: 155/72 (14 Dec 2018 08:08) (95/62 - 163/83)  BP(mean): --  RR: 22 (14 Dec 2018 08:08) (18 - 22)  SpO2: 96% (14 Dec 2018 09:30) (91% - 96%)    PHYSICAL EXAM:     GENERAL: no acute distress  HEENT: PERRLA, EOMI, moist oropharynx, +NJ in place   RESPIRATORY: CTAB, no w/c   CARDIOVASCULAR: RRR, no murmurs, gallops, rubs  ABDOMINAL: soft,+mild LUQ tenderness, +mass-like palpable area of firmness LUQ, nonfluctuant, +mildly distended, positive bowel sounds   EXTREMITIES: no clubbing, cyanosis,+trace b/l pitting edema  NEUROLOGICAL: alert and oriented x 2 (self, hospital), non-focal  SKIN: no rashes or lesions   MUSCULOSKELETAL: no gross joint deformity                          9.8    16.6  )-----------( 527      ( 14 Dec 2018 11:00 )             29.2     12-14    135  |  99  |  <4<L>  ----------------------------<  166<H>  3.8   |  25  |  0.51    Ca    8.5      14 Dec 2018 04:21  Phos  3.2     12-14  Mg     1.8     12-14    TPro  6.6  /  Alb  2.8<L>  /  TBili  0.3  /  DBili  x   /  AST  42<H>  /  ALT  42  /  AlkPhos  189<H>  12-14      CAPILLARY BLOOD GLUCOSE      POCT Blood Glucose.: 182 mg/dL (14 Dec 2018 11:54)  POCT Blood Glucose.: 162 mg/dL (14 Dec 2018 06:37)  POCT Blood Glucose.: 164 mg/dL (14 Dec 2018 00:50)  POCT Blood Glucose.: 154 mg/dL (13 Dec 2018 18:34)      MEDICATIONS  (STANDING):  ALBUTerol/ipratropium for Nebulization 3 milliLiter(s) Nebulizer every 6 hours  anastrozole 1 milliGRAM(s) Oral daily  clonazePAM Tablet 0.5 milliGRAM(s) Oral two times a day  dextrose 5% + sodium chloride 0.45% with potassium chloride 20 mEq/L 1000 milliLiter(s) (125 mL/Hr) IV Continuous <Continuous>  enoxaparin Injectable 40 milliGRAM(s) SubCutaneous daily  escitalopram 20 milliGRAM(s) Oral at bedtime  folic acid 1 milliGRAM(s) Oral daily  insulin lispro (HumaLOG) corrective regimen sliding scale   SubCutaneous every 6 hours  lisinopril 20 milliGRAM(s) Oral daily  metoprolol tartrate 25 milliGRAM(s) Oral two times a day  multivitamin 1 Tablet(s) Oral daily  pantoprazole  Injectable 40 milliGRAM(s) IV Push two times a day  potassium chloride  10 mEq/100 mL IVPB 10 milliEquivalent(s) IV Intermittent every 1 hour  QUEtiapine 100 milliGRAM(s) Oral at bedtime  thiamine 100 milliGRAM(s) Oral daily    MEDICATIONS  (PRN):  HYDROmorphone  Injectable 1 milliGRAM(s) IV Push every 4 hours PRN Severe Pain (7 - 10)  HYDROmorphone  Injectable 0.5 milliGRAM(s) IV Push every 4 hours PRN Moderate Pain (4 - 6)

## 2018-12-15 LAB
ALBUMIN SERPL ELPH-MCNC: 2.6 G/DL — LOW (ref 3.3–5)
ALP SERPL-CCNC: 138 U/L — HIGH (ref 40–120)
ALT FLD-CCNC: 25 U/L — SIGNIFICANT CHANGE UP (ref 10–45)
AMYLASE P1 CFR SERPL: 122 U/L — SIGNIFICANT CHANGE UP (ref 25–125)
ANION GAP SERPL CALC-SCNC: 10 MMOL/L — SIGNIFICANT CHANGE UP (ref 5–17)
APTT BLD: 38.1 SEC — HIGH (ref 27.5–36.3)
AST SERPL-CCNC: 24 U/L — SIGNIFICANT CHANGE UP (ref 10–40)
BILIRUB DIRECT SERPL-MCNC: 0.1 MG/DL — SIGNIFICANT CHANGE UP (ref 0–0.2)
BILIRUB INDIRECT FLD-MCNC: 0.2 MG/DL — SIGNIFICANT CHANGE UP (ref 0.2–1)
BILIRUB SERPL-MCNC: 0.3 MG/DL — SIGNIFICANT CHANGE UP (ref 0.2–1.2)
BLD GP AB SCN SERPL QL: NEGATIVE — SIGNIFICANT CHANGE UP
BUN SERPL-MCNC: 8 MG/DL — SIGNIFICANT CHANGE UP (ref 7–23)
CALCIUM SERPL-MCNC: 8.1 MG/DL — LOW (ref 8.4–10.5)
CHLORIDE SERPL-SCNC: 100 MMOL/L — SIGNIFICANT CHANGE UP (ref 96–108)
CO2 SERPL-SCNC: 23 MMOL/L — SIGNIFICANT CHANGE UP (ref 22–31)
CREAT SERPL-MCNC: 0.65 MG/DL — SIGNIFICANT CHANGE UP (ref 0.5–1.3)
GLUCOSE BLDC GLUCOMTR-MCNC: 131 MG/DL — HIGH (ref 70–99)
GLUCOSE BLDC GLUCOMTR-MCNC: 137 MG/DL — HIGH (ref 70–99)
GLUCOSE BLDC GLUCOMTR-MCNC: 151 MG/DL — HIGH (ref 70–99)
GLUCOSE BLDC GLUCOMTR-MCNC: 180 MG/DL — HIGH (ref 70–99)
GLUCOSE BLDC GLUCOMTR-MCNC: 182 MG/DL — HIGH (ref 70–99)
GLUCOSE SERPL-MCNC: 153 MG/DL — HIGH (ref 70–99)
HCT VFR BLD CALC: 23.8 % — LOW (ref 39–50)
HCT VFR BLD CALC: 24.6 % — LOW (ref 39–50)
HGB BLD-MCNC: 7.7 G/DL — LOW (ref 13–17)
HGB BLD-MCNC: 8 G/DL — LOW (ref 13–17)
INR BLD: 1.58 RATIO — HIGH (ref 0.88–1.16)
LIDOCAIN IGE QN: 383 U/L — HIGH (ref 7–60)
MAGNESIUM SERPL-MCNC: 2.1 MG/DL — SIGNIFICANT CHANGE UP (ref 1.6–2.6)
MCHC RBC-ENTMCNC: 32.4 GM/DL — SIGNIFICANT CHANGE UP (ref 32–36)
MCHC RBC-ENTMCNC: 32.5 GM/DL — SIGNIFICANT CHANGE UP (ref 32–36)
MCHC RBC-ENTMCNC: 32.8 PG — SIGNIFICANT CHANGE UP (ref 27–34)
MCHC RBC-ENTMCNC: 32.9 PG — SIGNIFICANT CHANGE UP (ref 27–34)
MCV RBC AUTO: 100.8 FL — HIGH (ref 80–100)
MCV RBC AUTO: 101.7 FL — HIGH (ref 80–100)
PHOSPHATE SERPL-MCNC: 4.1 MG/DL — SIGNIFICANT CHANGE UP (ref 2.5–4.5)
PLATELET # BLD AUTO: 547 K/UL — HIGH (ref 150–400)
PLATELET # BLD AUTO: 563 K/UL — HIGH (ref 150–400)
POTASSIUM SERPL-MCNC: 4.1 MMOL/L — SIGNIFICANT CHANGE UP (ref 3.5–5.3)
POTASSIUM SERPL-SCNC: 4.1 MMOL/L — SIGNIFICANT CHANGE UP (ref 3.5–5.3)
PROT SERPL-MCNC: 6 G/DL — SIGNIFICANT CHANGE UP (ref 6–8.3)
PROTHROM AB SERPL-ACNC: 18.3 SEC — HIGH (ref 10–13.1)
RBC # BLD: 2.34 M/UL — LOW (ref 4.2–5.8)
RBC # BLD: 2.44 M/UL — LOW (ref 4.2–5.8)
RBC # FLD: 13.8 % — SIGNIFICANT CHANGE UP (ref 10.3–14.5)
RBC # FLD: 13.9 % — SIGNIFICANT CHANGE UP (ref 10.3–14.5)
RH IG SCN BLD-IMP: POSITIVE — SIGNIFICANT CHANGE UP
SODIUM SERPL-SCNC: 134 MMOL/L — LOW (ref 135–145)
WBC # BLD: 13.65 K/UL — HIGH (ref 3.8–10.5)
WBC # BLD: 13.68 K/UL — HIGH (ref 3.8–10.5)
WBC # FLD AUTO: 13.65 K/UL — HIGH (ref 3.8–10.5)
WBC # FLD AUTO: 13.68 K/UL — HIGH (ref 3.8–10.5)

## 2018-12-15 PROCEDURE — 99232 SBSQ HOSP IP/OBS MODERATE 35: CPT

## 2018-12-15 RX ORDER — INSULIN LISPRO 100/ML
VIAL (ML) SUBCUTANEOUS AT BEDTIME
Qty: 0 | Refills: 0 | Status: DISCONTINUED | OUTPATIENT
Start: 2018-12-15 | End: 2018-12-17

## 2018-12-15 RX ORDER — PHYTONADIONE (VIT K1) 5 MG
5 TABLET ORAL ONCE
Qty: 0 | Refills: 0 | Status: COMPLETED | OUTPATIENT
Start: 2018-12-15 | End: 2018-12-15

## 2018-12-15 RX ORDER — ACETAMINOPHEN 500 MG
650 TABLET ORAL EVERY 6 HOURS
Qty: 0 | Refills: 0 | Status: DISCONTINUED | OUTPATIENT
Start: 2018-12-15 | End: 2018-12-24

## 2018-12-15 RX ORDER — OXYCODONE HYDROCHLORIDE 5 MG/1
5 TABLET ORAL EVERY 6 HOURS
Qty: 0 | Refills: 0 | Status: DISCONTINUED | OUTPATIENT
Start: 2018-12-15 | End: 2018-12-22

## 2018-12-15 RX ORDER — OXYCODONE HYDROCHLORIDE 5 MG/1
10 TABLET ORAL EVERY 6 HOURS
Qty: 0 | Refills: 0 | Status: DISCONTINUED | OUTPATIENT
Start: 2018-12-15 | End: 2018-12-22

## 2018-12-15 RX ORDER — INSULIN LISPRO 100/ML
VIAL (ML) SUBCUTANEOUS
Qty: 0 | Refills: 0 | Status: DISCONTINUED | OUTPATIENT
Start: 2018-12-15 | End: 2018-12-17

## 2018-12-15 RX ADMIN — ANASTROZOLE 1 MILLIGRAM(S): 1 TABLET ORAL at 11:56

## 2018-12-15 RX ADMIN — DEXTROSE MONOHYDRATE, SODIUM CHLORIDE, AND POTASSIUM CHLORIDE 75 MILLILITER(S): 50; .745; 4.5 INJECTION, SOLUTION INTRAVENOUS at 18:00

## 2018-12-15 RX ADMIN — Medication 3 MILLILITER(S): at 17:21

## 2018-12-15 RX ADMIN — PANTOPRAZOLE SODIUM 40 MILLIGRAM(S): 20 TABLET, DELAYED RELEASE ORAL at 17:17

## 2018-12-15 RX ADMIN — Medication 1 MILLIGRAM(S): at 11:56

## 2018-12-15 RX ADMIN — QUETIAPINE FUMARATE 100 MILLIGRAM(S): 200 TABLET, FILM COATED ORAL at 22:30

## 2018-12-15 RX ADMIN — Medication 3 MILLILITER(S): at 06:12

## 2018-12-15 RX ADMIN — Medication 2: at 13:07

## 2018-12-15 RX ADMIN — LISINOPRIL 20 MILLIGRAM(S): 2.5 TABLET ORAL at 06:12

## 2018-12-15 RX ADMIN — PANTOPRAZOLE SODIUM 40 MILLIGRAM(S): 20 TABLET, DELAYED RELEASE ORAL at 06:12

## 2018-12-15 RX ADMIN — Medication 2: at 01:12

## 2018-12-15 RX ADMIN — OXYCODONE HYDROCHLORIDE 5 MILLIGRAM(S): 5 TABLET ORAL at 17:18

## 2018-12-15 RX ADMIN — Medication 1 TABLET(S): at 11:56

## 2018-12-15 RX ADMIN — OXYCODONE HYDROCHLORIDE 5 MILLIGRAM(S): 5 TABLET ORAL at 17:48

## 2018-12-15 RX ADMIN — Medication 101 MILLIGRAM(S): at 15:22

## 2018-12-15 RX ADMIN — Medication 3 MILLILITER(S): at 01:12

## 2018-12-15 RX ADMIN — Medication 2: at 06:12

## 2018-12-15 RX ADMIN — ESCITALOPRAM OXALATE 20 MILLIGRAM(S): 10 TABLET, FILM COATED ORAL at 22:30

## 2018-12-15 RX ADMIN — Medication 25 MILLIGRAM(S): at 17:18

## 2018-12-15 RX ADMIN — Medication 25 MILLIGRAM(S): at 06:12

## 2018-12-15 RX ADMIN — DEXTROSE MONOHYDRATE, SODIUM CHLORIDE, AND POTASSIUM CHLORIDE 125 MILLILITER(S): 50; .745; 4.5 INJECTION, SOLUTION INTRAVENOUS at 02:23

## 2018-12-15 RX ADMIN — Medication 100 MILLIGRAM(S): at 11:57

## 2018-12-15 NOTE — PROGRESS NOTE ADULT - ASSESSMENT
63M transferred from [place] with acute/subacute pancreatitis with necrosis and fluid collection around the lesser sac, repeat CTs (12/10, 12/13) grossly stable.  NJ tube removed with unknown mechanism, now NPO except meds    - angela GARCIA'd - DTV at 5pm  - monitor mental status  - Pain control  - Continue to monitor for bleeding, vital signs  - Continue nightly bipap, monitor respiratory status  - Trend labs: repeat CBC this afternoon  - Insulin sliding scale  - DVT ppx: Lovenox  - PT/OOB

## 2018-12-15 NOTE — PROVIDER CONTACT NOTE (OTHER) - ASSESSMENT
Pt had small bloody BM or bright red blood.  On previous day and night shift pt had been having multiple bright red bowl movements.  H/H remains stable at this time. VSS. Pt making adequate urine output. Pt A&Ox1, as per his baseline.

## 2018-12-15 NOTE — PROVIDER CONTACT NOTE (OTHER) - BACKGROUND
Pt admitted with alcohol induced pancreatitis with possible necrosis. Pt also found to have bernadette pancreatic fluid collection that is unable to be drained by IR.

## 2018-12-15 NOTE — PROVIDER CONTACT NOTE (OTHER) - ASSESSMENT
Pt. BP 97/61 all other VSS. BP repeated manually 124/60. Pt. appears lethargic but responds to name and commands.  Pt on prior night and throughout day had multiple bloody bowl movements, pt has had no bloody bowl movements for our shift this far. Pt making adequate urine output at this time. No s/s of distress.

## 2018-12-15 NOTE — PROGRESS NOTE ADULT - ATTENDING COMMENTS
Patient seen and examined on rounds  Mental status is significantly improved today.  Patient continues to have lower GI bleed with  2 episodes of melena. H/H has remained decreased from 29 to 23. Pending repeat CBC this afternoon.  GI will plan to do a colonoscopy on the patient on Monday.   Lovenox held due to bleeding.  If CBC continues to drop will get a CTA.   vitals stable  abd- obese, nondistended, soft  Labs reviewed.   Continue to have NJT out and on CLD now.   Will likely eventually need cyst-gastrostomy given large fluid peripancreatic collection posterior to stomach but will need more time to mature.

## 2018-12-15 NOTE — PROGRESS NOTE ADULT - PROBLEM SELECTOR PLAN 2
- urgent bleeding scan neg may need ct angio  - monitor h/h q 4-6 hours, transfuse prn  - start clears if unstable ct angio and icu consultation if stable plan for colonosopcy on monday

## 2018-12-15 NOTE — PROGRESS NOTE ADULT - SUBJECTIVE AND OBJECTIVE BOX
INTERVAL HPI/OVERNIGHT EVENTS:  s/p large bleed overnight drop in hgb vitals stable bleeding scan was negative no sob or chest pain npo from pancreatitis stand point  Allergies    No Known Allergies    Intolerances  General:  No wt loss, fevers, chills, night sweats, fatigue,   Eyes:  Good vision, no reported pain  ENT:  No sore throat, pain, runny nose, dysphagia  CV:  No pain, palpitations, hypo/hypertension  Resp:  No dyspnea, cough, tachypnea, wheezing  GI:  No pain, No nausea, No vomiting, No diarrhea, No constipation, No weight loss, No fever, No pruritis, No rectal bleeding, No tarry stools, No dysphagia,  :  No pain, bleeding, incontinence, nocturia  Muscle:  No pain, weakness  Neuro:  No weakness, tingling, memory problems  Psych:  No fatigue, insomnia, mood problems, depression  Endocrine:  No polyuria, polydipsia, cold/heat intolerance  Heme:  No petechiae, ecchymosis, easy bruisability  Skin:  No rash, tattoos, scars, edema      PHYSICAL EXAM:   Vital Signs:  Vital Signs Last 24 Hrs  T(C): 37.2 (15 Dec 2018 13:58), Max: 38.2 (14 Dec 2018 15:42)  T(F): 99 (15 Dec 2018 13:58), Max: 100.8 (14 Dec 2018 15:42)  HR: 93 (15 Dec 2018 13:58) (91 - 105)  BP: 135/71 (15 Dec 2018 13:58) (97/61 - 148/78)  BP(mean): --  RR: 18 (15 Dec 2018 13:58) (18 - 19)  SpO2: 94% (15 Dec 2018 13:58) (94% - 100%)  Daily     Daily I&O's Summary    14 Dec 2018 07:01  -  15 Dec 2018 07:00  --------------------------------------------------------  IN: 1950 mL / OUT: 1435 mL / NET: 515 mL    15 Dec 2018 07:  -  15 Dec 2018 14:35  --------------------------------------------------------  IN: 0 mL / OUT: 300 mL / NET: -300 mL        GENERAL:  Appears stated age, well-groomed, well-nourished, no distress  HEENT:  NC/AT,  conjunctivae clear and pink, no thyromegaly, nodules, adenopathy, no JVD, sclera -anicteric  CHEST:  Full & symmetric excursion, no increased effort, breath sounds clear  HEART:  Regular rhythm, S1, S2, no murmur/rub/S3/S4, no abdominal bruit, no edema  ABDOMEN:  Soft, non-tender, non-distended, normoactive bowel sounds,  no masses ,no hepato-splenomegaly, no signs of chronic liver disease  EXTEREMITIES:  no cyanosis,clubbing or edema  SKIN:  No rash/erythema/ecchymoses/petechiae/wounds/abscess/warm/dry  NEURO:  Alert, oriented, no asterixis, no tremor, no encephalopathy      LABS:                        7.7    13.68 )-----------( 547      ( 15 Dec 2018 09:23 )             23.8     12-15    134<L>  |  100  |  8   ----------------------------<  153<H>  4.1   |  23  |  0.65    Ca    8.1<L>      15 Dec 2018 07:24  Phos  4.1     12-15  Mg     2.1     12-15    TPro  6.0  /  Alb  2.6<L>  /  TBili  0.3  /  DBili  0.1  /  AST  24  /  ALT  25  /  AlkPhos  138<H>  12-15    PT/INR - ( 15 Dec 2018 09:22 )   PT: 18.3 sec;   INR: 1.58 ratio         PTT - ( 15 Dec 2018 09:22 )  PTT:38.1 sec  Urinalysis Basic - ( 14 Dec 2018 16:42 )    Color: Yellow / Appearance: Clear / S.024 / pH: x  Gluc: x / Ketone: Negative  / Bili: Negative / Urobili: Negative   Blood: x / Protein: Trace / Nitrite: Negative   Leuk Esterase: Negative / RBC: 15 /hpf / WBC 6 /hpf   Sq Epi: x / Non Sq Epi: 1 /hpf / Bacteria: Negative      amylaseAmylase, Serum Total: 122 U/L (12-15 @ 07:24)  Amylase, Serum Total: 126 U/L ( @ 04:21)  Amylase, Serum Total: 129 U/L ( @ 07:12)  Amylase, Serum Total: 134 U/L ( @ 07:28)  Amylase, Serum Total: 136 U/L ( @ 07:18)     lipaseLipase, Serum: 383 U/L (12-15 @ 07:24)  Lipase, Serum: 455 U/L ( @ 04:21)  Lipase, Serum: 477 U/L ( @ 07:12)  Lipase, Serum: 401 U/L ( @ 07:28)  Lipase, Serum: 349 U/L ( @ 07:18)    RADIOLOGY & ADDITIONAL TESTS:

## 2018-12-16 LAB
ALBUMIN SERPL ELPH-MCNC: 2.7 G/DL — LOW (ref 3.3–5)
ALP SERPL-CCNC: 126 U/L — HIGH (ref 40–120)
ALT FLD-CCNC: 23 U/L — SIGNIFICANT CHANGE UP (ref 10–45)
AMYLASE P1 CFR SERPL: 110 U/L — SIGNIFICANT CHANGE UP (ref 25–125)
ANION GAP SERPL CALC-SCNC: 12 MMOL/L — SIGNIFICANT CHANGE UP (ref 5–17)
AST SERPL-CCNC: 22 U/L — SIGNIFICANT CHANGE UP (ref 10–40)
BILIRUB SERPL-MCNC: 0.3 MG/DL — SIGNIFICANT CHANGE UP (ref 0.2–1.2)
BUN SERPL-MCNC: 5 MG/DL — LOW (ref 7–23)
CALCIUM SERPL-MCNC: 8.5 MG/DL — SIGNIFICANT CHANGE UP (ref 8.4–10.5)
CHLORIDE SERPL-SCNC: 99 MMOL/L — SIGNIFICANT CHANGE UP (ref 96–108)
CO2 SERPL-SCNC: 24 MMOL/L — SIGNIFICANT CHANGE UP (ref 22–31)
CREAT SERPL-MCNC: 0.61 MG/DL — SIGNIFICANT CHANGE UP (ref 0.5–1.3)
GLUCOSE BLDC GLUCOMTR-MCNC: 127 MG/DL — HIGH (ref 70–99)
GLUCOSE BLDC GLUCOMTR-MCNC: 131 MG/DL — HIGH (ref 70–99)
GLUCOSE BLDC GLUCOMTR-MCNC: 131 MG/DL — HIGH (ref 70–99)
GLUCOSE BLDC GLUCOMTR-MCNC: 161 MG/DL — HIGH (ref 70–99)
GLUCOSE SERPL-MCNC: 142 MG/DL — HIGH (ref 70–99)
HBA1C BLD-MCNC: 6.6 % — HIGH (ref 4–5.6)
HCT VFR BLD CALC: 23.6 % — LOW (ref 39–50)
HCT VFR BLD CALC: 24.2 % — LOW (ref 39–50)
HGB BLD-MCNC: 7.7 G/DL — LOW (ref 13–17)
HGB BLD-MCNC: 7.8 G/DL — LOW (ref 13–17)
MAGNESIUM SERPL-MCNC: 2 MG/DL — SIGNIFICANT CHANGE UP (ref 1.6–2.6)
MCHC RBC-ENTMCNC: 31.8 PG — SIGNIFICANT CHANGE UP (ref 27–34)
MCHC RBC-ENTMCNC: 32.2 GM/DL — SIGNIFICANT CHANGE UP (ref 32–36)
MCHC RBC-ENTMCNC: 32.6 GM/DL — SIGNIFICANT CHANGE UP (ref 32–36)
MCHC RBC-ENTMCNC: 32.8 PG — SIGNIFICANT CHANGE UP (ref 27–34)
MCV RBC AUTO: 100.4 FL — HIGH (ref 80–100)
MCV RBC AUTO: 98.8 FL — SIGNIFICANT CHANGE UP (ref 80–100)
PHOSPHATE SERPL-MCNC: 4.8 MG/DL — HIGH (ref 2.5–4.5)
PLATELET # BLD AUTO: 575 K/UL — HIGH (ref 150–400)
PLATELET # BLD AUTO: 575 K/UL — HIGH (ref 150–400)
POTASSIUM SERPL-MCNC: 4.2 MMOL/L — SIGNIFICANT CHANGE UP (ref 3.5–5.3)
POTASSIUM SERPL-SCNC: 4.2 MMOL/L — SIGNIFICANT CHANGE UP (ref 3.5–5.3)
PROT SERPL-MCNC: 6.4 G/DL — SIGNIFICANT CHANGE UP (ref 6–8.3)
RBC # BLD: 2.35 M/UL — LOW (ref 4.2–5.8)
RBC # BLD: 2.45 M/UL — LOW (ref 4.2–5.8)
RBC # FLD: 13.8 % — SIGNIFICANT CHANGE UP (ref 10.3–14.5)
RBC # FLD: 14 % — SIGNIFICANT CHANGE UP (ref 10.3–14.5)
SODIUM SERPL-SCNC: 135 MMOL/L — SIGNIFICANT CHANGE UP (ref 135–145)
WBC # BLD: 10.17 K/UL — SIGNIFICANT CHANGE UP (ref 3.8–10.5)
WBC # BLD: 10.85 K/UL — HIGH (ref 3.8–10.5)
WBC # FLD AUTO: 10.17 K/UL — SIGNIFICANT CHANGE UP (ref 3.8–10.5)
WBC # FLD AUTO: 10.85 K/UL — HIGH (ref 3.8–10.5)

## 2018-12-16 PROCEDURE — 99232 SBSQ HOSP IP/OBS MODERATE 35: CPT

## 2018-12-16 RX ORDER — SOD SULF/SODIUM/NAHCO3/KCL/PEG
1000 SOLUTION, RECONSTITUTED, ORAL ORAL EVERY 4 HOURS
Qty: 0 | Refills: 0 | Status: COMPLETED | OUTPATIENT
Start: 2018-12-16 | End: 2018-12-16

## 2018-12-16 RX ADMIN — Medication 0.5 MILLIGRAM(S): at 01:06

## 2018-12-16 RX ADMIN — Medication 100 MILLIGRAM(S): at 13:11

## 2018-12-16 RX ADMIN — ANASTROZOLE 1 MILLIGRAM(S): 1 TABLET ORAL at 13:11

## 2018-12-16 RX ADMIN — QUETIAPINE FUMARATE 100 MILLIGRAM(S): 200 TABLET, FILM COATED ORAL at 22:03

## 2018-12-16 RX ADMIN — ESCITALOPRAM OXALATE 20 MILLIGRAM(S): 10 TABLET, FILM COATED ORAL at 22:03

## 2018-12-16 RX ADMIN — Medication 1 TABLET(S): at 13:11

## 2018-12-16 RX ADMIN — Medication 3 MILLILITER(S): at 05:09

## 2018-12-16 RX ADMIN — Medication 2: at 08:43

## 2018-12-16 RX ADMIN — Medication 25 MILLIGRAM(S): at 05:10

## 2018-12-16 RX ADMIN — Medication 3 MILLILITER(S): at 13:11

## 2018-12-16 RX ADMIN — PANTOPRAZOLE SODIUM 40 MILLIGRAM(S): 20 TABLET, DELAYED RELEASE ORAL at 05:09

## 2018-12-16 RX ADMIN — Medication 650 MILLIGRAM(S): at 18:17

## 2018-12-16 RX ADMIN — Medication 3 MILLILITER(S): at 01:06

## 2018-12-16 RX ADMIN — Medication 1000 MILLILITER(S): at 15:29

## 2018-12-16 RX ADMIN — Medication 5 MILLIGRAM(S): at 22:03

## 2018-12-16 RX ADMIN — Medication 1 MILLIGRAM(S): at 13:11

## 2018-12-16 RX ADMIN — Medication 0.5 MILLIGRAM(S): at 13:16

## 2018-12-16 RX ADMIN — Medication 3 MILLILITER(S): at 18:17

## 2018-12-16 RX ADMIN — LISINOPRIL 20 MILLIGRAM(S): 2.5 TABLET ORAL at 05:09

## 2018-12-16 RX ADMIN — PANTOPRAZOLE SODIUM 40 MILLIGRAM(S): 20 TABLET, DELAYED RELEASE ORAL at 18:17

## 2018-12-16 RX ADMIN — Medication 1000 MILLILITER(S): at 18:22

## 2018-12-16 RX ADMIN — Medication 25 MILLIGRAM(S): at 18:18

## 2018-12-16 NOTE — PROGRESS NOTE ADULT - ATTENDING COMMENTS
Patient seen and examined on rounds  Mental status continues  Lower GI bleed appears stable with H/H 7.7/23.6  Lovenox held due to bleeding. Likely could be restarted after colonoscopy.   If CBC continues to drop will get a CTA.   vitals stable  abd- obese, nondistended, soft  Labs reviewed.   Tolerating clear liquid diet  Will likely eventually need cyst-gastrostomy given large fluid peripancreatic collection posterior to stomach but will need more time to mature.

## 2018-12-16 NOTE — PROGRESS NOTE ADULT - ASSESSMENT
63M transferred from [place] with acute/subacute pancreatitis with necrosis and fluid collection around the lesser sac, repeat CTs (12/10, 12/13) grossly stable.  NJ tube removed with unknown mechanism, now NPO    - Pain control  - NPO / IVF, consider advancing diet  - Continue to monitor for bleeding, vital signs  - Continue nightly bipap, monitor respiratory status  - Trend labs: follow CBC  - Insulin sliding scale  - DVT ppx: Lovenox  - PT/OOB    TIA Palma, PGY-1  [facility]  [id number] with any questions

## 2018-12-16 NOTE — PROGRESS NOTE ADULT - SUBJECTIVE AND OBJECTIVE BOX
INTERVAL HPI/OVERNIGHT EVENTS:  No new overnight event.  No N/V/D.  Tolerating diet.  no bleeding overnight  mildly confused    Allergies    No Known Allergies    Intolerances          General:  No wt loss, fevers, chills, night sweats, fatigue,   Eyes:  Good vision, no reported pain  ENT:  No sore throat, pain, runny nose, dysphagia  CV:  No pain, palpitations, hypo/hypertension  Resp:  No dyspnea, cough, tachypnea, wheezing  GI:  No pain, No nausea, No vomiting, No diarrhea, No constipation, No weight loss, No fever, No pruritis, No rectal bleeding, No tarry stools, No dysphagia,  :  No pain, bleeding, incontinence, nocturia  Muscle:  No pain, weakness  Neuro:  No weakness, tingling, memory problems  Psych:  No fatigue, insomnia, mood problems, depression  Endocrine:  No polyuria, polydipsia, cold/heat intolerance  Heme:  No petechiae, ecchymosis, easy bruisability  Skin:  No rash, tattoos, scars, edema      PHYSICAL EXAM:   Vital Signs:  Vital Signs Last 24 Hrs  T(C): 36.7 (16 Dec 2018 09:28), Max: 37.2 (15 Dec 2018 13:58)  T(F): 98.1 (16 Dec 2018 09:28), Max: 99 (15 Dec 2018 13:58)  HR: 94 (16 Dec 2018 09:28) (89 - 99)  BP: 109/64 (16 Dec 2018 09:28) (109/64 - 137/73)  BP(mean): --  RR: 18 (16 Dec 2018 09:28) (18 - 18)  SpO2: 92% (16 Dec 2018 09:28) (92% - 100%)  Daily     Daily I&O's Summary    15 Dec 2018 07:  -  16 Dec 2018 07:00  --------------------------------------------------------  IN: 2520 mL / OUT: 1550 mL / NET: 970 mL    16 Dec 2018 07:  -  16 Dec 2018 09:37  --------------------------------------------------------  IN: 240 mL / OUT: 0 mL / NET: 240 mL        GENERAL:  Appears stated age, well-groomed, well-nourished, no distress  HEENT:  NC/AT,  conjunctivae clear and pink, no thyromegaly, nodules, adenopathy, no JVD, sclera -anicteric  CHEST:  Full & symmetric excursion, no increased effort, breath sounds clear  HEART:  Regular rhythm, S1, S2, no murmur/rub/S3/S4, no abdominal bruit, no edema  ABDOMEN:  Soft, non-tender, non-distended, normoactive bowel sounds,  no masses ,no hepato-splenomegaly, no signs of chronic liver disease  EXTEREMITIES:  no cyanosis,clubbing or edema  SKIN:  No rash/erythema/ecchymoses/petechiae/wounds/abscess/warm/dry  NEURO:  Alert, oriented, no asterixis, no tremor, no encephalopathy      LABS:                        7.7    10.17 )-----------( 575      ( 16 Dec 2018 06:41 )             23.6     12-16    135  |  99  |  5<L>  ----------------------------<  142<H>  4.2   |  24  |  0.61    Ca    8.5      16 Dec 2018 05:43  Phos  4.1     12-15  Mg     2.1     12-15    TPro  6.4  /  Alb  2.7<L>  /  TBili  0.3  /  DBili  x   /  AST  22  /  ALT  23  /  AlkPhos  126<H>  12-16    PT/INR - ( 15 Dec 2018 09:22 )   PT: 18.3 sec;   INR: 1.58 ratio         PTT - ( 15 Dec 2018 09:22 )  PTT:38.1 sec  Urinalysis Basic - ( 14 Dec 2018 16:42 )    Color: Yellow / Appearance: Clear / S.024 / pH: x  Gluc: x / Ketone: Negative  / Bili: Negative / Urobili: Negative   Blood: x / Protein: Trace / Nitrite: Negative   Leuk Esterase: Negative / RBC: 15 /hpf / WBC 6 /hpf   Sq Epi: x / Non Sq Epi: 1 /hpf / Bacteria: Negative      amylaseAmylase, Serum Total: 110 U/L ( @ 05:43)  Amylase, Serum Total: 122 U/L (12-15 @ 07:24)  Amylase, Serum Total: 126 U/L ( @ 04:21)  Amylase, Serum Total: 129 U/L ( @ 07:12)  Amylase, Serum Total: 134 U/L ( @ 07:28)     lipaseLipase, Serum: 383 U/L (12-15 @ 07:24)  Lipase, Serum: 455 U/L ( @ 04:21)  Lipase, Serum: 477 U/L ( @ 07:12)  Lipase, Serum: 401 U/L ( @ 07:28)    RADIOLOGY & ADDITIONAL TESTS:

## 2018-12-16 NOTE — PROGRESS NOTE ADULT - SUBJECTIVE AND OBJECTIVE BOX
TRAUMA SURGERY DAILY PROGRESS NOTE:    Overnight:  No acute events.    Subjective:  Patient seen and examined. Reports pain is well controlled. Oriented to place.    Exam:  Gen: NAD, resting in bed  Resp: Airway patent, non-labored respirations  Abd: Soft, distended, tender in LUQ, no rebound or guarding    Vital Signs Last 24 Hrs  T(C): 36.7 (16 Dec 2018 05:10), Max: 37.2 (15 Dec 2018 13:58)  T(F): 98 (16 Dec 2018 05:10), Max: 99 (15 Dec 2018 13:58)  HR: 95 (16 Dec 2018 06:36) (89 - 103)  BP: 137/73 (16 Dec 2018 05:10) (115/70 - 137/73)  BP(mean): --  RR: 18 (16 Dec 2018 05:10) (18 - 18)  SpO2: 93% (16 Dec 2018 06:36) (93% - 100%)    I&O's Detail    14 Dec 2018 07:01  -  15 Dec 2018 07:00  --------------------------------------------------------  IN:    dextrose 5% + sodium chloride 0.45% with potassium chloride 20 mEq/L: 1600 mL    Solution: 50 mL    Solution: 300 mL  Total IN: 1950 mL    OUT:    Indwelling Catheter - Urethral: 1185 mL    Voided: 250 mL  Total OUT: 1435 mL    Total NET: 515 mL      15 Dec 2018 07:01  -  16 Dec 2018 06:58  --------------------------------------------------------  IN:    dextrose 5% + sodium chloride 0.45% with potassium chloride 20 mEq/L: 2350 mL    Oral Fluid: 120 mL    Solution: 50 mL  Total IN: 2520 mL    OUT:    Indwelling Catheter - Urethral: 300 mL    Voided: 1250 mL  Total OUT: 1550 mL    Total NET: 970 mL    MEDICATIONS  (STANDING):  ALBUTerol/ipratropium for Nebulization 3 milliLiter(s) Nebulizer every 6 hours  anastrozole 1 milliGRAM(s) Oral daily  bisacodyl 5 milliGRAM(s) Oral at bedtime  clonazePAM Tablet 0.5 milliGRAM(s) Oral two times a day  dextrose 5% + sodium chloride 0.45% with potassium chloride 20 mEq/L 1000 milliLiter(s) (75 mL/Hr) IV Continuous <Continuous>  escitalopram 20 milliGRAM(s) Oral at bedtime  folic acid 1 milliGRAM(s) Oral daily  insulin lispro (HumaLOG) corrective regimen sliding scale   SubCutaneous three times a day before meals  insulin lispro (HumaLOG) corrective regimen sliding scale   SubCutaneous at bedtime  lisinopril 20 milliGRAM(s) Oral daily  metoprolol tartrate 25 milliGRAM(s) Oral two times a day  multivitamin 1 Tablet(s) Oral daily  pantoprazole  Injectable 40 milliGRAM(s) IV Push two times a day  polyethylene glycol/electrolyte Solution 1000 milliLiter(s) Oral every 4 hours  QUEtiapine 100 milliGRAM(s) Oral at bedtime  thiamine 100 milliGRAM(s) Oral daily    MEDICATIONS  (PRN):  acetaminophen   Tablet .. 650 milliGRAM(s) Oral every 6 hours PRN Mild Pain (1 - 3)  oxyCODONE    IR 5 milliGRAM(s) Oral every 6 hours PRN Moderate Pain (4 - 6)  oxyCODONE    IR 10 milliGRAM(s) Oral every 6 hours PRN Severe Pain (7 - 10)      LABS:                        8.0    13.65 )-----------( 563      ( 15 Dec 2018 16:25 )             24.6     12-16    135  |  99  |  5<L>  ----------------------------<  142<H>  4.2   |  24  |  0.61    Ca    8.5      16 Dec 2018 05:43  Phos  4.1     12-15  Mg     2.1     12-15    TPro  6.4  /  Alb  2.7<L>  /  TBili  0.3  /  DBili  x   /  AST  22  /  ALT  23  /  AlkPhos  126<H>  12-16    PT/INR - ( 15 Dec 2018 09:22 )   PT: 18.3 sec;   INR: 1.58 ratio         PTT - ( 15 Dec 2018 09:22 )  PTT:38.1 sec  Urinalysis Basic - ( 14 Dec 2018 16:42 )    Color: Yellow / Appearance: Clear / S.024 / pH: x  Gluc: x / Ketone: Negative  / Bili: Negative / Urobili: Negative   Blood: x / Protein: Trace / Nitrite: Negative   Leuk Esterase: Negative / RBC: 15 /hpf / WBC 6 /hpf   Sq Epi: x / Non Sq Epi: 1 /hpf / Bacteria: Negative

## 2018-12-17 ENCOUNTER — RESULT REVIEW (OUTPATIENT)
Age: 63
End: 2018-12-17

## 2018-12-17 LAB
ALBUMIN SERPL ELPH-MCNC: 3 G/DL — LOW (ref 3.3–5)
ALP SERPL-CCNC: 134 U/L — HIGH (ref 40–120)
ALT FLD-CCNC: 22 U/L — SIGNIFICANT CHANGE UP (ref 10–45)
AMYLASE P1 CFR SERPL: 115 U/L — SIGNIFICANT CHANGE UP (ref 25–125)
ANION GAP SERPL CALC-SCNC: 12 MMOL/L — SIGNIFICANT CHANGE UP (ref 5–17)
APTT BLD: 29.4 SEC — SIGNIFICANT CHANGE UP (ref 27.5–36.3)
AST SERPL-CCNC: 26 U/L — SIGNIFICANT CHANGE UP (ref 10–40)
BILIRUB DIRECT SERPL-MCNC: 0.1 MG/DL — SIGNIFICANT CHANGE UP (ref 0–0.2)
BILIRUB INDIRECT FLD-MCNC: 0.2 MG/DL — SIGNIFICANT CHANGE UP (ref 0.2–1)
BILIRUB SERPL-MCNC: 0.3 MG/DL — SIGNIFICANT CHANGE UP (ref 0.2–1.2)
BUN SERPL-MCNC: 5 MG/DL — LOW (ref 7–23)
CALCIUM SERPL-MCNC: 9.1 MG/DL — SIGNIFICANT CHANGE UP (ref 8.4–10.5)
CHLORIDE SERPL-SCNC: 99 MMOL/L — SIGNIFICANT CHANGE UP (ref 96–108)
CO2 SERPL-SCNC: 27 MMOL/L — SIGNIFICANT CHANGE UP (ref 22–31)
CREAT SERPL-MCNC: 0.64 MG/DL — SIGNIFICANT CHANGE UP (ref 0.5–1.3)
GLUCOSE BLDC GLUCOMTR-MCNC: 120 MG/DL — HIGH (ref 70–99)
GLUCOSE BLDC GLUCOMTR-MCNC: 134 MG/DL — HIGH (ref 70–99)
GLUCOSE BLDC GLUCOMTR-MCNC: 144 MG/DL — HIGH (ref 70–99)
GLUCOSE SERPL-MCNC: 141 MG/DL — HIGH (ref 70–99)
HCT VFR BLD CALC: 24.7 % — LOW (ref 39–50)
HCT VFR BLD CALC: 25.2 % — LOW (ref 39–50)
HGB BLD-MCNC: 8 G/DL — LOW (ref 13–17)
HGB BLD-MCNC: 8.3 G/DL — LOW (ref 13–17)
INR BLD: 1.21 RATIO — HIGH (ref 0.88–1.16)
LIDOCAIN IGE QN: 347 U/L — HIGH (ref 7–60)
MAGNESIUM SERPL-MCNC: 2 MG/DL — SIGNIFICANT CHANGE UP (ref 1.6–2.6)
MCHC RBC-ENTMCNC: 31.7 GM/DL — LOW (ref 32–36)
MCHC RBC-ENTMCNC: 32 PG — SIGNIFICANT CHANGE UP (ref 27–34)
MCHC RBC-ENTMCNC: 33.1 PG — SIGNIFICANT CHANGE UP (ref 27–34)
MCHC RBC-ENTMCNC: 33.5 GM/DL — SIGNIFICANT CHANGE UP (ref 32–36)
MCV RBC AUTO: 100.8 FL — HIGH (ref 80–100)
MCV RBC AUTO: 98.8 FL — SIGNIFICANT CHANGE UP (ref 80–100)
PHOSPHATE SERPL-MCNC: 4.9 MG/DL — HIGH (ref 2.5–4.5)
PLATELET # BLD AUTO: 635 K/UL — HIGH (ref 150–400)
PLATELET # BLD AUTO: 651 K/UL — HIGH (ref 150–400)
POTASSIUM SERPL-MCNC: 4.3 MMOL/L — SIGNIFICANT CHANGE UP (ref 3.5–5.3)
POTASSIUM SERPL-SCNC: 4.3 MMOL/L — SIGNIFICANT CHANGE UP (ref 3.5–5.3)
PROT SERPL-MCNC: 7 G/DL — SIGNIFICANT CHANGE UP (ref 6–8.3)
PROTHROM AB SERPL-ACNC: 13.9 SEC — HIGH (ref 10–13.1)
RBC # BLD: 2.5 M/UL — LOW (ref 4.2–5.8)
RBC # BLD: 2.5 M/UL — LOW (ref 4.2–5.8)
RBC # FLD: 12.9 % — SIGNIFICANT CHANGE UP (ref 10.3–14.5)
RBC # FLD: 13.8 % — SIGNIFICANT CHANGE UP (ref 10.3–14.5)
SODIUM SERPL-SCNC: 138 MMOL/L — SIGNIFICANT CHANGE UP (ref 135–145)
WBC # BLD: 10.21 K/UL — SIGNIFICANT CHANGE UP (ref 3.8–10.5)
WBC # BLD: 10.5 K/UL — SIGNIFICANT CHANGE UP (ref 3.8–10.5)
WBC # FLD AUTO: 10.21 K/UL — SIGNIFICANT CHANGE UP (ref 3.8–10.5)
WBC # FLD AUTO: 10.5 K/UL — SIGNIFICANT CHANGE UP (ref 3.8–10.5)

## 2018-12-17 PROCEDURE — 99233 SBSQ HOSP IP/OBS HIGH 50: CPT

## 2018-12-17 PROCEDURE — 88305 TISSUE EXAM BY PATHOLOGIST: CPT | Mod: 26

## 2018-12-17 RX ORDER — INSULIN LISPRO 100/ML
VIAL (ML) SUBCUTANEOUS
Qty: 0 | Refills: 0 | Status: DISCONTINUED | OUTPATIENT
Start: 2018-12-17 | End: 2018-12-24

## 2018-12-17 RX ORDER — DEXTROSE 50 % IN WATER 50 %
25 SYRINGE (ML) INTRAVENOUS ONCE
Qty: 0 | Refills: 0 | Status: DISCONTINUED | OUTPATIENT
Start: 2018-12-17 | End: 2018-12-24

## 2018-12-17 RX ORDER — DEXTROSE 50 % IN WATER 50 %
15 SYRINGE (ML) INTRAVENOUS ONCE
Qty: 0 | Refills: 0 | Status: DISCONTINUED | OUTPATIENT
Start: 2018-12-17 | End: 2018-12-24

## 2018-12-17 RX ORDER — GLUCAGON INJECTION, SOLUTION 0.5 MG/.1ML
1 INJECTION, SOLUTION SUBCUTANEOUS ONCE
Qty: 0 | Refills: 0 | Status: DISCONTINUED | OUTPATIENT
Start: 2018-12-17 | End: 2018-12-24

## 2018-12-17 RX ORDER — DEXTROSE 50 % IN WATER 50 %
12.5 SYRINGE (ML) INTRAVENOUS ONCE
Qty: 0 | Refills: 0 | Status: DISCONTINUED | OUTPATIENT
Start: 2018-12-17 | End: 2018-12-24

## 2018-12-17 RX ORDER — INSULIN LISPRO 100/ML
VIAL (ML) SUBCUTANEOUS EVERY 6 HOURS
Qty: 0 | Refills: 0 | Status: DISCONTINUED | OUTPATIENT
Start: 2018-12-17 | End: 2018-12-17

## 2018-12-17 RX ORDER — INSULIN LISPRO 100/ML
VIAL (ML) SUBCUTANEOUS AT BEDTIME
Qty: 0 | Refills: 0 | Status: DISCONTINUED | OUTPATIENT
Start: 2018-12-17 | End: 2018-12-24

## 2018-12-17 RX ORDER — SODIUM CHLORIDE 9 MG/ML
1000 INJECTION, SOLUTION INTRAVENOUS
Qty: 0 | Refills: 0 | Status: DISCONTINUED | OUTPATIENT
Start: 2018-12-17 | End: 2018-12-24

## 2018-12-17 RX ORDER — CLONAZEPAM 1 MG
0.5 TABLET ORAL DAILY
Qty: 0 | Refills: 0 | Status: DISCONTINUED | OUTPATIENT
Start: 2018-12-17 | End: 2018-12-18

## 2018-12-17 RX ADMIN — DEXTROSE MONOHYDRATE, SODIUM CHLORIDE, AND POTASSIUM CHLORIDE 75 MILLILITER(S): 50; .745; 4.5 INJECTION, SOLUTION INTRAVENOUS at 22:26

## 2018-12-17 RX ADMIN — PANTOPRAZOLE SODIUM 40 MILLIGRAM(S): 20 TABLET, DELAYED RELEASE ORAL at 06:10

## 2018-12-17 RX ADMIN — PANTOPRAZOLE SODIUM 40 MILLIGRAM(S): 20 TABLET, DELAYED RELEASE ORAL at 19:09

## 2018-12-17 RX ADMIN — Medication 3 MILLILITER(S): at 02:04

## 2018-12-17 RX ADMIN — Medication 3 MILLILITER(S): at 06:10

## 2018-12-17 RX ADMIN — Medication 25 MILLIGRAM(S): at 19:09

## 2018-12-17 RX ADMIN — Medication 5 MILLIGRAM(S): at 22:25

## 2018-12-17 RX ADMIN — Medication 3 MILLILITER(S): at 19:09

## 2018-12-17 RX ADMIN — Medication 0: at 22:25

## 2018-12-17 RX ADMIN — Medication 0.5 MILLIGRAM(S): at 22:25

## 2018-12-17 RX ADMIN — Medication 25 MILLIGRAM(S): at 06:10

## 2018-12-17 RX ADMIN — LISINOPRIL 20 MILLIGRAM(S): 2.5 TABLET ORAL at 06:10

## 2018-12-17 RX ADMIN — QUETIAPINE FUMARATE 100 MILLIGRAM(S): 200 TABLET, FILM COATED ORAL at 22:25

## 2018-12-17 RX ADMIN — ESCITALOPRAM OXALATE 20 MILLIGRAM(S): 10 TABLET, FILM COATED ORAL at 22:25

## 2018-12-17 NOTE — PROGRESS NOTE ADULT - SUBJECTIVE AND OBJECTIVE BOX
Surgery Progress Note    S: Patient seen and examined. No acute events overnight. Patient's H/H remain stable with q6h checks. Patient remains intermittently confused and required re-orientation. Patient denies pain. Patient is NPO for colonoscopy today and was prepped o/n.     O:  Vital Signs Last 24 Hrs  T(C): 36.7 (17 Dec 2018 06:01), Max: 37.4 (16 Dec 2018 14:03)  T(F): 98 (17 Dec 2018 06:01), Max: 99.3 (16 Dec 2018 14:03)  HR: 84 (17 Dec 2018 09:07) (84 - 100)  BP: 130/76 (17 Dec 2018 06:01) (130/74 - 135/76)  BP(mean): --  RR: 18 (17 Dec 2018 06:01) (18 - 18)  SpO2: 92% (17 Dec 2018 09:07) (92% - 98%)    I&O's Detail    16 Dec 2018 07:01  -  17 Dec 2018 07:00  --------------------------------------------------------  IN:    dextrose 5% + sodium chloride 0.45% with potassium chloride 20 mEq/L: 1800 mL    Oral Fluid: 720 mL  Total IN: 2520 mL    OUT:    Voided: 3400 mL  Total OUT: 3400 mL    Total NET: -880 mL          MEDICATIONS  (STANDING):  ALBUTerol/ipratropium for Nebulization 3 milliLiter(s) Nebulizer every 6 hours  anastrozole 1 milliGRAM(s) Oral daily  bisacodyl 5 milliGRAM(s) Oral at bedtime  clonazePAM Tablet 0.5 milliGRAM(s) Oral two times a day  dextrose 5% + sodium chloride 0.45% with potassium chloride 20 mEq/L 1000 milliLiter(s) (75 mL/Hr) IV Continuous <Continuous>  dextrose 5%. 1000 milliLiter(s) (50 mL/Hr) IV Continuous <Continuous>  dextrose 50% Injectable 12.5 Gram(s) IV Push once  dextrose 50% Injectable 25 Gram(s) IV Push once  dextrose 50% Injectable 25 Gram(s) IV Push once  escitalopram 20 milliGRAM(s) Oral at bedtime  folic acid 1 milliGRAM(s) Oral daily  insulin lispro (HumaLOG) corrective regimen sliding scale   SubCutaneous every 6 hours  lisinopril 20 milliGRAM(s) Oral daily  metoprolol tartrate 25 milliGRAM(s) Oral two times a day  multivitamin 1 Tablet(s) Oral daily  pantoprazole  Injectable 40 milliGRAM(s) IV Push two times a day  QUEtiapine 100 milliGRAM(s) Oral at bedtime  thiamine 100 milliGRAM(s) Oral daily    MEDICATIONS  (PRN):  acetaminophen   Tablet .. 650 milliGRAM(s) Oral every 6 hours PRN Mild Pain (1 - 3)  dextrose 40% Gel 15 Gram(s) Oral once PRN Blood Glucose LESS THAN 70 milliGRAM(s)/deciliter  glucagon  Injectable 1 milliGRAM(s) IntraMuscular once PRN Glucose LESS THAN 70 milligrams/deciliter  oxyCODONE    IR 5 milliGRAM(s) Oral every 6 hours PRN Moderate Pain (4 - 6)  oxyCODONE    IR 10 milliGRAM(s) Oral every 6 hours PRN Severe Pain (7 - 10)                            8.0    10.21 )-----------( 651      ( 17 Dec 2018 09:18 )             25.2       12-17    138  |  99  |  5<L>  ----------------------------<  141<H>  4.3   |  27  |  0.64    Ca    9.1      17 Dec 2018 07:08  Phos  4.9     12-17  Mg     2.0     12-17    TPro  7.0  /  Alb  3.0<L>  /  TBili  0.3  /  DBili  0.1  /  AST  26  /  ALT  22  /  AlkPhos  134<H>  12-17      Physical Exam:  Gen: Laying in bed, NAD, AAOx2 (person, and place)  Resp: Unlabored breathing on facemask  Abd: soft, NTND, no rebound or guarding  Ext: WWP  Skin: No rashes

## 2018-12-17 NOTE — PROGRESS NOTE ADULT - ASSESSMENT
63y Male PMHX of alcohol induced pancreatitis earlier this year, alcohol abuse (1/3 quart of vodka daily), DM, HLD, HTN, Depression, ROSS on CPAP who presents as a transfer from [facility] for management of acute pancreatitis complicated by necrosis and enlarging abdominal fluid collections, as well as persistent delirium.

## 2018-12-17 NOTE — PROGRESS NOTE ADULT - ASSESSMENT
63M transferred from [place] with acute/subacute pancreatitis with necrosis and fluid collection around the lesser sac, repeat CTs (12/10, 12/13) grossly stable.  NJ tube removed with unknown mechanism, now NPO for colonoscopy today    - Pain control as needed  - NPO for colonoscopy today  - CLD with ensure clears after colonoscopy  - Continue to monitor for bleeding, vital signs  - Continue nightly bipap, monitor respiratory status  - Trend CBC  - Insulin sliding scale  - DVT ppx currently being held, will re-start after colonoscopy  - OOB and ambulating as tolerated    Trauma Surgery  [phone]

## 2018-12-17 NOTE — PROGRESS NOTE ADULT - PROBLEM SELECTOR PLAN 2
Likely hospital delirium in the setting of prolonged hospital stay with critical illness, with possible component of benzodiazepine effect  - recommend tapering off klonopin- would decrease to 0.5mg po daily x 2 days, then keep only as PRN  - conservative measures to decrease delirium: PT eval and ambulation, OOB, frequent reorientation, minimize sleep disturbances at night, continue to taper off any deleriogenic medications such as benzos  - continue seroquel 100mg po qhs for now- if persists, would consider stopping this as well  - if worsens, would obtain ABG as patient with ROSS and may have hypoventilation syndrome, would consider head CT as well.

## 2018-12-17 NOTE — PROGRESS NOTE ADULT - SUBJECTIVE AND OBJECTIVE BOX
Patient is a 63y old  Male who presents with a chief complaint of acute pancreatitis (17 Dec 2018 12:39)  Subjective: No acute events overnight. No further bleeding episodes noted. No fevers/chills, but does note new nonproductive cough, which exacerbates his abdominal pain. No nausea/vomiting, no cp/palpitations. No sob. Tolerating clears however had been NPO since midnight for planned colonoscopy today. He is disappointed in himself and considered this illness a wake up call to quit drinking.    14 point ros otherwise negative.     VITAL SIGNS:  Vital Signs Last 24 Hrs  T(C): 37.2 (17 Dec 2018 10:17), Max: 37.4 (16 Dec 2018 14:03)  T(F): 99 (17 Dec 2018 10:17), Max: 99.3 (16 Dec 2018 14:03)  HR: 86 (17 Dec 2018 10:57) (84 - 100)  BP: 127/74 (17 Dec 2018 10:57) (16/69 - 135/76)  BP(mean): --  RR: 18 (17 Dec 2018 10:17) (18 - 18)  SpO2: 94% (17 Dec 2018 10:57) (92% - 98%)      PHYSICAL EXAM:     GENERAL: no acute distress  HEENT: PERRLA, EOMI, moist oropharynx   RESPIRATORY: +R basilar crackles, +L basilar decreased breath sounds  CARDIOVASCULAR: RRR, no murmurs, gallops, rubs  ABDOMINAL: softly distended, mild LUQ tenderness, +mass-like firmness palpable in LUQ, positive bowel sounds   EXTREMITIES: no clubbing, cyanosis, +trace pitting edema  NEUROLOGICAL: alert and oriented x 2, non-focal deficits, less delirious today  SKIN: no rashes or lesions   MUSCULOSKELETAL: no gross joint deformity                          8.0    10.21 )-----------( 651      ( 17 Dec 2018 09:18 )             25.2     12-17    138  |  99  |  5<L>  ----------------------------<  141<H>  4.3   |  27  |  0.64    Ca    9.1      17 Dec 2018 07:08  Phos  4.9     12-17  Mg     2.0     12-17    TPro  7.0  /  Alb  3.0<L>  /  TBili  0.3  /  DBili  0.1  /  AST  26  /  ALT  22  /  AlkPhos  134<H>  12-17      CAPILLARY BLOOD GLUCOSE      POCT Blood Glucose.: 131 mg/dL (16 Dec 2018 21:35)  POCT Blood Glucose.: 131 mg/dL (16 Dec 2018 18:35)      MEDICATIONS  (STANDING):  ALBUTerol/ipratropium for Nebulization 3 milliLiter(s) Nebulizer every 6 hours  anastrozole 1 milliGRAM(s) Oral daily  bisacodyl 5 milliGRAM(s) Oral at bedtime  clonazePAM Tablet 0.5 milliGRAM(s) Oral two times a day  dextrose 5% + sodium chloride 0.45% with potassium chloride 20 mEq/L 1000 milliLiter(s) (75 mL/Hr) IV Continuous <Continuous>  dextrose 5%. 1000 milliLiter(s) (50 mL/Hr) IV Continuous <Continuous>  dextrose 50% Injectable 12.5 Gram(s) IV Push once  dextrose 50% Injectable 25 Gram(s) IV Push once  dextrose 50% Injectable 25 Gram(s) IV Push once  escitalopram 20 milliGRAM(s) Oral at bedtime  folic acid 1 milliGRAM(s) Oral daily  insulin lispro (HumaLOG) corrective regimen sliding scale   SubCutaneous every 6 hours  lisinopril 20 milliGRAM(s) Oral daily  metoprolol tartrate 25 milliGRAM(s) Oral two times a day  multivitamin 1 Tablet(s) Oral daily  pantoprazole  Injectable 40 milliGRAM(s) IV Push two times a day  QUEtiapine 100 milliGRAM(s) Oral at bedtime  thiamine 100 milliGRAM(s) Oral daily    MEDICATIONS  (PRN):  acetaminophen   Tablet .. 650 milliGRAM(s) Oral every 6 hours PRN Mild Pain (1 - 3)  dextrose 40% Gel 15 Gram(s) Oral once PRN Blood Glucose LESS THAN 70 milliGRAM(s)/deciliter  glucagon  Injectable 1 milliGRAM(s) IntraMuscular once PRN Glucose LESS THAN 70 milligrams/deciliter  oxyCODONE    IR 5 milliGRAM(s) Oral every 6 hours PRN Moderate Pain (4 - 6)  oxyCODONE    IR 10 milliGRAM(s) Oral every 6 hours PRN Severe Pain (7 - 10)

## 2018-12-17 NOTE — PROGRESS NOTE ADULT - ATTENDING COMMENTS
seen and examined 12-17-18 @ 1135    was tolerating clears, but now NPO for colonoscopy  c/o dry cough, worsened upon deep inspiration    lungs CTA bilat, but decreased breath sounds in left posterior lung fields  soft / mild epigastric tenderness / ND  palpable LUQ phlegmon      acute alcoholic pancreatitis with acute necrotic collections and no evidence of infected necrosis  -add Ensure  -will replace NJ tube if he does not tolerate adequate oral nutrition    left pleural effusion with compressive atelectasis  -incentive spirometry  -thoracentesis not indicated at this time    LGIB from ascending colon ulceration  -no evidence of significant ongoing bleeding  -f/u biopsies

## 2018-12-17 NOTE — PROGRESS NOTE ADULT - PROBLEM SELECTOR PLAN 10
- ppx: lovenox  - diet: NPO for colonoscopy today  - dispo: pending clinical progress    Problem 11: Low testosterone- likely 2/2 alcohol abuse  - pt has been prescribed anastrozole 1mg po daily, will continue for now  - continue OP follow up for repeat T testing

## 2018-12-17 NOTE — PROGRESS NOTE ADULT - SUBJECTIVE AND OBJECTIVE BOX
Pre-Endoscopy Evaluation      Referring Physician: dr. nallely sanders                                 Procedure: colonoscopy    Indication for Procedure: gib    Pertinent History: 63y male transferred from [place] with acute/subacute pancreatitis with necrosis and fluid collection around the lesser sac, repeat CTs (12/10, 12/1      Sedation by Anesthesia [X]    PAST MEDICAL & SURGICAL HISTORY:  Diabetes: type 2  ROSS (obstructive sleep apnea)  Depression, unspecified depression type  Alcohol use disorder, severe, dependence  Alcohol-induced acute pancreatitis, unspecified complication status  Essential hypertension  No significant past surgical history      PMH of Gastroparesis [ ]  Gastric Surgery [ ]  Gastric Outlet Obstruction [ ]    Allergies:    No Known Allergies    Intolerances:    Latex allergy: [ ] yes [X] no    Medications:MEDICATIONS  (STANDING):  ALBUTerol/ipratropium for Nebulization 3 milliLiter(s) Nebulizer every 6 hours  anastrozole 1 milliGRAM(s) Oral daily  bisacodyl 5 milliGRAM(s) Oral at bedtime  clonazePAM Tablet 0.5 milliGRAM(s) Oral two times a day  dextrose 5% + sodium chloride 0.45% with potassium chloride 20 mEq/L 1000 milliLiter(s) (75 mL/Hr) IV Continuous <Continuous>  dextrose 5%. 1000 milliLiter(s) (50 mL/Hr) IV Continuous <Continuous>  dextrose 50% Injectable 12.5 Gram(s) IV Push once  dextrose 50% Injectable 25 Gram(s) IV Push once  dextrose 50% Injectable 25 Gram(s) IV Push once  escitalopram 20 milliGRAM(s) Oral at bedtime  folic acid 1 milliGRAM(s) Oral daily  insulin lispro (HumaLOG) corrective regimen sliding scale   SubCutaneous every 6 hours  lisinopril 20 milliGRAM(s) Oral daily  metoprolol tartrate 25 milliGRAM(s) Oral two times a day  multivitamin 1 Tablet(s) Oral daily  pantoprazole  Injectable 40 milliGRAM(s) IV Push two times a day  QUEtiapine 100 milliGRAM(s) Oral at bedtime  thiamine 100 milliGRAM(s) Oral daily    MEDICATIONS  (PRN):  acetaminophen   Tablet .. 650 milliGRAM(s) Oral every 6 hours PRN Mild Pain (1 - 3)  dextrose 40% Gel 15 Gram(s) Oral once PRN Blood Glucose LESS THAN 70 milliGRAM(s)/deciliter  glucagon  Injectable 1 milliGRAM(s) IntraMuscular once PRN Glucose LESS THAN 70 milligrams/deciliter  oxyCODONE    IR 5 milliGRAM(s) Oral every 6 hours PRN Moderate Pain (4 - 6)  oxyCODONE    IR 10 milliGRAM(s) Oral every 6 hours PRN Severe Pain (7 - 10)      Smoking: [ ] yes  [X] no    AICD/PPM: [ ] yes   [X] no    Pertinent lab data:                        8.0    10.21 )-----------( 651      ( 17 Dec 2018 09:18 )             25.2     12-17    138  |  99  |  5<L>  ----------------------------<  141<H>  4.3   |  27  |  0.64    Ca    9.1      17 Dec 2018 07:08  Phos  4.9     12-17  Mg     2.0     12-17    TPro  7.0  /  Alb  3.0<L>  /  TBili  0.3  /  DBili  0.1  /  AST  26  /  ALT  22  /  AlkPhos  134<H>  12-17    PT/INR - ( 17 Dec 2018 09:12 )   PT: 13.9 sec;   INR: 1.21 ratio         PTT - ( 17 Dec 2018 09:12 )  PTT:29.4 sec    CAPILLARY BLOOD GLUCOSE  POCT Blood Glucose.: 131 mg/dL (16 Dec 2018 21:35)      Physical Examination:    Daily   Vital Signs Last 24 Hrs  T(C): 37.2 (17 Dec 2018 10:17), Max: 37.4 (16 Dec 2018 14:03)  T(F): 99 (17 Dec 2018 10:17), Max: 99.3 (16 Dec 2018 14:03)  HR: 86 (17 Dec 2018 10:57) (84 - 100)  BP: 127/74 (17 Dec 2018 10:57) (16/69 - 135/76)  BP(mean): --  RR: 18 (17 Dec 2018 10:17) (18 - 18)  SpO2: 94% (17 Dec 2018 10:57) (92% - 98%)    Drug Dosing Weight  Weight (kg): 108.8 (06 Dec 2018 21:00)    Constitutional: NAD     Neck:  No JVD    Respiratory: CTAB/L    Cardiovascular: S1 and S2    Gastrointestinal: BS+, soft, NT/ND    Extremities: No peripheral edema    : No Holder    Skin: No rashes    Comments:    ASA Class: I [ ]  II [ ]  III [x]  IV [ ]    The patient is a suitable candidate for the planned procedure unless box checked [ ]  No, explain: Pre-Endoscopy Evaluation      Referring Physician: dr. nallely sanders                                 Procedure: colonoscopy    Indication for Procedure: brbpr    Pertinent History: 63y Male PMHX of alcohol induced pancreatitis earlier this year, alcohol abuse (1/3 quart of vodka daily), DM, HLD, HTN, Depression, ROSS on CPAP who presents as a transfer from [facility] for management of acute pancreatitis complicated by necrosis and enlarging abdominal fluid collections, as well as persistent delirium now with LGIB      Sedation by Anesthesia [X]    PAST MEDICAL & SURGICAL HISTORY:  Diabetes: type 2  ROSS (obstructive sleep apnea)  Depression, unspecified depression type  Alcohol use disorder, severe, dependence  Alcohol-induced acute pancreatitis, unspecified complication status  Essential hypertension  No significant past surgical history      PMH of Gastroparesis [ ]  Gastric Surgery [ ]  Gastric Outlet Obstruction [ ]    Allergies:    No Known Allergies    Intolerances:    Latex allergy: [ ] yes [X] no    Medications:MEDICATIONS  (STANDING):  ALBUTerol/ipratropium for Nebulization 3 milliLiter(s) Nebulizer every 6 hours  anastrozole 1 milliGRAM(s) Oral daily  bisacodyl 5 milliGRAM(s) Oral at bedtime  clonazePAM Tablet 0.5 milliGRAM(s) Oral two times a day  dextrose 5% + sodium chloride 0.45% with potassium chloride 20 mEq/L 1000 milliLiter(s) (75 mL/Hr) IV Continuous <Continuous>  dextrose 5%. 1000 milliLiter(s) (50 mL/Hr) IV Continuous <Continuous>  dextrose 50% Injectable 12.5 Gram(s) IV Push once  dextrose 50% Injectable 25 Gram(s) IV Push once  dextrose 50% Injectable 25 Gram(s) IV Push once  escitalopram 20 milliGRAM(s) Oral at bedtime  folic acid 1 milliGRAM(s) Oral daily  insulin lispro (HumaLOG) corrective regimen sliding scale   SubCutaneous every 6 hours  lisinopril 20 milliGRAM(s) Oral daily  metoprolol tartrate 25 milliGRAM(s) Oral two times a day  multivitamin 1 Tablet(s) Oral daily  pantoprazole  Injectable 40 milliGRAM(s) IV Push two times a day  QUEtiapine 100 milliGRAM(s) Oral at bedtime  thiamine 100 milliGRAM(s) Oral daily    MEDICATIONS  (PRN):  acetaminophen   Tablet .. 650 milliGRAM(s) Oral every 6 hours PRN Mild Pain (1 - 3)  dextrose 40% Gel 15 Gram(s) Oral once PRN Blood Glucose LESS THAN 70 milliGRAM(s)/deciliter  glucagon  Injectable 1 milliGRAM(s) IntraMuscular once PRN Glucose LESS THAN 70 milligrams/deciliter  oxyCODONE    IR 5 milliGRAM(s) Oral every 6 hours PRN Moderate Pain (4 - 6)  oxyCODONE    IR 10 milliGRAM(s) Oral every 6 hours PRN Severe Pain (7 - 10)      Smoking: [ ] yes  [X] no    AICD/PPM: [ ] yes   [X] no    Pertinent lab data:                        8.0    10.21 )-----------( 651      ( 17 Dec 2018 09:18 )             25.2     12-17    138  |  99  |  5<L>  ----------------------------<  141<H>  4.3   |  27  |  0.64    Ca    9.1      17 Dec 2018 07:08  Phos  4.9     12-17  Mg     2.0     12-17    TPro  7.0  /  Alb  3.0<L>  /  TBili  0.3  /  DBili  0.1  /  AST  26  /  ALT  22  /  AlkPhos  134<H>  12-17    PT/INR - ( 17 Dec 2018 09:12 )   PT: 13.9 sec;   INR: 1.21 ratio         PTT - ( 17 Dec 2018 09:12 )  PTT:29.4 sec    CAPILLARY BLOOD GLUCOSE  POCT Blood Glucose.: 131 mg/dL (16 Dec 2018 21:35)      Physical Examination:    Daily   Vital Signs Last 24 Hrs  T(C): 37.2 (17 Dec 2018 10:17), Max: 37.4 (16 Dec 2018 14:03)  T(F): 99 (17 Dec 2018 10:17), Max: 99.3 (16 Dec 2018 14:03)  HR: 86 (17 Dec 2018 10:57) (84 - 100)  BP: 127/74 (17 Dec 2018 10:57) (16/69 - 135/76)  BP(mean): --  RR: 18 (17 Dec 2018 10:17) (18 - 18)  SpO2: 94% (17 Dec 2018 10:57) (92% - 98%)    Drug Dosing Weight  Weight (kg): 108.8 (06 Dec 2018 21:00)    Constitutional: NAD     Neck:  No JVD    Respiratory: CTAB/L    Cardiovascular: S1 and S2    Gastrointestinal: BS+, soft, NT/ND    Extremities: No peripheral edema    : No Holder    Skin: No rashes    Comments:    ASA Class: I [ ]  II [ ]  III [x]  IV [ ]    The patient is a suitable candidate for the planned procedure unless box checked [ ]  No, explain:

## 2018-12-18 LAB
ALBUMIN SERPL ELPH-MCNC: 3.1 G/DL — LOW (ref 3.3–5)
ALP SERPL-CCNC: 126 U/L — HIGH (ref 40–120)
ALT FLD-CCNC: 18 U/L — SIGNIFICANT CHANGE UP (ref 10–45)
AMYLASE P1 CFR SERPL: 106 U/L — SIGNIFICANT CHANGE UP (ref 25–125)
ANION GAP SERPL CALC-SCNC: 12 MMOL/L — SIGNIFICANT CHANGE UP (ref 5–17)
AST SERPL-CCNC: 20 U/L — SIGNIFICANT CHANGE UP (ref 10–40)
BILIRUB DIRECT SERPL-MCNC: <0.1 MG/DL — SIGNIFICANT CHANGE UP (ref 0–0.2)
BILIRUB INDIRECT FLD-MCNC: >0.2 MG/DL — SIGNIFICANT CHANGE UP (ref 0.2–1)
BILIRUB SERPL-MCNC: 0.3 MG/DL — SIGNIFICANT CHANGE UP (ref 0.2–1.2)
BUN SERPL-MCNC: <4 MG/DL — LOW (ref 7–23)
CALCIUM SERPL-MCNC: 9.3 MG/DL — SIGNIFICANT CHANGE UP (ref 8.4–10.5)
CHLORIDE SERPL-SCNC: 97 MMOL/L — SIGNIFICANT CHANGE UP (ref 96–108)
CO2 SERPL-SCNC: 25 MMOL/L — SIGNIFICANT CHANGE UP (ref 22–31)
CREAT SERPL-MCNC: 0.6 MG/DL — SIGNIFICANT CHANGE UP (ref 0.5–1.3)
GLUCOSE BLDC GLUCOMTR-MCNC: 106 MG/DL — HIGH (ref 70–99)
GLUCOSE BLDC GLUCOMTR-MCNC: 113 MG/DL — HIGH (ref 70–99)
GLUCOSE BLDC GLUCOMTR-MCNC: 151 MG/DL — HIGH (ref 70–99)
GLUCOSE BLDC GLUCOMTR-MCNC: 155 MG/DL — HIGH (ref 70–99)
GLUCOSE SERPL-MCNC: 143 MG/DL — HIGH (ref 70–99)
HCT VFR BLD CALC: 24.4 % — LOW (ref 39–50)
HGB BLD-MCNC: 7.9 G/DL — LOW (ref 13–17)
LIDOCAIN IGE QN: 330 U/L — HIGH (ref 7–60)
MAGNESIUM SERPL-MCNC: 1.8 MG/DL — SIGNIFICANT CHANGE UP (ref 1.6–2.6)
MCHC RBC-ENTMCNC: 31.5 PG — SIGNIFICANT CHANGE UP (ref 27–34)
MCHC RBC-ENTMCNC: 32.4 GM/DL — SIGNIFICANT CHANGE UP (ref 32–36)
MCV RBC AUTO: 97.2 FL — SIGNIFICANT CHANGE UP (ref 80–100)
PHOSPHATE SERPL-MCNC: 4.8 MG/DL — HIGH (ref 2.5–4.5)
PLATELET # BLD AUTO: 655 K/UL — HIGH (ref 150–400)
POTASSIUM SERPL-MCNC: 3.8 MMOL/L — SIGNIFICANT CHANGE UP (ref 3.5–5.3)
POTASSIUM SERPL-SCNC: 3.8 MMOL/L — SIGNIFICANT CHANGE UP (ref 3.5–5.3)
PROT SERPL-MCNC: 7.1 G/DL — SIGNIFICANT CHANGE UP (ref 6–8.3)
RBC # BLD: 2.51 M/UL — LOW (ref 4.2–5.8)
RBC # FLD: 14 % — SIGNIFICANT CHANGE UP (ref 10.3–14.5)
SODIUM SERPL-SCNC: 134 MMOL/L — LOW (ref 135–145)
WBC # BLD: 9.78 K/UL — SIGNIFICANT CHANGE UP (ref 3.8–10.5)
WBC # FLD AUTO: 9.78 K/UL — SIGNIFICANT CHANGE UP (ref 3.8–10.5)

## 2018-12-18 PROCEDURE — 99233 SBSQ HOSP IP/OBS HIGH 50: CPT

## 2018-12-18 PROCEDURE — 93010 ELECTROCARDIOGRAM REPORT: CPT

## 2018-12-18 RX ORDER — MAGNESIUM SULFATE 500 MG/ML
1 VIAL (ML) INJECTION ONCE
Qty: 0 | Refills: 0 | Status: COMPLETED | OUTPATIENT
Start: 2018-12-18 | End: 2018-12-18

## 2018-12-18 RX ORDER — PANTOPRAZOLE SODIUM 20 MG/1
40 TABLET, DELAYED RELEASE ORAL
Qty: 0 | Refills: 0 | Status: DISCONTINUED | OUTPATIENT
Start: 2018-12-18 | End: 2018-12-21

## 2018-12-18 RX ORDER — CLONAZEPAM 1 MG
0.5 TABLET ORAL AT BEDTIME
Qty: 0 | Refills: 0 | Status: DISCONTINUED | OUTPATIENT
Start: 2018-12-18 | End: 2018-12-23

## 2018-12-18 RX ORDER — ENOXAPARIN SODIUM 100 MG/ML
40 INJECTION SUBCUTANEOUS DAILY
Qty: 0 | Refills: 0 | Status: DISCONTINUED | OUTPATIENT
Start: 2018-12-18 | End: 2018-12-24

## 2018-12-18 RX ORDER — ONDANSETRON 8 MG/1
4 TABLET, FILM COATED ORAL EVERY 8 HOURS
Qty: 0 | Refills: 0 | Status: DISCONTINUED | OUTPATIENT
Start: 2018-12-18 | End: 2018-12-24

## 2018-12-18 RX ORDER — CLONAZEPAM 1 MG
0.5 TABLET ORAL DAILY
Qty: 0 | Refills: 0 | Status: DISCONTINUED | OUTPATIENT
Start: 2018-12-18 | End: 2018-12-23

## 2018-12-18 RX ORDER — IPRATROPIUM/ALBUTEROL SULFATE 18-103MCG
3 AEROSOL WITH ADAPTER (GRAM) INHALATION EVERY 6 HOURS
Qty: 0 | Refills: 0 | Status: DISCONTINUED | OUTPATIENT
Start: 2018-12-18 | End: 2018-12-24

## 2018-12-18 RX ADMIN — PANTOPRAZOLE SODIUM 40 MILLIGRAM(S): 20 TABLET, DELAYED RELEASE ORAL at 18:09

## 2018-12-18 RX ADMIN — Medication 1 MILLIGRAM(S): at 13:11

## 2018-12-18 RX ADMIN — Medication 0.5 MILLIGRAM(S): at 21:56

## 2018-12-18 RX ADMIN — ANASTROZOLE 1 MILLIGRAM(S): 1 TABLET ORAL at 13:11

## 2018-12-18 RX ADMIN — LISINOPRIL 20 MILLIGRAM(S): 2.5 TABLET ORAL at 06:30

## 2018-12-18 RX ADMIN — Medication 3 MILLILITER(S): at 06:30

## 2018-12-18 RX ADMIN — ENOXAPARIN SODIUM 40 MILLIGRAM(S): 100 INJECTION SUBCUTANEOUS at 13:12

## 2018-12-18 RX ADMIN — Medication 25 MILLIGRAM(S): at 18:09

## 2018-12-18 RX ADMIN — Medication 1 TABLET(S): at 13:11

## 2018-12-18 RX ADMIN — Medication 0: at 21:57

## 2018-12-18 RX ADMIN — Medication 1: at 13:24

## 2018-12-18 RX ADMIN — Medication 1: at 09:18

## 2018-12-18 RX ADMIN — Medication 0: at 19:24

## 2018-12-18 RX ADMIN — QUETIAPINE FUMARATE 100 MILLIGRAM(S): 200 TABLET, FILM COATED ORAL at 21:57

## 2018-12-18 RX ADMIN — PANTOPRAZOLE SODIUM 40 MILLIGRAM(S): 20 TABLET, DELAYED RELEASE ORAL at 06:30

## 2018-12-18 RX ADMIN — ESCITALOPRAM OXALATE 20 MILLIGRAM(S): 10 TABLET, FILM COATED ORAL at 21:57

## 2018-12-18 RX ADMIN — Medication 25 MILLIGRAM(S): at 06:30

## 2018-12-18 RX ADMIN — Medication 100 MILLIGRAM(S): at 13:11

## 2018-12-18 RX ADMIN — Medication 100 GRAM(S): at 10:56

## 2018-12-18 RX ADMIN — Medication 5 MILLIGRAM(S): at 21:57

## 2018-12-18 NOTE — PROGRESS NOTE ADULT - SUBJECTIVE AND OBJECTIVE BOX
Interval Events: s/p colonoscopy with mucosal ulceration. Biopsied.  - Internal hemorrhoids.  - Await pathology results.    Pt seen and examined. mental status improving slowly. Pt admits to gernalized abdominal discomfort. He denies n/v. Tolerating CLD.  + brown bm this morning as per RN.                                                                                                                    MEDICATIONS  (STANDING):  anastrozole 1 milliGRAM(s) Oral daily  bisacodyl 5 milliGRAM(s) Oral at bedtime  clonazePAM Tablet 0.5 milliGRAM(s) Oral daily  dextrose 5% + sodium chloride 0.45% with potassium chloride 20 mEq/L 1000 milliLiter(s) (75 mL/Hr) IV Continuous <Continuous>  dextrose 5%. 1000 milliLiter(s) (50 mL/Hr) IV Continuous <Continuous>  dextrose 50% Injectable 12.5 Gram(s) IV Push once  dextrose 50% Injectable 25 Gram(s) IV Push once  dextrose 50% Injectable 25 Gram(s) IV Push once  enoxaparin Injectable 40 milliGRAM(s) SubCutaneous daily  escitalopram 20 milliGRAM(s) Oral at bedtime  folic acid 1 milliGRAM(s) Oral daily  insulin lispro (HumaLOG) corrective regimen sliding scale   SubCutaneous three times a day before meals  insulin lispro (HumaLOG) corrective regimen sliding scale   SubCutaneous at bedtime  lisinopril 20 milliGRAM(s) Oral daily  metoprolol tartrate 25 milliGRAM(s) Oral two times a day  multivitamin 1 Tablet(s) Oral daily  pantoprazole  Injectable 40 milliGRAM(s) IV Push two times a day  QUEtiapine 100 milliGRAM(s) Oral at bedtime  thiamine 100 milliGRAM(s) Oral daily    MEDICATIONS  (PRN):  acetaminophen   Tablet .. 650 milliGRAM(s) Oral every 6 hours PRN Mild Pain (1 - 3)  ALBUTerol/ipratropium for Nebulization 3 milliLiter(s) Nebulizer every 6 hours PRN Shortness of Breath and/or Wheezing  dextrose 40% Gel 15 Gram(s) Oral once PRN Blood Glucose LESS THAN 70 milliGRAM(s)/deciliter  glucagon  Injectable 1 milliGRAM(s) IntraMuscular once PRN Glucose LESS THAN 70 milligrams/deciliter  oxyCODONE    IR 5 milliGRAM(s) Oral every 6 hours PRN Moderate Pain (4 - 6)  oxyCODONE    IR 10 milliGRAM(s) Oral every 6 hours PRN Severe Pain (7 - 10)      Allergies    No Known Allergies    Intolerances        Review of Systems:    General:  No wt loss, fevers, chills, night sweats,fatigue,   Eyes:  Good vision, no reported pain  ENT:  No sore throat, pain, runny nose, dysphagia  CV:  No pain, palpitations, hypo/hypertension  Resp:  No dyspnea, cough, tachypnea, wheezing  GI:  No pain, No nausea, No vomiting, No diarrhea, No constipation, No weight loss, No fever, No pruritis, No rectal bleeding, No melena, No dysphagia  :  No pain, bleeding, incontinence, nocturia  Muscle:  No pain, weakness  Neuro:  No weakness, tingling, memory problems  Psych:  No fatigue, insomnia, mood problems, depression  Endocrine:  No polyuria, polydypsia, cold/heat intolerance  Heme:  No petechiae, ecchymosis, easy bruisability  Skin:  No rash, tattoos, scars, edema      Vital Signs Last 24 Hrs  T(C): 36.4 (18 Dec 2018 06:24), Max: 37.4 (17 Dec 2018 13:48)  T(F): 97.5 (18 Dec 2018 06:24), Max: 99.3 (17 Dec 2018 13:48)  HR: 88 (18 Dec 2018 10:36) (88 - 99)  BP: 144/72 (18 Dec 2018 06:24) (131/73 - 154/82)  BP(mean): --  RR: 18 (18 Dec 2018 06:24) (18 - 18)  SpO2: 94% (18 Dec 2018 10:36) (93% - 94%)    PHYSICAL EXAM:    Constitutional: NAD, well-developed  HEENT: EOMI, throat clear  Neck: No LAD, supple  Respiratory: CTA and P  Cardiovascular: S1 and S2, RRR, no M  Gastrointestinal: BS+, soft, NT/ND, neg HSM,  Extremities: No peripheral edema, neg clubing, cyanosis  Vascular: 2+ peripheral pulses  Neurological: A/O x 1-2   Psychiatric: Normal mood, normal affect  Skin: No rashes      LABS:                        7.9    9.78  )-----------( 655      ( 18 Dec 2018 08:14 )             24.4     12-18    134<L>  |  97  |  <4<L>  ----------------------------<  143<H>  3.8   |  25  |  0.60    Ca    9.3      18 Dec 2018 07:16  Phos  4.8     12-18  Mg     1.8     12-18    TPro  7.1  /  Alb  3.1<L>  /  TBili  0.3  /  DBili  <0.1  /  AST  20  /  ALT  18  /  AlkPhos  126<H>  12-18    PT/INR - ( 17 Dec 2018 09:12 )   PT: 13.9 sec;   INR: 1.21 ratio         PTT - ( 17 Dec 2018 09:12 )  PTT:29.4 sec      RADIOLOGY & ADDITIONAL TESTS:

## 2018-12-18 NOTE — PROGRESS NOTE ADULT - PROBLEM SELECTOR PLAN 10
- ppx: lovenox  - diet: clears with protein shakes  - dispo: pending clinical progress    Problem 11: Low testosterone- likely 2/2 alcohol abuse  - pt has been prescribed anastrozole 1mg po daily, will continue for now  - continue OP follow up for repeat T testing

## 2018-12-18 NOTE — PROGRESS NOTE ADULT - ATTENDING COMMENTS
seen and examined 12-18-18 @ 1222    tolerating Glucerna and Promote  still dry cough, worsened upon deep inspiration    soft / mild epigastric/LUQ tenderness / ND  palpable LUQ phlegmon      acute alcoholic pancreatitis with acute necrotic collections and no evidence of infected necrosis  -regular diet tomorrow if he remains well    left pleural effusion with compressive atelectasis  -incentive spirometry  -thoracentesis not indicated at this time as fluid will likely reaccumulate quickly    LGIB from ascending colon ulceration  -no evidence of significant ongoing bleeding  -f/u biopsies

## 2018-12-18 NOTE — PROGRESS NOTE ADULT - ASSESSMENT
63y Male PMHX of alcohol induced pancreatitis earlier this year, alcohol abuse (1/3 quart of vodka daily), DM, HLD, HTN, Depression, ROSS on CPAP who presents as a transfer from [facility] for management of acute pancreatitis complicated by necrosis and persistent fluid collections, likely maturing pseudocysts, also with delirium that is now clearing

## 2018-12-18 NOTE — CHART NOTE - NSCHARTNOTEFT_GEN_A_CORE
Nutrition Follow Up Note  Patient seen for nutrition follow up. Pt is a 63 year old male admitted with alcohol induced acute pancreatitis from [facility] to be monitored in the setting of necrotizing pancreatitis. Pt found to have chronic gastritis as well as peripancreatic collection. Pt had NJ tube placed  which was used for EN feeding; NJ tube removed as of . Pt had BRBPR (bloody red blood per rectum) starting , resolved 12/15. Pt s/p colonoscopy yesterday  indicated by BRBPR showing internal hemorrhoids and mucosal ulcerations.     Source: patient, EMR    Diet: Consistent Carbohydrate Clear Liquid Diet with Glucerna supplementation three times daily.    Patient reports he is feeling better than yesterday. Yesterday he was not able to consume more than half of the tray of liquids. Reports last BM this morning (not bloody or diarrhea). Denies nausea/vomiting. Reinforced importance of drinking Glucerna supplements for protein provision to maintain lean body mass.     PO intake : poor, <50%     Source for PO intake: patient     Enteral /Parenteral Nutrition: n/a      Daily Weight in k.8 (12-12)  % Weight Change - weights stable    Pertinent Medications: MEDICATIONS  (STANDING):  anastrozole 1 milliGRAM(s) Oral daily  bisacodyl 5 milliGRAM(s) Oral at bedtime  clonazePAM Tablet 0.5 milliGRAM(s) Oral daily  dextrose 5% + sodium chloride 0.45% with potassium chloride 20 mEq/L 1000 milliLiter(s) (75 mL/Hr) IV Continuous <Continuous>  dextrose 5%. 1000 milliLiter(s) (50 mL/Hr) IV Continuous <Continuous>  dextrose 50% Injectable 12.5 Gram(s) IV Push once  dextrose 50% Injectable 25 Gram(s) IV Push once  dextrose 50% Injectable 25 Gram(s) IV Push once  enoxaparin Injectable 40 milliGRAM(s) SubCutaneous daily  escitalopram 20 milliGRAM(s) Oral at bedtime  folic acid 1 milliGRAM(s) Oral daily  insulin lispro (HumaLOG) corrective regimen sliding scale   SubCutaneous three times a day before meals  insulin lispro (HumaLOG) corrective regimen sliding scale   SubCutaneous at bedtime  lisinopril 20 milliGRAM(s) Oral daily  magnesium sulfate  IVPB 1 Gram(s) IV Intermittent once  metoprolol tartrate 25 milliGRAM(s) Oral two times a day  multivitamin 1 Tablet(s) Oral daily  pantoprazole  Injectable 40 milliGRAM(s) IV Push two times a day  QUEtiapine 100 milliGRAM(s) Oral at bedtime  thiamine 100 milliGRAM(s) Oral daily    MEDICATIONS  (PRN):  acetaminophen   Tablet .. 650 milliGRAM(s) Oral every 6 hours PRN Mild Pain (1 - 3)  ALBUTerol/ipratropium for Nebulization 3 milliLiter(s) Nebulizer every 6 hours PRN Shortness of Breath and/or Wheezing  dextrose 40% Gel 15 Gram(s) Oral once PRN Blood Glucose LESS THAN 70 milliGRAM(s)/deciliter  glucagon  Injectable 1 milliGRAM(s) IntraMuscular once PRN Glucose LESS THAN 70 milligrams/deciliter  oxyCODONE    IR 5 milliGRAM(s) Oral every 6 hours PRN Moderate Pain (4 - 6)  oxyCODONE    IR 10 milliGRAM(s) Oral every 6 hours PRN Severe Pain (7 - 10)    Pertinent Labs:  @ 07:16: Na 134<L>, BUN <4<L>, Cr 0.60, <H>, K+ 3.8, Phos 4.8<H>, Mg 1.8, Alk Phos 126<H>, ALT/SGPT 18, AST/SGOT 20, HbA1c --    Finger Sticks:  POCT Blood Glucose.: 155 mg/dL ( @ 09:12)  POCT Blood Glucose.: 144 mg/dL ( @ 22:10)  POCT Blood Glucose.: 120 mg/dL ( @ 18:00)  POCT Blood Glucose.: 134 mg/dL ( @ 13:51)      Skin per nursing documentation: intact  Edema: none as per chart    Estimated Needs:   [x ] no change since previous assessment  [ ] recalculated:     Previous Nutrition Diagnosis: Inadequate protein-energy intake  Nutrition Diagnosis is: ongoing, being addressed with po diet    New Nutrition Diagnosis: n/a  Related to:    As evidenced by:      Interventions:   Recommend  1) Continue with current diet order of clear liquid diet with Glucerna supplements 3 times daily  2) Encourage po intake, particularly of Glucerna supplements  3) Obtain and honor preferences as able     Monitoring and Evaluation:   Continue to monitor nutritional intake, tolerance to diet prescription, weights, labs, skin integrity  RD remains available upon request and will follow up per protocol Nutrition Follow Up Note  Patient seen for nutrition follow up. Pt is a 63 year old male admitted with alcohol induced acute pancreatitis from [facility] to be monitored in the setting of necrotizing pancreatitis. Pt found to have chronic gastritis as well as peripancreatic collection. Pt had NJ tube placed  which was used for EN feeding; NJ tube removed as of . Pt had BRBPR (bloody red blood per rectum) starting , resolved 12/15. Pt s/p colonoscopy yesterday  indicated by BRBPR showing internal hemorrhoids and mucosal ulcerations.     Source: patient, EMR    Diet: Consistent Carbohydrate Clear Liquid Diet with Glucerna supplementation three times daily.    Patient reports he is feeling better than yesterday. Yesterday he was not able to consume more than half of the tray of liquids. Reports last BM this morning (not bloody or diarrhea). Denies nausea/vomiting. Reinforced importance of drinking Glucerna supplements for protein provision to maintain lean body mass.     PO intake : poor, <50%     Source for PO intake: patient     Enteral /Parenteral Nutrition: n/a      Daily Weight in k.8 (12-12)  % Weight Change - weights stable    Pertinent Medications: MEDICATIONS  (STANDING):  anastrozole 1 milliGRAM(s) Oral daily  bisacodyl 5 milliGRAM(s) Oral at bedtime  clonazePAM Tablet 0.5 milliGRAM(s) Oral daily  dextrose 5% + sodium chloride 0.45% with potassium chloride 20 mEq/L 1000 milliLiter(s) (75 mL/Hr) IV Continuous <Continuous>  dextrose 5%. 1000 milliLiter(s) (50 mL/Hr) IV Continuous <Continuous>  dextrose 50% Injectable 12.5 Gram(s) IV Push once  dextrose 50% Injectable 25 Gram(s) IV Push once  dextrose 50% Injectable 25 Gram(s) IV Push once  enoxaparin Injectable 40 milliGRAM(s) SubCutaneous daily  escitalopram 20 milliGRAM(s) Oral at bedtime  folic acid 1 milliGRAM(s) Oral daily  insulin lispro (HumaLOG) corrective regimen sliding scale   SubCutaneous three times a day before meals  insulin lispro (HumaLOG) corrective regimen sliding scale   SubCutaneous at bedtime  lisinopril 20 milliGRAM(s) Oral daily  magnesium sulfate  IVPB 1 Gram(s) IV Intermittent once  metoprolol tartrate 25 milliGRAM(s) Oral two times a day  multivitamin 1 Tablet(s) Oral daily  pantoprazole  Injectable 40 milliGRAM(s) IV Push two times a day  QUEtiapine 100 milliGRAM(s) Oral at bedtime  thiamine 100 milliGRAM(s) Oral daily    MEDICATIONS  (PRN):  acetaminophen   Tablet .. 650 milliGRAM(s) Oral every 6 hours PRN Mild Pain (1 - 3)  ALBUTerol/ipratropium for Nebulization 3 milliLiter(s) Nebulizer every 6 hours PRN Shortness of Breath and/or Wheezing  dextrose 40% Gel 15 Gram(s) Oral once PRN Blood Glucose LESS THAN 70 milliGRAM(s)/deciliter  glucagon  Injectable 1 milliGRAM(s) IntraMuscular once PRN Glucose LESS THAN 70 milligrams/deciliter  oxyCODONE    IR 5 milliGRAM(s) Oral every 6 hours PRN Moderate Pain (4 - 6)  oxyCODONE    IR 10 milliGRAM(s) Oral every 6 hours PRN Severe Pain (7 - 10)    Pertinent Labs:  @ 07:16: Na 134<L>, BUN <4<L>, Cr 0.60, <H>, K+ 3.8, Phos 4.8<H>, Mg 1.8, Alk Phos 126<H>, ALT/SGPT 18, AST/SGOT 20, HbA1c --    Finger Sticks:  POCT Blood Glucose.: 155 mg/dL ( @ 09:12)  POCT Blood Glucose.: 144 mg/dL ( @ 22:10)  POCT Blood Glucose.: 120 mg/dL ( @ 18:00)  POCT Blood Glucose.: 134 mg/dL ( @ 13:51)      Skin per nursing documentation: intact  Edema: none as per chart    Estimated Needs:   [x ] no change since previous assessment  [ ] recalculated:     Previous Nutrition Diagnosis: Inadequate protein-energy intake  Nutrition Diagnosis is: ongoing, being addressed with po diet    New Nutrition Diagnosis: n/a  Related to:    As evidenced by:      Interventions:   Recommend  1) As per team, continue with current diet order of clear liquid diet with Glucerna supplements 3 times daily. Pt to be advanced to regular diet tomorrow.   2) Encourage po intake, particularly of Glucerna supplements  3) Obtain and honor preferences as able     Monitoring and Evaluation:   Continue to monitor nutritional intake, tolerance to diet prescription, weights, labs, skin integrity  RD remains available upon request and will follow up per protocol

## 2018-12-18 NOTE — PROGRESS NOTE ADULT - PROBLEM SELECTOR PLAN 2
- Alcohol-induced Acute pancreatitis with unclear infectious burden/evolving necrosis  - Recent CT shows evolving fluid collections posterior to the stomach with unclear necrotic/infected burden   - continue IV abx   - s/p  upper gastrointestinal endoscopy with normal upper third of esophagus, middle third of esophagus and lower third of esophagus.  - Chronic gastritis.  - Normal first part of the duodenum, 2nd part of the duodenum, 3rd part of the duodenum and 4th part of the duodenum.  - Feeding tube placement was successfully performed.  - Use Protonix (pantoprazole) 40 mg PO daily.  - NPO with ice chips  - abd x-ray reviewed   - trend amylase/lipase, cbc  - repeat CT  with acute necrotic pancreatitis involving the tail. Acute necrotic   collections in the upper abdomen minimally increased in size from prior   imaging.  - feeds started via NJ tube, not on hold due to rectal bleeding

## 2018-12-18 NOTE — PROGRESS NOTE ADULT - PROBLEM SELECTOR PLAN 2
Likely hospital delirium in the setting of prolonged hospital stay with critical illness, improving somewhat. Pt now states he is a bit anxious and has been having trouble sleeping.  - initially we had wanted to taper off benzos due to possible deliriogenic effects, now pt with anxiety and insomnia- given his alertness is improving, will change klonopin dosing to 0.5mg po QHS standing for insomnia, and add klonopin 0.5mg po daily prn anxiety, hold for oversedation/delirium  - conservative measures to decrease delirium: PT eval and ambulation, OOB, frequent reorientation, minimize sleep disturbances at night  - if worsens, would obtain ABG as patient with ROSS and may have hypoventilation syndrome, would consider head CT as well.

## 2018-12-18 NOTE — PROGRESS NOTE ADULT - SUBJECTIVE AND OBJECTIVE BOX
Surgery Progress Note    S: Patient seen and examined. No acute events overnight. Patient underwent colonoscopy yesterday for work up of previous hematochezia which revealed internal hemorrhoids and mucosal ulcerations. Patient was subsequently advanced to a CLD which he tolerated without n/v. Patient continues to complain of LUQ discomfort, which is well controlled with current medications.     O:  Vital Signs Last 24 Hrs  T(C): 36.4 (18 Dec 2018 06:24), Max: 37.4 (17 Dec 2018 13:48)  T(F): 97.5 (18 Dec 2018 06:24), Max: 99.3 (17 Dec 2018 13:48)  HR: 96 (18 Dec 2018 06:31) (84 - 99)  BP: 144/72 (18 Dec 2018 06:24) (16/69 - 154/82)  BP(mean): --  RR: 18 (18 Dec 2018 06:24) (18 - 18)  SpO2: 94% (18 Dec 2018 06:31) (93% - 95%)    I&O's Detail    17 Dec 2018 07:01  -  18 Dec 2018 07:00  --------------------------------------------------------  IN:    dextrose 5% + sodium chloride 0.45% with potassium chloride 20 mEq/L: 1800 mL    Oral Fluid: 280 mL  Total IN: 2080 mL    OUT:    Voided: 2800 mL  Total OUT: 2800 mL    Total NET: -720 mL          MEDICATIONS  (STANDING):  anastrozole 1 milliGRAM(s) Oral daily  bisacodyl 5 milliGRAM(s) Oral at bedtime  clonazePAM Tablet 0.5 milliGRAM(s) Oral daily  dextrose 5% + sodium chloride 0.45% with potassium chloride 20 mEq/L 1000 milliLiter(s) (75 mL/Hr) IV Continuous <Continuous>  dextrose 5%. 1000 milliLiter(s) (50 mL/Hr) IV Continuous <Continuous>  dextrose 50% Injectable 12.5 Gram(s) IV Push once  dextrose 50% Injectable 25 Gram(s) IV Push once  dextrose 50% Injectable 25 Gram(s) IV Push once  enoxaparin Injectable 40 milliGRAM(s) SubCutaneous daily  escitalopram 20 milliGRAM(s) Oral at bedtime  folic acid 1 milliGRAM(s) Oral daily  insulin lispro (HumaLOG) corrective regimen sliding scale   SubCutaneous three times a day before meals  insulin lispro (HumaLOG) corrective regimen sliding scale   SubCutaneous at bedtime  lisinopril 20 milliGRAM(s) Oral daily  metoprolol tartrate 25 milliGRAM(s) Oral two times a day  multivitamin 1 Tablet(s) Oral daily  pantoprazole  Injectable 40 milliGRAM(s) IV Push two times a day  QUEtiapine 100 milliGRAM(s) Oral at bedtime  thiamine 100 milliGRAM(s) Oral daily    MEDICATIONS  (PRN):  acetaminophen   Tablet .. 650 milliGRAM(s) Oral every 6 hours PRN Mild Pain (1 - 3)  ALBUTerol/ipratropium for Nebulization 3 milliLiter(s) Nebulizer every 6 hours PRN Shortness of Breath and/or Wheezing  dextrose 40% Gel 15 Gram(s) Oral once PRN Blood Glucose LESS THAN 70 milliGRAM(s)/deciliter  glucagon  Injectable 1 milliGRAM(s) IntraMuscular once PRN Glucose LESS THAN 70 milligrams/deciliter  oxyCODONE    IR 5 milliGRAM(s) Oral every 6 hours PRN Moderate Pain (4 - 6)  oxyCODONE    IR 10 milliGRAM(s) Oral every 6 hours PRN Severe Pain (7 - 10)                            7.9    9.78  )-----------( 655      ( 18 Dec 2018 08:14 )             24.4       12-18    134<L>  |  97  |  <4<L>  ----------------------------<  143<H>  3.8   |  25  |  0.60    Ca    9.3      18 Dec 2018 07:16  Phos  4.8     12-18  Mg     1.8     12-18    TPro  7.1  /  Alb  3.1<L>  /  TBili  0.3  /  DBili  <0.1  /  AST  20  /  ALT  18  /  AlkPhos  126<H>  12-18      Physical Exam:  Gen: Laying in bed, NAD  Resp: Unlabored breathing with facemask in place  Abd: soft, NTND, no rebound or guarding  Ext: WWP  Skin: No rashes

## 2018-12-18 NOTE — PROGRESS NOTE ADULT - SUBJECTIVE AND OBJECTIVE BOX
Patient is a 63y old  Male who presents with a chief complaint of acute pancreatitis (18 Dec 2018 11:19)  Subjective: No acute events overnight- underwent colonoscopy showing no active bleeding, but ulcers noted in ascending colon, as well as hemorrhoids. Today, patient more alert and oriented. Has been tolerating clears with protein shakes, however notes glucerna makes him nauseated. +mild anxiety today- patient asks whether he is receiving his anxiety medication or not- states sleep is especially difficult. No vomiting/diarrhea, no fevers/chills, no sob/cough, no HA/vision problems, no tactile disturbances.    14 point ros otherwise negative    VITAL SIGNS:  Vital Signs Last 24 Hrs  T(C): 37.1 (18 Dec 2018 14:34), Max: 37.1 (17 Dec 2018 17:18)  T(F): 98.7 (18 Dec 2018 14:34), Max: 98.8 (18 Dec 2018 00:48)  HR: 88 (18 Dec 2018 14:34) (88 - 99)  BP: 133/78 (18 Dec 2018 14:34) (129/77 - 154/82)  BP(mean): --  RR: 18 (18 Dec 2018 14:34) (18 - 18)  SpO2: 94% (18 Dec 2018 14:34) (93% - 94%)      PHYSICAL EXAM:     GENERAL: no acute distress  HEENT: PERRLA, EOMI, moist oropharynx   RESPIRATORY: CTAB, no w/c   CARDIOVASCULAR: RRR, no murmurs, gallops, rubs  ABDOMINAL: soft, +mild TTP, worst in epigastrum and LUQ, +palpable mass in LUQ, positive bowel sounds   EXTREMITIES: no clubbing, cyanosis, or edema  NEUROLOGICAL: alert and oriented x 3, non-focal  SKIN: no rashes or lesions   MUSCULOSKELETAL: no gross joint deformity                          7.9    9.78  )-----------( 655      ( 18 Dec 2018 08:14 )             24.4     12-18    134<L>  |  97  |  <4<L>  ----------------------------<  143<H>  3.8   |  25  |  0.60    Ca    9.3      18 Dec 2018 07:16  Phos  4.8     12-18  Mg     1.8     12-18    TPro  7.1  /  Alb  3.1<L>  /  TBili  0.3  /  DBili  <0.1  /  AST  20  /  ALT  18  /  AlkPhos  126<H>  12-18      CAPILLARY BLOOD GLUCOSE      POCT Blood Glucose.: 151 mg/dL (18 Dec 2018 13:17)  POCT Blood Glucose.: 155 mg/dL (18 Dec 2018 09:12)  POCT Blood Glucose.: 144 mg/dL (17 Dec 2018 22:10)  POCT Blood Glucose.: 120 mg/dL (17 Dec 2018 18:00)      MEDICATIONS  (STANDING):  anastrozole 1 milliGRAM(s) Oral daily  bisacodyl 5 milliGRAM(s) Oral at bedtime  clonazePAM Tablet 0.5 milliGRAM(s) Oral at bedtime  dextrose 5%. 1000 milliLiter(s) (50 mL/Hr) IV Continuous <Continuous>  dextrose 50% Injectable 12.5 Gram(s) IV Push once  dextrose 50% Injectable 25 Gram(s) IV Push once  dextrose 50% Injectable 25 Gram(s) IV Push once  enoxaparin Injectable 40 milliGRAM(s) SubCutaneous daily  escitalopram 20 milliGRAM(s) Oral at bedtime  folic acid 1 milliGRAM(s) Oral daily  insulin lispro (HumaLOG) corrective regimen sliding scale   SubCutaneous three times a day before meals  insulin lispro (HumaLOG) corrective regimen sliding scale   SubCutaneous at bedtime  lisinopril 20 milliGRAM(s) Oral daily  metoprolol tartrate 25 milliGRAM(s) Oral two times a day  multivitamin 1 Tablet(s) Oral daily  pantoprazole    Tablet 40 milliGRAM(s) Oral two times a day  QUEtiapine 100 milliGRAM(s) Oral at bedtime  thiamine 100 milliGRAM(s) Oral daily    MEDICATIONS  (PRN):  acetaminophen   Tablet .. 650 milliGRAM(s) Oral every 6 hours PRN Mild Pain (1 - 3)  ALBUTerol/ipratropium for Nebulization 3 milliLiter(s) Nebulizer every 6 hours PRN Shortness of Breath and/or Wheezing  clonazePAM Tablet 0.5 milliGRAM(s) Oral daily PRN anxiety  dextrose 40% Gel 15 Gram(s) Oral once PRN Blood Glucose LESS THAN 70 milliGRAM(s)/deciliter  glucagon  Injectable 1 milliGRAM(s) IntraMuscular once PRN Glucose LESS THAN 70 milligrams/deciliter  guaiFENesin   Syrup  (Sugar-Free) 200 milliGRAM(s) Oral every 6 hours PRN Cough  oxyCODONE    IR 5 milliGRAM(s) Oral every 6 hours PRN Moderate Pain (4 - 6)  oxyCODONE    IR 10 milliGRAM(s) Oral every 6 hours PRN Severe Pain (7 - 10)

## 2018-12-18 NOTE — PROGRESS NOTE ADULT - ASSESSMENT
63M transferred from [place] with acute/subacute pancreatitis with necrosis and fluid collection around the lesser sac, repeat CTs (12/10, 12/13) grossly stable.  NJ tube removed with unknown mechanism, now on CLD    - Pain control as needed  - c/w CLD with glucerna supplementation  - Continue to monitor for bleeding, vital signs  - Continue nightly bipap, monitor respiratory status  - Insulin sliding scale  - lovenox for DVT ppx  - OOB and ambulating as tolerated    Trauma Surgery  [phone]

## 2018-12-19 LAB
ALBUMIN SERPL ELPH-MCNC: 3 G/DL — LOW (ref 3.3–5)
ALP SERPL-CCNC: 119 U/L — SIGNIFICANT CHANGE UP (ref 40–120)
ALT FLD-CCNC: 17 U/L — SIGNIFICANT CHANGE UP (ref 10–45)
AMYLASE P1 CFR SERPL: 107 U/L — SIGNIFICANT CHANGE UP (ref 25–125)
ANION GAP SERPL CALC-SCNC: 15 MMOL/L — SIGNIFICANT CHANGE UP (ref 5–17)
AST SERPL-CCNC: 21 U/L — SIGNIFICANT CHANGE UP (ref 10–40)
BILIRUB SERPL-MCNC: 0.3 MG/DL — SIGNIFICANT CHANGE UP (ref 0.2–1.2)
BUN SERPL-MCNC: 5 MG/DL — LOW (ref 7–23)
CALCIUM SERPL-MCNC: 9.1 MG/DL — SIGNIFICANT CHANGE UP (ref 8.4–10.5)
CHLORIDE SERPL-SCNC: 97 MMOL/L — SIGNIFICANT CHANGE UP (ref 96–108)
CO2 SERPL-SCNC: 24 MMOL/L — SIGNIFICANT CHANGE UP (ref 22–31)
CREAT SERPL-MCNC: 0.64 MG/DL — SIGNIFICANT CHANGE UP (ref 0.5–1.3)
CULTURE RESULTS: SIGNIFICANT CHANGE UP
CULTURE RESULTS: SIGNIFICANT CHANGE UP
GLUCOSE BLDC GLUCOMTR-MCNC: 107 MG/DL — HIGH (ref 70–99)
GLUCOSE BLDC GLUCOMTR-MCNC: 112 MG/DL — HIGH (ref 70–99)
GLUCOSE BLDC GLUCOMTR-MCNC: 112 MG/DL — HIGH (ref 70–99)
GLUCOSE BLDC GLUCOMTR-MCNC: 135 MG/DL — HIGH (ref 70–99)
GLUCOSE SERPL-MCNC: 116 MG/DL — HIGH (ref 70–99)
HCT VFR BLD CALC: 25.8 % — LOW (ref 39–50)
HGB BLD-MCNC: 8.2 G/DL — LOW (ref 13–17)
LIDOCAIN IGE QN: 314 U/L — HIGH (ref 7–60)
MAGNESIUM SERPL-MCNC: 2 MG/DL — SIGNIFICANT CHANGE UP (ref 1.6–2.6)
MCHC RBC-ENTMCNC: 30.9 PG — SIGNIFICANT CHANGE UP (ref 27–34)
MCHC RBC-ENTMCNC: 31.8 GM/DL — LOW (ref 32–36)
MCV RBC AUTO: 97.4 FL — SIGNIFICANT CHANGE UP (ref 80–100)
PHOSPHATE SERPL-MCNC: 4.9 MG/DL — HIGH (ref 2.5–4.5)
PLATELET # BLD AUTO: 689 K/UL — HIGH (ref 150–400)
POTASSIUM SERPL-MCNC: 3.8 MMOL/L — SIGNIFICANT CHANGE UP (ref 3.5–5.3)
POTASSIUM SERPL-SCNC: 3.8 MMOL/L — SIGNIFICANT CHANGE UP (ref 3.5–5.3)
PROT SERPL-MCNC: 7.4 G/DL — SIGNIFICANT CHANGE UP (ref 6–8.3)
RBC # BLD: 2.65 M/UL — LOW (ref 4.2–5.8)
RBC # FLD: 13.8 % — SIGNIFICANT CHANGE UP (ref 10.3–14.5)
SODIUM SERPL-SCNC: 136 MMOL/L — SIGNIFICANT CHANGE UP (ref 135–145)
SPECIMEN SOURCE: SIGNIFICANT CHANGE UP
SPECIMEN SOURCE: SIGNIFICANT CHANGE UP
WBC # BLD: 10.35 K/UL — SIGNIFICANT CHANGE UP (ref 3.8–10.5)
WBC # FLD AUTO: 10.35 K/UL — SIGNIFICANT CHANGE UP (ref 3.8–10.5)

## 2018-12-19 PROCEDURE — 99233 SBSQ HOSP IP/OBS HIGH 50: CPT

## 2018-12-19 RX ORDER — ONDANSETRON 8 MG/1
4 TABLET, FILM COATED ORAL EVERY 8 HOURS
Qty: 0 | Refills: 0 | Status: DISCONTINUED | OUTPATIENT
Start: 2018-12-19 | End: 2018-12-19

## 2018-12-19 RX ORDER — LISINOPRIL 2.5 MG/1
40 TABLET ORAL DAILY
Qty: 0 | Refills: 0 | Status: DISCONTINUED | OUTPATIENT
Start: 2018-12-20 | End: 2018-12-23

## 2018-12-19 RX ADMIN — Medication 650 MILLIGRAM(S): at 22:07

## 2018-12-19 RX ADMIN — Medication 1 MILLIGRAM(S): at 13:10

## 2018-12-19 RX ADMIN — ENOXAPARIN SODIUM 40 MILLIGRAM(S): 100 INJECTION SUBCUTANEOUS at 13:09

## 2018-12-19 RX ADMIN — Medication 5 MILLIGRAM(S): at 21:37

## 2018-12-19 RX ADMIN — ANASTROZOLE 1 MILLIGRAM(S): 1 TABLET ORAL at 13:10

## 2018-12-19 RX ADMIN — Medication 0: at 22:48

## 2018-12-19 RX ADMIN — Medication 650 MILLIGRAM(S): at 21:37

## 2018-12-19 RX ADMIN — LISINOPRIL 20 MILLIGRAM(S): 2.5 TABLET ORAL at 05:52

## 2018-12-19 RX ADMIN — PANTOPRAZOLE SODIUM 40 MILLIGRAM(S): 20 TABLET, DELAYED RELEASE ORAL at 17:41

## 2018-12-19 RX ADMIN — Medication 25 MILLIGRAM(S): at 17:41

## 2018-12-19 RX ADMIN — Medication 0.5 MILLIGRAM(S): at 21:37

## 2018-12-19 RX ADMIN — Medication 1 TABLET(S): at 13:10

## 2018-12-19 RX ADMIN — Medication 25 MILLIGRAM(S): at 05:52

## 2018-12-19 RX ADMIN — PANTOPRAZOLE SODIUM 40 MILLIGRAM(S): 20 TABLET, DELAYED RELEASE ORAL at 05:52

## 2018-12-19 RX ADMIN — QUETIAPINE FUMARATE 100 MILLIGRAM(S): 200 TABLET, FILM COATED ORAL at 21:37

## 2018-12-19 RX ADMIN — Medication 100 MILLIGRAM(S): at 13:10

## 2018-12-19 RX ADMIN — ESCITALOPRAM OXALATE 20 MILLIGRAM(S): 10 TABLET, FILM COATED ORAL at 21:37

## 2018-12-19 NOTE — PROGRESS NOTE ADULT - SUBJECTIVE AND OBJECTIVE BOX
Surgery Progress Note    Subjective:  Pt on prn Robitussin for cough.   Pt was tachycardic to 101 overnight. Pt continued to be monitored as he has a history of tachycardia x 1 week for this hospital admission.   Pt seen at bedside.      Objective:    Physical Exam:  Gen: Laying in bed, NAD  Resp: Unlabored breathing with facemask in place  Abd: soft, NTND, no rebound or guarding  Ext: WWP  Skin: No rashes        T(C): 36.5 (12-19-18 @ 05:35), Max: 37.1 (12-18-18 @ 11:00)  HR: 102 (12-19-18 @ 05:35) (83 - 105)  BP: 157/74 (12-19-18 @ 05:35) (129/77 - 157/74)  RR: 18 (12-19-18 @ 05:35) (18 - 18)  SpO2: 95% (12-19-18 @ 05:35) (93% - 95%)    12-17-18 @ 07:01  -  12-18-18 @ 07:00  --------------------------------------------------------  IN: 2080 mL / OUT: 2800 mL / NET: -720 mL    12-18-18 @ 07:01  -  12-19-18 @ 06:02  --------------------------------------------------------  IN: 1390 mL / OUT: 1075 mL / NET: 315 mL        LABS                        7.9    9.78  )-----------( 655      ( 18 Dec 2018 08:14 )             24.4     12-18    134<L>  |  97  |  <4<L>  ----------------------------<  143<H>  3.8   |  25  |  0.60    Ca    9.3      18 Dec 2018 07:16  Phos  4.8     12-18  Mg     1.8     12-18    TPro  7.1  /  Alb  3.1<L>  /  TBili  0.3  /  DBili  <0.1  /  AST  20  /  ALT  18  /  AlkPhos  126<H>  12-18    PT/INR - ( 17 Dec 2018 09:12 )   PT: 13.9 sec;   INR: 1.21 ratio         PTT - ( 17 Dec 2018 09:12 )  PTT:29.4 sec Surgery Progress Note    Subjective:   Pt on prn Robitussin for cough. Pt was tachycardic to 101 overnight. Pt continued to be monitored as he has a history of tachycardia x 1 week for this hospital admission.   Pt seen at bedside.       Objective:    Physical Exam:  Gen: Laying in bed, NAD, sleepy  Resp: No increased work of breathing, on nasal cannula  Abd: soft, nt/nd, no rebound or guarding  flegmon palpable  Ext: WWP  Skin: No rashes      T(C): 36.5 (12-19-18 @ 05:35), Max: 37.1 (12-18-18 @ 11:00)  HR: 102 (12-19-18 @ 05:35) (83 - 105)  BP: 157/74 (12-19-18 @ 05:35) (129/77 - 157/74)  RR: 18 (12-19-18 @ 05:35) (18 - 18)  SpO2: 95% (12-19-18 @ 05:35) (93% - 95%)    12-17-18 @ 07:01  -  12-18-18 @ 07:00  --------------------------------------------------------  IN: 2080 mL / OUT: 2800 mL / NET: -720 mL    12-18-18 @ 07:01  -  12-19-18 @ 06:02  --------------------------------------------------------  IN: 1390 mL / OUT: 1075 mL / NET: 315 mL        LABS                        7.9    9.78  )-----------( 655      ( 18 Dec 2018 08:14 )             24.4     12-18    134<L>  |  97  |  <4<L>  ----------------------------<  143<H>  3.8   |  25  |  0.60    Ca    9.3      18 Dec 2018 07:16  Phos  4.8     12-18  Mg     1.8     12-18    TPro  7.1  /  Alb  3.1<L>  /  TBili  0.3  /  DBili  <0.1  /  AST  20  /  ALT  18  /  AlkPhos  126<H>  12-18    PT/INR - ( 17 Dec 2018 09:12 )   PT: 13.9 sec;   INR: 1.21 ratio         PTT - ( 17 Dec 2018 09:12 )  PTT:29.4 sec

## 2018-12-19 NOTE — PROGRESS NOTE ADULT - PROBLEM SELECTOR PLAN 10
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

## 2018-12-19 NOTE — PROGRESS NOTE ADULT - PROBLEM SELECTOR PLAN 2
Likely hospital delirium in the setting of prolonged hospital stay with critical illness, improving somewhat. Pt now much improved.  - initially we had wanted to taper off benzos due to possible deliriogenic effects, now pt with anxiety and insomnia- given his alertness is improving, will change klonopin dosing to 0.5mg po QHS standing for insomnia, and add klonopin 0.5mg po daily prn anxiety, hold for oversedation/delirium  - conservative measures to decrease delirium: PT eval and ambulation, OOB, frequent reorientation, minimize sleep disturbances at night  - if worsens, would obtain ABG as patient with ROSS and may have hypoventilation syndrome, would consider head CT as well.

## 2018-12-19 NOTE — PROGRESS NOTE ADULT - PROBLEM SELECTOR PLAN 2
- Alcohol-induced Acute pancreatitis with unclear infectious burden/evolving necrosis  - Recent CT shows evolving fluid collections posterior to the stomach with unclear necrotic/infected burden   - s/p course of IV abx   - s/p upper gastrointestinal endoscopy with normal upper third of esophagus, middle third of esophagus and lower third of esophagus.  - Chronic gastritis.  - Normal first part of the duodenum, 2nd part of the duodenum, 3rd part of the duodenum and 4th part of the duodenum.  - Use Protonix (pantoprazole) 40 mg PO daily.  - abd x-ray reviewed   - trend amylase/lipase, cbc  - repeat CT  with acute necrotic pancreatitis involving the tail. Acute necrotic collections in the upper abdomen minimally increased in size from prior   imaging.

## 2018-12-19 NOTE — PROVIDER CONTACT NOTE (OTHER) - ASSESSMENT
pt denies pain. all other vss.  pt denies heart palpitations, pt denies chest pain, headache, double vision. pt asymptomatic. no s/s distress.

## 2018-12-19 NOTE — PROGRESS NOTE ADULT - ATTENDING COMMENTS
seen and examined 12-19-18 @ 1620    tolerating small amounts of regular diet, but has early satiety  still dry cough, worsened upon deep inspiration    afeb  AVSS  soft / mild epigastric/LUQ tenderness / ND  palpable LUQ phlegmon    WBC = 10    acute alcoholic pancreatitis with acute necrotic collections and no evidence of infected necrosis  -I explained to the patient that he should eat several small meals throughout the day  -I explained that any alcohol consumption will place him at extremely high risk for recurrent pancreatitis which could be fatal.  -I explained that necrotizing pancreatitis could still progress to requiring pancreatic debridement.    alcohol abuse  -I explained that we will be discharging him home soon. I explained that he should start thinking about strategies to completely abstain from alcohol when he is discharged. His wife drinks alcohol. S/W referral will be made to assist.    left pleural effusion with compressive atelectasis  -incentive spirometry  -thoracentesis not indicated at this time as fluid will likely reaccumulate quickly    LGIB from ascending colon ulceration  -no evidence of significant ongoing bleeding  -f/u biopsies

## 2018-12-19 NOTE — PROGRESS NOTE ADULT - ASSESSMENT
63y Male PMHX of alcohol induced pancreatitis earlier this year, alcohol abuse (1/3 quart of vodka daily), DM, HLD, HTN, Depression, ROSS on CPAP who presents as a transfer from [facility] for management of acute pancreatitis complicated by necrosis and persistent fluid collections, likely maturing pseudocysts, also with delirium that is now clearing

## 2018-12-19 NOTE — PROGRESS NOTE ADULT - SUBJECTIVE AND OBJECTIVE BOX
Patient is a 63y old  Male who presents with a chief complaint of acute pancreatitis (19 Dec 2018 06:00)  Subjective: No acute events overnight. pt reports ongoing L sided abdominal pain, mainly while coughing. Cannot tell whether the pain brings on the cough, or vice versa. No phlegm, no fevers/chills, no sob. Tolerated clear ensure well and would like to continue that. Does not have much appetite for solid food yet. +flatus/BM. Feels weak and unbalanced on his feet.    14 point ros otherwise negative    VITAL SIGNS:  Vital Signs Last 24 Hrs  T(C): 36.6 (19 Dec 2018 09:14), Max: 37.1 (18 Dec 2018 14:34)  T(F): 97.8 (19 Dec 2018 09:14), Max: 98.7 (18 Dec 2018 14:34)  HR: 89 (19 Dec 2018 09:14) (83 - 105)  BP: 144/76 (19 Dec 2018 09:14) (133/78 - 157/74)  BP(mean): --  RR: 18 (19 Dec 2018 09:14) (18 - 18)  SpO2: 96% (19 Dec 2018 09:14) (93% - 96%)      PHYSICAL EXAM:     GENERAL: no acute distress  HEENT: PERRLA, EOMI, moist oropharynx   RESPIRATORY: +trace R basilar rales, +decreased breath sounds L base  CARDIOVASCULAR: RRR, no murmurs, gallops, rubs  ABDOMINAL: soft, +mild ttp LUQ, +palpable mass in LUQ  EXTREMITIES: no clubbing, cyanosis, or edema  NEUROLOGICAL: alert and oriented x 3, non-focal  SKIN: no rashes or lesions   MUSCULOSKELETAL: no gross joint deformity                          8.2    10.35 )-----------( 689      ( 19 Dec 2018 08:18 )             25.8     12-19    136  |  97  |  5<L>  ----------------------------<  116<H>  3.8   |  24  |  0.64    Ca    9.1      19 Dec 2018 07:10  Phos  4.9     12-19  Mg     2.0     12-19    TPro  7.4  /  Alb  3.0<L>  /  TBili  0.3  /  DBili  x   /  AST  21  /  ALT  17  /  AlkPhos  119  12-19      CAPILLARY BLOOD GLUCOSE      POCT Blood Glucose.: 135 mg/dL (19 Dec 2018 09:06)  POCT Blood Glucose.: 113 mg/dL (18 Dec 2018 21:30)  POCT Blood Glucose.: 106 mg/dL (18 Dec 2018 19:08)  POCT Blood Glucose.: 151 mg/dL (18 Dec 2018 13:17)      MEDICATIONS  (STANDING):  anastrozole 1 milliGRAM(s) Oral daily  bisacodyl 5 milliGRAM(s) Oral at bedtime  clonazePAM Tablet 0.5 milliGRAM(s) Oral at bedtime  dextrose 5%. 1000 milliLiter(s) (50 mL/Hr) IV Continuous <Continuous>  dextrose 50% Injectable 12.5 Gram(s) IV Push once  dextrose 50% Injectable 25 Gram(s) IV Push once  dextrose 50% Injectable 25 Gram(s) IV Push once  enoxaparin Injectable 40 milliGRAM(s) SubCutaneous daily  escitalopram 20 milliGRAM(s) Oral at bedtime  folic acid 1 milliGRAM(s) Oral daily  insulin lispro (HumaLOG) corrective regimen sliding scale   SubCutaneous three times a day before meals  insulin lispro (HumaLOG) corrective regimen sliding scale   SubCutaneous at bedtime  metoprolol tartrate 25 milliGRAM(s) Oral two times a day  multivitamin 1 Tablet(s) Oral daily  pantoprazole    Tablet 40 milliGRAM(s) Oral two times a day  QUEtiapine 100 milliGRAM(s) Oral at bedtime  thiamine 100 milliGRAM(s) Oral daily    MEDICATIONS  (PRN):  acetaminophen   Tablet .. 650 milliGRAM(s) Oral every 6 hours PRN Mild Pain (1 - 3)  ALBUTerol/ipratropium for Nebulization 3 milliLiter(s) Nebulizer every 6 hours PRN Shortness of Breath and/or Wheezing  clonazePAM Tablet 0.5 milliGRAM(s) Oral daily PRN anxiety  dextrose 40% Gel 15 Gram(s) Oral once PRN Blood Glucose LESS THAN 70 milliGRAM(s)/deciliter  glucagon  Injectable 1 milliGRAM(s) IntraMuscular once PRN Glucose LESS THAN 70 milligrams/deciliter  guaiFENesin   Syrup  (Sugar-Free) 200 milliGRAM(s) Oral every 6 hours PRN Cough  ondansetron Injectable 4 milliGRAM(s) IV Push every 8 hours PRN Nausea and/or Vomiting  oxyCODONE    IR 5 milliGRAM(s) Oral every 6 hours PRN Moderate Pain (4 - 6)  oxyCODONE    IR 10 milliGRAM(s) Oral every 6 hours PRN Severe Pain (7 - 10)

## 2018-12-19 NOTE — PROGRESS NOTE ADULT - SUBJECTIVE AND OBJECTIVE BOX
Interval Events: Pt seen and examined. Tolerating clears. Diet advanced to regular. Pt admits to abdominal pain when he coughs no n/v, brbpr/melena.     MEDICATIONS  (STANDING):  anastrozole 1 milliGRAM(s) Oral daily  bisacodyl 5 milliGRAM(s) Oral at bedtime  clonazePAM Tablet 0.5 milliGRAM(s) Oral at bedtime  dextrose 5%. 1000 milliLiter(s) (50 mL/Hr) IV Continuous <Continuous>  dextrose 50% Injectable 12.5 Gram(s) IV Push once  dextrose 50% Injectable 25 Gram(s) IV Push once  dextrose 50% Injectable 25 Gram(s) IV Push once  enoxaparin Injectable 40 milliGRAM(s) SubCutaneous daily  escitalopram 20 milliGRAM(s) Oral at bedtime  folic acid 1 milliGRAM(s) Oral daily  insulin lispro (HumaLOG) corrective regimen sliding scale   SubCutaneous three times a day before meals  insulin lispro (HumaLOG) corrective regimen sliding scale   SubCutaneous at bedtime  metoprolol tartrate 25 milliGRAM(s) Oral two times a day  multivitamin 1 Tablet(s) Oral daily  pantoprazole    Tablet 40 milliGRAM(s) Oral two times a day  QUEtiapine 100 milliGRAM(s) Oral at bedtime  thiamine 100 milliGRAM(s) Oral daily    MEDICATIONS  (PRN):  acetaminophen   Tablet .. 650 milliGRAM(s) Oral every 6 hours PRN Mild Pain (1 - 3)  ALBUTerol/ipratropium for Nebulization 3 milliLiter(s) Nebulizer every 6 hours PRN Shortness of Breath and/or Wheezing  clonazePAM Tablet 0.5 milliGRAM(s) Oral daily PRN anxiety  dextrose 40% Gel 15 Gram(s) Oral once PRN Blood Glucose LESS THAN 70 milliGRAM(s)/deciliter  glucagon  Injectable 1 milliGRAM(s) IntraMuscular once PRN Glucose LESS THAN 70 milligrams/deciliter  guaiFENesin   Syrup  (Sugar-Free) 200 milliGRAM(s) Oral every 6 hours PRN Cough  ondansetron Injectable 4 milliGRAM(s) IV Push every 8 hours PRN Nausea and/or Vomiting  oxyCODONE    IR 5 milliGRAM(s) Oral every 6 hours PRN Moderate Pain (4 - 6)  oxyCODONE    IR 10 milliGRAM(s) Oral every 6 hours PRN Severe Pain (7 - 10)      Allergies    No Known Allergies    Intolerances        Review of Systems:    General:  No wt loss, fevers, chills, night sweats,fatigue,   Eyes:  Good vision, no reported pain  ENT:  No sore throat, pain, runny nose, dysphagia  CV:  No pain, palpitations, hypo/hypertension  Resp:  No dyspnea, cough, tachypnea, wheezing  GI:  No pain, No nausea, No vomiting, No diarrhea, No constipation, No weight loss, No fever, No pruritis, No rectal bleeding, No melena, No dysphagia  :  No pain, bleeding, incontinence, nocturia  Muscle:  No pain, weakness  Neuro:  No weakness, tingling, memory problems  Psych:  No fatigue, insomnia, mood problems, depression  Endocrine:  No polyuria, polydypsia, cold/heat intolerance  Heme:  No petechiae, ecchymosis, easy bruisability  Skin:  No rash, tattoos, scars, edema      Vital Signs Last 24 Hrs  T(C): 36.9 (19 Dec 2018 13:39), Max: 36.9 (19 Dec 2018 13:39)  T(F): 98.5 (19 Dec 2018 13:39), Max: 98.5 (19 Dec 2018 13:39)  HR: 90 (19 Dec 2018 13:39) (83 - 105)  BP: 143/82 (19 Dec 2018 13:39) (143/82 - 157/74)  BP(mean): --  RR: 18 (19 Dec 2018 13:39) (18 - 18)  SpO2: 97% (19 Dec 2018 13:39) (93% - 97%)    PHYSICAL EXAM:    Constitutional: NAD, well-developed  HEENT: EOMI, throat clear  Neck: No LAD, supple  Respiratory: CTA and P  Cardiovascular: S1 and S2, RRR, no M  Gastrointestinal: BS+, soft, NT/ND, neg HSM,  Extremities: No peripheral edema, neg clubing, cyanosis  Vascular: 2+ peripheral pulses  Neurological: A/O x 3, no focal deficits  Psychiatric: Normal mood, normal affect  Skin: No rashes      LABS:                        8.2    10.35 )-----------( 689      ( 19 Dec 2018 08:18 )             25.8     12-19    136  |  97  |  5<L>  ----------------------------<  116<H>  3.8   |  24  |  0.64    Ca    9.1      19 Dec 2018 07:10  Phos  4.9     12-19  Mg     2.0     12-19    TPro  7.4  /  Alb  3.0<L>  /  TBili  0.3  /  DBili  x   /  AST  21  /  ALT  17  /  AlkPhos  119  12-19          RADIOLOGY & ADDITIONAL TESTS:

## 2018-12-19 NOTE — PROVIDER CONTACT NOTE (OTHER) - BACKGROUND
pt admitted acute pancreatitis secondary to etoh. pt hr running 80s, 90s, low 100s throughout hospital stay

## 2018-12-19 NOTE — PROGRESS NOTE ADULT - ASSESSMENT
63M transferred from [place] with acute/subacute pancreatitis with necrosis and fluid collection around the lesser sac, repeat CTs (12/10, 12/13) grossly stable.  NJ tube removed with unknown mechanism, now on CLD    - Pain control as needed  - c/w CLD with glucerna supplementation  - Continue to monitor for bleeding, vital signs  - Continue nightly bipap, monitor respiratory status  - Insulin sliding scale  - lovenox for DVT ppx  - OOB and ambulating as tolerated 63M transferred from [place] with acute/subacute pancreatitis with necrosis and acute necrotic collection around the lesser sac, repeat CTs (12/10, 12/13) grossly stable.  NJ tube removed with unknown mechanism, now on CLD    - Pain control as needed  - no need to trend lipase as it has no correlation to clinical course  - c/w CLD with glucerna supplementation  - Continue to monitor for bleeding, vital signs  - Continue nightly bipap, monitor respiratory status  - Insulin sliding scale  - lovenox for DVT ppx  - OOB and ambulating as tolerated

## 2018-12-20 DIAGNOSIS — R53.83 OTHER FATIGUE: ICD-10-CM

## 2018-12-20 LAB
ANION GAP SERPL CALC-SCNC: 14 MMOL/L — SIGNIFICANT CHANGE UP (ref 5–17)
BUN SERPL-MCNC: 8 MG/DL — SIGNIFICANT CHANGE UP (ref 7–23)
CALCIUM SERPL-MCNC: 9.5 MG/DL — SIGNIFICANT CHANGE UP (ref 8.4–10.5)
CHLORIDE SERPL-SCNC: 95 MMOL/L — LOW (ref 96–108)
CO2 SERPL-SCNC: 26 MMOL/L — SIGNIFICANT CHANGE UP (ref 22–31)
CREAT SERPL-MCNC: 0.71 MG/DL — SIGNIFICANT CHANGE UP (ref 0.5–1.3)
GLUCOSE BLDC GLUCOMTR-MCNC: 117 MG/DL — HIGH (ref 70–99)
GLUCOSE BLDC GLUCOMTR-MCNC: 118 MG/DL — HIGH (ref 70–99)
GLUCOSE BLDC GLUCOMTR-MCNC: 123 MG/DL — HIGH (ref 70–99)
GLUCOSE BLDC GLUCOMTR-MCNC: 129 MG/DL — HIGH (ref 70–99)
GLUCOSE SERPL-MCNC: 111 MG/DL — HIGH (ref 70–99)
HCT VFR BLD CALC: 26.2 % — LOW (ref 39–50)
HGB BLD-MCNC: 8.4 G/DL — LOW (ref 13–17)
MAGNESIUM SERPL-MCNC: 2 MG/DL — SIGNIFICANT CHANGE UP (ref 1.6–2.6)
MCHC RBC-ENTMCNC: 31.1 PG — SIGNIFICANT CHANGE UP (ref 27–34)
MCHC RBC-ENTMCNC: 32.1 GM/DL — SIGNIFICANT CHANGE UP (ref 32–36)
MCV RBC AUTO: 97 FL — SIGNIFICANT CHANGE UP (ref 80–100)
PHOSPHATE SERPL-MCNC: 4.8 MG/DL — HIGH (ref 2.5–4.5)
PLATELET # BLD AUTO: 695 K/UL — HIGH (ref 150–400)
POTASSIUM SERPL-MCNC: 3.8 MMOL/L — SIGNIFICANT CHANGE UP (ref 3.5–5.3)
POTASSIUM SERPL-SCNC: 3.8 MMOL/L — SIGNIFICANT CHANGE UP (ref 3.5–5.3)
RBC # BLD: 2.7 M/UL — LOW (ref 4.2–5.8)
RBC # FLD: 14 % — SIGNIFICANT CHANGE UP (ref 10.3–14.5)
SODIUM SERPL-SCNC: 135 MMOL/L — SIGNIFICANT CHANGE UP (ref 135–145)
SURGICAL PATHOLOGY STUDY: SIGNIFICANT CHANGE UP
WBC # BLD: 9.93 K/UL — SIGNIFICANT CHANGE UP (ref 3.8–10.5)
WBC # FLD AUTO: 9.93 K/UL — SIGNIFICANT CHANGE UP (ref 3.8–10.5)

## 2018-12-20 PROCEDURE — 99233 SBSQ HOSP IP/OBS HIGH 50: CPT

## 2018-12-20 PROCEDURE — 99232 SBSQ HOSP IP/OBS MODERATE 35: CPT

## 2018-12-20 RX ORDER — IBUPROFEN 200 MG
400 TABLET ORAL ONCE
Qty: 0 | Refills: 0 | Status: COMPLETED | OUTPATIENT
Start: 2018-12-20 | End: 2018-12-20

## 2018-12-20 RX ORDER — AMLODIPINE BESYLATE 2.5 MG/1
10 TABLET ORAL DAILY
Qty: 0 | Refills: 0 | Status: DISCONTINUED | OUTPATIENT
Start: 2018-12-21 | End: 2018-12-23

## 2018-12-20 RX ADMIN — Medication 650 MILLIGRAM(S): at 22:54

## 2018-12-20 RX ADMIN — Medication 650 MILLIGRAM(S): at 23:55

## 2018-12-20 RX ADMIN — Medication 5 MILLIGRAM(S): at 22:54

## 2018-12-20 RX ADMIN — LISINOPRIL 40 MILLIGRAM(S): 2.5 TABLET ORAL at 05:07

## 2018-12-20 RX ADMIN — Medication 400 MILLIGRAM(S): at 16:24

## 2018-12-20 RX ADMIN — ESCITALOPRAM OXALATE 20 MILLIGRAM(S): 10 TABLET, FILM COATED ORAL at 22:53

## 2018-12-20 RX ADMIN — Medication 0.5 MILLIGRAM(S): at 22:53

## 2018-12-20 RX ADMIN — Medication 400 MILLIGRAM(S): at 16:54

## 2018-12-20 RX ADMIN — QUETIAPINE FUMARATE 100 MILLIGRAM(S): 200 TABLET, FILM COATED ORAL at 22:53

## 2018-12-20 RX ADMIN — Medication 25 MILLIGRAM(S): at 05:06

## 2018-12-20 RX ADMIN — PANTOPRAZOLE SODIUM 40 MILLIGRAM(S): 20 TABLET, DELAYED RELEASE ORAL at 05:06

## 2018-12-20 RX ADMIN — ENOXAPARIN SODIUM 40 MILLIGRAM(S): 100 INJECTION SUBCUTANEOUS at 13:07

## 2018-12-20 RX ADMIN — Medication 100 MILLIGRAM(S): at 13:07

## 2018-12-20 RX ADMIN — ANASTROZOLE 1 MILLIGRAM(S): 1 TABLET ORAL at 13:07

## 2018-12-20 RX ADMIN — Medication 1 TABLET(S): at 13:07

## 2018-12-20 RX ADMIN — Medication 1 MILLIGRAM(S): at 13:07

## 2018-12-20 RX ADMIN — Medication 25 MILLIGRAM(S): at 17:56

## 2018-12-20 RX ADMIN — PANTOPRAZOLE SODIUM 40 MILLIGRAM(S): 20 TABLET, DELAYED RELEASE ORAL at 17:56

## 2018-12-20 NOTE — PROGRESS NOTE ADULT - PROBLEM SELECTOR PLAN 10
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

## 2018-12-20 NOTE — PROGRESS NOTE ADULT - ASSESSMENT
63y Male PMHX of alcohol induced pancreatitis earlier this year, alcohol abuse (1/3 quart of vodka daily), DM, HLD, HTN, Depression, ROSS on CPAP who presents as a transfer from [facility] for management of acute pancreatitis complicated by necrosis and persistent fluid collections, likely maturing pseudocysts, also with BRBPR found to have ascending colonic ulcers, delirium which has now improved

## 2018-12-20 NOTE — PROGRESS NOTE ADULT - SUBJECTIVE AND OBJECTIVE BOX
Interval Events: Pt seen and examined.   slowly tolerating regular  having some mild discomfort    MEDICATIONS  (STANDING):  anastrozole 1 milliGRAM(s) Oral daily  bisacodyl 5 milliGRAM(s) Oral at bedtime  clonazePAM Tablet 0.5 milliGRAM(s) Oral at bedtime  dextrose 5%. 1000 milliLiter(s) (50 mL/Hr) IV Continuous <Continuous>  dextrose 50% Injectable 12.5 Gram(s) IV Push once  dextrose 50% Injectable 25 Gram(s) IV Push once  dextrose 50% Injectable 25 Gram(s) IV Push once  enoxaparin Injectable 40 milliGRAM(s) SubCutaneous daily  escitalopram 20 milliGRAM(s) Oral at bedtime  folic acid 1 milliGRAM(s) Oral daily  insulin lispro (HumaLOG) corrective regimen sliding scale   SubCutaneous three times a day before meals  insulin lispro (HumaLOG) corrective regimen sliding scale   SubCutaneous at bedtime  metoprolol tartrate 25 milliGRAM(s) Oral two times a day  multivitamin 1 Tablet(s) Oral daily  pantoprazole    Tablet 40 milliGRAM(s) Oral two times a day  QUEtiapine 100 milliGRAM(s) Oral at bedtime  thiamine 100 milliGRAM(s) Oral daily    MEDICATIONS  (PRN):  acetaminophen   Tablet .. 650 milliGRAM(s) Oral every 6 hours PRN Mild Pain (1 - 3)  ALBUTerol/ipratropium for Nebulization 3 milliLiter(s) Nebulizer every 6 hours PRN Shortness of Breath and/or Wheezing  clonazePAM Tablet 0.5 milliGRAM(s) Oral daily PRN anxiety  dextrose 40% Gel 15 Gram(s) Oral once PRN Blood Glucose LESS THAN 70 milliGRAM(s)/deciliter  glucagon  Injectable 1 milliGRAM(s) IntraMuscular once PRN Glucose LESS THAN 70 milligrams/deciliter  guaiFENesin   Syrup  (Sugar-Free) 200 milliGRAM(s) Oral every 6 hours PRN Cough  ondansetron Injectable 4 milliGRAM(s) IV Push every 8 hours PRN Nausea and/or Vomiting  oxyCODONE    IR 5 milliGRAM(s) Oral every 6 hours PRN Moderate Pain (4 - 6)  oxyCODONE    IR 10 milliGRAM(s) Oral every 6 hours PRN Severe Pain (7 - 10)      Allergies    No Known Allergies    Intolerances        Review of Systems:    General:  No wt loss, fevers, chills, night sweats,fatigue,   Eyes:  Good vision, no reported pain  ENT:  No sore throat, pain, runny nose, dysphagia  CV:  No pain, palpitations, hypo/hypertension  Resp:  No dyspnea, cough, tachypnea, wheezing  GI:  No pain, No nausea, No vomiting, No diarrhea, No constipation, No weight loss, No fever, No pruritis, No rectal bleeding, No melena, No dysphagia  :  No pain, bleeding, incontinence, nocturia  Muscle:  No pain, weakness  Neuro:  No weakness, tingling, memory problems  Psych:  No fatigue, insomnia, mood problems, depression  Endocrine:  No polyuria, polydypsia, cold/heat intolerance  Heme:  No petechiae, ecchymosis, easy bruisability  Skin:  No rash, tattoos, scars, edema      Vital Signs Last 24 Hrs  T(C): 36.9 (19 Dec 2018 13:39), Max: 36.9 (19 Dec 2018 13:39)  T(F): 98.5 (19 Dec 2018 13:39), Max: 98.5 (19 Dec 2018 13:39)  HR: 90 (19 Dec 2018 13:39) (83 - 105)  BP: 143/82 (19 Dec 2018 13:39) (143/82 - 157/74)  BP(mean): --  RR: 18 (19 Dec 2018 13:39) (18 - 18)  SpO2: 97% (19 Dec 2018 13:39) (93% - 97%)    PHYSICAL EXAM:    Constitutional: NAD, well-developed  HEENT: EOMI, throat clear  Neck: No LAD, supple  Respiratory: CTA and P  Cardiovascular: S1 and S2, RRR, no M  Gastrointestinal: BS+, soft, NT/ND, neg HSM,  Extremities: No peripheral edema, neg clubing, cyanosis  Vascular: 2+ peripheral pulses  Neurological: A/O x 3, no focal deficits  Psychiatric: Normal mood, normal affect  Skin: No rashes      LABS:                        8.2    10.35 )-----------( 689      ( 19 Dec 2018 08:18 )             25.8     12-19    136  |  97  |  5<L>  ----------------------------<  116<H>  3.8   |  24  |  0.64    Ca    9.1      19 Dec 2018 07:10  Phos  4.9     12-19  Mg     2.0     12-19    TPro  7.4  /  Alb  3.0<L>  /  TBili  0.3  /  DBili  x   /  AST  21  /  ALT  17  /  AlkPhos  119  12-19          RADIOLOGY & ADDITIONAL TESTS:

## 2018-12-20 NOTE — PROGRESS NOTE ADULT - ATTENDING COMMENTS
seen and examined 12-20-18 @ 1005    tolerating small amounts of regular diet, but has early satiety  still dry cough, worsened upon deep inspiration    afeb  AVSS  soft / mild LUQ tenderness / ND  LUQ phlegmon less prominent    WBC = 10    acute alcoholic pancreatitis with acute necrotic collections and no evidence of infected necrosis  -pancreatitis resolving  -multiple small meals    left pleural effusion with compressive atelectasis  -incentive spirometry  -thoracentesis not indicated at this time as fluid will likely reaccumulate quickly    LGIB from ascending colon ulceration  -f/u biopsies    stable for GREGORY placement

## 2018-12-20 NOTE — PROGRESS NOTE ADULT - SUBJECTIVE AND OBJECTIVE BOX
Surgery Progress Note    S: Patient seen and examined. No acute events overnight. Patient was advanced to a regular diet yesterday and tolerated without n/v or pain. This AM, patient denies any pain.     O:  Vital Signs Last 24 Hrs  T(C): 36.9 (20 Dec 2018 09:01), Max: 37.3 (19 Dec 2018 17:37)  T(F): 98.5 (20 Dec 2018 09:01), Max: 99.2 (19 Dec 2018 17:37)  HR: 96 (20 Dec 2018 09:01) (90 - 103)  BP: 124/75 (20 Dec 2018 09:01) (124/75 - 145/68)  BP(mean): --  RR: 18 (20 Dec 2018 09:01) (18 - 18)  SpO2: 92% (20 Dec 2018 09:01) (90% - 97%)    I&O's Detail    19 Dec 2018 07:01  -  20 Dec 2018 07:00  --------------------------------------------------------  IN:    Oral Fluid: 660 mL  Total IN: 660 mL    OUT:    Voided: 1250 mL  Total OUT: 1250 mL    Total NET: -590 mL      20 Dec 2018 07:01  -  20 Dec 2018 11:33  --------------------------------------------------------  IN:  Total IN: 0 mL    OUT:    Voided: 250 mL  Total OUT: 250 mL    Total NET: -250 mL          MEDICATIONS  (STANDING):  anastrozole 1 milliGRAM(s) Oral daily  bisacodyl 5 milliGRAM(s) Oral at bedtime  clonazePAM Tablet 0.5 milliGRAM(s) Oral at bedtime  dextrose 5%. 1000 milliLiter(s) (50 mL/Hr) IV Continuous <Continuous>  dextrose 50% Injectable 12.5 Gram(s) IV Push once  dextrose 50% Injectable 25 Gram(s) IV Push once  dextrose 50% Injectable 25 Gram(s) IV Push once  enoxaparin Injectable 40 milliGRAM(s) SubCutaneous daily  escitalopram 20 milliGRAM(s) Oral at bedtime  folic acid 1 milliGRAM(s) Oral daily  insulin lispro (HumaLOG) corrective regimen sliding scale   SubCutaneous three times a day before meals  insulin lispro (HumaLOG) corrective regimen sliding scale   SubCutaneous at bedtime  lisinopril 40 milliGRAM(s) Oral daily  metoprolol tartrate 25 milliGRAM(s) Oral two times a day  multivitamin 1 Tablet(s) Oral daily  pantoprazole    Tablet 40 milliGRAM(s) Oral two times a day  QUEtiapine 100 milliGRAM(s) Oral at bedtime  thiamine 100 milliGRAM(s) Oral daily    MEDICATIONS  (PRN):  acetaminophen   Tablet .. 650 milliGRAM(s) Oral every 6 hours PRN Mild Pain (1 - 3)  ALBUTerol/ipratropium for Nebulization 3 milliLiter(s) Nebulizer every 6 hours PRN Shortness of Breath and/or Wheezing  clonazePAM Tablet 0.5 milliGRAM(s) Oral daily PRN anxiety  dextrose 40% Gel 15 Gram(s) Oral once PRN Blood Glucose LESS THAN 70 milliGRAM(s)/deciliter  glucagon  Injectable 1 milliGRAM(s) IntraMuscular once PRN Glucose LESS THAN 70 milligrams/deciliter  guaiFENesin   Syrup  (Sugar-Free) 200 milliGRAM(s) Oral every 6 hours PRN Cough  ondansetron Injectable 4 milliGRAM(s) IV Push every 8 hours PRN Nausea and/or Vomiting  oxyCODONE    IR 5 milliGRAM(s) Oral every 6 hours PRN Moderate Pain (4 - 6)  oxyCODONE    IR 10 milliGRAM(s) Oral every 6 hours PRN Severe Pain (7 - 10)                            8.4    9.93  )-----------( 695      ( 20 Dec 2018 08:14 )             26.2       12-20    135  |  95<L>  |  8   ----------------------------<  111<H>  3.8   |  26  |  0.71    Ca    9.5      20 Dec 2018 07:11  Phos  4.8     12-20  Mg     2.0     12-20    TPro  7.4  /  Alb  3.0<L>  /  TBili  0.3  /  DBili  x   /  AST  21  /  ALT  17  /  AlkPhos  119  12-19      Physical Exam:  Gen: Laying in bed, NAD, AAOx2 (person and place)  Resp: Unlabored breathing  Abd: soft, minimal LUQ TTP, ND, no rebound or guarding  Ext: WWP  Skin: No rashes

## 2018-12-20 NOTE — PROGRESS NOTE ADULT - ASSESSMENT
63M transferred from [place] with acute/subacute pancreatitis with necrosis and acute necrotic collection around the lesser sac, repeat CTs (12/10, 12/13) grossly stable.  NJ tube removed with unknown mechanism, now on regular diet    - Pain control as needed  - no need to trend lipase as it has no correlation to clinical course  - c/w regular diet with small, frequent meals  - Continue nightly bipap, monitor respiratory status  - Insulin sliding scale  - lovenox for DVT ppx  - OOB and ambulating as tolerated  - encourage IS  - dispo: rehab planning    Trauma Surgery  [phone]

## 2018-12-20 NOTE — PROGRESS NOTE ADULT - SUBJECTIVE AND OBJECTIVE BOX
Patient is a 63y old  Male who presents with a chief complaint of acute pancreatitis (20 Dec 2018 14:18)  Subjective: Pt reports improved appetite, able to tolerate PO today. Abd pain improved. +intermittent nausea at the sight of food, but knows he needs to eat and has not had any vomiting. +reports profound fatigue and is very concerned that he has mono. No fevers/chills, no cough/sob.    14 point ros otherwise negative    VITAL SIGNS:  Vital Signs Last 24 Hrs  T(C): 36.8 (20 Dec 2018 14:41), Max: 37.3 (19 Dec 2018 17:37)  T(F): 98.2 (20 Dec 2018 14:41), Max: 99.2 (19 Dec 2018 17:37)  HR: 102 (20 Dec 2018 14:41) (95 - 103)  BP: 148/76 (20 Dec 2018 14:41) (124/75 - 148/76)  BP(mean): --  RR: 18 (20 Dec 2018 14:41) (18 - 18)  SpO2: 90% (20 Dec 2018 14:41) (90% - 92%)      PHYSICAL EXAM:     GENERAL: no acute distress  HEENT: PERRLA, EOMI, moist oropharynx   RESPIRATORY: +decreased breath sounds L base  CARDIOVASCULAR: RRR, no murmurs, gallops, rubs  ABDOMINAL: soft, non-tender, non-distended, positive bowel sounds, +palpable mass LUQ  EXTREMITIES: no clubbing, cyanosis, or edema  NEUROLOGICAL: alert and oriented x 3, non-focal  SKIN: no rashes or lesions   MUSCULOSKELETAL: no gross joint deformity                          8.4    9.93  )-----------( 695      ( 20 Dec 2018 08:14 )             26.2     12-20    135  |  95<L>  |  8   ----------------------------<  111<H>  3.8   |  26  |  0.71    Ca    9.5      20 Dec 2018 07:11  Phos  4.8     12-20  Mg     2.0     12-20    TPro  7.4  /  Alb  3.0<L>  /  TBili  0.3  /  DBili  x   /  AST  21  /  ALT  17  /  AlkPhos  119  12-19      CAPILLARY BLOOD GLUCOSE      POCT Blood Glucose.: 129 mg/dL (20 Dec 2018 13:06)  POCT Blood Glucose.: 123 mg/dL (20 Dec 2018 08:47)  POCT Blood Glucose.: 112 mg/dL (19 Dec 2018 22:21)  POCT Blood Glucose.: 112 mg/dL (19 Dec 2018 16:44)      MEDICATIONS  (STANDING):  anastrozole 1 milliGRAM(s) Oral daily  bisacodyl 5 milliGRAM(s) Oral at bedtime  clonazePAM Tablet 0.5 milliGRAM(s) Oral at bedtime  dextrose 5%. 1000 milliLiter(s) (50 mL/Hr) IV Continuous <Continuous>  dextrose 50% Injectable 12.5 Gram(s) IV Push once  dextrose 50% Injectable 25 Gram(s) IV Push once  dextrose 50% Injectable 25 Gram(s) IV Push once  enoxaparin Injectable 40 milliGRAM(s) SubCutaneous daily  escitalopram 20 milliGRAM(s) Oral at bedtime  folic acid 1 milliGRAM(s) Oral daily  insulin lispro (HumaLOG) corrective regimen sliding scale   SubCutaneous three times a day before meals  insulin lispro (HumaLOG) corrective regimen sliding scale   SubCutaneous at bedtime  lisinopril 40 milliGRAM(s) Oral daily  metoprolol tartrate 25 milliGRAM(s) Oral two times a day  multivitamin 1 Tablet(s) Oral daily  pantoprazole    Tablet 40 milliGRAM(s) Oral two times a day  QUEtiapine 100 milliGRAM(s) Oral at bedtime  thiamine 100 milliGRAM(s) Oral daily    MEDICATIONS  (PRN):  acetaminophen   Tablet .. 650 milliGRAM(s) Oral every 6 hours PRN Mild Pain (1 - 3)  ALBUTerol/ipratropium for Nebulization 3 milliLiter(s) Nebulizer every 6 hours PRN Shortness of Breath and/or Wheezing  clonazePAM Tablet 0.5 milliGRAM(s) Oral daily PRN anxiety  dextrose 40% Gel 15 Gram(s) Oral once PRN Blood Glucose LESS THAN 70 milliGRAM(s)/deciliter  glucagon  Injectable 1 milliGRAM(s) IntraMuscular once PRN Glucose LESS THAN 70 milligrams/deciliter  guaiFENesin   Syrup  (Sugar-Free) 200 milliGRAM(s) Oral every 6 hours PRN Cough  ondansetron Injectable 4 milliGRAM(s) IV Push every 8 hours PRN Nausea and/or Vomiting  oxyCODONE    IR 5 milliGRAM(s) Oral every 6 hours PRN Moderate Pain (4 - 6)  oxyCODONE    IR 10 milliGRAM(s) Oral every 6 hours PRN Severe Pain (7 - 10)

## 2018-12-20 NOTE — PROGRESS NOTE ADULT - PROBLEM SELECTOR PLAN 2
cont diet as tolerated, patient informed to take diet slowly  will need repeat abdominal imaging as outpatient to decide if these collections mature and need to be drained

## 2018-12-21 LAB
EBV EA AB SER IA-ACNC: 82.8 U/ML — HIGH
EBV EA AB TITR SER IF: POSITIVE
EBV EA IGG SER-ACNC: POSITIVE
EBV NA IGG SER IA-ACNC: 338 U/ML — HIGH
EBV PATRN SPEC IB-IMP: SIGNIFICANT CHANGE UP
EBV VCA IGG AVIDITY SER QL IA: POSITIVE
EBV VCA IGM SER IA-ACNC: 28.5 U/ML — SIGNIFICANT CHANGE UP
EBV VCA IGM SER IA-ACNC: 301 U/ML — HIGH
EBV VCA IGM TITR FLD: NEGATIVE — SIGNIFICANT CHANGE UP
GLUCOSE BLDC GLUCOMTR-MCNC: 104 MG/DL — HIGH (ref 70–99)
GLUCOSE BLDC GLUCOMTR-MCNC: 112 MG/DL — HIGH (ref 70–99)
GLUCOSE BLDC GLUCOMTR-MCNC: 125 MG/DL — HIGH (ref 70–99)
GLUCOSE BLDC GLUCOMTR-MCNC: 126 MG/DL — HIGH (ref 70–99)

## 2018-12-21 PROCEDURE — 99231 SBSQ HOSP IP/OBS SF/LOW 25: CPT

## 2018-12-21 PROCEDURE — 99233 SBSQ HOSP IP/OBS HIGH 50: CPT

## 2018-12-21 RX ORDER — PANTOPRAZOLE SODIUM 20 MG/1
1 TABLET, DELAYED RELEASE ORAL
Qty: 0 | Refills: 0 | COMMUNITY
Start: 2018-12-21

## 2018-12-21 RX ORDER — AMLODIPINE BESYLATE 2.5 MG/1
1 TABLET ORAL
Qty: 0 | Refills: 0 | COMMUNITY
Start: 2018-12-21

## 2018-12-21 RX ORDER — OXYCODONE HYDROCHLORIDE 5 MG/1
1 TABLET ORAL
Qty: 20 | Refills: 0 | OUTPATIENT
Start: 2018-12-21 | End: 2018-12-25

## 2018-12-21 RX ORDER — PANTOPRAZOLE SODIUM 20 MG/1
40 TABLET, DELAYED RELEASE ORAL
Qty: 0 | Refills: 0 | Status: DISCONTINUED | OUTPATIENT
Start: 2018-12-21 | End: 2018-12-24

## 2018-12-21 RX ORDER — ACETAMINOPHEN 500 MG
2 TABLET ORAL
Qty: 0 | Refills: 0 | COMMUNITY
Start: 2018-12-21

## 2018-12-21 RX ORDER — THIAMINE MONONITRATE (VIT B1) 100 MG
1 TABLET ORAL
Qty: 0 | Refills: 0 | COMMUNITY
Start: 2018-12-21

## 2018-12-21 RX ORDER — ANASTROZOLE 1 MG/1
1 TABLET ORAL
Qty: 20 | Refills: 0 | OUTPATIENT
Start: 2018-12-21

## 2018-12-21 RX ORDER — FOLIC ACID 0.8 MG
1 TABLET ORAL
Qty: 0 | Refills: 0 | COMMUNITY
Start: 2018-12-21

## 2018-12-21 RX ADMIN — Medication 0.5 MILLIGRAM(S): at 21:28

## 2018-12-21 RX ADMIN — Medication 100 MILLIGRAM(S): at 10:52

## 2018-12-21 RX ADMIN — Medication 25 MILLIGRAM(S): at 17:46

## 2018-12-21 RX ADMIN — Medication 1 TABLET(S): at 10:52

## 2018-12-21 RX ADMIN — PANTOPRAZOLE SODIUM 40 MILLIGRAM(S): 20 TABLET, DELAYED RELEASE ORAL at 05:16

## 2018-12-21 RX ADMIN — Medication 5 MILLIGRAM(S): at 21:28

## 2018-12-21 RX ADMIN — AMLODIPINE BESYLATE 10 MILLIGRAM(S): 2.5 TABLET ORAL at 05:16

## 2018-12-21 RX ADMIN — LISINOPRIL 40 MILLIGRAM(S): 2.5 TABLET ORAL at 05:16

## 2018-12-21 RX ADMIN — OXYCODONE HYDROCHLORIDE 10 MILLIGRAM(S): 5 TABLET ORAL at 11:48

## 2018-12-21 RX ADMIN — ENOXAPARIN SODIUM 40 MILLIGRAM(S): 100 INJECTION SUBCUTANEOUS at 10:52

## 2018-12-21 RX ADMIN — ESCITALOPRAM OXALATE 20 MILLIGRAM(S): 10 TABLET, FILM COATED ORAL at 21:28

## 2018-12-21 RX ADMIN — OXYCODONE HYDROCHLORIDE 10 MILLIGRAM(S): 5 TABLET ORAL at 22:42

## 2018-12-21 RX ADMIN — QUETIAPINE FUMARATE 100 MILLIGRAM(S): 200 TABLET, FILM COATED ORAL at 21:28

## 2018-12-21 RX ADMIN — Medication 1 MILLIGRAM(S): at 10:52

## 2018-12-21 RX ADMIN — ANASTROZOLE 1 MILLIGRAM(S): 1 TABLET ORAL at 10:52

## 2018-12-21 RX ADMIN — Medication 25 MILLIGRAM(S): at 05:16

## 2018-12-21 RX ADMIN — OXYCODONE HYDROCHLORIDE 10 MILLIGRAM(S): 5 TABLET ORAL at 10:48

## 2018-12-21 NOTE — PROGRESS NOTE ADULT - ASSESSMENT
63y Male PMHX of alcohol induced pancreatitis earlier this year, alcohol abuse (1/3 quart of vodka daily), DM, HLD, HTN, Depression, ROSS on CPAP who presents as a transfer from [facility] for management of acute pancreatitis complicated by necrosis and persistent fluid collections, likely maturing pseudocysts, also with BRBPR found to have ascending colonic ulcers, delirium which has now improved

## 2018-12-21 NOTE — PROGRESS NOTE ADULT - SUBJECTIVE AND OBJECTIVE BOX
Patient is a 63y old  Male who presents with a chief complaint of acute pancreatitis (21 Dec 2018 07:08)  Subjective: No acute events overnight. Pt reports he still feels tired. Tolerating PO but doesn't like the hospital food. Feels ready for rehab. +abd pain stable/controlled but increases with cough. No fevers/chills, no sob, no cp/palpitations. +BM    14 point ros otherwise negative    VITAL SIGNS:  Vital Signs Last 24 Hrs  T(C): 36.6 (21 Dec 2018 09:36), Max: 37.2 (20 Dec 2018 17:34)  T(F): 97.9 (21 Dec 2018 09:36), Max: 99 (21 Dec 2018 06:38)  HR: 93 (21 Dec 2018 09:36) (87 - 103)  BP: 107/68 (21 Dec 2018 09:36) (101/63 - 148/76)  BP(mean): --  RR: 20 (21 Dec 2018 09:36) (18 - 20)  SpO2: 94% (21 Dec 2018 09:36) (90% - 95%)      PHYSICAL EXAM:     GENERAL: no acute distress  HEENT: PERRLA, EOMI, moist oropharynx   RESPIRATORY: +decreased breath sounds L base  CARDIOVASCULAR: RRR, no murmurs, gallops, rubs  ABDOMINAL: soft, +minimally tender LUQ, +palpable mass LUQ, +bowel sounds  EXTREMITIES: no clubbing, cyanosis, or edema  NEUROLOGICAL: alert and oriented x 3, non-focal  SKIN: no rashes or lesions   MUSCULOSKELETAL: no gross joint deformity                          8.4    9.93  )-----------( 695      ( 20 Dec 2018 08:14 )             26.2     12-20    135  |  95<L>  |  8   ----------------------------<  111<H>  3.8   |  26  |  0.71    Ca    9.5      20 Dec 2018 07:11  Phos  4.8     12-20  Mg     2.0     12-20        CAPILLARY BLOOD GLUCOSE      POCT Blood Glucose.: 125 mg/dL (21 Dec 2018 10:07)  POCT Blood Glucose.: 117 mg/dL (20 Dec 2018 22:14)  POCT Blood Glucose.: 118 mg/dL (20 Dec 2018 17:33)  POCT Blood Glucose.: 129 mg/dL (20 Dec 2018 13:06)      MEDICATIONS  (STANDING):  amLODIPine   Tablet 10 milliGRAM(s) Oral daily  anastrozole 1 milliGRAM(s) Oral daily  bisacodyl 5 milliGRAM(s) Oral at bedtime  clonazePAM Tablet 0.5 milliGRAM(s) Oral at bedtime  dextrose 5%. 1000 milliLiter(s) (50 mL/Hr) IV Continuous <Continuous>  dextrose 50% Injectable 12.5 Gram(s) IV Push once  dextrose 50% Injectable 25 Gram(s) IV Push once  dextrose 50% Injectable 25 Gram(s) IV Push once  enoxaparin Injectable 40 milliGRAM(s) SubCutaneous daily  escitalopram 20 milliGRAM(s) Oral at bedtime  folic acid 1 milliGRAM(s) Oral daily  insulin lispro (HumaLOG) corrective regimen sliding scale   SubCutaneous three times a day before meals  insulin lispro (HumaLOG) corrective regimen sliding scale   SubCutaneous at bedtime  lisinopril 40 milliGRAM(s) Oral daily  metoprolol tartrate 25 milliGRAM(s) Oral two times a day  multivitamin 1 Tablet(s) Oral daily  pantoprazole    Tablet 40 milliGRAM(s) Oral two times a day  QUEtiapine 100 milliGRAM(s) Oral at bedtime  thiamine 100 milliGRAM(s) Oral daily    MEDICATIONS  (PRN):  acetaminophen   Tablet .. 650 milliGRAM(s) Oral every 6 hours PRN Mild Pain (1 - 3)  ALBUTerol/ipratropium for Nebulization 3 milliLiter(s) Nebulizer every 6 hours PRN Shortness of Breath and/or Wheezing  clonazePAM Tablet 0.5 milliGRAM(s) Oral daily PRN anxiety  dextrose 40% Gel 15 Gram(s) Oral once PRN Blood Glucose LESS THAN 70 milliGRAM(s)/deciliter  glucagon  Injectable 1 milliGRAM(s) IntraMuscular once PRN Glucose LESS THAN 70 milligrams/deciliter  guaiFENesin   Syrup  (Sugar-Free) 200 milliGRAM(s) Oral every 6 hours PRN Cough  ondansetron Injectable 4 milliGRAM(s) IV Push every 8 hours PRN Nausea and/or Vomiting  oxyCODONE    IR 5 milliGRAM(s) Oral every 6 hours PRN Moderate Pain (4 - 6)  oxyCODONE    IR 10 milliGRAM(s) Oral every 6 hours PRN Severe Pain (7 - 10)

## 2018-12-21 NOTE — PROGRESS NOTE ADULT - PROBLEM SELECTOR PLAN 9
- continue lexapro 20mg po daily Home meds verified with [facility]  - anastrozole 1mg po twice a week  - klonopin 1mg po tid  - lexapro 30mg po daily  - lipitor 20mg po qhs  - seroquel 100mg po nightly  - quinapril 40mg po bid

## 2018-12-21 NOTE — PROGRESS NOTE ADULT - SUBJECTIVE AND OBJECTIVE BOX
GENERAL SURGERY DAILY PROGRESS NOTE:       Subjective:  Pain controlled. Denies N/V. Tolerating low fat diet.         Objective:    PE:  Gen: Laying in bed, NAD, AAOx2 (person and place)  Resp: Unlabored breathing  Abd: soft, minimal LUQ TTP, ND, no rebound or guarding      Vital Signs Last 24 Hrs  T(C): 37.2 (21 Dec 2018 06:38), Max: 37.2 (20 Dec 2018 17:34)  T(F): 99 (21 Dec 2018 06:38), Max: 99 (21 Dec 2018 06:38)  HR: 93 (21 Dec 2018 06:38) (87 - 103)  BP: 101/63 (21 Dec 2018 06:38) (101/63 - 148/76)  BP(mean): --  RR: 18 (21 Dec 2018 06:38) (18 - 18)  SpO2: 95% (21 Dec 2018 06:38) (90% - 95%)    I&O's Detail    20 Dec 2018 07:01  -  21 Dec 2018 07:00  --------------------------------------------------------  IN:    Oral Fluid: 1060 mL  Total IN: 1060 mL    OUT:    Voided: 1200 mL  Total OUT: 1200 mL    Total NET: -140 mL          Daily     Daily     MEDICATIONS  (STANDING):  amLODIPine   Tablet 10 milliGRAM(s) Oral daily  anastrozole 1 milliGRAM(s) Oral daily  bisacodyl 5 milliGRAM(s) Oral at bedtime  clonazePAM Tablet 0.5 milliGRAM(s) Oral at bedtime  dextrose 5%. 1000 milliLiter(s) (50 mL/Hr) IV Continuous <Continuous>  dextrose 50% Injectable 12.5 Gram(s) IV Push once  dextrose 50% Injectable 25 Gram(s) IV Push once  dextrose 50% Injectable 25 Gram(s) IV Push once  enoxaparin Injectable 40 milliGRAM(s) SubCutaneous daily  escitalopram 20 milliGRAM(s) Oral at bedtime  folic acid 1 milliGRAM(s) Oral daily  insulin lispro (HumaLOG) corrective regimen sliding scale   SubCutaneous three times a day before meals  insulin lispro (HumaLOG) corrective regimen sliding scale   SubCutaneous at bedtime  lisinopril 40 milliGRAM(s) Oral daily  metoprolol tartrate 25 milliGRAM(s) Oral two times a day  multivitamin 1 Tablet(s) Oral daily  pantoprazole    Tablet 40 milliGRAM(s) Oral two times a day  QUEtiapine 100 milliGRAM(s) Oral at bedtime  thiamine 100 milliGRAM(s) Oral daily    MEDICATIONS  (PRN):  acetaminophen   Tablet .. 650 milliGRAM(s) Oral every 6 hours PRN Mild Pain (1 - 3)  ALBUTerol/ipratropium for Nebulization 3 milliLiter(s) Nebulizer every 6 hours PRN Shortness of Breath and/or Wheezing  clonazePAM Tablet 0.5 milliGRAM(s) Oral daily PRN anxiety  dextrose 40% Gel 15 Gram(s) Oral once PRN Blood Glucose LESS THAN 70 milliGRAM(s)/deciliter  glucagon  Injectable 1 milliGRAM(s) IntraMuscular once PRN Glucose LESS THAN 70 milligrams/deciliter  guaiFENesin   Syrup  (Sugar-Free) 200 milliGRAM(s) Oral every 6 hours PRN Cough  ondansetron Injectable 4 milliGRAM(s) IV Push every 8 hours PRN Nausea and/or Vomiting  oxyCODONE    IR 5 milliGRAM(s) Oral every 6 hours PRN Moderate Pain (4 - 6)  oxyCODONE    IR 10 milliGRAM(s) Oral every 6 hours PRN Severe Pain (7 - 10)      LABS:                        8.4    9.93  )-----------( 695      ( 20 Dec 2018 08:14 )             26.2     12-20    135  |  95<L>  |  8   ----------------------------<  111<H>  3.8   |  26  |  0.71    Ca    9.5      20 Dec 2018 07:11  Phos  4.8     12-20  Mg     2.0     12-20    TPro  7.4  /  Alb  3.0<L>  /  TBili  0.3  /  DBili  x   /  AST  21  /  ALT  17  /  AlkPhos  119  12-19          RADIOLOGY & ADDITIONAL STUDIES: GENERAL SURGERY DAILY PROGRESS NOTE:       Subjective:  Pain controlled. Denies N/V and abdominal pain. Tolerating low fat diet.         Objective:    PE:  Gen: Laying in bed, NAD, AAOx2 (person and place)  Resp: Unlabored breathing  Abd: soft, minimal LUQ TTP, ND, no rebound or guarding      Vital Signs Last 24 Hrs  T(C): 37.2 (21 Dec 2018 06:38), Max: 37.2 (20 Dec 2018 17:34)  T(F): 99 (21 Dec 2018 06:38), Max: 99 (21 Dec 2018 06:38)  HR: 93 (21 Dec 2018 06:38) (87 - 103)  BP: 101/63 (21 Dec 2018 06:38) (101/63 - 148/76)  BP(mean): --  RR: 18 (21 Dec 2018 06:38) (18 - 18)  SpO2: 95% (21 Dec 2018 06:38) (90% - 95%)    I&O's Detail    20 Dec 2018 07:01  -  21 Dec 2018 07:00  --------------------------------------------------------  IN:    Oral Fluid: 1060 mL  Total IN: 1060 mL    OUT:    Voided: 1200 mL  Total OUT: 1200 mL    Total NET: -140 mL          Daily     Daily     MEDICATIONS  (STANDING):  amLODIPine   Tablet 10 milliGRAM(s) Oral daily  anastrozole 1 milliGRAM(s) Oral daily  bisacodyl 5 milliGRAM(s) Oral at bedtime  clonazePAM Tablet 0.5 milliGRAM(s) Oral at bedtime  dextrose 5%. 1000 milliLiter(s) (50 mL/Hr) IV Continuous <Continuous>  dextrose 50% Injectable 12.5 Gram(s) IV Push once  dextrose 50% Injectable 25 Gram(s) IV Push once  dextrose 50% Injectable 25 Gram(s) IV Push once  enoxaparin Injectable 40 milliGRAM(s) SubCutaneous daily  escitalopram 20 milliGRAM(s) Oral at bedtime  folic acid 1 milliGRAM(s) Oral daily  insulin lispro (HumaLOG) corrective regimen sliding scale   SubCutaneous three times a day before meals  insulin lispro (HumaLOG) corrective regimen sliding scale   SubCutaneous at bedtime  lisinopril 40 milliGRAM(s) Oral daily  metoprolol tartrate 25 milliGRAM(s) Oral two times a day  multivitamin 1 Tablet(s) Oral daily  pantoprazole    Tablet 40 milliGRAM(s) Oral two times a day  QUEtiapine 100 milliGRAM(s) Oral at bedtime  thiamine 100 milliGRAM(s) Oral daily    MEDICATIONS  (PRN):  acetaminophen   Tablet .. 650 milliGRAM(s) Oral every 6 hours PRN Mild Pain (1 - 3)  ALBUTerol/ipratropium for Nebulization 3 milliLiter(s) Nebulizer every 6 hours PRN Shortness of Breath and/or Wheezing  clonazePAM Tablet 0.5 milliGRAM(s) Oral daily PRN anxiety  dextrose 40% Gel 15 Gram(s) Oral once PRN Blood Glucose LESS THAN 70 milliGRAM(s)/deciliter  glucagon  Injectable 1 milliGRAM(s) IntraMuscular once PRN Glucose LESS THAN 70 milligrams/deciliter  guaiFENesin   Syrup  (Sugar-Free) 200 milliGRAM(s) Oral every 6 hours PRN Cough  ondansetron Injectable 4 milliGRAM(s) IV Push every 8 hours PRN Nausea and/or Vomiting  oxyCODONE    IR 5 milliGRAM(s) Oral every 6 hours PRN Moderate Pain (4 - 6)  oxyCODONE    IR 10 milliGRAM(s) Oral every 6 hours PRN Severe Pain (7 - 10)      LABS:                        8.4    9.93  )-----------( 695      ( 20 Dec 2018 08:14 )             26.2     12-20    135  |  95<L>  |  8   ----------------------------<  111<H>  3.8   |  26  |  0.71    Ca    9.5      20 Dec 2018 07:11  Phos  4.8     12-20  Mg     2.0     12-20    TPro  7.4  /  Alb  3.0<L>  /  TBili  0.3  /  DBili  x   /  AST  21  /  ALT  17  /  AlkPhos  119  12-19          RADIOLOGY & ADDITIONAL STUDIES:

## 2018-12-21 NOTE — PROGRESS NOTE ADULT - PROBLEM SELECTOR PLAN 2
Likely hospital delirium in the setting of prolonged hospital stay with critical illness, improving somewhat. Pt now back to baseline.  -  decreased benzo regimen to klonopin dosing to 0.5mg po QHS standing for insomnia, and klonopin 0.5mg po daily prn anxiety, hold for oversedation/delirium  - conservative measures to decrease delirium: PT eval and ambulation, OOB, frequent reorientation, minimize sleep disturbances at night  - if worsens, would obtain ABG as patient with ROSS and may have hypoventilation syndrome, would consider head CT as well.

## 2018-12-21 NOTE — PROGRESS NOTE ADULT - ATTENDING COMMENTS
seen and examined 12-21-18 @ 1427    tolerating more diet and supplements    soft / mild LUQ tenderness / ND  LUQ phlegmon less prominent    acute alcoholic pancreatitis with acute necrotic collections and no evidence of infected necrosis  -pancreatitis resolving    left pleural effusion with compressive atelectasis  -incentive spirometry    LGIB from ascending colon ulceration - pathology consistent with chronic active colitis    stable for GREGORY placement

## 2018-12-21 NOTE — PROGRESS NOTE ADULT - ASSESSMENT
63M transferred from [place] with acute/subacute pancreatitis with necrosis and acute necrotic collection around the lesser sac, repeat CTs (12/10, 12/13) grossly stable.  NJ tube removed with unknown mechanism, now on regular diet    - Pain control as needed  - no need to trend lipase as it has no correlation to clinical course  - c/w regular diet with small, frequent meals  - Continue nightly bipap, monitor respiratory status  - Insulin sliding scale  - lovenox for DVT ppx  - OOB and ambulating as tolerated  - encourage IS  - dispo: rehab planning    Trauma Surgery  [id number] 63M transferred from [place] with acute/subacute pancreatitis with necrosis and acute necrotic collection around the lesser sac, repeat CTs (12/10, 12/13) grossly stable.  NJ tube removed with unknown mechanism, now on regular diet    - Pain control as needed  - no need to trend lipase as it has no correlation to clinical course  - c/w regular diet with small, frequent meals  - Continue nightly bipap, monitor respiratory status  - Insulin sliding scale  - lovenox for DVT ppx  - OOB and ambulating as tolerated  - encourage IS  - Biopsy pending from LGIB   - dispo: rehab planning    Trauma Surgery  [id number]

## 2018-12-22 LAB
GLUCOSE BLDC GLUCOMTR-MCNC: 106 MG/DL — HIGH (ref 70–99)
GLUCOSE BLDC GLUCOMTR-MCNC: 114 MG/DL — HIGH (ref 70–99)
GLUCOSE BLDC GLUCOMTR-MCNC: 163 MG/DL — HIGH (ref 70–99)

## 2018-12-22 PROCEDURE — 99231 SBSQ HOSP IP/OBS SF/LOW 25: CPT

## 2018-12-22 RX ORDER — OXYCODONE HYDROCHLORIDE 5 MG/1
5 TABLET ORAL EVERY 6 HOURS
Qty: 0 | Refills: 0 | Status: DISCONTINUED | OUTPATIENT
Start: 2018-12-22 | End: 2018-12-24

## 2018-12-22 RX ORDER — OXYCODONE HYDROCHLORIDE 5 MG/1
10 TABLET ORAL EVERY 6 HOURS
Qty: 0 | Refills: 0 | Status: DISCONTINUED | OUTPATIENT
Start: 2018-12-22 | End: 2018-12-24

## 2018-12-22 RX ADMIN — Medication 1 MILLIGRAM(S): at 14:26

## 2018-12-22 RX ADMIN — Medication 1 TABLET(S): at 14:29

## 2018-12-22 RX ADMIN — ENOXAPARIN SODIUM 40 MILLIGRAM(S): 100 INJECTION SUBCUTANEOUS at 18:22

## 2018-12-22 RX ADMIN — OXYCODONE HYDROCHLORIDE 5 MILLIGRAM(S): 5 TABLET ORAL at 14:25

## 2018-12-22 RX ADMIN — ESCITALOPRAM OXALATE 20 MILLIGRAM(S): 10 TABLET, FILM COATED ORAL at 21:51

## 2018-12-22 RX ADMIN — Medication 0.5 MILLIGRAM(S): at 21:51

## 2018-12-22 RX ADMIN — OXYCODONE HYDROCHLORIDE 5 MILLIGRAM(S): 5 TABLET ORAL at 14:55

## 2018-12-22 RX ADMIN — PANTOPRAZOLE SODIUM 40 MILLIGRAM(S): 20 TABLET, DELAYED RELEASE ORAL at 05:26

## 2018-12-22 RX ADMIN — OXYCODONE HYDROCHLORIDE 5 MILLIGRAM(S): 5 TABLET ORAL at 22:41

## 2018-12-22 RX ADMIN — QUETIAPINE FUMARATE 100 MILLIGRAM(S): 200 TABLET, FILM COATED ORAL at 21:51

## 2018-12-22 RX ADMIN — OXYCODONE HYDROCHLORIDE 5 MILLIGRAM(S): 5 TABLET ORAL at 23:20

## 2018-12-22 RX ADMIN — Medication 100 MILLIGRAM(S): at 14:26

## 2018-12-22 RX ADMIN — ANASTROZOLE 1 MILLIGRAM(S): 1 TABLET ORAL at 14:27

## 2018-12-22 RX ADMIN — Medication 5 MILLIGRAM(S): at 21:51

## 2018-12-22 RX ADMIN — AMLODIPINE BESYLATE 10 MILLIGRAM(S): 2.5 TABLET ORAL at 09:17

## 2018-12-22 RX ADMIN — Medication 25 MILLIGRAM(S): at 18:24

## 2018-12-22 RX ADMIN — LISINOPRIL 40 MILLIGRAM(S): 2.5 TABLET ORAL at 09:16

## 2018-12-22 RX ADMIN — Medication 25 MILLIGRAM(S): at 09:16

## 2018-12-22 RX ADMIN — ONDANSETRON 4 MILLIGRAM(S): 8 TABLET, FILM COATED ORAL at 20:35

## 2018-12-22 NOTE — PROGRESS NOTE ADULT - SUBJECTIVE AND OBJECTIVE BOX
GENERAL SURGERY DAILY PROGRESS NOTE:       Subjective:  Pt seen and examined at bedside. No acute events overnight. Pain controlled. AM BP meds held for         Objective:    PE:  Gen: Laying in bed, NAD, AAOx2 (person and place)  Resp: Unlabored breathing  Abd: soft, minimal LUQ TTP, ND, no rebound or guarding    Vital Signs Last 24 Hrs  T(C): 36.3 (22 Dec 2018 05:07), Max: 37.3 (21 Dec 2018 17:17)  T(F): 97.4 (22 Dec 2018 05:07), Max: 99.2 (21 Dec 2018 17:17)  HR: 101 (22 Dec 2018 05:07) (87 - 101)  BP: 101/62 (22 Dec 2018 05:07) (101/58 - 127/70)  BP(mean): --  RR: 18 (22 Dec 2018 05:07) (18 - 20)  SpO2: 92% (22 Dec 2018 05:07) (92% - 95%)    I&O's Detail    20 Dec 2018 07:01  -  21 Dec 2018 07:00  --------------------------------------------------------  IN:    Oral Fluid: 1060 mL  Total IN: 1060 mL    OUT:    Voided: 1200 mL  Total OUT: 1200 mL    Total NET: -140 mL      21 Dec 2018 07:01  -  22 Dec 2018 05:17  --------------------------------------------------------  IN:    Oral Fluid: 740 mL  Total IN: 740 mL    OUT:  Total OUT: 0 mL    Total NET: 740 mL          Daily     Daily     MEDICATIONS  (STANDING):  amLODIPine   Tablet 10 milliGRAM(s) Oral daily  anastrozole 1 milliGRAM(s) Oral daily  bisacodyl 5 milliGRAM(s) Oral at bedtime  clonazePAM Tablet 0.5 milliGRAM(s) Oral at bedtime  dextrose 5%. 1000 milliLiter(s) (50 mL/Hr) IV Continuous <Continuous>  dextrose 50% Injectable 12.5 Gram(s) IV Push once  dextrose 50% Injectable 25 Gram(s) IV Push once  dextrose 50% Injectable 25 Gram(s) IV Push once  enoxaparin Injectable 40 milliGRAM(s) SubCutaneous daily  escitalopram 20 milliGRAM(s) Oral at bedtime  folic acid 1 milliGRAM(s) Oral daily  insulin lispro (HumaLOG) corrective regimen sliding scale   SubCutaneous three times a day before meals  insulin lispro (HumaLOG) corrective regimen sliding scale   SubCutaneous at bedtime  lisinopril 40 milliGRAM(s) Oral daily  metoprolol tartrate 25 milliGRAM(s) Oral two times a day  multivitamin 1 Tablet(s) Oral daily  pantoprazole    Tablet 40 milliGRAM(s) Oral before breakfast  QUEtiapine 100 milliGRAM(s) Oral at bedtime  thiamine 100 milliGRAM(s) Oral daily    MEDICATIONS  (PRN):  acetaminophen   Tablet .. 650 milliGRAM(s) Oral every 6 hours PRN Mild Pain (1 - 3)  ALBUTerol/ipratropium for Nebulization 3 milliLiter(s) Nebulizer every 6 hours PRN Shortness of Breath and/or Wheezing  clonazePAM Tablet 0.5 milliGRAM(s) Oral daily PRN anxiety  dextrose 40% Gel 15 Gram(s) Oral once PRN Blood Glucose LESS THAN 70 milliGRAM(s)/deciliter  glucagon  Injectable 1 milliGRAM(s) IntraMuscular once PRN Glucose LESS THAN 70 milligrams/deciliter  guaiFENesin   Syrup  (Sugar-Free) 200 milliGRAM(s) Oral every 6 hours PRN Cough  ondansetron Injectable 4 milliGRAM(s) IV Push every 8 hours PRN Nausea and/or Vomiting  oxyCODONE    IR 5 milliGRAM(s) Oral every 6 hours PRN Moderate Pain (4 - 6)  oxyCODONE    IR 10 milliGRAM(s) Oral every 6 hours PRN Severe Pain (7 - 10)      LABS:                        8.4    9.93  )-----------( 695      ( 20 Dec 2018 08:14 )             26.2     12-20    135  |  95<L>  |  8   ----------------------------<  111<H>  3.8   |  26  |  0.71    Ca    9.5      20 Dec 2018 07:11  Phos  4.8     12-20  Mg     2.0     12-20            RADIOLOGY & ADDITIONAL STUDIES:

## 2018-12-22 NOTE — PROGRESS NOTE ADULT - ATTENDING COMMENTS
seen and examined 12-22-18 @ 0830    tolerating regular diet and supplements    soft / NT / ND  LUQ phlegmon no longer palpable on exam    acute alcoholic pancreatitis with acute necrotic collections and no evidence of infected necrosis  -pancreatitis resolved    left pleural effusion with compressive atelectasis  -incentive spirometry    LGIB from ascending colon ulceration - pathology consistent with chronic active colitis  -D/C ibuprofen    stable for GREGORY placement

## 2018-12-22 NOTE — PROGRESS NOTE ADULT - ASSESSMENT
63M transferred from [place] with acute/subacute pancreatitis with necrosis and acute necrotic collection around the lesser sac, repeat CTs (12/10, 12/13) grossly stable.  NJ tube removed with unknown mechanism, now on regular diet    - Pain control as needed  - no need to trend lipase as it has no correlation to clinical course  - c/w regular diet with small, frequent meals  - Continue nightly bipap, monitor respiratory status  - Insulin sliding scale  - lovenox for DVT ppx  - OOB and ambulating as tolerated  - encourage IS  - Biopsy pending from LGIB   - dispo: rehab planning, f/u social work re insurance approval for rehab     Trauma Surgery  [id number]

## 2018-12-23 LAB
ALBUMIN SERPL ELPH-MCNC: 3.3 G/DL — SIGNIFICANT CHANGE UP (ref 3.3–5)
ALP SERPL-CCNC: 326 U/L — HIGH (ref 40–120)
ALT FLD-CCNC: 32 U/L — SIGNIFICANT CHANGE UP (ref 10–45)
ANION GAP SERPL CALC-SCNC: 14 MMOL/L — SIGNIFICANT CHANGE UP (ref 5–17)
ANION GAP SERPL CALC-SCNC: 16 MMOL/L — SIGNIFICANT CHANGE UP (ref 5–17)
AST SERPL-CCNC: 46 U/L — HIGH (ref 10–40)
BILIRUB SERPL-MCNC: 0.6 MG/DL — SIGNIFICANT CHANGE UP (ref 0.2–1.2)
BUN SERPL-MCNC: 23 MG/DL — SIGNIFICANT CHANGE UP (ref 7–23)
BUN SERPL-MCNC: 24 MG/DL — HIGH (ref 7–23)
CALCIUM SERPL-MCNC: 9.6 MG/DL — SIGNIFICANT CHANGE UP (ref 8.4–10.5)
CALCIUM SERPL-MCNC: 9.8 MG/DL — SIGNIFICANT CHANGE UP (ref 8.4–10.5)
CHLORIDE SERPL-SCNC: 92 MMOL/L — LOW (ref 96–108)
CHLORIDE SERPL-SCNC: 92 MMOL/L — LOW (ref 96–108)
CHLORIDE UR-SCNC: <35 MMOL/L — SIGNIFICANT CHANGE UP
CO2 SERPL-SCNC: 23 MMOL/L — SIGNIFICANT CHANGE UP (ref 22–31)
CO2 SERPL-SCNC: 24 MMOL/L — SIGNIFICANT CHANGE UP (ref 22–31)
CREAT ?TM UR-MCNC: 135 MG/DL — SIGNIFICANT CHANGE UP
CREAT SERPL-MCNC: 1.84 MG/DL — HIGH (ref 0.5–1.3)
CREAT SERPL-MCNC: 2.44 MG/DL — HIGH (ref 0.5–1.3)
GLUCOSE BLDC GLUCOMTR-MCNC: 106 MG/DL — HIGH (ref 70–99)
GLUCOSE BLDC GLUCOMTR-MCNC: 124 MG/DL — HIGH (ref 70–99)
GLUCOSE BLDC GLUCOMTR-MCNC: 98 MG/DL — SIGNIFICANT CHANGE UP (ref 70–99)
GLUCOSE SERPL-MCNC: 104 MG/DL — HIGH (ref 70–99)
GLUCOSE SERPL-MCNC: 119 MG/DL — HIGH (ref 70–99)
HCT VFR BLD CALC: 26.8 % — LOW (ref 39–50)
HGB BLD-MCNC: 8.7 G/DL — LOW (ref 13–17)
MAGNESIUM SERPL-MCNC: 1.9 MG/DL — SIGNIFICANT CHANGE UP (ref 1.6–2.6)
MCHC RBC-ENTMCNC: 30.3 PG — SIGNIFICANT CHANGE UP (ref 27–34)
MCHC RBC-ENTMCNC: 32.5 GM/DL — SIGNIFICANT CHANGE UP (ref 32–36)
MCV RBC AUTO: 93.4 FL — SIGNIFICANT CHANGE UP (ref 80–100)
PHOSPHATE SERPL-MCNC: 6.4 MG/DL — HIGH (ref 2.5–4.5)
PLATELET # BLD AUTO: 618 K/UL — HIGH (ref 150–400)
POTASSIUM SERPL-MCNC: 4.4 MMOL/L — SIGNIFICANT CHANGE UP (ref 3.5–5.3)
POTASSIUM SERPL-MCNC: 4.6 MMOL/L — SIGNIFICANT CHANGE UP (ref 3.5–5.3)
POTASSIUM SERPL-SCNC: 4.4 MMOL/L — SIGNIFICANT CHANGE UP (ref 3.5–5.3)
POTASSIUM SERPL-SCNC: 4.6 MMOL/L — SIGNIFICANT CHANGE UP (ref 3.5–5.3)
POTASSIUM UR-SCNC: 14 MMOL/L — SIGNIFICANT CHANGE UP
PROT SERPL-MCNC: 7.9 G/DL — SIGNIFICANT CHANGE UP (ref 6–8.3)
RBC # BLD: 2.87 M/UL — LOW (ref 4.2–5.8)
RBC # FLD: 14.5 % — SIGNIFICANT CHANGE UP (ref 10.3–14.5)
SODIUM SERPL-SCNC: 130 MMOL/L — LOW (ref 135–145)
SODIUM SERPL-SCNC: 131 MMOL/L — LOW (ref 135–145)
SODIUM UR-SCNC: 20 MMOL/L — SIGNIFICANT CHANGE UP
WBC # BLD: 11.18 K/UL — HIGH (ref 3.8–10.5)
WBC # FLD AUTO: 11.18 K/UL — HIGH (ref 3.8–10.5)

## 2018-12-23 PROCEDURE — 99233 SBSQ HOSP IP/OBS HIGH 50: CPT

## 2018-12-23 PROCEDURE — 71045 X-RAY EXAM CHEST 1 VIEW: CPT | Mod: 26

## 2018-12-23 PROCEDURE — 76770 US EXAM ABDO BACK WALL COMP: CPT | Mod: 26

## 2018-12-23 RX ORDER — AMLODIPINE BESYLATE 2.5 MG/1
10 TABLET ORAL DAILY
Qty: 0 | Refills: 0 | Status: DISCONTINUED | OUTPATIENT
Start: 2018-12-23 | End: 2018-12-24

## 2018-12-23 RX ORDER — CLONAZEPAM 1 MG
0.5 TABLET ORAL DAILY
Qty: 0 | Refills: 0 | Status: DISCONTINUED | OUTPATIENT
Start: 2018-12-23 | End: 2018-12-24

## 2018-12-23 RX ORDER — CLONAZEPAM 1 MG
0.5 TABLET ORAL AT BEDTIME
Qty: 0 | Refills: 0 | Status: DISCONTINUED | OUTPATIENT
Start: 2018-12-23 | End: 2018-12-24

## 2018-12-23 RX ORDER — METOPROLOL TARTRATE 50 MG
25 TABLET ORAL
Qty: 0 | Refills: 0 | Status: DISCONTINUED | OUTPATIENT
Start: 2018-12-23 | End: 2018-12-24

## 2018-12-23 RX ADMIN — QUETIAPINE FUMARATE 100 MILLIGRAM(S): 200 TABLET, FILM COATED ORAL at 21:43

## 2018-12-23 RX ADMIN — Medication 25 MILLIGRAM(S): at 06:45

## 2018-12-23 RX ADMIN — ESCITALOPRAM OXALATE 20 MILLIGRAM(S): 10 TABLET, FILM COATED ORAL at 21:44

## 2018-12-23 RX ADMIN — ENOXAPARIN SODIUM 40 MILLIGRAM(S): 100 INJECTION SUBCUTANEOUS at 19:08

## 2018-12-23 RX ADMIN — Medication 650 MILLIGRAM(S): at 19:08

## 2018-12-23 RX ADMIN — Medication 5 MILLIGRAM(S): at 21:44

## 2018-12-23 RX ADMIN — Medication 1 TABLET(S): at 19:07

## 2018-12-23 RX ADMIN — Medication 100 MILLIGRAM(S): at 19:07

## 2018-12-23 RX ADMIN — LISINOPRIL 40 MILLIGRAM(S): 2.5 TABLET ORAL at 06:45

## 2018-12-23 RX ADMIN — Medication 0.5 MILLIGRAM(S): at 21:44

## 2018-12-23 RX ADMIN — AMLODIPINE BESYLATE 10 MILLIGRAM(S): 2.5 TABLET ORAL at 21:43

## 2018-12-23 RX ADMIN — ANASTROZOLE 1 MILLIGRAM(S): 1 TABLET ORAL at 19:07

## 2018-12-23 RX ADMIN — Medication 1 MILLIGRAM(S): at 19:07

## 2018-12-23 RX ADMIN — Medication 25 MILLIGRAM(S): at 21:45

## 2018-12-23 RX ADMIN — PANTOPRAZOLE SODIUM 40 MILLIGRAM(S): 20 TABLET, DELAYED RELEASE ORAL at 06:45

## 2018-12-23 RX ADMIN — AMLODIPINE BESYLATE 10 MILLIGRAM(S): 2.5 TABLET ORAL at 06:45

## 2018-12-23 NOTE — PROGRESS NOTE ADULT - ASSESSMENT
63M transferred from [place] with acute/subacute pancreatitis with necrosis and acute necrotic collection around the lesser sac, repeat CTs (12/10, 12/13) grossly stable.  NJ tube removed with unknown mechanism, now on regular diet    - Pain control as needed  - no need to trend lipase as it has no correlation to clinical course  - c/w regular diet with small, frequent meals  - Continue nightly bipap, monitor respiratory status  - Insulin sliding scale  - lovenox for DVT ppx  - OOB and ambulating as tolerated  - encourage IS  - dispo: rehab planning, f/u social work re insurance approval for rehab     Trauma Surgery  [id number]

## 2018-12-23 NOTE — PROGRESS NOTE ADULT - SUBJECTIVE AND OBJECTIVE BOX
Surgery Progress Note    S: Patient seen and examined. No acute events overnight. Patient tolerating diet with small but frequent meals. Patient reports his mental status and clarity are much improved from a few days ago. Patient reports his pain is well controlled.     O:  Vital Signs Last 24 Hrs  T(C): 36.4 (23 Dec 2018 06:12), Max: 37.2 (22 Dec 2018 17:35)  T(F): 97.5 (23 Dec 2018 06:12), Max: 98.9 (22 Dec 2018 17:35)  HR: 101 (23 Dec 2018 06:12) (87 - 104)  BP: 105/58 (23 Dec 2018 06:12) (100/51 - 125/67)  BP(mean): --  RR: 18 (23 Dec 2018 06:12) (18 - 18)  SpO2: 90% (23 Dec 2018 06:12) (88% - 93%)    I&O's Detail    21 Dec 2018 07:01  -  22 Dec 2018 07:00  --------------------------------------------------------  IN:    Oral Fluid: 740 mL  Total IN: 740 mL    OUT:  Total OUT: 0 mL    Total NET: 740 mL      22 Dec 2018 07:01  -  23 Dec 2018 06:18  --------------------------------------------------------  IN:    Oral Fluid: 840 mL  Total IN: 840 mL    OUT:    Voided: 550 mL  Total OUT: 550 mL    Total NET: 290 mL          MEDICATIONS  (STANDING):  amLODIPine   Tablet 10 milliGRAM(s) Oral daily  anastrozole 1 milliGRAM(s) Oral daily  bisacodyl 5 milliGRAM(s) Oral at bedtime  clonazePAM Tablet 0.5 milliGRAM(s) Oral at bedtime  dextrose 5%. 1000 milliLiter(s) (50 mL/Hr) IV Continuous <Continuous>  dextrose 50% Injectable 12.5 Gram(s) IV Push once  dextrose 50% Injectable 25 Gram(s) IV Push once  dextrose 50% Injectable 25 Gram(s) IV Push once  enoxaparin Injectable 40 milliGRAM(s) SubCutaneous daily  escitalopram 20 milliGRAM(s) Oral at bedtime  folic acid 1 milliGRAM(s) Oral daily  insulin lispro (HumaLOG) corrective regimen sliding scale   SubCutaneous three times a day before meals  insulin lispro (HumaLOG) corrective regimen sliding scale   SubCutaneous at bedtime  lisinopril 40 milliGRAM(s) Oral daily  metoprolol tartrate 25 milliGRAM(s) Oral two times a day  multivitamin 1 Tablet(s) Oral daily  pantoprazole    Tablet 40 milliGRAM(s) Oral before breakfast  QUEtiapine 100 milliGRAM(s) Oral at bedtime  thiamine 100 milliGRAM(s) Oral daily    MEDICATIONS  (PRN):  acetaminophen   Tablet .. 650 milliGRAM(s) Oral every 6 hours PRN Mild Pain (1 - 3)  ALBUTerol/ipratropium for Nebulization 3 milliLiter(s) Nebulizer every 6 hours PRN Shortness of Breath and/or Wheezing  clonazePAM Tablet 0.5 milliGRAM(s) Oral daily PRN anxiety  dextrose 40% Gel 15 Gram(s) Oral once PRN Blood Glucose LESS THAN 70 milliGRAM(s)/deciliter  glucagon  Injectable 1 milliGRAM(s) IntraMuscular once PRN Glucose LESS THAN 70 milligrams/deciliter  guaiFENesin   Syrup  (Sugar-Free) 200 milliGRAM(s) Oral every 6 hours PRN Cough  ondansetron Injectable 4 milliGRAM(s) IV Push every 8 hours PRN Nausea and/or Vomiting  oxyCODONE    IR 5 milliGRAM(s) Oral every 6 hours PRN Moderate Pain (4 - 6)  oxyCODONE    IR 10 milliGRAM(s) Oral every 6 hours PRN Severe Pain (7 - 10)        Physical Exam:  Gen: Laying in bed, NAD, AAOx4  Resp: Unlabored breathing  Abd: soft, minimal LUQ TTP, ND, no rebound or guarding  Ext: WWP  Skin: No rashes

## 2018-12-23 NOTE — PROGRESS NOTE ADULT - ATTENDING COMMENTS
seen and examined 12-23-18 @ 0825    tolerating regular diet and supplements  no nausea or vomiting  +flatus/BM    soft / mild LUQ tenderness / ND    acute alcoholic pancreatitis with acute necrotic collections and no evidence of infected necrosis  -pancreatitis resolved    left pleural effusion with compressive atelectasis  -incentive spirometry    LGIB from ascending colon ulceration - pathology consistent with chronic active colitis  -D/C ibuprofen    HERIBERTO  -Cr 0.7 -> 2.4  -low BUN:Cr suggests against prerenal  -repeat BMP  -check urine electrolytes  -bladder scan  -renal U/S  -D/C ACE inhibitor

## 2018-12-24 VITALS
SYSTOLIC BLOOD PRESSURE: 104 MMHG | DIASTOLIC BLOOD PRESSURE: 67 MMHG | RESPIRATION RATE: 18 BRPM | OXYGEN SATURATION: 92 % | HEART RATE: 88 BPM | TEMPERATURE: 98 F

## 2018-12-24 DIAGNOSIS — N17.9 ACUTE KIDNEY FAILURE, UNSPECIFIED: ICD-10-CM

## 2018-12-24 LAB
ANION GAP SERPL CALC-SCNC: 14 MMOL/L — SIGNIFICANT CHANGE UP (ref 5–17)
BUN SERPL-MCNC: 22 MG/DL — SIGNIFICANT CHANGE UP (ref 7–23)
CALCIUM SERPL-MCNC: 9.7 MG/DL — SIGNIFICANT CHANGE UP (ref 8.4–10.5)
CHLORIDE SERPL-SCNC: 92 MMOL/L — LOW (ref 96–108)
CO2 SERPL-SCNC: 24 MMOL/L — SIGNIFICANT CHANGE UP (ref 22–31)
CREAT SERPL-MCNC: 1.35 MG/DL — HIGH (ref 0.5–1.3)
GLUCOSE BLDC GLUCOMTR-MCNC: 108 MG/DL — HIGH (ref 70–99)
GLUCOSE BLDC GLUCOMTR-MCNC: 125 MG/DL — HIGH (ref 70–99)
GLUCOSE SERPL-MCNC: 108 MG/DL — HIGH (ref 70–99)
HCT VFR BLD CALC: 26.2 % — LOW (ref 39–50)
HGB BLD-MCNC: 8.4 G/DL — LOW (ref 13–17)
MAGNESIUM SERPL-MCNC: 2 MG/DL — SIGNIFICANT CHANGE UP (ref 1.6–2.6)
MCHC RBC-ENTMCNC: 29.8 PG — SIGNIFICANT CHANGE UP (ref 27–34)
MCHC RBC-ENTMCNC: 32.1 GM/DL — SIGNIFICANT CHANGE UP (ref 32–36)
MCV RBC AUTO: 92.9 FL — SIGNIFICANT CHANGE UP (ref 80–100)
PHOSPHATE SERPL-MCNC: 5 MG/DL — HIGH (ref 2.5–4.5)
PLATELET # BLD AUTO: 582 K/UL — HIGH (ref 150–400)
POTASSIUM SERPL-MCNC: 4.2 MMOL/L — SIGNIFICANT CHANGE UP (ref 3.5–5.3)
POTASSIUM SERPL-SCNC: 4.2 MMOL/L — SIGNIFICANT CHANGE UP (ref 3.5–5.3)
RBC # BLD: 2.82 M/UL — LOW (ref 4.2–5.8)
RBC # FLD: 14.6 % — HIGH (ref 10.3–14.5)
SODIUM SERPL-SCNC: 130 MMOL/L — LOW (ref 135–145)
WBC # BLD: 7.78 K/UL — SIGNIFICANT CHANGE UP (ref 3.8–10.5)
WBC # FLD AUTO: 7.78 K/UL — SIGNIFICANT CHANGE UP (ref 3.8–10.5)

## 2018-12-24 PROCEDURE — 99233 SBSQ HOSP IP/OBS HIGH 50: CPT

## 2018-12-24 RX ORDER — ATORVASTATIN CALCIUM 80 MG/1
20 TABLET, FILM COATED ORAL AT BEDTIME
Qty: 0 | Refills: 0 | Status: DISCONTINUED | OUTPATIENT
Start: 2018-12-24 | End: 2018-12-24

## 2018-12-24 RX ORDER — METOPROLOL TARTRATE 50 MG
1 TABLET ORAL
Qty: 0 | Refills: 0 | COMMUNITY
Start: 2018-12-24

## 2018-12-24 RX ADMIN — OXYCODONE HYDROCHLORIDE 10 MILLIGRAM(S): 5 TABLET ORAL at 12:34

## 2018-12-24 RX ADMIN — ENOXAPARIN SODIUM 40 MILLIGRAM(S): 100 INJECTION SUBCUTANEOUS at 12:36

## 2018-12-24 RX ADMIN — PANTOPRAZOLE SODIUM 40 MILLIGRAM(S): 20 TABLET, DELAYED RELEASE ORAL at 05:30

## 2018-12-24 RX ADMIN — Medication 100 MILLIGRAM(S): at 12:35

## 2018-12-24 RX ADMIN — ANASTROZOLE 1 MILLIGRAM(S): 1 TABLET ORAL at 12:35

## 2018-12-24 RX ADMIN — Medication 1 MILLIGRAM(S): at 12:36

## 2018-12-24 RX ADMIN — OXYCODONE HYDROCHLORIDE 10 MILLIGRAM(S): 5 TABLET ORAL at 13:04

## 2018-12-24 RX ADMIN — Medication 1 TABLET(S): at 12:35

## 2018-12-24 NOTE — PROVIDER CONTACT NOTE (EICU) - ASSESSMENT
pt Spo2 90. Pt. denies pain, SOB, no difficulty breathing dizziness, pt asymptomatic, all other vss. no s/s distress. Pt. has been sating consistently in the low 90s (90-93) prior.

## 2018-12-24 NOTE — PROGRESS NOTE ADULT - PROBLEM SELECTOR PROBLEM 8
Depression, unspecified depression type
Alcohol use disorder, severe, dependence

## 2018-12-24 NOTE — PROGRESS NOTE ADULT - PROBLEM SELECTOR PROBLEM 3
ACP (advance care planning)
Diabetes
BRBPR (bright red blood per rectum)
Back pain
Delirium
Back pain

## 2018-12-24 NOTE — PROGRESS NOTE ADULT - PROBLEM SELECTOR PLAN 5
metop 25mg po bid
Patient very concerned that he has mono. Low suspicion for active mono at present- fatigue is most likely 2/2 critical illness myopathy + deconditioning from his medical illness and malnutrition in setting of his pancreatitis  - f/u EBV serologies as outpatient  - continue PT ambulation program- pt to go to [facility]  - advance diet and encourage PO intake, continue protein shakes  - pt counseled that his fatigue may take some time to krystle
continue cpap nightly
continue sliding scale insulin and fingerstick monitoring
continue sliding scale insulin and fingerstick monitoring  - resume lipitor 20mg po nightly
continue cpap nightly

## 2018-12-24 NOTE — PROGRESS NOTE ADULT - PROBLEM SELECTOR PLAN 8
- continue lexapro 20mg po daily
high risk of withdrawal. Anxiety reported today.   - will continue klonopin 0.5mg po nightly, with additional klonpin 0.5mg po daily prn anxiety  - continue cessation counseling
high risk of withdrawal. Anxiety reported today.   - will continue klonopin 0.5mg po nightly, with additional klonpin 0.5mg po daily prn anxiety  - continue cessation counseling and OP follow up
high risk of withdrawal. Anxiety reported today.   - will continue klonopin 0.5mg po nightly, with additional klonpin 0.5mg po daily prn anxiety  - continue cessation counseling and OP follow up
high risk of withdrawal. Currently on klonopin but noted to be persistently delirious  - change to klonopin 0.5mg po daily prn anxiety  - continue cessation counseling
high risk of withdrawal. Currently on klonopin but noted to be persistently delirious  - would not stop benzos abruptly given high risk of withdrawal  - would decrease klonopin to 0.5mg po daily starting tomorrow
high risk of withdrawal. Currently on klonopin but noted to be persistently delirious  - would taper off klonopin as recommended above  - continue cessation counseling
high risk of withdrawal. Currently on klonopin but noted to be persistently delirious  - would not stop benzos abruptly given high risk of withdrawal  - would decrease klonopin to 0.5mg po daily starting tomorrow

## 2018-12-24 NOTE — PROGRESS NOTE ADULT - PROBLEM SELECTOR PLAN 6
likely vit k deficiency/malnutrition  - plan for endoscopic J tube placement today
continue cpap nightly
continue sliding scale insulin and fingerstick monitoring  - resume lipitor 20mg po nightly
metop 25mg po bid
metop 25mg po bid

## 2018-12-24 NOTE — PROGRESS NOTE ADULT - PROBLEM SELECTOR PLAN 7
high risk of withdrawal. Currently on klonopin but noted to be delirious today  - would not stop benzos abruptly given high risk of withdrawal  - would decrease to klonopin 0.5mg po bid for now
- Hold lisinopril given HERIBERTO  - metop 25mg po bid  - norvasc 10mg po daily
- increase lisinopril to 40mg po daily  - metop 25mg po bid  - if remains elevated, may resume home norvasc 10mg po daily
- lisinopril to 40mg po daily  - metop 25mg po bid  - if remains elevated, may resume home norvasc 10mg po daily
- lisinopril to 40mg po daily  - metop 25mg po bid  - norvasc 10mg po daily
likely vit k deficiency/malnutrition  - tube feeds for now  - trial PO diet possibly this weekend
metop 25mg po bid
likely vit k deficiency/malnutrition  - uptitrate tube feeds as tolerated

## 2018-12-24 NOTE — CHART NOTE - NSCHARTNOTEFT_GEN_A_CORE
Pt seen for education prior to discharge. Medical chart reviewed/events noted. Pt with alcohol-induced acute pancreatitis Pt reports fair appetite, waiting for lunch tray. Pt amenable to nutrition education. Pancreatitis nutrition therapy discussed with patient including foods recommended, foods to avoid and healthy alternatives. Discussed appropriate portion sizes and increased consumption of lean meats, whole grains, fruit and vegetables.  Discussed cooking methods (steaming, baking), avoiding fried greasy foods and avoiding alcohol.  Provided sample menu and Pancreatitis nutrition therapy handout from [employer].  Pt verbalized understanding.  RD to follow up for further nutritional interventions as indicated/requested by medical team/pt.     Jojo Carvajal RD pager #[phone]

## 2018-12-24 NOTE — PROGRESS NOTE ADULT - PROBLEM SELECTOR PROBLEM 2
Alcohol-induced acute pancreatitis, unspecified complication status
ACP (advance care planning)
ACP (advance care planning)
Alcohol-induced acute pancreatitis, unspecified complication status
BRBPR (bright red blood per rectum)
Back pain
Alcohol-induced acute pancreatitis, unspecified complication status
Delirium

## 2018-12-24 NOTE — PROGRESS NOTE ADULT - REASON FOR ADMISSION
acute pancreatitis

## 2018-12-24 NOTE — PROGRESS NOTE ADULT - PROBLEM SELECTOR PLAN 1
- Alcohol-induced Acute pancreatitis with unclear infectious burden/evolving necrosis  - Recent CT shows evolving fluid collections posterior to the stomach with unclear necrotic/infected burden   - continue IV abx   - Establishing enteral acess via [place] tube/NG for decompression/tube feeds  - NPO   - trend amylase/lipase, cbc   - SICU care
s/p colonoscopy with mucosal ulceration. Biopsied.  - Internal hemorrhoids.  biopsy shows non specific colitis suspect ischemia  - Repeat colonoscopy for screening purposes as outpatient  - monitor h/h daily, transfuse prn                                                      - monitor stool color closely
- Alcohol-induced Acute pancreatitis with unclear infectious burden/evolving necrosis  - Recent CT shows evolving fluid collections posterior to the stomach with unclear necrotic/infected burden   - continue IV abx   - Establishing enteral acess via [place] tube/NG for decompression/tube feeds  - NPO   - trend amylase/lipase, cbc   - SICU care
- Alcohol-induced Acute pancreatitis with unclear infectious burden/evolving necrosis  - Recent CT shows evolving fluid collections posterior to the stomach with unclear necrotic/infected burden   - continue IV abx   - Establishing enteral acess via [place] tube/NG for decompression/tube feeds  - NPO   - trend amylase/lipase, cbc   - SICU care
- Alcohol-induced Acute pancreatitis with unclear infectious burden/evolving necrosis  - Recent CT shows evolving fluid collections posterior to the stomach with unclear necrotic/infected burden   - continue IV abx   - Establishing enteral acess via [place] tube/NG for decompression/tube feeds  - NPO with ice chips  - trend amylase/lipase, cbc  - repeat CT tomorrow
- Alcohol-induced Acute pancreatitis with unclear infectious burden/evolving necrosis  - Recent CT shows evolving fluid collections posterior to the stomach with unclear necrotic/infected burden   - continue IV abx   - s/p  upper gastrointestinal endoscopy with normal upper third of esophagus, middle third of esophagus and lower third of esophagus.  - Chronic gastritis.  - Normal first part of the duodenum, 2nd part of the duodenum, 3rd part of the duodenum and 4th part of the duodenum.  - Feeding tube placement was successfully performed.  - NPO.  - Use Protonix (pantoprazole) 40 mg PO daily.  - NPO with ice chips  - abd x-ray reviewed   - trend amylase/lipase, cbc  - repeat CT  with acute necrotic pancreatitis involving the tail. Acute necrotic   collections in the upper abdomen minimally increased in size from prior   imaging.
- Alcohol-induced Acute pancreatitis with unclear infectious burden/evolving necrosis  - Recent CT shows evolving fluid collections posterior to the stomach with unclear necrotic/infected burden   - continue IV abx   - s/p  upper gastrointestinal endoscopy with normal upper third of esophagus, middle third of esophagus and lower third of esophagus.  - Chronic gastritis.  - Normal first part of the duodenum, 2nd part of the duodenum, 3rd part of the duodenum and 4th part of the duodenum.  - Feeding tube placement was successfully performed.  - Use Protonix (pantoprazole) 40 mg PO daily.  - NPO with ice chips  - abd x-ray reviewed   - trend amylase/lipase, cbc  - repeat CT  with acute necrotic pancreatitis involving the tail. Acute necrotic   collections in the upper abdomen minimally increased in size from prior   imaging.  - feeds started via [place] tube, not on hold due to rectal bleeding
- Alcohol-induced Acute pancreatitis with unclear infectious burden/evolving necrosis  - Recent CT shows evolving fluid collections posterior to the stomach with unclear necrotic/infected burden   - continue IV abx   - s/p  upper gastrointestinal endoscopy with normal upper third of esophagus, middle third of esophagus and lower third of esophagus.  - Chronic gastritis.  - Normal first part of the duodenum, 2nd part of the duodenum, 3rd part of the duodenum and 4th part of the duodenum.  - Feeding tube placement was successfully performed.  - Use Protonix (pantoprazole) 40 mg PO daily.  - NPO with ice chips  - abd x-ray reviewed   - trend amylase/lipase, cbc  - repeat CT  with acute necrotic pancreatitis involving the tail. Acute necrotic   collections in the upper abdomen minimally increased in size from prior   imaging.  - feeds started via [place] tube, pt is tolerating them well.   - Advance NJ tube feeds by 20 every 6 hours, pause if he ceases to tolerate
complicated by necrosis and enlarged abdominal fluid collections as seen on CT scan performed yesterday. Low suspicion for superimposed infection. No fevers/chills. Abd appears more soft on exam today.  - continue dilaudid prn for pain as per surgery  - pt planned for endoscopic J tube placement today for enteral feeding  - observe off antibiotics  - continue LR 100cc/hr
s/p colonoscopy with mucosal ulceration. Biopsied.  - Internal hemorrhoids.  - Await pathology results.  - Repeat colonoscopy for screening purposes as outpatient  - monitor h/h daily, transfuse prn                                                                          - monitor stool color closely  - tolerating clears
s/p colonoscopy with mucosal ulceration. Biopsied.  - Internal hemorrhoids.  - Await pathology results.  - Repeat colonoscopy for screening purposes as outpatient  - monitor h/h daily, transfuse prn                                                      - monitor stool color closely
s/p colonoscopy with mucosal ulceration. Biopsied.  - Internal hemorrhoids.  - Await pathology results.  - Repeat colonoscopy for screening purposes as outpatient  - monitor h/h daily, transfuse prn                                                      - monitor stool color closely  - tolerating clears, diet advanced to regular
s/p colonoscopy with mucosal ulceration. Biopsied.  - Internal hemorrhoids.  biopsy shows non specific colitis suspect ischemia  - Repeat colonoscopy for screening purposes as outpatient  - monitor h/h daily, transfuse prn                                                      - monitor stool color closely
With sharp increase in Cr to 2.44 yesterday. Lisinopril held, renal sono without hydro, improving fluid collection. Urine electrolytes with Fena 0.2 indicating pre-renal etiology.   - Cr improving, 1.3 today. Continue to encourage oral hydration  - Monitor BMP  - Hold nephrotoxic agents. Would hold ACE-I on discharge
complicated by necrosis and enlarged abdominal fluid collections as seen on CT scan performed yesterday. Low suspicion for superimposed infection. No fevers/chills.   - continue dilaudid prn for pain as per surgery  - s/p NJ tube- continue tube feeds  - observe off antibiotics
complicated by necrosis and enlarged abdominal fluid collections as seen on CT scan performed yesterday. Low suspicion for superimposed infection. No fevers/chills.   - continue dilaudid prn for pain as per surgery  - s/p NJ tube- continue tube feeds  - observe off antibiotics
complicated by necrosis and persistent abdominal fluid collections as seen on repeat CT scan. Low suspicion for superimposed infection. No fevers/chills.   - continue dilaudid prn for pain as per surgery  - s/p NJ tube removed as patient tolerating clears  - observe off antibiotics
complicated by necrosis and persistent abdominal fluid collections as seen on repeat CT scan. Low suspicion for superimposed infection. No fevers/chills.   - continue dilaudid prn for pain as per surgery  - s/p NJ tube removed as patient tolerating clears  - pt to try promote for protein shake  - observe off antibiotics  - zofran PRN nausea, f/u baseline EKG
complicated by necrosis and persistent abdominal fluid collections as seen on repeat CT scan. Low suspicion for superimposed infection. No fevers/chills.   - continue oxycodone prn for pain as per surgery  - PT tolerating PO diet  - observe off antibiotics  - zofran PRN nausea
complicated by necrosis and persistent abdominal fluid collections as seen on repeat CT scan. Low suspicion for superimposed infection. No fevers/chills.   - continue oxycodone prn for pain as per surgery  - s/p NJ tube removed as patient tolerating clears  - observe off antibiotics  - zofran PRN nausea
complicated by necrosis and persistent abdominal fluid collections as seen on repeat CT scan. Low suspicion for superimposed infection. No fevers/chills.   - continue oxycodone prn for pain as per surgery  - s/p NJ tube removed as patient tolerating clears  - observe off antibiotics  - zofran PRN nausea, f/u baseline EKG
complicated by necrosis and enlarged abdominal fluid collections as seen on CT scan performed yesterday. Low suspicion for superimposed infection. No fevers/chills.   - continue dilaudid prn for pain as per surgery  - s/p NJ tube placed with GI yesterday, uptitrat tube feeds to goal as tolerated  - observe off antibiotics

## 2018-12-24 NOTE — PROGRESS NOTE ADULT - SUBJECTIVE AND OBJECTIVE BOX
Interval Events:  Pt seen and examined.   slowly tolerating regular diet  having some mild discomfort  No n/v    MEDICATIONS  (STANDING):  amLODIPine   Tablet 10 milliGRAM(s) Oral daily  anastrozole 1 milliGRAM(s) Oral daily  bisacodyl 5 milliGRAM(s) Oral at bedtime  clonazePAM Tablet 0.5 milliGRAM(s) Oral at bedtime  enoxaparin Injectable 40 milliGRAM(s) SubCutaneous daily  escitalopram 20 milliGRAM(s) Oral at bedtime  folic acid 1 milliGRAM(s) Oral daily  insulin lispro (HumaLOG) corrective regimen sliding scale   SubCutaneous three times a day before meals  insulin lispro (HumaLOG) corrective regimen sliding scale   SubCutaneous at bedtime  metoprolol tartrate 25 milliGRAM(s) Oral two times a day  multivitamin 1 Tablet(s) Oral daily  pantoprazole    Tablet 40 milliGRAM(s) Oral before breakfast  QUEtiapine 100 milliGRAM(s) Oral at bedtime  thiamine 100 milliGRAM(s) Oral daily    MEDICATIONS  (PRN):  acetaminophen   Tablet .. 650 milliGRAM(s) Oral every 6 hours PRN Mild Pain (1 - 3)  ALBUTerol/ipratropium for Nebulization 3 milliLiter(s) Nebulizer every 6 hours PRN Shortness of Breath and/or Wheezing  clonazePAM Tablet 0.5 milliGRAM(s) Oral daily PRN anxiety  oxyCODONE    IR 5 milliGRAM(s) Oral every 6 hours PRN Moderate Pain (4 - 6)  oxyCODONE    IR 10 milliGRAM(s) Oral every 6 hours PRN Severe Pain (7 - 10)      Allergies    No Known Allergies    Intolerances        Review of Systems:    General:  No wt loss, fevers, chills, night sweats,fatigue,   Eyes:  Good vision, no reported pain  ENT:  No sore throat, pain, runny nose, dysphagia  CV:  No pain, palpitations, hypo/hypertension  Resp:  No dyspnea, cough, tachypnea, wheezing  GI:  No pain, No nausea, No vomiting, No diarrhea, No constipation, No weight loss, No fever, No pruritis, No rectal bleeding, No melena, No dysphagia  :  No pain, bleeding, incontinence, nocturia  Muscle:  No pain, weakness  Neuro:  No weakness, tingling, memory problems  Psych:  No fatigue, insomnia, mood problems, depression  Endocrine:  No polyuria, polydypsia, cold/heat intolerance  Heme:  No petechiae, ecchymosis, easy bruisability  Skin:  No rash, tattoos, scars, edema      Vital Signs Last 24 Hrs  T(C): 36.9 (24 Dec 2018 09:00), Max: 37.4 (23 Dec 2018 17:26)  T(F): 98.5 (24 Dec 2018 09:00), Max: 99.4 (23 Dec 2018 17:26)  HR: 90 (24 Dec 2018 09:00) (71 - 95)  BP: 102/67 (24 Dec 2018 09:00) (99/61 - 119/65)  BP(mean): --  RR: 17 (24 Dec 2018 09:00) (17 - 18)  SpO2: 93% (24 Dec 2018 09:00) (90% - 93%)    PHYSICAL EXAM:    Constitutional: NAD, well-developed  HEENT: EOMI, throat clear  Neck: No LAD, supple  Respiratory: CTA and P  Cardiovascular: S1 and S2, RRR, no M  Gastrointestinal: BS+, soft, NT/ND, neg HSM,  Extremities: No peripheral edema, neg clubing, cyanosis  Vascular: 2+ peripheral pulses  Neurological: A/O x 3, no focal deficits  Psychiatric: Normal mood, normal affect  Skin: No rashes      LABS:                        8.4    7.78  )-----------( 582      ( 24 Dec 2018 08:20 )             26.2     12-24    130<L>  |  92<L>  |  22  ----------------------------<  108<H>  4.2   |  24  |  1.35<H>    Ca    9.7      24 Dec 2018 07:26  Phos  5.0     12-24  Mg     2.0     12-24    TPro  7.9  /  Alb  3.3  /  TBili  0.6  /  DBili  x   /  AST  46<H>  /  ALT  32  /  AlkPhos  326<H>  12-23          RADIOLOGY & ADDITIONAL TESTS:

## 2018-12-24 NOTE — PROGRESS NOTE ADULT - ASSESSMENT
63M transferred from [place] with acute/subacute pancreatitis with necrosis and acute necrotic collection around the lesser sac, repeat CTs (12/10, 12/13) grossly stable.  NJ tube removed with unknown mechanism, now on regular diet    - Pain control as needed  - c/w regular diet with small, frequent meals  - Continue nightly bipap, monitor respiratory status  - Insulin sliding scale  - lovenox for DVT ppx  - OOB and ambulating as tolerated  - encourage IS  - trend Cr  - dispo: rehab planning, f/u social work re insurance approval for rehab     Trauma Surgery  [phone]

## 2018-12-24 NOTE — PROGRESS NOTE ADULT - PROBLEM SELECTOR PLAN 2
complicated by necrosis and persistent abdominal fluid collections as seen on repeat CT scan. Low suspicion for superimposed infection. No fevers/chills.   - continue oxycodone prn for pain as per surgery  - PT tolerating PO diet  - observe off antibiotics  - zofran PRN nausea

## 2018-12-24 NOTE — PROGRESS NOTE ADULT - PROBLEM SELECTOR PLAN 3
Advanced care planning was discussed with patient and family.  Advanced care planning forms were reviewed and discussed.  Risks, benefits and alternatives of gastroenterologic procedures were discussed in detail and all questions were answered.    30 minutes spent
continue sliding scale insulin and fingerstick monitoring
LIkely muskuloskeletal related to being in bed, however must consider possible retroperitoneal hemorrhage or fluid collection given drop in Hb from 10's to 9's. No active issues at present.  - continue to monitor for now  - trend CBC, if continues to drop, would rescan abd/pelvis to r/o hemorrhage
Likely hospital delirium in the setting of prolonged hospital stay with critical illness. Pt now back to baseline.  -  c/w klonopin dosing to 0.5mg po QHS standing for insomnia, and klonopin 0.5mg po daily prn anxiety, hold for oversedation/delirium  - conservative measures to decrease delirium: PT eval and ambulation, OOB, frequent reorientation, minimize sleep disturbances at night  - if worsens, would obtain ABG as patient with ROSS and may have hypoventilation syndrome, would consider head CT as well.
No hematomae or source of bleeding seen on CT performed overnight  - f/u NM bleeding scan  - trend cbc, transfuse for hb < 7
No hematomae or source of bleeding seen on CTA, NM bleeding scan negative  - no further episodes   - ascending colonic ulcers seen on c-scope, likely the source of prior bleeds  - continue PPI BID  - trend cbc, transfuse for hb < 7
No hematomae or source of bleeding seen on CTA, NM bleeding scan negative  - no further episodes   - ascending colonic ulcers seen on c-scope, likely the source of prior bleeds  - continue PPI BID  - trend cbc, transfuse for hb < 7
No hematomae or source of bleeding seen on CTA, NM bleeding scan negative  - no further episodes overnight  - ascending colonic ulcers seen on c-scope  - continue PPI BID  - trend cbc, transfuse for hb < 7
No hematomae or source of bleeding seen on CTA, NM bleeding scan negative  - no further episodes overnight  - plan for colonoscopy with GI today  - trend cbc, transfuse for hb < 7
No hematomae or source of bleeding seen on CTA, NM bleeding scan negative. Colonoscopy by GI showing ascending colonic ulcers- likely the source of bleed. Now resolved.  - no further episodes   - appreciate GI recs  - continue PPI daily  - trend cbc, transfuse for hb < 7
LIkely muskuloskeletal related to being in bed, however must consider possible retroperitoneal hemorrhage or fluid collection given drop in Hb from 10's to 9's. Improved today.   - continue to monitor for now  - trend CBC, if continues to drop, would rescan abd/pelvis to r/o hemorrhage

## 2018-12-24 NOTE — PROGRESS NOTE ADULT - PROBLEM SELECTOR PROBLEM 6
INR (international normal ratio) abnormal
Diabetes
Essential hypertension
ROSS (obstructive sleep apnea)
Essential hypertension

## 2018-12-24 NOTE — PROGRESS NOTE ADULT - PROBLEM SELECTOR PLAN 4
continue cpap nightly
LIkely muskuloskeletal related to being in bed, however must consider possible retroperitoneal hemorrhage or fluid collection given drop in Hb from 10's to 9's. No active issues at present.  - continue to monitor for now
No hematoma or source of bleeding seen on CTA, NM bleeding scan negative. Colonoscopy by GI showing ascending colonic ulcers- likely the source of bleed. Now resolved.  - no further episodes   - appreciate GI recs  - continue PPI daily  - trend cbc, transfuse for hb < 7
Patient very concerned that he has mono. Low suspicion for active mono at present- fatigue is most likely 2/2 critical illness myopathy + deconditioning from his medical illness and malnutrition in setting of his pancreatitis  - check AM EBV serologies  - continue PT ambulation program- pt to go to [facility]  - advance diet and encourage PO intake, continue protein shakes  - pt counseled that his fatigue may take some time to krystle
Patient very concerned that he has mono. Low suspicion for active mono at present- fatigue is most likely 2/2 critical illness myopathy + deconditioning from his medical illness and malnutrition in setting of his pancreatitis  - f/u EBV serologies as outpatient  - continue PT ambulation program- pt to go to [facility]  - advance diet and encourage PO intake, continue protein shakes  - pt counseled that his fatigue may take some time to krystle
Resolved- likely 2/2 being in bed. No active issues at present.  - continue to monitor for now
continue sliding scale insulin and fingerstick monitoring
continue sliding scale insulin and fingerstick monitoring

## 2018-12-24 NOTE — PROGRESS NOTE ADULT - SUBJECTIVE AND OBJECTIVE BOX
Damian Barrera MD  Division of Hospital Medicine  Pager [phone]      JOHN DHILLON  63y  Male      Patient is a 63y old  Male who presents with a chief complaint of acute pancreatitis (24 Dec 2018 12:02)      INTERVAL HPI/OVERNIGHT EVENTS:  Seen at bedside. Tolerating diet, pain is controlled. Reports urinating normally.       REVIEW OF SYSTEMS: 14 point ROS negative unless listed above    T(C): 36.9 (12-24-18 @ 09:00), Max: 37.4 (12-23-18 @ 17:26)  HR: 90 (12-24-18 @ 09:00) (71 - 95)  BP: 102/67 (12-24-18 @ 09:00) (99/61 - 119/65)  RR: 17 (12-24-18 @ 09:00) (17 - 18)  SpO2: 93% (12-24-18 @ 09:00) (90% - 93%)  Wt(kg): --Vital Signs Last 24 Hrs  T(C): 36.9 (24 Dec 2018 09:00), Max: 37.4 (23 Dec 2018 17:26)  T(F): 98.5 (24 Dec 2018 09:00), Max: 99.4 (23 Dec 2018 17:26)  HR: 90 (24 Dec 2018 09:00) (71 - 95)  BP: 102/67 (24 Dec 2018 09:00) (99/61 - 119/65)  BP(mean): --  RR: 17 (24 Dec 2018 09:00) (17 - 18)  SpO2: 93% (24 Dec 2018 09:00) (90% - 93%)    PHYSICAL EXAM:  GENERAL: no acute distress  HEENT: PERRLA, EOMI, moist oropharynx   RESPIRATORY: +decreased breath sounds L base  CARDIOVASCULAR: RRR, no murmurs, gallops, rubs  ABDOMINAL: soft, +minimally tender LUQ, +palpable mass LUQ, +bowel sounds  EXTREMITIES: no clubbing, cyanosis, or edema  NEUROLOGICAL: alert and oriented x 3, non-focal  SKIN: no rashes or lesions   MUSCULOSKELETAL: no gross joint deformity    Consultant(s) Notes Reviewed:  [x ] YES  [ ] NO  Care Discussed with Consultants/Other Providers [ x] YES  [ ] NO    LABS:                        8.4    7.78  )-----------( 582      ( 24 Dec 2018 08:20 )             26.2     12-24    130<L>  |  92<L>  |  22  ----------------------------<  108<H>  4.2   |  24  |  1.35<H>    Ca    9.7      24 Dec 2018 07:26  Phos  5.0     12-24  Mg     2.0     12-24    TPro  7.9  /  Alb  3.3  /  TBili  0.6  /  DBili  x   /  AST  46<H>  /  ALT  32  /  AlkPhos  326<H>  12-23        CAPILLARY BLOOD GLUCOSE      POCT Blood Glucose.: 125 mg/dL (24 Dec 2018 10:39)  POCT Blood Glucose.: 106 mg/dL (23 Dec 2018 21:35)  POCT Blood Glucose.: 98 mg/dL (23 Dec 2018 19:18)            RADIOLOGY & ADDITIONAL TESTS:    Imaging Personally Reviewed:  [x ] YES  [ ] NO

## 2018-12-24 NOTE — PROVIDER CONTACT NOTE (EICU) - ACTION/TREATMENT ORDERED:
continue to monitor. no immediate action.  follow up with AM vital signs.  will continue to monitor. reassess at morning vs.

## 2018-12-24 NOTE — PROGRESS NOTE ADULT - PROBLEM SELECTOR PLAN 9
Home meds verified with [facility]  - anastrozole 1mg po twice a week  - klonopin 1mg po tid  - lexapro 30mg po daily  - lipitor 20mg po qhs  - seroquel 100mg po nightly  - quinapril 40mg po bid

## 2018-12-24 NOTE — PROGRESS NOTE ADULT - ASSESSMENT
63y Male PMHX of alcohol induced pancreatitis earlier this year, alcohol abuse (1/3 quart of vodka daily), DM, HLD, HTN, Depression, ROSS on CPAP who presents as a transfer from [facility] for management of acute pancreatitis complicated by necrosis and persistent fluid collections, likely maturing pseudocysts, also with BRBPR found to have ascending colonic ulcers, delirium which has now improved and improving HERIBERTO

## 2018-12-24 NOTE — PROGRESS NOTE ADULT - PROBLEM SELECTOR PROBLEM 7
Alcohol use disorder, severe, dependence
Essential hypertension
INR (international normal ratio) abnormal
INR (international normal ratio) abnormal

## 2018-12-24 NOTE — PROGRESS NOTE ADULT - PROBLEM SELECTOR PROBLEM 1
Alcohol-induced acute pancreatitis, unspecified complication status
BRBPR (bright red blood per rectum)
Alcohol-induced acute pancreatitis, unspecified complication status
BRBPR (bright red blood per rectum)
Alcohol-induced acute pancreatitis, unspecified complication status
HERIBERTO (acute kidney injury)
Alcohol-induced acute pancreatitis, unspecified complication status

## 2018-12-24 NOTE — PROGRESS NOTE ADULT - SUBJECTIVE AND OBJECTIVE BOX
Surgery Progress Note    S: Patient seen and examined. No acute events overnight. Patient's creatinine was found to be elevated yesterday in AM. Patient's ACEi was stopped and patient received renal US which showed no hydronephrosis or retention. Patient was encourage to increase PO intake and repeat CMP showed downtrending Cr. This AM, patient reports his pain is well controlled. He is tolerating liquids and is tolerating small and frequent meals.     O:  Vital Signs Last 24 Hrs  T(C): 37.1 (24 Dec 2018 05:28), Max: 37.4 (23 Dec 2018 17:26)  T(F): 98.7 (24 Dec 2018 05:28), Max: 99.4 (23 Dec 2018 17:26)  HR: 87 (24 Dec 2018 05:28) (71 - 95)  BP: 99/61 (24 Dec 2018 05:28) (92/55 - 119/65)  BP(mean): --  RR: 18 (24 Dec 2018 05:28) (18 - 18)  SpO2: 90% (24 Dec 2018 05:28) (90% - 93%)    I&O's Detail    23 Dec 2018 07:01  -  24 Dec 2018 07:00  --------------------------------------------------------  IN:    Oral Fluid: 860 mL  Total IN: 860 mL    OUT:    Voided: 800 mL  Total OUT: 800 mL    Total NET: 60 mL          MEDICATIONS  (STANDING):  amLODIPine   Tablet 10 milliGRAM(s) Oral daily  anastrozole 1 milliGRAM(s) Oral daily  bisacodyl 5 milliGRAM(s) Oral at bedtime  clonazePAM Tablet 0.5 milliGRAM(s) Oral at bedtime  dextrose 5%. 1000 milliLiter(s) (50 mL/Hr) IV Continuous <Continuous>  dextrose 50% Injectable 12.5 Gram(s) IV Push once  dextrose 50% Injectable 25 Gram(s) IV Push once  dextrose 50% Injectable 25 Gram(s) IV Push once  enoxaparin Injectable 40 milliGRAM(s) SubCutaneous daily  escitalopram 20 milliGRAM(s) Oral at bedtime  folic acid 1 milliGRAM(s) Oral daily  insulin lispro (HumaLOG) corrective regimen sliding scale   SubCutaneous three times a day before meals  insulin lispro (HumaLOG) corrective regimen sliding scale   SubCutaneous at bedtime  metoprolol tartrate 25 milliGRAM(s) Oral two times a day  multivitamin 1 Tablet(s) Oral daily  pantoprazole    Tablet 40 milliGRAM(s) Oral before breakfast  QUEtiapine 100 milliGRAM(s) Oral at bedtime  thiamine 100 milliGRAM(s) Oral daily    MEDICATIONS  (PRN):  acetaminophen   Tablet .. 650 milliGRAM(s) Oral every 6 hours PRN Mild Pain (1 - 3)  ALBUTerol/ipratropium for Nebulization 3 milliLiter(s) Nebulizer every 6 hours PRN Shortness of Breath and/or Wheezing  clonazePAM Tablet 0.5 milliGRAM(s) Oral daily PRN anxiety  dextrose 40% Gel 15 Gram(s) Oral once PRN Blood Glucose LESS THAN 70 milliGRAM(s)/deciliter  glucagon  Injectable 1 milliGRAM(s) IntraMuscular once PRN Glucose LESS THAN 70 milligrams/deciliter  guaiFENesin   Syrup  (Sugar-Free) 200 milliGRAM(s) Oral every 6 hours PRN Cough  ondansetron Injectable 4 milliGRAM(s) IV Push every 8 hours PRN Nausea and/or Vomiting  oxyCODONE    IR 5 milliGRAM(s) Oral every 6 hours PRN Moderate Pain (4 - 6)  oxyCODONE    IR 10 milliGRAM(s) Oral every 6 hours PRN Severe Pain (7 - 10)                            8.7    11.18 )-----------( 618      ( 23 Dec 2018 08:04 )             26.8       12-23    131<L>  |  92<L>  |  24<H>  ----------------------------<  104<H>  4.4   |  23  |  1.84<H>    Ca    9.8      23 Dec 2018 15:12  Phos  6.4     12-23  Mg     1.9     12-23    TPro  7.9  /  Alb  3.3  /  TBili  0.6  /  DBili  x   /  AST  46<H>  /  ALT  32  /  AlkPhos  326<H>  12-23      Physical Exam:  Gen: Laying in bed, NAD  Resp: Unlabored breathing  Abd: soft, slight LUQ TTP, ND, no rebound or guarding  Ext: WWP  Skin: No rashes

## 2018-12-24 NOTE — PROGRESS NOTE ADULT - PROBLEM SELECTOR PROBLEM 4
ROSS (obstructive sleep apnea)
BRBPR (bright red blood per rectum)
Back pain
Diabetes
Fatigue
Fatigue
Diabetes

## 2018-12-24 NOTE — PROGRESS NOTE ADULT - PROBLEM SELECTOR PROBLEM 5
Essential hypertension
Diabetes
Fatigue
ROSS (obstructive sleep apnea)
ROSS (obstructive sleep apnea)

## 2018-12-25 RX ORDER — OXYCODONE HYDROCHLORIDE 5 MG/1
1 TABLET ORAL
Qty: 20 | Refills: 0 | OUTPATIENT
Start: 2018-12-25 | End: 2018-12-29

## 2018-12-25 RX ORDER — CLONAZEPAM 1 MG
1 TABLET ORAL
Qty: 60 | Refills: 0 | OUTPATIENT
Start: 2018-12-25 | End: 2019-01-23

## 2018-12-28 RX ORDER — QUINAPRIL HYDROCHLORIDE 40 MG/1
1 TABLET, FILM COATED ORAL
Qty: 0 | Refills: 0 | COMMUNITY

## 2018-12-28 RX ORDER — ANASTROZOLE 1 MG/1
1 TABLET ORAL
Qty: 0 | Refills: 0 | COMMUNITY

## 2018-12-28 RX ORDER — ESCITALOPRAM OXALATE 10 MG/1
1 TABLET, FILM COATED ORAL
Qty: 0 | Refills: 0 | COMMUNITY

## 2019-03-12 NOTE — PATIENT PROFILE ADULT. - ATTEMPT TO OOB
Mammogram  A mammogram is an X-ray of the breasts that is done to check for abnormal changes. This procedure can screen for and detect any changes that may suggest breast cancer. A mammogram can also identify other changes and variations in the breast, such as:  · Inflammation of the breast tissue (mastitis).  · An infected area that contains a collection of pus (abscess).  · A fluid-filled sac (cyst).  · Fibrocystic changes. This is when breast tissue becomes denser, which can make the tissue feel rope-like or uneven under the skin.  · Tumors that are not cancerous (benign).  Tell a health care provider about:  · Any allergies you have.  · If you have breast implants.  · If you have had previous breast disease, biopsy, or surgery.  · If you are breastfeeding.  · Any possibility that you could be pregnant, if this applies.  · If you are younger than age 25.  · If you have a family history of breast cancer.  What are the risks?  Generally, this is a safe procedure. However, problems may occur, including:  · Exposure to radiation. Radiation levels are very low with this test.  · The results being misinterpreted.  · The need for further tests.  · The inability of the mammogram to detect certain cancers.  What happens before the procedure?  · Schedule your test about 1-2 weeks after your menstrual period. This is usually when your breasts are the least tender.  · If you have had a mammogram done at a different facility in the past, get the mammogram X-rays or have them sent to your current exam facility in order to compare them.  · Wash your breasts and under your arms the day of the test.  · Do not wear deodorants, perfumes, lotions, or powders anywhere on your body on the day of the test.  · Remove any jewelry from your neck.  · Wear clothes that you can change into and out of easily.  What happens during the procedure?  · You will undress from the waist up and put on a gown.  · You will  front of the X-ray  machine.  · Each breast will be placed between two plastic or glass plates. The plates will compress your breast for a few seconds. Try to stay as relaxed as possible during the procedure. This does not cause any harm to your breasts and any discomfort you feel will be very brief.  · X-rays will be taken from different angles of each breast.  The procedure may vary among health care providers and hospitals.  What happens after the procedure?  · The mammogram will be examined by a specialist (radiologist).  · You may need to repeat certain parts of the test, depending on the quality of the images. This is commonly done if the radiologist needs a better view of the breast tissue.  · Ask when your test results will be ready. Make sure you get your test results.  · You may resume your normal activities.  This information is not intended to replace advice given to you by your health care provider. Make sure you discuss any questions you have with your health care provider.  Document Released: 12/15/2001 Document Revised: 05/22/2017 Document Reviewed: 02/26/2016  Elsevier Interactive Patient Education © 2017 [employer]     no

## 2019-04-17 ENCOUNTER — EMERGENCY (EMERGENCY)
Facility: HOSPITAL | Age: 64
LOS: 0 days | Discharge: HOME | End: 2019-04-17
Attending: EMERGENCY MEDICINE | Admitting: EMERGENCY MEDICINE
Payer: COMMERCIAL

## 2019-04-17 VITALS
DIASTOLIC BLOOD PRESSURE: 76 MMHG | RESPIRATION RATE: 18 BRPM | HEART RATE: 70 BPM | OXYGEN SATURATION: 98 % | SYSTOLIC BLOOD PRESSURE: 124 MMHG

## 2019-04-17 VITALS
TEMPERATURE: 98 F | WEIGHT: 197.53 LBS | OXYGEN SATURATION: 100 % | HEART RATE: 73 BPM | RESPIRATION RATE: 16 BRPM | SYSTOLIC BLOOD PRESSURE: 147 MMHG | DIASTOLIC BLOOD PRESSURE: 67 MMHG

## 2019-04-17 DIAGNOSIS — G47.33 OBSTRUCTIVE SLEEP APNEA (ADULT) (PEDIATRIC): ICD-10-CM

## 2019-04-17 DIAGNOSIS — Z79.899 OTHER LONG TERM (CURRENT) DRUG THERAPY: ICD-10-CM

## 2019-04-17 DIAGNOSIS — F10.129 ALCOHOL ABUSE WITH INTOXICATION, UNSPECIFIED: ICD-10-CM
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
## 2019-04-17 DIAGNOSIS — Z98.89 OTHER SPECIFIED POSTPROCEDURAL STATES: Chronic | ICD-10-CM

## 2019-04-17 DIAGNOSIS — Z79.4 LONG TERM (CURRENT) USE OF INSULIN: ICD-10-CM

## 2019-04-17 DIAGNOSIS — Z79.82 LONG TERM (CURRENT) USE OF ASPIRIN: ICD-10-CM

## 2019-04-17 DIAGNOSIS — Z98.890 OTHER SPECIFIED POSTPROCEDURAL STATES: Chronic | ICD-10-CM

## 2019-04-17 DIAGNOSIS — F32.9 MAJOR DEPRESSIVE DISORDER, SINGLE EPISODE, UNSPECIFIED: ICD-10-CM

## 2019-04-17 DIAGNOSIS — E11.9 TYPE 2 DIABETES MELLITUS WITHOUT COMPLICATIONS: ICD-10-CM
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
## 2019-04-17 DIAGNOSIS — S00.81XA ABRASION OF OTHER PART OF HEAD, INITIAL ENCOUNTER: ICD-10-CM

## 2019-04-17 DIAGNOSIS — I10 ESSENTIAL (PRIMARY) HYPERTENSION: ICD-10-CM

## 2019-04-17 DIAGNOSIS — E66.9 OBESITY, UNSPECIFIED: ICD-10-CM
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
## 2019-04-17 DIAGNOSIS — E78.5 HYPERLIPIDEMIA, UNSPECIFIED: ICD-10-CM

## 2019-04-17 LAB
ALBUMIN SERPL ELPH-MCNC: 4.4 G/DL — SIGNIFICANT CHANGE UP (ref 3.3–5)
ALP SERPL-CCNC: 117 U/L — SIGNIFICANT CHANGE UP (ref 40–120)
ALT FLD-CCNC: 67 U/L — SIGNIFICANT CHANGE UP (ref 12–78)
ANION GAP SERPL CALC-SCNC: 10 MMOL/L — SIGNIFICANT CHANGE UP (ref 5–17)
APTT BLD: 31.9 SEC — SIGNIFICANT CHANGE UP (ref 27.5–36.3)
AST SERPL-CCNC: 42 U/L — HIGH (ref 15–37)
BASOPHILS # BLD AUTO: 0.04 K/UL — SIGNIFICANT CHANGE UP (ref 0–0.2)
BASOPHILS NFR BLD AUTO: 0.5 % — SIGNIFICANT CHANGE UP (ref 0–2)
BILIRUB SERPL-MCNC: 0.3 MG/DL — SIGNIFICANT CHANGE UP (ref 0.2–1.2)
BUN SERPL-MCNC: 14 MG/DL — SIGNIFICANT CHANGE UP (ref 7–23)
CALCIUM SERPL-MCNC: 9.5 MG/DL — SIGNIFICANT CHANGE UP (ref 8.5–10.1)
CHLORIDE SERPL-SCNC: 104 MMOL/L — SIGNIFICANT CHANGE UP (ref 96–108)
CO2 SERPL-SCNC: 27 MMOL/L — SIGNIFICANT CHANGE UP (ref 22–31)
CREAT SERPL-MCNC: 0.91 MG/DL — SIGNIFICANT CHANGE UP (ref 0.5–1.3)
EOSINOPHIL # BLD AUTO: 0.19 K/UL — SIGNIFICANT CHANGE UP (ref 0–0.5)
EOSINOPHIL NFR BLD AUTO: 2.5 % — SIGNIFICANT CHANGE UP (ref 0–6)
ETHANOL SERPL-MCNC: 266 MG/DL — HIGH (ref 0–10)
GLUCOSE SERPL-MCNC: 105 MG/DL — HIGH (ref 70–99)
HCT VFR BLD CALC: 46.8 % — SIGNIFICANT CHANGE UP (ref 39–50)
HGB BLD-MCNC: 15.7 G/DL — SIGNIFICANT CHANGE UP (ref 13–17)
IMM GRANULOCYTES NFR BLD AUTO: 0.3 % — SIGNIFICANT CHANGE UP (ref 0–1.5)
INR BLD: 1.03 RATIO — SIGNIFICANT CHANGE UP (ref 0.88–1.16)
LIDOCAIN IGE QN: 103 U/L — SIGNIFICANT CHANGE UP (ref 73–393)
LYMPHOCYTES # BLD AUTO: 3.15 K/UL — SIGNIFICANT CHANGE UP (ref 1–3.3)
LYMPHOCYTES # BLD AUTO: 41.4 % — SIGNIFICANT CHANGE UP (ref 13–44)
MCHC RBC-ENTMCNC: 29.3 PG — SIGNIFICANT CHANGE UP (ref 27–34)
MCHC RBC-ENTMCNC: 33.5 GM/DL — SIGNIFICANT CHANGE UP (ref 32–36)
MCV RBC AUTO: 87.3 FL — SIGNIFICANT CHANGE UP (ref 80–100)
MONOCYTES # BLD AUTO: 0.69 K/UL — SIGNIFICANT CHANGE UP (ref 0–0.9)
MONOCYTES NFR BLD AUTO: 9.1 % — SIGNIFICANT CHANGE UP (ref 2–14)
NEUTROPHILS # BLD AUTO: 3.52 K/UL — SIGNIFICANT CHANGE UP (ref 1.8–7.4)
NEUTROPHILS NFR BLD AUTO: 46.2 % — SIGNIFICANT CHANGE UP (ref 43–77)
NRBC # BLD: 0 /100 WBCS — SIGNIFICANT CHANGE UP (ref 0–0)
PLATELET # BLD AUTO: 228 K/UL — SIGNIFICANT CHANGE UP (ref 150–400)
POTASSIUM SERPL-MCNC: 3.7 MMOL/L — SIGNIFICANT CHANGE UP (ref 3.5–5.3)
POTASSIUM SERPL-SCNC: 3.7 MMOL/L — SIGNIFICANT CHANGE UP (ref 3.5–5.3)
PROT SERPL-MCNC: 9.5 GM/DL — HIGH (ref 6–8.3)
PROTHROM AB SERPL-ACNC: 11.4 SEC — SIGNIFICANT CHANGE UP (ref 10–12.9)
RBC # BLD: 5.36 M/UL — SIGNIFICANT CHANGE UP (ref 4.2–5.8)
RBC # FLD: 16.8 % — HIGH (ref 10.3–14.5)
SODIUM SERPL-SCNC: 141 MMOL/L — SIGNIFICANT CHANGE UP (ref 135–145)
WBC # BLD: 7.61 K/UL — SIGNIFICANT CHANGE UP (ref 3.8–10.5)
WBC # FLD AUTO: 7.61 K/UL — SIGNIFICANT CHANGE UP (ref 3.8–10.5)

## 2019-04-17 PROCEDURE — 93010 ELECTROCARDIOGRAM REPORT: CPT

## 2019-04-17 PROCEDURE — 74177 CT ABD & PELVIS W/CONTRAST: CPT | Mod: 26

## 2019-04-17 PROCEDURE — 72125 CT NECK SPINE W/O DYE: CPT | Mod: 26

## 2019-04-17 PROCEDURE — 70450 CT HEAD/BRAIN W/O DYE: CPT | Mod: 26

## 2019-04-17 PROCEDURE — 99285 EMERGENCY DEPT VISIT HI MDM: CPT

## 2019-04-17 PROCEDURE — 71260 CT THORAX DX C+: CPT | Mod: 26

## 2019-04-17 RX ORDER — SODIUM CHLORIDE 9 MG/ML
1000 INJECTION INTRAMUSCULAR; INTRAVENOUS; SUBCUTANEOUS ONCE
Qty: 0 | Refills: 0 | Status: COMPLETED | OUTPATIENT
Start: 2019-04-17 | End: 2019-04-17

## 2019-04-17 RX ADMIN — SODIUM CHLORIDE 1000 MILLILITER(S): 9 INJECTION INTRAMUSCULAR; INTRAVENOUS; SUBCUTANEOUS at 17:31
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
## 2019-04-17 NOTE — ED ADULT NURSE NOTE - NSIMPLEMENTINTERV_GEN_ALL_ED
Implemented All Fall with Harm Risk Interventions:  [place] to call system. Call bell, personal items and telephone within reach. Instruct patient to call for assistance. Room bathroom lighting operational. Non-slip footwear when patient is off stretcher. Physically safe environment: no spills, clutter or unnecessary equipment. Stretcher in lowest position, wheels locked, appropriate side rails in place. Provide visual cue, wrist band, yellow gown, etc. Monitor gait and stability. Monitor for mental status changes and reorient to person, place, and time. Review medications for side effects contributing to fall risk. Reinforce activity limits and safety measures with patient and family. Provide visual clues: red socks.

## 2019-04-17 NOTE — ED ADULT TRIAGE NOTE - CHIEF COMPLAINT QUOTE
Pt BIBA s/p fall, + ETOH, unknown LOC from standing height, pt A &o x 3, takes ASA, has no complaints, trauma alert called.

## 2019-04-17 NOTE — ED PROVIDER NOTE - NS_ ATTENDINGSCRIBEDETAILS _ED_A_ED_FT
I, Loco Flores MD,  performed the initial face to face bedside interview with this patient regarding history of present illness, review of symptoms and relevant past medical, social and family history.  I completed an independent physical examination.    The history, relevant review of systems, past medical and surgical history, medical decision making, and physical examination was documented by the scribe in my presence and I attest to the accuracy of the documentation.

## 2019-04-17 NOTE — ED PROVIDER NOTE - PMH
Alcohol use disorder, severe, dependence    Alcohol-induced acute pancreatitis, unspecified complication status    Borderline diabetes mellitus  last A1c unknown  Depression    Depression, unspecified depression type    Depression, unspecified depression type  Lost his son this past June 2016  Diabetes  type 2  Essential hypertension    Essential hypertension    HLD (hyperlipidemia)    HTN (hypertension)    Insomnia    Low back pain, unspecified back pain laterality, unspecified chronicity, with sciatica presence unspecified    Low testosterone in male    Low testosterone level in male    Lumbar herniated disc    Obesity (BMI 30-39.9)    ROSS (obstructive sleep apnea)    Sleep apnea, unspecified type    Sprain of right rotator cuff capsule, subsequent encounter

## 2019-04-17 NOTE — ED ADULT NURSE NOTE - OBJECTIVE STATEMENT
Pt comes in s/p fall on aspirin. + drinking. Unknown LOC. Pt p/w abrasion on left side of forehead. Denies any other symptoms.

## 2019-04-18 PROBLEM — F32.9 MAJOR DEPRESSIVE DISORDER, SINGLE EPISODE, UNSPECIFIED: Chronic | Status: ACTIVE | Noted: 2018-12-07

## 2019-04-18 PROBLEM — F10.20 ALCOHOL DEPENDENCE, UNCOMPLICATED: Chronic | Status: ACTIVE | Noted: 2018-12-07

## 2019-04-18 PROBLEM — G47.33 OBSTRUCTIVE SLEEP APNEA (ADULT) (PEDIATRIC): Chronic | Status: ACTIVE | Noted: 2018-12-07

## 2019-04-18 PROBLEM — K85.20 ALCOHOL INDUCED ACUTE PANCREATITIS WITHOUT NECROSIS OR INFECTION: Chronic | Status: ACTIVE | Noted: 2018-12-07

## 2019-04-18 PROBLEM — E11.9 TYPE 2 DIABETES MELLITUS WITHOUT COMPLICATIONS: Chronic | Status: ACTIVE | Noted: 2018-12-07

## 2019-04-18 PROBLEM — I10 ESSENTIAL (PRIMARY) HYPERTENSION: Chronic | Status: ACTIVE | Noted: 2018-12-07

## 2019-05-06 ENCOUNTER — INPATIENT (INPATIENT)
Facility: HOSPITAL | Age: 64
LOS: 2 days | Discharge: HOME | DRG: 439 | End: 2019-05-09
Attending: FAMILY MEDICINE | Admitting: HOSPITALIST
Payer: COMMERCIAL

## 2019-05-06 VITALS
WEIGHT: 197.98 LBS | OXYGEN SATURATION: 98 % | HEIGHT: 70 IN | RESPIRATION RATE: 17 BRPM | DIASTOLIC BLOOD PRESSURE: 73 MMHG | HEART RATE: 62 BPM | TEMPERATURE: 97 F | SYSTOLIC BLOOD PRESSURE: 134 MMHG

## 2019-05-06 DIAGNOSIS — E11.9 TYPE 2 DIABETES MELLITUS WITHOUT COMPLICATIONS: ICD-10-CM

## 2019-05-06 DIAGNOSIS — G47.33 OBSTRUCTIVE SLEEP APNEA (ADULT) (PEDIATRIC): ICD-10-CM

## 2019-05-06 DIAGNOSIS — Z98.890 OTHER SPECIFIED POSTPROCEDURAL STATES: Chronic | ICD-10-CM

## 2019-05-06 DIAGNOSIS — F32.9 MAJOR DEPRESSIVE DISORDER, SINGLE EPISODE, UNSPECIFIED: ICD-10-CM

## 2019-05-06 DIAGNOSIS — Z29.9 ENCOUNTER FOR PROPHYLACTIC MEASURES, UNSPECIFIED: ICD-10-CM

## 2019-05-06 DIAGNOSIS — K85.20 ALCOHOL INDUCED ACUTE PANCREATITIS WITHOUT NECROSIS OR INFECTION: ICD-10-CM

## 2019-05-06 DIAGNOSIS — Z98.89 OTHER SPECIFIED POSTPROCEDURAL STATES: Chronic | ICD-10-CM

## 2019-05-06 DIAGNOSIS — F10.20 ALCOHOL DEPENDENCE, UNCOMPLICATED: ICD-10-CM

## 2019-05-06 DIAGNOSIS — G47.00 INSOMNIA, UNSPECIFIED: ICD-10-CM

## 2019-05-06 DIAGNOSIS — E87.6 HYPOKALEMIA: ICD-10-CM

## 2019-05-06 DIAGNOSIS — I10 ESSENTIAL (PRIMARY) HYPERTENSION: ICD-10-CM

## 2019-05-06 DIAGNOSIS — E78.5 HYPERLIPIDEMIA, UNSPECIFIED: ICD-10-CM

## 2019-05-06 LAB
ALBUMIN SERPL ELPH-MCNC: 3.7 G/DL — SIGNIFICANT CHANGE UP (ref 3.3–5)
ALBUMIN SERPL ELPH-MCNC: 3.9 G/DL — SIGNIFICANT CHANGE UP (ref 3.3–5)
ALP SERPL-CCNC: 174 U/L — HIGH (ref 40–120)
ALP SERPL-CCNC: 197 U/L — HIGH (ref 40–120)
ALT FLD-CCNC: 81 U/L — HIGH (ref 12–78)
ALT FLD-CCNC: 90 U/L — HIGH (ref 12–78)
AMPHET UR-MCNC: NEGATIVE — SIGNIFICANT CHANGE UP
AMYLASE P1 CFR SERPL: 197 U/L — HIGH (ref 25–115)
ANION GAP SERPL CALC-SCNC: 12 MMOL/L — SIGNIFICANT CHANGE UP (ref 5–17)
ANION GAP SERPL CALC-SCNC: 8 MMOL/L — SIGNIFICANT CHANGE UP (ref 5–17)
APPEARANCE UR: CLEAR — SIGNIFICANT CHANGE UP
APTT BLD: 25.4 SEC — LOW (ref 28.5–37)
AST SERPL-CCNC: 51 U/L — HIGH (ref 15–37)
AST SERPL-CCNC: 65 U/L — HIGH (ref 15–37)
BACTERIA # UR AUTO: NEGATIVE — SIGNIFICANT CHANGE UP
BARBITURATES UR SCN-MCNC: NEGATIVE — SIGNIFICANT CHANGE UP
BASOPHILS # BLD AUTO: 0.03 K/UL — SIGNIFICANT CHANGE UP (ref 0–0.2)
BASOPHILS NFR BLD AUTO: 0.2 % — SIGNIFICANT CHANGE UP (ref 0–2)
BENZODIAZ UR-MCNC: NEGATIVE — SIGNIFICANT CHANGE UP
BILIRUB DIRECT SERPL-MCNC: 0.2 MG/DL — SIGNIFICANT CHANGE UP (ref 0.05–0.2)
BILIRUB INDIRECT FLD-MCNC: 0.4 MG/DL — SIGNIFICANT CHANGE UP (ref 0.2–1)
BILIRUB SERPL-MCNC: 0.6 MG/DL — SIGNIFICANT CHANGE UP (ref 0.2–1.2)
BILIRUB SERPL-MCNC: 0.6 MG/DL — SIGNIFICANT CHANGE UP (ref 0.2–1.2)
BILIRUB UR-MCNC: NEGATIVE — SIGNIFICANT CHANGE UP
BUN SERPL-MCNC: 14 MG/DL — SIGNIFICANT CHANGE UP (ref 7–23)
BUN SERPL-MCNC: 17 MG/DL — SIGNIFICANT CHANGE UP (ref 7–23)
CALCIUM SERPL-MCNC: 8.3 MG/DL — LOW (ref 8.5–10.1)
CALCIUM SERPL-MCNC: 9.3 MG/DL — SIGNIFICANT CHANGE UP (ref 8.5–10.1)
CHLORIDE SERPL-SCNC: 102 MMOL/L — SIGNIFICANT CHANGE UP (ref 96–108)
CHLORIDE SERPL-SCNC: 99 MMOL/L — SIGNIFICANT CHANGE UP (ref 96–108)
CO2 SERPL-SCNC: 24 MMOL/L — SIGNIFICANT CHANGE UP (ref 22–31)
CO2 SERPL-SCNC: 26 MMOL/L — SIGNIFICANT CHANGE UP (ref 22–31)
COCAINE METAB.OTHER UR-MCNC: NEGATIVE — SIGNIFICANT CHANGE UP
COLOR SPEC: YELLOW — SIGNIFICANT CHANGE UP
CREAT SERPL-MCNC: 0.75 MG/DL — SIGNIFICANT CHANGE UP (ref 0.5–1.3)
CREAT SERPL-MCNC: 0.92 MG/DL — SIGNIFICANT CHANGE UP (ref 0.5–1.3)
DIFF PNL FLD: ABNORMAL
EOSINOPHIL # BLD AUTO: 0.05 K/UL — SIGNIFICANT CHANGE UP (ref 0–0.5)
EOSINOPHIL NFR BLD AUTO: 0.4 % — SIGNIFICANT CHANGE UP (ref 0–6)
EPI CELLS # UR: SIGNIFICANT CHANGE UP
ETHANOL SERPL-MCNC: <10 MG/DL — SIGNIFICANT CHANGE UP (ref 0–10)
GLUCOSE SERPL-MCNC: 203 MG/DL — HIGH (ref 70–99)
GLUCOSE SERPL-MCNC: 206 MG/DL — HIGH (ref 70–99)
GLUCOSE UR QL: 300 MG/DL
HCT VFR BLD CALC: 41 % — SIGNIFICANT CHANGE UP (ref 39–50)
HGB BLD-MCNC: 14.5 G/DL — SIGNIFICANT CHANGE UP (ref 13–17)
IMM GRANULOCYTES NFR BLD AUTO: 0.6 % — SIGNIFICANT CHANGE UP (ref 0–1.5)
INR BLD: 1.09 RATIO — SIGNIFICANT CHANGE UP (ref 0.88–1.16)
KETONES UR-MCNC: ABNORMAL
LACTATE SERPL-SCNC: 1.5 MMOL/L — SIGNIFICANT CHANGE UP (ref 0.7–2)
LEUKOCYTE ESTERASE UR-ACNC: ABNORMAL
LIDOCAIN IGE QN: 4457 U/L — HIGH (ref 73–393)
LYMPHOCYTES # BLD AUTO: 17.7 % — SIGNIFICANT CHANGE UP (ref 13–44)
LYMPHOCYTES # BLD AUTO: 2.21 K/UL — SIGNIFICANT CHANGE UP (ref 1–3.3)
MAGNESIUM SERPL-MCNC: 1.5 MG/DL — LOW (ref 1.6–2.6)
MCHC RBC-ENTMCNC: 29.5 PG — SIGNIFICANT CHANGE UP (ref 27–34)
MCHC RBC-ENTMCNC: 35.4 GM/DL — SIGNIFICANT CHANGE UP (ref 32–36)
MCV RBC AUTO: 83.5 FL — SIGNIFICANT CHANGE UP (ref 80–100)
METHADONE UR-MCNC: NEGATIVE — SIGNIFICANT CHANGE UP
MONOCYTES # BLD AUTO: 0.91 K/UL — HIGH (ref 0–0.9)
MONOCYTES NFR BLD AUTO: 7.3 % — SIGNIFICANT CHANGE UP (ref 2–14)
NEUTROPHILS # BLD AUTO: 9.25 K/UL — HIGH (ref 1.8–7.4)
NEUTROPHILS NFR BLD AUTO: 73.8 % — SIGNIFICANT CHANGE UP (ref 43–77)
NITRITE UR-MCNC: NEGATIVE — SIGNIFICANT CHANGE UP
NRBC # BLD: 0 /100 WBCS — SIGNIFICANT CHANGE UP (ref 0–0)
OPIATES UR-MCNC: POSITIVE — SIGNIFICANT CHANGE UP
PCP SPEC-MCNC: SIGNIFICANT CHANGE UP
PCP UR-MCNC: NEGATIVE — SIGNIFICANT CHANGE UP
PH UR: 7 — SIGNIFICANT CHANGE UP (ref 5–8)
PHOSPHATE SERPL-MCNC: 4.4 MG/DL — SIGNIFICANT CHANGE UP (ref 2.5–4.5)
PLATELET # BLD AUTO: 213 K/UL — SIGNIFICANT CHANGE UP (ref 150–400)
POTASSIUM SERPL-MCNC: 3.4 MMOL/L — LOW (ref 3.5–5.3)
POTASSIUM SERPL-MCNC: 3.8 MMOL/L — SIGNIFICANT CHANGE UP (ref 3.5–5.3)
POTASSIUM SERPL-SCNC: 3.4 MMOL/L — LOW (ref 3.5–5.3)
POTASSIUM SERPL-SCNC: 3.8 MMOL/L — SIGNIFICANT CHANGE UP (ref 3.5–5.3)
PROT SERPL-MCNC: 7.6 G/DL — SIGNIFICANT CHANGE UP (ref 6–8.3)
PROT SERPL-MCNC: 8.1 G/DL — SIGNIFICANT CHANGE UP (ref 6–8.3)
PROT UR-MCNC: 25 MG/DL
PROTHROM AB SERPL-ACNC: 12.5 SEC — SIGNIFICANT CHANGE UP (ref 10–12.9)
RBC # BLD: 4.91 M/UL — SIGNIFICANT CHANGE UP (ref 4.2–5.8)
RBC # FLD: 14.6 % — HIGH (ref 10.3–14.5)
RBC CASTS # UR COMP ASSIST: SIGNIFICANT CHANGE UP /HPF (ref 0–4)
SODIUM SERPL-SCNC: 135 MMOL/L — SIGNIFICANT CHANGE UP (ref 135–145)
SODIUM SERPL-SCNC: 136 MMOL/L — SIGNIFICANT CHANGE UP (ref 135–145)
SP GR SPEC: 1 — LOW (ref 1.01–1.02)
THC UR QL: NEGATIVE — SIGNIFICANT CHANGE UP
UROBILINOGEN FLD QL: NEGATIVE — SIGNIFICANT CHANGE UP
WBC # BLD: 12.52 K/UL — HIGH (ref 3.8–10.5)
WBC # FLD AUTO: 12.52 K/UL — HIGH (ref 3.8–10.5)
WBC UR QL: ABNORMAL

## 2019-05-06 PROCEDURE — 99223 1ST HOSP IP/OBS HIGH 75: CPT | Mod: GC,AI

## 2019-05-06 PROCEDURE — 74177 CT ABD & PELVIS W/CONTRAST: CPT | Mod: 26

## 2019-05-06 PROCEDURE — 93010 ELECTROCARDIOGRAM REPORT: CPT

## 2019-05-06 PROCEDURE — 99285 EMERGENCY DEPT VISIT HI MDM: CPT

## 2019-05-06 RX ORDER — SODIUM CHLORIDE 9 MG/ML
1000 INJECTION, SOLUTION INTRAVENOUS
Qty: 0 | Refills: 0 | Status: DISCONTINUED | OUTPATIENT
Start: 2019-05-06 | End: 2019-05-08

## 2019-05-06 RX ORDER — INSULIN LISPRO 100/ML
VIAL (ML) SUBCUTANEOUS EVERY 6 HOURS
Qty: 0 | Refills: 0 | Status: DISCONTINUED | OUTPATIENT
Start: 2019-05-06 | End: 2019-05-08

## 2019-05-06 RX ORDER — AMLODIPINE BESYLATE 2.5 MG/1
1 TABLET ORAL
Qty: 0 | Refills: 0 | COMMUNITY

## 2019-05-06 RX ORDER — ESCITALOPRAM OXALATE 10 MG/1
20 TABLET, FILM COATED ORAL DAILY
Qty: 0 | Refills: 0 | Status: DISCONTINUED | OUTPATIENT
Start: 2019-05-06 | End: 2019-05-09

## 2019-05-06 RX ORDER — ESCITALOPRAM OXALATE 10 MG/1
30 TABLET, FILM COATED ORAL
Qty: 0 | Refills: 0 | COMMUNITY

## 2019-05-06 RX ORDER — SODIUM CHLORIDE 9 MG/ML
1000 INJECTION INTRAMUSCULAR; INTRAVENOUS; SUBCUTANEOUS
Qty: 0 | Refills: 0 | Status: COMPLETED | OUTPATIENT
Start: 2019-05-06 | End: 2019-05-06

## 2019-05-06 RX ORDER — ENOXAPARIN SODIUM 100 MG/ML
40 INJECTION SUBCUTANEOUS DAILY
Qty: 0 | Refills: 0 | Status: DISCONTINUED | OUTPATIENT
Start: 2019-05-06 | End: 2019-05-09

## 2019-05-06 RX ORDER — LANOLIN ALCOHOL/MO/W.PET/CERES
5 CREAM (GRAM) TOPICAL AT BEDTIME
Qty: 0 | Refills: 0 | Status: DISCONTINUED | OUTPATIENT
Start: 2019-05-06 | End: 2019-05-09

## 2019-05-06 RX ORDER — METOPROLOL TARTRATE 50 MG
1 TABLET ORAL
Qty: 0 | Refills: 0 | COMMUNITY

## 2019-05-06 RX ORDER — CLONAZEPAM 1 MG
1 TABLET ORAL
Qty: 0 | Refills: 0 | COMMUNITY

## 2019-05-06 RX ORDER — SIMETHICONE 80 MG/1
80 TABLET, CHEWABLE ORAL
Qty: 0 | Refills: 0 | Status: DISCONTINUED | OUTPATIENT
Start: 2019-05-06 | End: 2019-05-09

## 2019-05-06 RX ORDER — DEXTROSE 50 % IN WATER 50 %
12.5 SYRINGE (ML) INTRAVENOUS ONCE
Qty: 0 | Refills: 0 | Status: DISCONTINUED | OUTPATIENT
Start: 2019-05-06 | End: 2019-05-09

## 2019-05-06 RX ORDER — ANASTROZOLE 1 MG/1
1 TABLET ORAL
Qty: 0 | Refills: 0 | COMMUNITY

## 2019-05-06 RX ORDER — ATORVASTATIN CALCIUM 80 MG/1
1 TABLET, FILM COATED ORAL
Qty: 0 | Refills: 0 | COMMUNITY

## 2019-05-06 RX ORDER — METOPROLOL TARTRATE 50 MG
5 TABLET ORAL EVERY 6 HOURS
Qty: 0 | Refills: 0 | Status: DISCONTINUED | OUTPATIENT
Start: 2019-05-06 | End: 2019-05-08

## 2019-05-06 RX ORDER — QUETIAPINE FUMARATE 200 MG/1
1 TABLET, FILM COATED ORAL
Qty: 0 | Refills: 0 | COMMUNITY

## 2019-05-06 RX ORDER — MELOXICAM 15 MG/1
1 TABLET ORAL
Qty: 0 | Refills: 0 | COMMUNITY

## 2019-05-06 RX ORDER — HYDROMORPHONE HYDROCHLORIDE 2 MG/ML
1 INJECTION INTRAMUSCULAR; INTRAVENOUS; SUBCUTANEOUS EVERY 6 HOURS
Qty: 0 | Refills: 0 | Status: DISCONTINUED | OUTPATIENT
Start: 2019-05-06 | End: 2019-05-08

## 2019-05-06 RX ORDER — CLONAZEPAM 1 MG
2 TABLET ORAL AT BEDTIME
Qty: 0 | Refills: 0 | Status: DISCONTINUED | OUTPATIENT
Start: 2019-05-06 | End: 2019-05-07

## 2019-05-06 RX ORDER — ESCITALOPRAM OXALATE 10 MG/1
1 TABLET, FILM COATED ORAL
Qty: 0 | Refills: 0 | COMMUNITY

## 2019-05-06 RX ORDER — MORPHINE SULFATE 50 MG/1
4 CAPSULE, EXTENDED RELEASE ORAL ONCE
Qty: 0 | Refills: 0 | Status: DISCONTINUED | OUTPATIENT
Start: 2019-05-06 | End: 2019-05-06

## 2019-05-06 RX ORDER — ATORVASTATIN CALCIUM 80 MG/1
20 TABLET, FILM COATED ORAL AT BEDTIME
Qty: 0 | Refills: 0 | Status: DISCONTINUED | OUTPATIENT
Start: 2019-05-06 | End: 2019-05-09

## 2019-05-06 RX ORDER — AMLODIPINE BESYLATE 2.5 MG/1
10 TABLET ORAL DAILY
Qty: 0 | Refills: 0 | Status: DISCONTINUED | OUTPATIENT
Start: 2019-05-06 | End: 2019-05-09

## 2019-05-06 RX ORDER — SODIUM CHLORIDE 9 MG/ML
3 INJECTION INTRAMUSCULAR; INTRAVENOUS; SUBCUTANEOUS ONCE
Qty: 0 | Refills: 0 | Status: COMPLETED | OUTPATIENT
Start: 2019-05-06 | End: 2019-05-06

## 2019-05-06 RX ORDER — DEXTROSE 50 % IN WATER 50 %
25 SYRINGE (ML) INTRAVENOUS ONCE
Qty: 0 | Refills: 0 | Status: DISCONTINUED | OUTPATIENT
Start: 2019-05-06 | End: 2019-05-09

## 2019-05-06 RX ORDER — HYDROMORPHONE HYDROCHLORIDE 2 MG/ML
0.5 INJECTION INTRAMUSCULAR; INTRAVENOUS; SUBCUTANEOUS ONCE
Qty: 0 | Refills: 0 | Status: DISCONTINUED | OUTPATIENT
Start: 2019-05-06 | End: 2019-05-06

## 2019-05-06 RX ORDER — DEXTROSE 50 % IN WATER 50 %
15 SYRINGE (ML) INTRAVENOUS ONCE
Qty: 0 | Refills: 0 | Status: DISCONTINUED | OUTPATIENT
Start: 2019-05-06 | End: 2019-05-09

## 2019-05-06 RX ORDER — HYDROMORPHONE HYDROCHLORIDE 2 MG/ML
0.5 INJECTION INTRAMUSCULAR; INTRAVENOUS; SUBCUTANEOUS EVERY 6 HOURS
Qty: 0 | Refills: 0 | Status: DISCONTINUED | OUTPATIENT
Start: 2019-05-06 | End: 2019-05-08

## 2019-05-06 RX ORDER — HYDROMORPHONE HYDROCHLORIDE 2 MG/ML
0.5 INJECTION INTRAMUSCULAR; INTRAVENOUS; SUBCUTANEOUS ONCE
Qty: 0 | Refills: 0 | Status: DISCONTINUED | OUTPATIENT
Start: 2019-05-06 | End: 2019-05-08

## 2019-05-06 RX ORDER — MORPHINE SULFATE 50 MG/1
4 CAPSULE, EXTENDED RELEASE ORAL EVERY 6 HOURS
Qty: 0 | Refills: 0 | Status: DISCONTINUED | OUTPATIENT
Start: 2019-05-06 | End: 2019-05-08

## 2019-05-06 RX ORDER — ONDANSETRON 8 MG/1
4 TABLET, FILM COATED ORAL ONCE
Qty: 0 | Refills: 0 | Status: COMPLETED | OUTPATIENT
Start: 2019-05-06 | End: 2019-05-06

## 2019-05-06 RX ORDER — DEXTROSE MONOHYDRATE, SODIUM CHLORIDE, AND POTASSIUM CHLORIDE 50; .745; 4.5 G/1000ML; G/1000ML; G/1000ML
1000 INJECTION, SOLUTION INTRAVENOUS
Qty: 0 | Refills: 0 | Status: DISCONTINUED | OUTPATIENT
Start: 2019-05-06 | End: 2019-05-07

## 2019-05-06 RX ORDER — GLUCAGON INJECTION, SOLUTION 0.5 MG/.1ML
1 INJECTION, SOLUTION SUBCUTANEOUS ONCE
Qty: 0 | Refills: 0 | Status: DISCONTINUED | OUTPATIENT
Start: 2019-05-06 | End: 2019-05-09

## 2019-05-06 RX ADMIN — MORPHINE SULFATE 4 MILLIGRAM(S): 50 CAPSULE, EXTENDED RELEASE ORAL at 16:38

## 2019-05-06 RX ADMIN — Medication 2 MILLIGRAM(S): at 23:21

## 2019-05-06 RX ADMIN — ATORVASTATIN CALCIUM 20 MILLIGRAM(S): 80 TABLET, FILM COATED ORAL at 22:57

## 2019-05-06 RX ADMIN — SODIUM CHLORIDE 1000 MILLILITER(S): 9 INJECTION INTRAMUSCULAR; INTRAVENOUS; SUBCUTANEOUS at 16:37

## 2019-05-06 RX ADMIN — DEXTROSE MONOHYDRATE, SODIUM CHLORIDE, AND POTASSIUM CHLORIDE 125 MILLILITER(S): 50; .745; 4.5 INJECTION, SOLUTION INTRAVENOUS at 23:24

## 2019-05-06 RX ADMIN — MORPHINE SULFATE 4 MILLIGRAM(S): 50 CAPSULE, EXTENDED RELEASE ORAL at 17:08

## 2019-05-06 RX ADMIN — MORPHINE SULFATE 4 MILLIGRAM(S): 50 CAPSULE, EXTENDED RELEASE ORAL at 18:51

## 2019-05-06 RX ADMIN — Medication 5 MILLIGRAM(S): at 23:00

## 2019-05-06 RX ADMIN — SODIUM CHLORIDE 1000 MILLILITER(S): 9 INJECTION INTRAMUSCULAR; INTRAVENOUS; SUBCUTANEOUS at 19:27

## 2019-05-06 RX ADMIN — MORPHINE SULFATE 4 MILLIGRAM(S): 50 CAPSULE, EXTENDED RELEASE ORAL at 18:21

## 2019-05-06 RX ADMIN — ONDANSETRON 4 MILLIGRAM(S): 8 TABLET, FILM COATED ORAL at 16:37

## 2019-05-06 RX ADMIN — SODIUM CHLORIDE 3 MILLILITER(S): 9 INJECTION INTRAMUSCULAR; INTRAVENOUS; SUBCUTANEOUS at 16:16

## 2019-05-06 RX ADMIN — HYDROMORPHONE HYDROCHLORIDE 0.5 MILLIGRAM(S): 2 INJECTION INTRAMUSCULAR; INTRAVENOUS; SUBCUTANEOUS at 21:48

## 2019-05-06 NOTE — ED ADULT NURSE NOTE - PSH
H/O knee surgery  b/l knees  H/O shoulder surgery  right  No significant past surgical history    S/P ACL repair  left knee 2010  S/P arthroscopy of right knee  2010  S/P right inguinal hernia repair  2010  S/P rotator cuff repair  Right shoulder 2008

## 2019-05-06 NOTE — H&P ADULT - PROBLEM SELECTOR PLAN 2
AILYN protocol  Psych - Dr. Mcwilliams AILYN protocol - librium  Psych - Dr. Mcwilliams consulted AILYN protocol - librium  Psych - Dr. Mcwilliams consulted  Addiction medicine - Consult Dr. Coats in AM AILYN protocol - librium; in past, notes reviewed, required ICU support and precedex. consider ICU consult based on clinical course.   Psych - Dr. Mcwilliams consulted  Addiction medicine - Consult Dr. Coats in AM

## 2019-05-06 NOTE — H&P ADULT - NSHPSOCIALHISTORY_GEN_ALL_CORE
Lives in [facility]  Son  3 yo from drug overdose  Daughter also struggling with substance abuse

## 2019-05-06 NOTE — H&P ADULT - PROBLEM SELECTOR PLAN 10
Lovenox for DVT ppx  NPO  ambulate as tolerated   IMPROVE VTE Individual Risk Assessment          RISK                                                          Points    [  ] Previous VTE                                                3  [  ] Thrombophilia                                             2  [  ] Lower limb paralysis                                   2        (unable to hold up >15 seconds)    [  ] Current Cancer                                            2         (within 6 months)  [  ] Immobilization > 24 hrs                              1  [  ] ICU/CCU stay > 24 hours                            1  [  ] Age > 60                                                    1    IMPROVE VTE Score ____1_____ Lovenox for DVT ppx  NPO  ambulate as tolerated   IMPROVE VTE Individual Risk Assessment          RISK                                                          Points    [  ] Previous VTE                                                3  [  ] Thrombophilia                                             2  [  ] Lower limb paralysis                                   2        (unable to hold up >15 seconds)    [  ] Current Cancer                                            2         (within 6 months)  [  ] Immobilization > 24 hrs                              1  [  ] ICU/CCU stay > 24 hours                            1  [  x] Age > 60                                                    1    IMPROVE VTE Score ____1_____

## 2019-05-06 NOTE — H&P ADULT - NSICDXPASTMEDICALHX_GEN_ALL_CORE_FT
PAST MEDICAL HISTORY:  Alcohol use disorder, severe, dependence     Alcohol-induced acute pancreatitis, unspecified complication status     Borderline diabetes mellitus last A1c unknown    Depression     Depression, unspecified depression type Lost his son this past June 2016    Depression, unspecified depression type     Diabetes type 2    Essential hypertension     Essential hypertension     HLD (hyperlipidemia)     HTN (hypertension)     Insomnia     Low back pain, unspecified back pain laterality, unspecified chronicity, with sciatica presence unspecified     Low testosterone in male     Low testosterone level in male     Lumbar herniated disc     Obesity (BMI 30-39.9)     ROSS (obstructive sleep apnea)     Sleep apnea, unspecified type     Sprain of right rotator cuff capsule, subsequent encounter

## 2019-05-06 NOTE — H&P ADULT - PROBLEM SELECTOR PLAN 3
K 3.4  Replete KCl 40meq IV x 3  f/u in AM K 3.4  Replete with KCl IVF addition  f/u in AM K 3.4  Replete with KCl IVF addition  f/u in AM  check ECG

## 2019-05-06 NOTE — H&P ADULT - NSHPREVIEWOFSYSTEMS_GEN_ALL_CORE
Constitutional: denies fever, chills, diaphoresis   HEENT: denies blurry vision, difficulty hearing  Respiratory: denies SOB, DOYLE, cough, sputum production, wheezing, hemoptysis  Cardiovascular: denies CP, palpitations, edema  Gastrointestinal: endorses epigastric pain, endorses gas, denies nausea, vomiting, diarrhea, constipation, melena, hematochezia   Genitourinary: denies dysuria, frequency, urgency, hematuria   Skin/Breast: denies rash, itching  Musculoskeletal: denies myalgias, joint swelling, muscle weakness  Neurologic: denies headache, weakness, dizziness, paresthesias, numbness/tingling  Psychiatric: denies feeling anxious, depressed, suicidal, homicidal thoughts  Hematology/Oncology: denies bruising, tender or enlarged lymph nodes   ROS negative except as noted above

## 2019-05-06 NOTE — H&P ADULT - NSHPPHYSICALEXAM_GEN_ALL_CORE
Vital Signs Last 24 Hrs  T(C): 37.3 (06 May 2019 19:26), Max: 37.3 (06 May 2019 19:26)  T(F): 99.1 (06 May 2019 19:26), Max: 99.1 (06 May 2019 19:26)  HR: 92 (06 May 2019 19:26) (62 - 106)  BP: 135/63 (06 May 2019 19:26) (130/60 - 135/63)  BP(mean): --  RR: 16 (06 May 2019 19:26) (16 - 17)  SpO2: 98% (06 May 2019 19:26) (98% - 99%)      General: obese, NAD  HEENT: NCAT, PERRLA, EOMI bl, moist mucous membranes   Neck: Supple, nontender, no mass  Neurology: A&Ox3, nonfocal, CN II-XII grossly intact, sensation intact    Respiratory: CTA B/L, No W/R/R  CV: RRR, +S1/S2, no murmurs, rubs or gallops  Abdominal: Soft, TTP epigastrum, mildly distended, +BSx4  Extremities: No C/C/E, + peripheral pulses  MSK: Normal ROM, no joint erythema or warmth, no joint swelling   Skin: warm, dry, normal color, no rash or abnormal lesions  Psych: normal affect, positive CAGE screening Vital Signs Last 24 Hrs  T(C): 37.3 (06 May 2019 19:26), Max: 37.3 (06 May 2019 19:26)  T(F): 99.1 (06 May 2019 19:26), Max: 99.1 (06 May 2019 19:26)  HR: 92 (06 May 2019 19:26) (62 - 106)  BP: 135/63 (06 May 2019 19:26) (130/60 - 135/63)  BP(mean): --  RR: 16 (06 May 2019 19:26) (16 - 17)  SpO2: 98% (06 May 2019 19:26) (98% - 99%)      General: obese, NAD  HEENT: NCAT, PERRLA, EOMI bl, moist mucous membranes   Neck: Supple, nontender, no mass  Neurology: A&Ox3, nonfocal, CN II-XII grossly intact, sensation intact    Respiratory: CTA B/L, No W/R/R  CV: RRR, +S1/S2, no murmurs, rubs or gallops  Abdominal: Soft, TTP epigastrum, mildly distended, +BSx4 small reduciable umbilical hernia  Extremities: No C/C/E, + peripheral pulses  MSK: Normal ROM, no joint erythema or warmth, no joint swelling   Skin: warm, dry, normal color, no rash or abnormal lesions  Psych: normal affect, positive CAGE screening

## 2019-05-06 NOTE — PATIENT PROFILE ADULT - NSALCOHOLTYPE_GEN_A_NUR
unable to assess, Pt unkown parameter/liquor beer/liquor/unable to assess, Pt unkown parameter beer/liquor

## 2019-05-06 NOTE — ED PROVIDER NOTE - OBJECTIVE STATEMENT
63 yo M with hx ETOH abuse , hx pancreatitis pw mid abd pain since this am. No cp/sob/palp. no fever/chills. No cp/sob/palp. No neck / back pain. No recent illness. no neck / back pain. no recent illness. no recent trauma. Pt states does not drink a lot . Wife states he does drink a decent amount. No fever/chills. no weakness / dizzy. no agg/allev factors. no recent trauma. No other inj or co. Pos nausea, no v/d.

## 2019-05-06 NOTE — H&P ADULT - ASSESSMENT
65 y/o male with a PMHx of HTN, alcohol use disorder, type 2 diabetes mellitus, depression, HLD, obesity, ROSS, presents with c/o abdominal pain, admitted for acute pancreatitis

## 2019-05-06 NOTE — H&P ADULT - NSICDXFAMILYHX_GEN_ALL_CORE_FT
FAMILY HISTORY:  Family history of cancer  Family history of hypertension  Family history of squamous cell carcinoma, of parotid  No pertinent family history in first degree relatives

## 2019-05-06 NOTE — H&P ADULT - NSICDXPASTSURGICALHX_GEN_ALL_CORE_FT
PAST SURGICAL HISTORY:  H/O knee surgery b/l knees    H/O shoulder surgery right    No significant past surgical history     S/P ACL repair left knee 2010    S/P arthroscopy of right knee 2010    S/P right inguinal hernia repair 2010    S/P rotator cuff repair Right shoulder 2008

## 2019-05-06 NOTE — H&P ADULT - PROBLEM SELECTOR PLAN 8
IV med? melatonin QHS  f/u psych reccs melatonin QHS  f/u psych reccs  -possibly attribute to liver disease ? also atypical depression.

## 2019-05-06 NOTE — H&P ADULT - PROBLEM SELECTOR PLAN 5
Hold home PO amlodipine  Start lopressor 5mg IV Q6hrs with hold parameters c/w home PO amlodipine  Start lopressor 5mg IV Q6hrs with hold parameters c/w home PO amlodipine with hold parameters  Start lopressor 5mg IV Q6hrs with hold parameters

## 2019-05-06 NOTE — H&P ADULT - PROBLEM SELECTOR PLAN 1
GI consult - Dr. Faulkner  Pain management: Morphine 4mg, Dilaudid 0.5mg, Dialudid 1mg Q6hrs  NS IVF  NPO GI consult - Dr. Faulkner consulted  Pain management: Morphine 4mg, Dilaudid 0.5mg, Dialudid 1mg Q6hrs  NS IVF  NPO

## 2019-05-06 NOTE — PATIENT PROFILE ADULT - NSALCOHOLMD_GEN_A_NUR
pt went through detox at [facility]--> [facility] daiana md aware of pt alcohol use. On CIWA scale

## 2019-05-06 NOTE — H&P ADULT - HISTORY OF PRESENT ILLNESS
65 y/o male with a PMHx of HTN, alcohol use disorder, type 2 diabetes mellitus, depression, HLD, obesity, ROSS, presents with c/o abdominal pain. Patient states he awoke this morning with intense epigastric abdominal pain. Patient has a history of pancreatitis 2/2 alcohol abuse. Patient pursued further medical evaluation at [facility] ED.    In the ED, patient vitals were T(F): 99.1, HR: 92, BP: 135/63, RR: 16, SpO2: 98% on RA. CBC exhibited WBC 12.52, Hgb 14.5, Hct 41.0, Plt 213. CMP exhibited Na 135, K 3.4, Cl 99, CO2 24, BUN 17, Cr 0.92, Gluc 203, AlkPhos 197, AST 65, ALT 90.  Lactate was 1.5. Lipase was found to be 4457, and amylase was found to be 197. Alcohol was found to be <10.0. CT abd/pelvis exhibited stranding of the peripancreatic fat, peripancreatic fluid surrounding the pancreas head and body, as well as stranding of the fat in the region of the gastrohepatic ligament, suggestive of acute pancreatitis superimposed on chronic pancreatitis/pseudocyst formation without evidence of portal or splenic vein thrombosis or pancreatic necrosis. In the ED, patient was administered NS IVF and morphine. 63 y/o male with a PMHx of HTN, alcohol use disorder, type 2 diabetes mellitus, depression, HLD, obesity, ROSS, presents with c/o abdominal pain. Patient states he awoke this morning with intense "burning" epigastric abdominal pain. Patient has a history of pancreatitis 2/2 alcohol abuse. Of note, patient is very interested in finding a new psychiatrist to follow up with as an outpatient. Patient pursued further medical evaluation at [facility] ED.    In the ED, patient vitals were T(F): 99.1, HR: 92, BP: 135/63, RR: 16, SpO2: 98% on RA. CBC exhibited WBC 12.52, Hgb 14.5, Hct 41.0, Plt 213. CMP exhibited Na 135, K 3.4, Cl 99, CO2 24, BUN 17, Cr 0.92, Gluc 203, AlkPhos 197, AST 65, ALT 90.  Lactate was 1.5. Lipase was found to be 4457, and amylase was found to be 197. Alcohol was found to be <10.0. CT abd/pelvis exhibited stranding of the peripancreatic fat, peripancreatic fluid surrounding the pancreas head and body, as well as stranding of the fat in the region of the gastrohepatic ligament, suggestive of acute pancreatitis superimposed on chronic pancreatitis/pseudocyst formation without evidence of portal or splenic vein thrombosis or pancreatic necrosis. In the ED, patient was administered NS IVF and morphine. 63 y/o male with a PMHx of HTN, alcohol use disorder, pancreatitis in past, type 2 diabetes mellitus, depression, HLD, obesity, ROSS, presents with c/o abdominal pain. Patient states he awoke this morning with intense "burning" epigastric abdominal pain. Pain 10/10 at epigastrium, non radiating. Denies nausea vomiting fever or chills. He also endorses chronic  depression, and is  interested in finding a new psychiatrist to follow up with as an outpatient. Patient pursued further medical evaluation at [facility] ED.    In the ED, patient vitals were T(F): 99.1, HR: 92, BP: 135/63, RR: 16, SpO2: 98% on RA. CBC exhibited WBC 12.52, Hgb 14.5, Hct 41.0, Plt 213. CMP exhibited Na 135, K 3.4, Cl 99, CO2 24, BUN 17, Cr 0.92, Gluc 203, AlkPhos 197, AST 65, ALT 90.  Lactate was 1.5. Lipase was found to be 4457, and amylase was found to be 197. Alcohol was found to be <10.0. CT abd/pelvis exhibited stranding of the peripancreatic fat, peripancreatic fluid surrounding the pancreas head and body, as well as stranding of the fat in the region of the gastrohepatic ligament, suggestive of acute pancreatitis superimposed on chronic pancreatitis/pseudocyst formation without evidence of portal or splenic vein thrombosis or pancreatic necrosis. In the ED, patient was administered NS IVF and morphine.

## 2019-05-07 ENCOUNTER — TRANSCRIPTION ENCOUNTER (OUTPATIENT)
Age: 64
End: 2019-05-07

## 2019-05-07 DIAGNOSIS — R10.13 EPIGASTRIC PAIN: ICD-10-CM

## 2019-05-07 DIAGNOSIS — K85.20 ALCOHOL INDUCED ACUTE PANCREATITIS WITHOUT NECROSIS OR INFECTION: ICD-10-CM

## 2019-05-07 DIAGNOSIS — E11.9 TYPE 2 DIABETES MELLITUS WITHOUT COMPLICATIONS: ICD-10-CM

## 2019-05-07 DIAGNOSIS — K86.1 OTHER CHRONIC PANCREATITIS: ICD-10-CM

## 2019-05-07 LAB
ALBUMIN SERPL ELPH-MCNC: 3.3 G/DL — SIGNIFICANT CHANGE UP (ref 3.3–5)
ALP SERPL-CCNC: 146 U/L — HIGH (ref 40–120)
ALT FLD-CCNC: 64 U/L — SIGNIFICANT CHANGE UP (ref 12–78)
AMYLASE P1 CFR SERPL: 719 U/L — HIGH (ref 25–115)
ANION GAP SERPL CALC-SCNC: 8 MMOL/L — SIGNIFICANT CHANGE UP (ref 5–17)
ANION GAP SERPL CALC-SCNC: 9 MMOL/L — SIGNIFICANT CHANGE UP (ref 5–17)
AST SERPL-CCNC: 40 U/L — HIGH (ref 15–37)
BILIRUB SERPL-MCNC: 0.7 MG/DL — SIGNIFICANT CHANGE UP (ref 0.2–1.2)
BUN SERPL-MCNC: 11 MG/DL — SIGNIFICANT CHANGE UP (ref 7–23)
BUN SERPL-MCNC: 12 MG/DL — SIGNIFICANT CHANGE UP (ref 7–23)
CALCIUM SERPL-MCNC: 8.3 MG/DL — LOW (ref 8.5–10.1)
CALCIUM SERPL-MCNC: 8.5 MG/DL — SIGNIFICANT CHANGE UP (ref 8.5–10.1)
CHLORIDE SERPL-SCNC: 101 MMOL/L — SIGNIFICANT CHANGE UP (ref 96–108)
CHLORIDE SERPL-SCNC: 102 MMOL/L — SIGNIFICANT CHANGE UP (ref 96–108)
CHOLEST SERPL-MCNC: 191 MG/DL — SIGNIFICANT CHANGE UP (ref 10–199)
CO2 SERPL-SCNC: 26 MMOL/L — SIGNIFICANT CHANGE UP (ref 22–31)
CO2 SERPL-SCNC: 26 MMOL/L — SIGNIFICANT CHANGE UP (ref 22–31)
CREAT SERPL-MCNC: 0.68 MG/DL — SIGNIFICANT CHANGE UP (ref 0.5–1.3)
CREAT SERPL-MCNC: 0.94 MG/DL — SIGNIFICANT CHANGE UP (ref 0.5–1.3)
GLUCOSE SERPL-MCNC: 196 MG/DL — HIGH (ref 70–99)
GLUCOSE SERPL-MCNC: 221 MG/DL — HIGH (ref 70–99)
HBA1C BLD-MCNC: 8.9 % — HIGH (ref 4–5.6)
HCT VFR BLD CALC: 47.5 % — SIGNIFICANT CHANGE UP (ref 39–50)
HCT VFR BLD CALC: 47.7 % — SIGNIFICANT CHANGE UP (ref 39–50)
HCV AB S/CO SERPL IA: 0.16 S/CO — SIGNIFICANT CHANGE UP (ref 0–0.99)
HCV AB SERPL-IMP: SIGNIFICANT CHANGE UP
HDLC SERPL-MCNC: 40 MG/DL — SIGNIFICANT CHANGE UP
HGB BLD-MCNC: 16.1 G/DL — SIGNIFICANT CHANGE UP (ref 13–17)
HGB BLD-MCNC: 16.2 G/DL — SIGNIFICANT CHANGE UP (ref 13–17)
LIDOCAIN IGE QN: 3671 U/L — HIGH (ref 73–393)
LIDOCAIN IGE QN: 7865 U/L — HIGH (ref 73–393)
LIPID PNL WITH DIRECT LDL SERPL: SIGNIFICANT CHANGE UP MG/DL
MAGNESIUM SERPL-MCNC: 1.8 MG/DL — SIGNIFICANT CHANGE UP (ref 1.6–2.6)
MCHC RBC-ENTMCNC: 29.1 PG — SIGNIFICANT CHANGE UP (ref 27–34)
MCHC RBC-ENTMCNC: 29.3 PG — SIGNIFICANT CHANGE UP (ref 27–34)
MCHC RBC-ENTMCNC: 33.8 GM/DL — SIGNIFICANT CHANGE UP (ref 32–36)
MCHC RBC-ENTMCNC: 34.1 GM/DL — SIGNIFICANT CHANGE UP (ref 32–36)
MCV RBC AUTO: 86.1 FL — SIGNIFICANT CHANGE UP (ref 80–100)
MCV RBC AUTO: 86.3 FL — SIGNIFICANT CHANGE UP (ref 80–100)
NRBC # BLD: 0 /100 WBCS — SIGNIFICANT CHANGE UP (ref 0–0)
NRBC # BLD: 0 /100 WBCS — SIGNIFICANT CHANGE UP (ref 0–0)
PHOSPHATE SERPL-MCNC: 3.8 MG/DL — SIGNIFICANT CHANGE UP (ref 2.5–4.5)
PLATELET # BLD AUTO: 157 K/UL — SIGNIFICANT CHANGE UP (ref 150–400)
PLATELET # BLD AUTO: 165 K/UL — SIGNIFICANT CHANGE UP (ref 150–400)
POTASSIUM SERPL-MCNC: 3.9 MMOL/L — SIGNIFICANT CHANGE UP (ref 3.5–5.3)
POTASSIUM SERPL-MCNC: 4.3 MMOL/L — SIGNIFICANT CHANGE UP (ref 3.5–5.3)
POTASSIUM SERPL-SCNC: 3.9 MMOL/L — SIGNIFICANT CHANGE UP (ref 3.5–5.3)
POTASSIUM SERPL-SCNC: 4.3 MMOL/L — SIGNIFICANT CHANGE UP (ref 3.5–5.3)
PROT SERPL-MCNC: 7.4 G/DL — SIGNIFICANT CHANGE UP (ref 6–8.3)
RBC # BLD: 5.52 M/UL — SIGNIFICANT CHANGE UP (ref 4.2–5.8)
RBC # BLD: 5.53 M/UL — SIGNIFICANT CHANGE UP (ref 4.2–5.8)
RBC # FLD: 14.9 % — HIGH (ref 10.3–14.5)
RBC # FLD: 14.9 % — HIGH (ref 10.3–14.5)
SODIUM SERPL-SCNC: 135 MMOL/L — SIGNIFICANT CHANGE UP (ref 135–145)
SODIUM SERPL-SCNC: 137 MMOL/L — SIGNIFICANT CHANGE UP (ref 135–145)
TOTAL CHOLESTEROL/HDL RATIO MEASUREMENT: 4.8 RATIO — SIGNIFICANT CHANGE UP (ref 3.4–9.6)
TRIGL SERPL-MCNC: 706 MG/DL — HIGH (ref 10–149)
WBC # BLD: 7.51 K/UL — SIGNIFICANT CHANGE UP (ref 3.8–10.5)
WBC # BLD: 7.56 K/UL — SIGNIFICANT CHANGE UP (ref 3.8–10.5)
WBC # FLD AUTO: 7.51 K/UL — SIGNIFICANT CHANGE UP (ref 3.8–10.5)
WBC # FLD AUTO: 7.56 K/UL — SIGNIFICANT CHANGE UP (ref 3.8–10.5)

## 2019-05-07 PROCEDURE — 99233 SBSQ HOSP IP/OBS HIGH 50: CPT | Mod: GC

## 2019-05-07 RX ORDER — PANTOPRAZOLE SODIUM 20 MG/1
40 TABLET, DELAYED RELEASE ORAL ONCE
Qty: 0 | Refills: 0 | Status: COMPLETED | OUTPATIENT
Start: 2019-05-07 | End: 2019-05-07

## 2019-05-07 RX ORDER — MAGNESIUM SULFATE 500 MG/ML
2 VIAL (ML) INJECTION ONCE
Qty: 0 | Refills: 0 | Status: COMPLETED | OUTPATIENT
Start: 2019-05-07 | End: 2019-05-07

## 2019-05-07 RX ORDER — DOCUSATE SODIUM 100 MG
100 CAPSULE ORAL DAILY
Qty: 0 | Refills: 0 | Status: DISCONTINUED | OUTPATIENT
Start: 2019-05-07 | End: 2019-05-09

## 2019-05-07 RX ORDER — CLONAZEPAM 1 MG
1 TABLET ORAL AT BEDTIME
Qty: 0 | Refills: 0 | Status: DISCONTINUED | OUTPATIENT
Start: 2019-05-07 | End: 2019-05-09

## 2019-05-07 RX ORDER — LIPASE/PROTEASE/AMYLASE 16-48-48K
2 CAPSULE,DELAYED RELEASE (ENTERIC COATED) ORAL EVERY 8 HOURS
Qty: 0 | Refills: 0 | Status: DISCONTINUED | OUTPATIENT
Start: 2019-05-07 | End: 2019-05-07

## 2019-05-07 RX ORDER — TRAZODONE HCL 50 MG
100 TABLET ORAL AT BEDTIME
Qty: 0 | Refills: 0 | Status: DISCONTINUED | OUTPATIENT
Start: 2019-05-07 | End: 2019-05-09

## 2019-05-07 RX ORDER — SENNA PLUS 8.6 MG/1
2 TABLET ORAL AT BEDTIME
Qty: 0 | Refills: 0 | Status: DISCONTINUED | OUTPATIENT
Start: 2019-05-07 | End: 2019-05-09

## 2019-05-07 RX ORDER — LIPASE/PROTEASE/AMYLASE 16-48-48K
6 CAPSULE,DELAYED RELEASE (ENTERIC COATED) ORAL EVERY 8 HOURS
Qty: 0 | Refills: 0 | Status: DISCONTINUED | OUTPATIENT
Start: 2019-05-07 | End: 2019-05-09

## 2019-05-07 RX ORDER — DEXTROSE MONOHYDRATE, SODIUM CHLORIDE, AND POTASSIUM CHLORIDE 50; .745; 4.5 G/1000ML; G/1000ML; G/1000ML
1000 INJECTION, SOLUTION INTRAVENOUS
Qty: 0 | Refills: 0 | Status: DISCONTINUED | OUTPATIENT
Start: 2019-05-07 | End: 2019-05-08

## 2019-05-07 RX ORDER — PANTOPRAZOLE SODIUM 20 MG/1
40 TABLET, DELAYED RELEASE ORAL
Qty: 0 | Refills: 0 | Status: DISCONTINUED | OUTPATIENT
Start: 2019-05-07 | End: 2019-05-09

## 2019-05-07 RX ADMIN — HYDROMORPHONE HYDROCHLORIDE 0.5 MILLIGRAM(S): 2 INJECTION INTRAMUSCULAR; INTRAVENOUS; SUBCUTANEOUS at 09:52

## 2019-05-07 RX ADMIN — Medication 1: at 06:22

## 2019-05-07 RX ADMIN — HYDROMORPHONE HYDROCHLORIDE 1 MILLIGRAM(S): 2 INJECTION INTRAMUSCULAR; INTRAVENOUS; SUBCUTANEOUS at 02:38

## 2019-05-07 RX ADMIN — Medication 6 CAPSULE(S): at 12:00

## 2019-05-07 RX ADMIN — Medication 5 MILLIGRAM(S): at 22:08

## 2019-05-07 RX ADMIN — Medication 1: at 12:43

## 2019-05-07 RX ADMIN — DEXTROSE MONOHYDRATE, SODIUM CHLORIDE, AND POTASSIUM CHLORIDE 125 MILLILITER(S): 50; .745; 4.5 INJECTION, SOLUTION INTRAVENOUS at 07:49

## 2019-05-07 RX ADMIN — Medication 5 MILLIGRAM(S): at 12:00

## 2019-05-07 RX ADMIN — Medication 5 MILLIGRAM(S): at 05:31

## 2019-05-07 RX ADMIN — Medication 5 MILLIGRAM(S): at 17:54

## 2019-05-07 RX ADMIN — PANTOPRAZOLE SODIUM 40 MILLIGRAM(S): 20 TABLET, DELAYED RELEASE ORAL at 09:55

## 2019-05-07 RX ADMIN — SIMETHICONE 80 MILLIGRAM(S): 80 TABLET, CHEWABLE ORAL at 08:52

## 2019-05-07 RX ADMIN — AMLODIPINE BESYLATE 10 MILLIGRAM(S): 2.5 TABLET ORAL at 05:31

## 2019-05-07 RX ADMIN — ENOXAPARIN SODIUM 40 MILLIGRAM(S): 100 INJECTION SUBCUTANEOUS at 12:43

## 2019-05-07 RX ADMIN — DEXTROSE MONOHYDRATE, SODIUM CHLORIDE, AND POTASSIUM CHLORIDE 130 MILLILITER(S): 50; .745; 4.5 INJECTION, SOLUTION INTRAVENOUS at 17:55

## 2019-05-07 RX ADMIN — HYDROMORPHONE HYDROCHLORIDE 0.5 MILLIGRAM(S): 2 INJECTION INTRAMUSCULAR; INTRAVENOUS; SUBCUTANEOUS at 18:52

## 2019-05-07 RX ADMIN — HYDROMORPHONE HYDROCHLORIDE 0.5 MILLIGRAM(S): 2 INJECTION INTRAMUSCULAR; INTRAVENOUS; SUBCUTANEOUS at 18:58

## 2019-05-07 RX ADMIN — Medication 1 MILLIGRAM(S): at 22:19

## 2019-05-07 RX ADMIN — Medication 50 GRAM(S): at 12:00

## 2019-05-07 RX ADMIN — SENNA PLUS 2 TABLET(S): 8.6 TABLET ORAL at 22:08

## 2019-05-07 RX ADMIN — HYDROMORPHONE HYDROCHLORIDE 1 MILLIGRAM(S): 2 INJECTION INTRAMUSCULAR; INTRAVENOUS; SUBCUTANEOUS at 02:23

## 2019-05-07 RX ADMIN — MORPHINE SULFATE 4 MILLIGRAM(S): 50 CAPSULE, EXTENDED RELEASE ORAL at 14:30

## 2019-05-07 RX ADMIN — HYDROMORPHONE HYDROCHLORIDE 0.5 MILLIGRAM(S): 2 INJECTION INTRAMUSCULAR; INTRAVENOUS; SUBCUTANEOUS at 10:07

## 2019-05-07 RX ADMIN — Medication 100 MILLIGRAM(S): at 22:08

## 2019-05-07 RX ADMIN — MORPHINE SULFATE 4 MILLIGRAM(S): 50 CAPSULE, EXTENDED RELEASE ORAL at 06:28

## 2019-05-07 RX ADMIN — ATORVASTATIN CALCIUM 20 MILLIGRAM(S): 80 TABLET, FILM COATED ORAL at 21:08

## 2019-05-07 RX ADMIN — Medication 2: at 17:52

## 2019-05-07 RX ADMIN — ESCITALOPRAM OXALATE 20 MILLIGRAM(S): 10 TABLET, FILM COATED ORAL at 12:00

## 2019-05-07 RX ADMIN — MORPHINE SULFATE 4 MILLIGRAM(S): 50 CAPSULE, EXTENDED RELEASE ORAL at 06:43

## 2019-05-07 RX ADMIN — Medication 6 CAPSULE(S): at 17:53

## 2019-05-07 RX ADMIN — MORPHINE SULFATE 4 MILLIGRAM(S): 50 CAPSULE, EXTENDED RELEASE ORAL at 14:11

## 2019-05-07 NOTE — DISCHARGE NOTE PROVIDER - CARE PROVIDER_API CALL
Michael Wade)  Cardiovascular Disease; Internal Medicine  [address] [place] [address]  Phone: [phone]  Fax: [phone]  Follow Up Time: Michael Wade (MD)  Cardiovascular Disease; Internal Medicine  [address] [place] [address]  Phone: [phone]  Fax: [phone]  Follow Up Time:     Saurav Faulnker ()  Internal Medicine  [address] [place] [address]  Phone: [phone]  Fax: [phone]  Follow Up Time:     Perlman, Craig D ()  Medicine  [address] [place] [address] [address]  [place] [address]  Phone: [phone]  Fax: [phone]  Follow Up Time:

## 2019-05-07 NOTE — CONSULT NOTE ADULT - PROBLEM SELECTOR RECOMMENDATION 9
todd  pancreatitis  etoh use disorder  poss opioid addiction  abd pain  I and O  IVF  serial labs, serial clinical assessment, replete lytes  monitor vs and HD and sat  CPAP use nightly for TODD  weight management and sleep hygiene discussion  ct abd reviewed, labs reviewed  GI eval and Addiction Med cx - this am  prognosis guarded  reassurance and emotional support  pt is on Dilaudid for severe pain management  am lipase pending

## 2019-05-07 NOTE — BEHAVIORAL HEALTH ASSESSMENT NOTE - NSBHCHARTREVIEWVS_PSY_A_CORE FT
Vital Signs Last 24 Hrs  T(C): 36.4 (07 May 2019 05:13), Max: 37.3 (06 May 2019 19:26)  T(F): 97.6 (07 May 2019 05:13), Max: 99.1 (06 May 2019 19:26)  HR: 94 (07 May 2019 05:13) (62 - 106)  BP: 146/80 (07 May 2019 05:13) (130/60 - 146/80)  BP(mean): --  RR: 16 (07 May 2019 05:13) (16 - 20)  SpO2: 93% (07 May 2019 05:13) (93% - 99%)

## 2019-05-07 NOTE — PROGRESS NOTE ADULT - PROBLEM SELECTOR PLAN 5
-continue statin -TGs elevated 706  -continue statin -TGs elevated 706  -continue statin for now, may need additional support but will trend TG level w/ aggressive hydration then decide on po meds once symptoms improve and diet advances

## 2019-05-07 NOTE — PROVIDER CONTACT NOTE (OTHER) - ACTION/TREATMENT ORDERED:
made aware and states she is not concerned with HR.  States pt has a right to refuse cipap machine.  Suggests that the pt bring his own cipap machine from home to wear at night.  Pt agrees.
 made aware.  Said she will put in some orders for Bowl regimen.  Will continue to monitor.

## 2019-05-07 NOTE — BEHAVIORAL HEALTH ASSESSMENT NOTE - SUMMARY
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

## 2019-05-07 NOTE — DISCHARGE NOTE PROVIDER - NSDCCPCAREPLAN_GEN_ALL_CORE_FT
PRINCIPAL DISCHARGE DIAGNOSIS  Diagnosis: Pancreatitis, alcoholic, acute  Assessment and Plan of Treatment: Pancreatitis, alcoholic, acute: You were treated with IV fluids, pain management and anti-nausea medication. You were started on Creon by gatroenterologist.      SECONDARY DISCHARGE DIAGNOSES  Diagnosis: Depression  Assessment and Plan of Treatment: Depression: Continue home Lexapro.    Diagnosis: ROSS (obstructive sleep apnea)  Assessment and Plan of Treatment: ROSS (obstructive sleep apnea): You should continue your CPAP at night.    Diagnosis: HTN (hypertension)  Assessment and Plan of Treatment: HTN (hypertension): Please continue your home Amlodipine 10mg.    Diagnosis: HLD (hyperlipidemia)  Assessment and Plan of Treatment: HLD (hyperlipidemia): Your triglycerides were 706 while in the hospital. Please continue your home statin.    Diagnosis: Dyspepsia  Assessment and Plan of Treatment: Dyspepsia: You may continue simethicone as needed.    Diagnosis: Type 2 diabetes mellitus  Assessment and Plan of Treatment: Type 2 diabetes mellitus: Your HgA1c was 8.9 while in the hospital. You should follow up with your PCP to start diabetes medication to further control your Diabetes. PRINCIPAL DISCHARGE DIAGNOSIS  Diagnosis: Pancreatitis, alcoholic, acute  Assessment and Plan of Treatment: Pancreatitis, alcoholic, acute: You were treated with IV fluids, pain management and anti-nausea medication. You were started on Creon by gatroenterologist.      SECONDARY DISCHARGE DIAGNOSES  Diagnosis: Depression  Assessment and Plan of Treatment: Depression: Continue home Lexapro.    Diagnosis: ROSS (obstructive sleep apnea)  Assessment and Plan of Treatment: ROSS (obstructive sleep apnea): You should continue your CPAP at night.    Diagnosis: Pancreatic pseudocyst  Assessment and Plan of Treatment: CT scan showed a pancreatic pseudocyst  please followup with GI in one week to schedule routine surveilance    Diagnosis: HTN (hypertension)  Assessment and Plan of Treatment: HTN (hypertension): Please continue your home Amlodipine 10mg and metoprolol 25 mg twice per day    Diagnosis: HLD (hyperlipidemia)  Assessment and Plan of Treatment: HLD (hyperlipidemia): Your triglycerides were 706 while in the hospital. Please continue your home statin.  please followup with your primary doctor regularly to discuss further management    Diagnosis: Dyspepsia  Assessment and Plan of Treatment: Dyspepsia: You may continue simethicone as needed.    Diagnosis: Type 2 diabetes mellitus  Assessment and Plan of Treatment: Type 2 diabetes mellitus: Your HgA1c was 8.9 while in the hospital. You should follow up with your PCP to start diabetes medication to further control your Diabetes. PRINCIPAL DISCHARGE DIAGNOSIS  Diagnosis: Pancreatitis, alcoholic, acute  Assessment and Plan of Treatment: Pancreatitis, alcoholic, acute: You were treated with IV fluids, pain management and anti-nausea medication. You were started on Creon by gatroenterologist.      SECONDARY DISCHARGE DIAGNOSES  Diagnosis: Pancreatic pseudocyst  Assessment and Plan of Treatment: CT scan showed a pancreatic pseudocyst  please followup with GI in one week to schedule routine surveilance    Diagnosis: Depression  Assessment and Plan of Treatment: Depression: Continue home Lexapro.    Diagnosis: ROSS (obstructive sleep apnea)  Assessment and Plan of Treatment: ROSS (obstructive sleep apnea): You should continue your CPAP at night.    Diagnosis: HTN (hypertension)  Assessment and Plan of Treatment: HTN (hypertension): Please continue your home Amlodipine 10mg and metoprolol 25 mg twice per day    Diagnosis: HLD (hyperlipidemia)  Assessment and Plan of Treatment: HLD (hyperlipidemia): Your triglycerides were 706 while in the hospital. Please continue your home statin.  please followup with your primary doctor regularly to discuss further management    Diagnosis: Dyspepsia  Assessment and Plan of Treatment: Dyspepsia: You may continue simethicone as needed.    Diagnosis: Type 2 diabetes mellitus  Assessment and Plan of Treatment: Type 2 diabetes mellitus: Your HgA1c was 8.9 while in the hospital. You should follow up with your PCP to start diabetes medication to further control your Diabetes.    Diagnosis: Type 2 diabetes mellitus  Assessment and Plan of Treatment: Type 2 diabetes mellitus: Your HgA1c was 8.9 while in the hospital. You should follow up with your PCP to start diabetes medication to further control your Diabetes.    Diagnosis: Dyspepsia  Assessment and Plan of Treatment: Dyspepsia: You may continue simethicone as needed.

## 2019-05-07 NOTE — ADVANCED PRACTICE NURSE CONSULT - REASON FOR CONSULT
64y    Male    Patient is a 64y old  Male who presents with a chief complaint of Acute Pancreatitis (07 May 2019 11:37)      Type2 DX PCP Dr. Mark Wade, mentioned that he had high sugars. Patient was surprised a1c 8.8%. diabetes education provided. Patient admits needs to stop drinking. 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    HPI:  63 y/o male with a PMHx of HTN, alcohol use disorder, pancreatitis in past, type 2 diabetes mellitus, depression, HLD, obesity, ROSS, presents with c/o abdominal pain. Patient states he awoke this morning with intense "burning" epigastric abdominal pain. Pain 10/10 at epigastrium, non radiating. Denies nausea vomiting fever or chills. He also endorses chronic  depression, and is  interested in finding a new psychiatrist to follow up with as an outpatient. Patient pursued further medical evaluation at [facility] ED.    In the ED, patient vitals were T(F): 99.1, HR: 92, BP: 135/63, RR: 16, SpO2: 98% on RA. CBC exhibited WBC 12.52, Hgb 14.5, Hct 41.0, Plt 213. CMP exhibited Na 135, K 3.4, Cl 99, CO2 24, BUN 17, Cr 0.92, Gluc 203, AlkPhos 197, AST 65, ALT 90.  Lactate was 1.5. Lipase was found to be 4457, and amylase was found to be 197. Alcohol was found to be <10.0. CT abd/pelvis exhibited stranding of the peripancreatic fat, peripancreatic fluid surrounding the pancreas head and body, as well as stranding of the fat in the region of the gastrohepatic ligament, suggestive of acute pancreatitis superimposed on chronic pancreatitis/pseudocyst formation without evidence of portal or splenic vein thrombosis or pancreatic necrosis. In the ED, patient was administered NS IVF and morphine. (06 May 2019 20:31)      PAST MEDICAL & SURGICAL HISTORY:  Diabetes: type 2  ROSS (obstructive sleep apnea)  Depression, unspecified depression type  Alcohol use disorder, severe, dependence  Alcohol-induced acute pancreatitis, unspecified complication status  Essential hypertension  Low testosterone in male  Depression  Insomnia  HLD (hyperlipidemia)  HTN (hypertension)  Low testosterone level in male  Sprain of right rotator cuff capsule, subsequent encounter  Borderline diabetes mellitus: last A1c unknown  Low back pain, unspecified back pain laterality, unspecified chronicity, with sciatica presence unspecified  Lumbar herniated disc  Depression, unspecified depression type: Lost his son this past June 2016  Essential hypertension  Sleep apnea, unspecified type  Obesity (BMI 30-39.9)  No significant past surgical history  H/O knee surgery: b/l knees  H/O shoulder surgery: right  S/P arthroscopy of right knee: 2010  S/P right inguinal hernia repair: 2010  S/P ACL repair: left knee 2010  S/P rotator cuff repair: Right shoulder 2008      MEDICATIONS  (STANDING):  amLODIPine   Tablet 10 milliGRAM(s) Oral daily  atorvastatin 20 milliGRAM(s) Oral at bedtime  dextrose 5%. 1000 milliLiter(s) (50 mL/Hr) IV Continuous <Continuous>  dextrose 50% Injectable 12.5 Gram(s) IV Push once  dextrose 50% Injectable 25 Gram(s) IV Push once  dextrose 50% Injectable 25 Gram(s) IV Push once  enoxaparin Injectable 40 milliGRAM(s) SubCutaneous daily  escitalopram 20 milliGRAM(s) Oral daily  insulin lispro (HumaLOG) corrective regimen sliding scale   SubCutaneous every 6 hours  metoprolol tartrate Injectable 5 milliGRAM(s) IV Push every 6 hours  pancrelipase  (CREON 12,000 Lipase Units) 6 Capsule(s) Oral every 8 hours  pantoprazole    Tablet 40 milliGRAM(s) Oral before breakfast  sodium chloride 0.45% with potassium chloride 20 mEq/L 1000 milliLiter(s) (130 mL/Hr) IV Continuous <Continuous>  traZODone 100 milliGRAM(s) Oral at bedtime

## 2019-05-07 NOTE — ADVANCED PRACTICE NURSE CONSULT - ASSESSMENT
Vital Signs Last 24 Hrs  T(C): 36.6 (07 May 2019 12:31), Max: 37.3 (06 May 2019 19:26)  T(F): 97.8 (07 May 2019 12:31), Max: 99.1 (06 May 2019 19:26)  HR: 95 (07 May 2019 12:31) (91 - 106)  BP: 147/79 (07 May 2019 12:31) (130/60 - 147/79)  BP(mean): --  RR: 17 (07 May 2019 12:31) (16 - 20)  SpO2: 93% (07 May 2019 12:31) (93% - 99%)    Hemoglobin A1C, Whole Blood: 8.9 % (05-07-19 @ 11:00)  Hemoglobin A1C, Whole Blood: 6.6 % (12-16-18 @ 06:41)  Hemoglobin A1C, Whole Blood: 7.0 % (12-02-18 @ 10:38)   eGFR if Non African American: 102 mL/min/1.73M2 (05-07-19 @ 09:02)  eGFR if : 118 mL/min/1.73M2 (05-07-19 @ 09:02)  eGFR if Non African American: 98 mL/min/1.73M2 (05-06-19 @ 22:44)  eGFR if : 113 mL/min/1.73M2 (05-06-19 @ 22:44)  eGFR if Non African American: 88 mL/min/1.73M2 (05-06-19 @ 16:34)  eGFR if : 102 mL/min/1.73M2 (05-06-19 @ 16:34)      CAPILLARY BLOOD GLUCOSE      POCT Blood Glucose.: 189 mg/dL (07 May 2019 12:07)  POCT Blood Glucose.: 187 mg/dL (07 May 2019 05:46)      DIET:

## 2019-05-07 NOTE — BEHAVIORAL HEALTH ASSESSMENT NOTE - HPI (INCLUDE ILLNESS QUALITY, SEVERITY, DURATION, TIMING, CONTEXT, MODIFYING FACTORS, ASSOCIATED SIGNS AND SYMPTOMS)
Patient seen, evaluated and chart reviewed. Pt is a 64 y/o M with PMHx of alcohol induced Pancreatitis, AODM (diet-controlled), Depression, HLD, HTN, presents with c/o abdominal pain. Patient states he awoke this morning with intense "burning" epigastric abdominal pain. Pain 10/10 at epigastrium, non radiating. Denies nausea vomiting fever or chills. He also endorses chronic  depression, and is  interested in finding a new psychiatrist to follow up with as an outpatient. Patient pursued further medical evaluation at [facility] ED. 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   Patient reports that he was deeply affected by the death of his son 3 years ago, who likely accidentally overdosed. In addition his daughter is struggling with drug dependence and she has been in detox several times over the last few months. He reports worsened sleep and energy, with worsened concentration. He expressed interest in changing his treatment regimen, but on an outpatient basis, not while in the hospital.

## 2019-05-07 NOTE — PROGRESS NOTE ADULT - PROBLEM SELECTOR PLAN 2
-[facility] protocol - librium; in past, notes reviewed, required ICU support and precedex. consider ICU consult based on clinical course.   -Psych Consult - Dr. Mcwilliams consulted  -Addiction Medicine Consult - Dr. Coats

## 2019-05-07 NOTE — CHART NOTE - NSCHARTNOTEFT_GEN_A_CORE
Search Terms: Salomon Cochran, 1955     Search Date: 05/07/2019 07:23:02 AM     The Drug Utilization Report below displays all of the controlled substance prescriptions, if any, that your patient has filled in the last twelve months. The information displayed on this report is compiled from pharmacy submissions to the Department, and accurately reflects the information as submitted by the pharmacies.    This report was requested by: Royer Car | Reference #: [id number]             Others' Prescriptions    Patient Name: Salomon Cochran YOB: 1955   Address: [address] [place] [address] Sex: Male         Rx Written    Rx Dispensed    Drug    Quantity    Days Supply    Prescriber Name              04/24/2019 04/27/2019 clonazepam 1 mg tablet  60 30 Michael Wade B     03/29/2019 03/29/2019 clonazepam 1 mg tablet  60 30 Michael Wade B     02/13/2019 02/17/2019 clonazepam 1 mg tablet  90 30 Autumn Rinaldi MD         Patient Name: Salomon Cochran YOB: 1955   Address: [address] [place] [address]  [place] [address] Sex: Male         Rx Written    Rx Dispensed    Drug    Quantity    Days Supply    Prescriber Name              11/28/2018 01/07/2019 clonazepam 1 mg tablet  90 30 Autumn Rinaldi MD     10/24/2018 11/06/2018 clonazepam 1 mg tablet  90 30 Autumn Rinaldi MD     09/26/2018 10/06/2018 clonazepam 1 mg tablet  90 30 Autumn Rinaldi MD     08/01/2018 08/28/2018 clonazepam 1 mg tablet  90 30 Autumn Rinaldi MD     07/30/2018 08/02/2018 clonazepam 1 mg tablet  90 30 Michael Wade B     07/02/2018 07/02/2018 clonazepam 1 mg tablet  90 30 Michael Wade B     05/16/2018 05/23/2018 clonazepam 1 mg tablet  90 30 Michael Wade B         Patient Name: Salomon Cochran YOB: 1955   Address: [place] [address] Sex: Male         Rx Written    Rx Dispensed    Drug    Quantity    Days Supply    Prescriber Name              12/25/2018 12/25/2018 oxycodone hcl 5 mg tablet  20 5 Devan Luo MD     12/25/2018 12/25/2018 clonazepam 1 mg tablet  60 30 Devan Luo MD

## 2019-05-07 NOTE — PROVIDER CONTACT NOTE (OTHER) - SITUATION
Pt has a pulse ox of 88 and is suppose to have on Cipap for sleep apnea and is refusing to wear it for tonight.

## 2019-05-07 NOTE — PROGRESS NOTE ADULT - PROBLEM SELECTOR PLAN 3
-Patient states diet controlled, denies current tx  -low dose insulin sliding scale  -f/u A1C  NPO -Patient states diet controlled, denies current tx  -low dose insulin sliding scale  -A1C 8.9, will consult Perlman  -ANKIT

## 2019-05-07 NOTE — CONSULT NOTE ADULT - SUBJECTIVE AND OBJECTIVE BOX
Chief Complaint:  Patient is a 64y old  Male who presents with a chief complaint of Acute Pancreatitis (07 May 2019 06:46)    Diabetes  ROSS (obstructive sleep apnea)  Depression, unspecified depression type  Alcohol use disorder, severe, dependence  Alcohol-induced acute pancreatitis, unspecified complication status  Essential hypertension  Low testosterone in male  Depression  Insomnia  HLD (hyperlipidemia)  HTN (hypertension)  Low testosterone level in male  Sprain of right rotator cuff capsule, subsequent encounter  Borderline diabetes mellitus  Low back pain, unspecified back pain laterality, unspecified chronicity, with sciatica presence unspecified  Lumbar herniated disc  Depression, unspecified depression type  Essential hypertension  Sleep apnea, unspecified type  Obesity (BMI 30-39.9)  No significant past surgical history  H/O knee surgery  H/O shoulder surgery  S/P arthroscopy of right knee  S/P right inguinal hernia repair  S/P ACL repair  S/P rotator cuff repair     HPI:  65 y/o male with a PMHx of HTN, alcohol use disorder, pancreatitis in past, type 2 diabetes mellitus, depression, HLD, obesity, ROSS, presents with c/o abdominal pain. Patient states he awoke this morning with intense "burning" epigastric abdominal pain. Pain 10/10 at epigastrium, non radiating. Denies nausea vomiting fever or chills. He also endorses chronic  depression, and is  interested in finding a new psychiatrist to follow up with as an outpatient. Patient pursued further medical evaluation at [facility] ED.    In the ED, patient vitals were T(F): 99.1, HR: 92, BP: 135/63, RR: 16, SpO2: 98% on RA. CBC exhibited WBC 12.52, Hgb 14.5, Hct 41.0, Plt 213. CMP exhibited Na 135, K 3.4, Cl 99, CO2 24, BUN 17, Cr 0.92, Gluc 203, AlkPhos 197, AST 65, ALT 90.  Lactate was 1.5. Lipase was found to be 4457, and amylase was found to be 197. Alcohol was found to be <10.0. CT abd/pelvis exhibited stranding of the peripancreatic fat, peripancreatic fluid surrounding the pancreas head and body, as well as stranding of the fat in the region of the gastrohepatic ligament, suggestive of acute pancreatitis superimposed on chronic pancreatitis/pseudocyst formation without evidence of portal or splenic vein thrombosis or pancreatic necrosis. In the ED, patient was administered NS IVF and morphine. (06 May 2019 20:31)      No Known Allergies      amLODIPine   Tablet 10 milliGRAM(s) Oral daily  atorvastatin 20 milliGRAM(s) Oral at bedtime  chlordiazePOXIDE 50 milliGRAM(s) Oral every 1 hour PRN  clonazePAM Tablet 1 milliGRAM(s) Oral at bedtime PRN  dextrose 40% Gel 15 Gram(s) Oral once PRN  dextrose 5%. 1000 milliLiter(s) IV Continuous <Continuous>  dextrose 50% Injectable 12.5 Gram(s) IV Push once  dextrose 50% Injectable 25 Gram(s) IV Push once  dextrose 50% Injectable 25 Gram(s) IV Push once  enoxaparin Injectable 40 milliGRAM(s) SubCutaneous daily  escitalopram 20 milliGRAM(s) Oral daily  glucagon  Injectable 1 milliGRAM(s) IntraMuscular once PRN  HYDROmorphone  Injectable 0.5 milliGRAM(s) IV Push every 6 hours PRN  HYDROmorphone  Injectable 1 milliGRAM(s) IV Push every 6 hours PRN  HYDROmorphone  Injectable 0.5 milliGRAM(s) IV Push once PRN  insulin lispro (HumaLOG) corrective regimen sliding scale   SubCutaneous every 6 hours  melatonin 5 milliGRAM(s) Oral at bedtime PRN  metoprolol tartrate Injectable 5 milliGRAM(s) IV Push every 6 hours  morphine  - Injectable 4 milliGRAM(s) IV Push every 6 hours PRN  pantoprazole    Tablet 40 milliGRAM(s) Oral before breakfast  simethicone 80 milliGRAM(s) Chew two times a day PRN  sodium chloride 0.9% with potassium chloride 20 mEq/L 1000 milliLiter(s) IV Continuous <Continuous>        FAMILY HISTORY:  No pertinent family history in first degree relatives  Family history of squamous cell carcinoma: of parotid  Family history of hypertension  Family history of cancer        Review of Systems:    General:  No wt loss, fevers, chills, night sweats,fatigue,   Eyes:  Good vision, no reported pain  ENT:  No sore throat, pain, runny nose, dysphagia  CV:  No pain, palpitatioins, hypo/hypertension  Resp:  No dyspnea, cough, tachypnea, wheezing  :  No pain, bleeding, incontinence, nocturia  Muscle:  No pain, weakness  Neuro:  No weakness, tingling, memory problems  Psych:  No fatigue, insomnia, mood problems, depression  Endocrine:  No polyuria, polydypsia, cold/heat intolerance  Heme:  No petechiae, ecchymosis, easy bruisability  Skin:  No rash, tattoos, scars, edema    Relevant Family History:       Relevant Social History:       Physical Exam:    Vital Signs:  Vital Signs Last 24 Hrs  T(C): 36.4 (07 May 2019 05:13), Max: 37.3 (06 May 2019 19:26)  T(F): 97.6 (07 May 2019 05:13), Max: 99.1 (06 May 2019 19:26)  HR: 94 (07 May 2019 05:13) (62 - 106)  BP: 146/80 (07 May 2019 05:13) (130/60 - 146/80)  BP(mean): --  RR: 16 (07 May 2019 05:13) (16 - 20)  SpO2: 93% (07 May 2019 05:13) (93% - 99%)  Daily Height in cm: 177.8 (06 May 2019 15:48)    Daily Weight in k.9 (06 May 2019 23:32)    General:  Appears stated age, well-groomed, well-nourished, no distress  HEENT:  NC/AT,  conjunctivae clear and pink, no thyromegaly, nodules, adenopathy, no JVD  Chest:  Full & symmetric excursion, no increased effort, breath sounds clear  Cardiovascular:  Regular rhythm, S1, S2, no murmur/rub/S3/S4, no abdominal bruit, no edema  Abdomen:  Soft, non-tender, non-distended, normoactive bowel sounds,  no masses ,no hepatosplenomeagaly, no signs of chronic liver disease  Extremities:  no cyanosis,clubbing or edema  Skin:  No rash/erythema/ecchymoses/petechiae/wounds/abscess/warm/dry  Neuro/Psych:  Alert, oriented, no asterixis, no tremor, no encephalopathy    Laboratory:                            16.2   7.56  )-----------( 165      ( 07 May 2019 09:02 )             47.5         137  |  102  |  11  ----------------------------<  196<H>  3.9   |  26  |  0.68    Ca    8.3<L>      07 May 2019 09:02  Phos  3.8     -  Mg     1.8     -    TPro  7.4  /  Alb  3.3  /  TBili  0.7  /  DBili  x   /  AST  40<H>  /  ALT  64  /  AlkPhos  146<H>      LIVER FUNCTIONS - ( 07 May 2019 09:02 )  Alb: 3.3 g/dL / Pro: 7.4 g/dL / ALK PHOS: 146 U/L / ALT: 64 U/L / AST: 40 U/L / GGT: x           PT/INR - ( 06 May 2019 16:34 )   PT: 12.5 sec;   INR: 1.09 ratio         PTT - ( 06 May 2019 16:34 )  PTT:25.4 sec  Urinalysis Basic - ( 06 May 2019 21:00 )    Color: Yellow / Appearance: Clear / S.005 / pH: x  Gluc: x / Ketone: Large  / Bili: Negative / Urobili: Negative   Blood: x / Protein: 25 mg/dL / Nitrite: Negative   Leuk Esterase: Trace / RBC: 0-2 /HPF / WBC 6-10   Sq Epi: x / Non Sq Epi: Occasional / Bacteria: Negative      Amylase [id number]      Lipase [id number]       Ammonia--  Amylase [id number]      Lipase [id number]       Ammonia--    Imaging:

## 2019-05-07 NOTE — DISCHARGE NOTE PROVIDER - HOSPITAL COURSE
FROM ADMISSION H+P:     HPI:    65 y/o male with a PMHx of HTN, alcohol use disorder, pancreatitis in past, type 2 diabetes mellitus, depression, HLD, obesity, ROSS, presents with c/o abdominal pain. Patient states he awoke this morning with intense "burning" epigastric abdominal pain. Pain 10/10 at epigastrium, non radiating. Denies nausea vomiting fever or chills. He also endorses chronic  depression, and is  interested in finding a new psychiatrist to follow up with as an outpatient. Patient pursued further medical evaluation at [facility] ED.        In the ED, patient vitals were T(F): 99.1, HR: 92, BP: 135/63, RR: 16, SpO2: 98% on RA. CBC exhibited WBC 12.52, Hgb 14.5, Hct 41.0, Plt 213. CMP exhibited Na 135, K 3.4, Cl 99, CO2 24, BUN 17, Cr 0.92, Gluc 203, AlkPhos 197, AST 65, ALT 90.  Lactate was 1.5. Lipase was found to be 4457, and amylase was found to be 197. Alcohol was found to be <10.0. CT abd/pelvis exhibited stranding of the peripancreatic fat, peripancreatic fluid surrounding the pancreas head and body, as well as stranding of the fat in the region of the gastrohepatic ligament, suggestive of acute pancreatitis superimposed on chronic pancreatitis/pseudocyst formation without evidence of portal or splenic vein thrombosis or pancreatic necrosis. In the ED, patient was administered NS IVF and morphine. (06 May 2019 20:31)            ---    HOSPITAL COURSE: 65 y/o male with a PMHx of HTN, alcohol use disorder, type 2 diabetes mellitus, depression, HLD, obesity, ROSS, presents with c/o abdominal pain, admitted for acute pancreatitis. His pain was managed and fluids were given. He was seen by GI (Dr. Galeana) and started on Creon. His diet was advanced as tolerated. GI will patient as outpatient to monitor pseudocyst. For his EtOH abuse, he was placed on CIWA protocol with librium. Dr. Coats and Dr. Mcwilliams evalauted him. He was started on Trazadone for insomnia.             ---    CONSULTANTS:         ---    TIME SPENT:    The total amount of time spent reviewing the hospital notes, laboratory values, imaging findings, assessing/counseling the patient, discussing with consultant physicians, social work, nursing staff took -- minutes        ---    FINAL DISCHARGE DIAGNOSIS LIST:    Please see last daily progress note for final discharge diagnoses        ---    Primary care provider was made aware of plan for discharge:      [  ] NO     [  ] YES FROM ADMISSION H+P:     HPI:    63 y/o male with a PMHx of HTN, alcohol use disorder, pancreatitis in past, type 2 diabetes mellitus, depression, HLD, obesity, ROSS, presents with c/o abdominal pain. Patient states he awoke this morning with intense "burning" epigastric abdominal pain. Pain 10/10 at epigastrium, non radiating. Denies nausea vomiting fever or chills. He also endorses chronic  depression, and is  interested in finding a new psychiatrist to follow up with as an outpatient. Patient pursued further medical evaluation at [facility] ED.        In the ED, patient vitals were T(F): 99.1, HR: 92, BP: 135/63, RR: 16, SpO2: 98% on RA. CBC exhibited WBC 12.52, Hgb 14.5, Hct 41.0, Plt 213. CMP exhibited Na 135, K 3.4, Cl 99, CO2 24, BUN 17, Cr 0.92, Gluc 203, AlkPhos 197, AST 65, ALT 90.  Lactate was 1.5. Lipase was found to be 4457, and amylase was found to be 197. Alcohol was found to be <10.0. CT abd/pelvis exhibited stranding of the peripancreatic fat, peripancreatic fluid surrounding the pancreas head and body, as well as stranding of the fat in the region of the gastrohepatic ligament, suggestive of acute pancreatitis superimposed on chronic pancreatitis/pseudocyst formation without evidence of portal or splenic vein thrombosis or pancreatic necrosis. In the ED, patient was administered NS IVF and morphine. (06 May 2019 20:31)            ---    HOSPITAL COURSE: 63 y/o male with a PMHx of HTN, alcohol use disorder, type 2 diabetes mellitus, depression, HLD, obesity, ROSS, presents with c/o abdominal pain, admitted for acute pancreatitis. His pain was managed and fluids were given. He was seen by GI (Dr. Galeana) and started on Creon. His diet was advanced as tolerated. GI will follow him as an outpatient to monitor pseudocyst. For his EtOH abuse, he was placed on CIWA protocol with librium. Dr. Mcwilliams evalauted him. He was started on Trazadone for insomnia. The patients abdominal pain resolved and her was able to tolerate a diet. The patient was seen and evaluated each day of his hospital course and is now stable for discharge home with out patient followup.        VITALS AND PHYSICAL EXAM ON DAY OF DISCHARGE:        Vital Signs Last 24 Hrs    T(C): 36.9 (09 May 2019 05:30), Max: 37.7 (08 May 2019 13:37)    T(F): 98.5 (09 May 2019 05:30), Max: 99.8 (08 May 2019 13:37)    HR: 102 (09 May 2019 05:30) (96 - 102)    BP: 143/79 (09 May 2019 05:30) (131/76 - 143/79)    BP(mean): --    RR: 18 (09 May 2019 05:30) (18 - 19)    SpO2: 91% (09 May 2019 05:30) (91% - 92%)        PHYSICAL EXAM:    GENERAL: middle-aged, overweight male, appropriately interactive, mild distress    EYES: sclera clear, no exudates, no scleral icterus    ENMT: oropharynx clear without erythema, moist mucous membranes    LUNGS: good air entry bilaterally, clear to auscultation, symmetric breath sounds, no wheezing or rhonchi appreciated    HEART: soft S1/S2, regular rate and rhythm, no murmurs noted, no noted edema to b/l LE    GASTROINTESTINAL: abdomen is soft, nontender nondistended, hypoactive bowel sounds, reducible midline abd hernia    INTEGUMENT: warm, well perfused, no visualized rashes, no jaundice noted    MUSCULOSKELETAL: no clubbing or cyanosis, no obvious deformity    NEUROLOGIC: awake, alert, oriented x3, good muscle tone in 4 extremities, no obvious sensory deficits    PSYCHIATRIC: mood is good, affect is congruent with mood, linear and logical thought process    HEME/LYMPH: no palpable supraclavicular nodules, no obvious ecchymosis         ---    CONSULTANTS:     GI - Dr. Faulkner    Psyc: Dr. Oj Álvarez - Dr. Koch    Endocrinology - Dr. C. Perlman            ---    TIME SPENT:    The total amount of time spent reviewing the hospital notes, laboratory values, imaging findings, assessing/counseling the patient, discussing with consultant physicians, social work, nursing staff took -- minutes        ---    FINAL DISCHARGE DIAGNOSIS LIST:    Please see last daily progress note for final discharge diagnoses        ---    Primary care provider was made aware of plan for discharge:      [  ] NO     [  ] YES FROM ADMISSION H+P:     HPI:    63 y/o male with a PMHx of HTN, alcohol use disorder, pancreatitis in past, type 2 diabetes mellitus, depression, HLD, obesity, ROSS, presents with c/o abdominal pain. Patient states he awoke this morning with intense "burning" epigastric abdominal pain. Pain 10/10 at epigastrium, non radiating. Denies nausea vomiting fever or chills. He also endorses chronic  depression, and is  interested in finding a new psychiatrist to follow up with as an outpatient. Patient pursued further medical evaluation at [facility] ED.        In the ED, patient vitals were T(F): 99.1, HR: 92, BP: 135/63, RR: 16, SpO2: 98% on RA. CBC exhibited WBC 12.52, Hgb 14.5, Hct 41.0, Plt 213. CMP exhibited Na 135, K 3.4, Cl 99, CO2 24, BUN 17, Cr 0.92, Gluc 203, AlkPhos 197, AST 65, ALT 90.  Lactate was 1.5. Lipase was found to be 4457, and amylase was found to be 197. Alcohol was found to be <10.0. CT abd/pelvis exhibited stranding of the peripancreatic fat, peripancreatic fluid surrounding the pancreas head and body, as well as stranding of the fat in the region of the gastrohepatic ligament, suggestive of acute pancreatitis superimposed on chronic pancreatitis/pseudocyst formation without evidence of portal or splenic vein thrombosis or pancreatic necrosis. In the ED, patient was administered NS IVF and morphine. (06 May 2019 20:31)            ---    HOSPITAL COURSE: 63 y/o male with a PMHx of HTN, alcohol use disorder, type 2 diabetes mellitus, depression, HLD, obesity, ROSS, presents with c/o abdominal pain, admitted for acute pancreatitis. His pain was managed and fluids were given. He was seen by GI (Dr. Galeana) and started on Creon. His diet was advanced as tolerated. GI will follow him as an outpatient to monitor pseudocyst. For his EtOH abuse, he was placed on CIWA protocol with librium. Dr. Mcwilliams evalauted him. He was started on Trazadone for insomnia. The patients abdominal pain resolved and he was able to tolerate a diet. The patient was seen and evaluated each day of his hospital course and is now stable for discharge home with out patient followup.        VITALS AND PHYSICAL EXAM ON DAY OF DISCHARGE:        Vital Signs Last 24 Hrs    T(C): 36.9 (09 May 2019 05:30), Max: 37.7 (08 May 2019 13:37)    T(F): 98.5 (09 May 2019 05:30), Max: 99.8 (08 May 2019 13:37)    HR: 102 (09 May 2019 05:30) (96 - 102)    BP: 143/79 (09 May 2019 05:30) (131/76 - 143/79)    BP(mean): --    RR: 18 (09 May 2019 05:30) (18 - 19)    SpO2: 91% (09 May 2019 05:30) (91% - 92%)        PHYSICAL EXAM:    GENERAL: middle-aged, overweight male, appropriately interactive, mild distress    EYES: sclera clear, no exudates, no scleral icterus    ENMT: oropharynx clear without erythema, moist mucous membranes    LUNGS: good air entry bilaterally, clear to auscultation, symmetric breath sounds, no wheezing or rhonchi appreciated    HEART: soft S1/S2, regular rate and rhythm, no murmurs noted, no noted edema to b/l LE    GASTROINTESTINAL: abdomen is soft, nontender nondistended, hypoactive bowel sounds, reducible midline abd hernia    INTEGUMENT: warm, well perfused, no visualized rashes, no jaundice noted    MUSCULOSKELETAL: no clubbing or cyanosis, no obvious deformity    NEUROLOGIC: awake, alert, oriented x3, good muscle tone in 4 extremities, no obvious sensory deficits    PSYCHIATRIC: mood is good, affect is congruent with mood, linear and logical thought process    HEME/LYMPH: no palpable supraclavicular nodules, no obvious ecchymosis         ---    CONSULTANTS:     GI - Dr. Faulkner    Psyc: Dr. Oj Álvarez - Dr. Koch    Endocrinology - Dr. C. Perlman            ---    TIME SPENT:    The total amount of time spent reviewing the hospital notes, laboratory values, imaging findings, assessing/counseling the patient, discussing with consultant physicians, social work, nursing staff took 38 minutes        ---    FINAL DISCHARGE DIAGNOSIS LIST:    Please see last daily progress note for final discharge diagnoses

## 2019-05-07 NOTE — ADVANCED PRACTICE NURSE CONSULT - RECOMMEDATIONS
Assessment    1  Acute maxillary sinusitis, recurrence not specified (461 0) (J01 00)   2  Anterior cervical adenopathy (785 6) (R59 9)    Plan  Acute maxillary sinusitis, recurrence not specified    · Amoxicillin 875 MG Oral Tablet; TAKE 1 TABLET EVERY 12 HOURS DAILY   · Follow Up if Not Better Evaluation and Treatment  Follow-up  Status: Complete  Done:  [id number]   · Apply warm moist compresses to the affected area 3 times a day for 5 minutes ;  Status:Complete;   Done: 72CRO4639   · Drink at least 6 glasses of water or juice a day ; Status:Complete;   Done: 58GNN4875   · Call [phone] if: The sinus pain is not better in 1 week ; Status:Complete;   Done:  26UMO4211   · Call [phone] if: Your sinus pain is worse ; Status:Complete;   Done: 43HLT6405  Acute sinusitis    · Promethazine-DM 6 25-15 MG/5ML Oral Syrup; TAKE 5 ML EVERY 4 TO 6 HOURS  AS NEEDED FOR COUGH    Discussion/Summary    63 yo smoker with acute R maxillary sinusitis  She will start Amoxil, push fluids, and use promethazine DM prn  She has a 2 cm, oval smooth and mobile mass in her R anterior triangle  It may be a reactive JDG node but cannot r/o neoplastic process  We discussed this today and the need for f/u to ensure resolution  She has f/u appointment scheduled for 2/19 and we will reevaluate this at that time  She agrees  Possible side effects of new medications were reviewed with the patient/guardian today  The treatment plan was reviewed with the patient/guardian  The patient/guardian understands and agrees with the treatment plan      Chief Complaint    1  Cold Symptoms  I feel crappy and my R ear feels as if it is closing  My eyes are sticky  Began last WE  Purulent nasal d/c  HAS but no myalgias or fever  Cough which is productive with wheeze and mild dyspnea  No edema or CP  History of Present Illness  Sinusitis (Brief): The patient is being seen for an initial evaluation of sinusitis   The sinusitis involves the maxillary sinuses  The patient is currently experiencing symptoms  facial pressure headache no dental pain nasal congestion purulent rhinorrhea postnasal drainage   Associated symptoms:  ear fullness, ear pressure and cough, but no fever  Lymphadenopathy (Brief): The patient is being seen for an initial evaluation of lymphadenopathy  Symptoms:  enlarged lymph node(s), palpable lymph node(s) and sore throat, but no visible lymph node(s), no painful lymph node(s), no rash, no skin lesions, no fever and no weight loss  Associated symptoms:  no lethargy and no night sweats  (Tobacco use)      Review of Systems    Constitutional: feeling tired, but no fever, not feeling poorly and no recent weight loss  ENT: earache and sore throat, but no hoarseness  Cardiovascular: no chest pain and no lower extremity edema  Respiratory: shortness of breath, cough and wheezing  Active Problems    1  Acute maxillary sinusitis, recurrence not specified (461 0) (J01 00)   2  Acute otitis media, unspecified laterality   3  Acute sinusitis (461 9) (J01 90)   4  Allergic rhinitis (477 9) (J30 9)   5  Arthralgia (719 40) (M25 50)   6  Cervicalgia (723 1) (M54 2)   7  Chalazion (373 2) (H00 19)   8  Chronic allergic conjunctivitis (372 14) (H10 45)   9  Chronic Right Rotator Cuff Sprain (Capsule) (840 4)   10  Colon, diverticulosis (562 10) (K57 30)   11  Degeneration of cervical intervertebral disc (722 4) (M50 90)   12  Depression (311) (F32 9)   13  Edema (782 3) (R60 9)   14  Foot pain, unspecified laterality (729 5) (M79 673)   15  Herpes zoster (053 9) (B02 9)   16  Hypothyroidism (244 9) (E03 9)   17  Need for prophylactic vaccination and inoculation against influenza (V04 81) (Z23)   18  Other chronic pain (338 29) (G89 29)   19  Psoriasis (696 1) (L40 9)   20  Rhus dermatitis (692 6) (L23 7)   21  Ulcerative Left-sided Colitis (556 5)    Past Medical History    1   History of Acute Bronchitis With Bronchospasm (466 0)   2  History of Acute upper respiratory infection (465 9) (J06 9)   3  History of acute bronchitis (V12 69) (Z87 09)   4  History of acute sinusitis (V12 69) (Z87 09)   5  History of dermatitis (V13 3) (Z87 2)   6  History of Lyme disease (V12 09) (Z86 19)    Social History    · Denied: Alcohol   · Current Every Day Smoker (305 1)    Surgical History    1  History of Colonoscopy (Fiberoptic) Screening    Current Meds   1  ALPRAZolam 0 5 MG Oral Tablet; TAKE 1 TABLET 3 times daily PRN; Therapy: 19Apr2012 to (Jeannette Miranda)  Requested for: 97Uuk5471; Last   Rx:81Ynp0054 Ordered   2  Amoxicillin 875 MG Oral Tablet; TAKE 1 TABLET EVERY 12 HOURS DAILY; Therapy: 89WZG8090 to (Antonio Nunez)  Requested for: 21Nov2015; Last   Rx:21Nov2015 Ordered   3  Cyclobenzaprine HCl - 10 MG Oral Tablet; TAKE 1 TABLET AT BEDTIME AS NEEDED; Therapy: 58RIG2654 to (Gifty Bucio)  Requested for: 31OHP8507; Last   Rx:02Jan2016 Ordered   4  Fluticasone Propionate 50 MCG/ACT Nasal Suspension; USE 2 SPRAYS IN EACH   NOSTRIL ONCE DAILY; Therapy: 48RUS7257 to (Jeremiah De La Cruz)  Requested for: 82LNB6710; Last   Rx:33Fnc3419 Ordered   5  Hydrocodone-Acetaminophen 5-325 MG Oral Tablet; TAKE ONE TWICE DAILY AS   NEEDED; Therapy: 81UBP8210 to (Evaluate:73Ggy7935)  Requested for: 64WRF1275; Last   Rx:14Jan2016 Ordered   6  Levothyroxine Sodium 75 MCG Oral Tablet; TAKE 1 TABLET DAILY; Therapy: 49BRS8664 to (Evaluate:05Jan2016)  Requested for: 87JPK7740; Last   Rx:06Nov2015 Ordered   7  Loratadine 10 MG Oral Tablet; TAKE 1 TABLET Every morning; Therapy: 61Gnh7951 to (Kriss Tim)  Requested for: 03Apr2014; Last   Rx:03Apr2014 Ordered   8  Oxycodone-Acetaminophen 5-325 MG Oral Tablet; TAKE 1 TABLET TWICE DAILY AS   NEEDED FOR PAIN;   Therapy: 83ILX5464 to (Evaluate:58Mmj0582); Last Rx:29Jan2016 Ordered   9   ProAir  (90 Base) MCG/ACT Inhalation Aerosol Solution; INHALE 2 PUFFS   EVERY 4-6 HOURS AS T2D with a1c 8.8% adm pancreatitis  Recommend endocrine, discussed with Dr. Car  diabetes education provided  Goal 100-180 mg/dL NEEDED; Therapy: 90EZY1446 to (Evaluate:19Mar2014)  Requested for: 66HFY8816; Last   Rx:79Wyn1644 Ordered   10  Promethazine-DM 6 25-15 MG/5ML Oral Syrup; TAKE 5 ML EVERY 4 TO 6 HOURS AS    NEEDED FOR COUGH; Therapy: 27LRQ9367 to (Etha Karin)  Requested for: 97XEI0876; Last    Rx:21Nov2015 Ordered   11  Sertraline HCl - 100 MG Oral Tablet; take 1 tablet by mouth twice a day; Therapy: 60FFD8194 to (Evaluate:05Jan2016)  Requested for: 40FST3550; Last    Rx:06Nov2015 Ordered   12  Triamcinolone Acetonide 0 1 % External Cream; APPLY A THIN LAYER TO AFFECTED    AREA(S) TWICE DAILY; Therapy: 49POY9169 to (Evaluate:97Izb6630)  Requested for: 30RYL6319; Last    Rx:85Yhc3575 Ordered    Allergies    1  Azithromycin TABS   2  Naproxen TABS   3  Sulfa Drugs    Vitals   Recorded: 99EIR6298 10:03AM   Temperature 70 1 F   Systolic 322   Diastolic 62   Height 5 ft 5 in   Weight 99 lb    BMI Calculated 16 47   BSA Calculated 1 47     Physical Exam    Constitutional   General appearance: No acute distress, well appearing and well nourished  Ears, Nose, Mouth, and Throat   Otoscopic examination: Abnormal   KATHARINA AD  Nasal mucosa, septum, and turbinates: Abnormal   Red and boggy with purulent d/c R  Oropharynx: Abnormal   Edentulous without apparent lesion  Pulmonary   Respiratory effort: No increased work of breathing or signs of respiratory distress  Auscultation of lungs: Clear to auscultation  Auscultation of the lungs revealed prolonged expiratory time  no rales or crackles were heard bilaterally  no rhonchi  no wheezing  Cardiovascular   Auscultation of heart: Normal rate and rhythm, normal S1 and S2, without murmurs  Examination of extremities for edema and/or varicosities: Normal     Lymphatic   Palpation of lymph nodes in neck: Abnormal   There is a smooth, oval, mobile and non tender R anterior triangle cervical node which is non tender     Psychiatric   Orientation to person, place, and time: Normal          Future Appointments    Date/Time Provider Specialty Site   02/12/2016 10:30 AM CHRISTIE rTipathi   Family Medicine [facility]     Signatures   Electronically signed by : CHRISTIE Parks ; Jan 29 2016 10:43AM EST                       (Author)

## 2019-05-07 NOTE — BEHAVIORAL HEALTH ASSESSMENT NOTE - NSBHCHARTREVIEWIMAGING_PSY_A_CORE FT
< from: CT Abdomen and Pelvis w/ IV Cont (05.06.19 @ 17:46) >    IMPRESSION:  Since prior evaluation there is new stranding of the   peripancreatic fat and new peripancreatic fluid surrounding the pancreas   head and body. New stranding of the fat in the region of the   gastrohepatic ligament is noted. A 5.6 x 3.9 cm complex fluid collection   noted adjacent to the pancreas tail is unchanged. A 3.1 x 2.3 cm fluid   collection adjacent to the posterior gastric wall is decreased in size.   Findings suggestive for acute pancreatitis superimposed on changes   related to chronic pancreatitis/pseudocyst formation. There is no   evidence for portal or splenic vein thrombosis. There is no evidence for   pancreatic necrosis.    DAFNE MEYER   This document has been electronically signed. May  6 2019  6:09PM

## 2019-05-07 NOTE — PROGRESS NOTE ADULT - SUBJECTIVE AND OBJECTIVE BOX
Patient is a 64y old  Male who presents with a chief complaint of Acute Pancreatitis (07 May 2019 10:35)      FROM ADMISSION H+P:   HPI:  63 y/o male with a PMHx of HTN, alcohol use disorder, pancreatitis in past, type 2 diabetes mellitus, depression, HLD, obesity, ROSS, presents with c/o abdominal pain. Patient states he awoke this morning with intense "burning" epigastric abdominal pain. Pain 10/10 at epigastrium, non radiating. Denies nausea vomiting fever or chills. He also endorses chronic  depression, and is  interested in finding a new psychiatrist to follow up with as an outpatient. Patient pursued further medical evaluation at [facility] ED.    In the ED, patient vitals were T(F): 99.1, HR: 92, BP: 135/63, RR: 16, SpO2: 98% on RA. CBC exhibited WBC 12.52, Hgb 14.5, Hct 41.0, Plt 213. CMP exhibited Na 135, K 3.4, Cl 99, CO2 24, BUN 17, Cr 0.92, Gluc 203, AlkPhos 197, AST 65, ALT 90.  Lactate was 1.5. Lipase was found to be 4457, and amylase was found to be 197. Alcohol was found to be <10.0. CT abd/pelvis exhibited stranding of the peripancreatic fat, peripancreatic fluid surrounding the pancreas head and body, as well as stranding of the fat in the region of the gastrohepatic ligament, suggestive of acute pancreatitis superimposed on chronic pancreatitis/pseudocyst formation without evidence of portal or splenic vein thrombosis or pancreatic necrosis. In the ED, patient was administered NS IVF and morphine. (06 May 2019 20:31)      ----  INTERVAL HPI/OVERNIGHT EVENTS: Pt seen and evaluated at the bedside. No acute overnight events occurred. Patient continues to endorse epigastric pain, denies n/v. Endorses acid reflux.    ----  PAST MEDICAL & SURGICAL HISTORY:  Diabetes: type 2  ROSS (obstructive sleep apnea)  Depression, unspecified depression type  Alcohol use disorder, severe, dependence  Alcohol-induced acute pancreatitis, unspecified complication status  Essential hypertension  Low testosterone in male  Depression  Insomnia  HLD (hyperlipidemia)  HTN (hypertension)  Low testosterone level in male  Sprain of right rotator cuff capsule, subsequent encounter  Borderline diabetes mellitus: last A1c unknown  Low back pain, unspecified back pain laterality, unspecified chronicity, with sciatica presence unspecified  Lumbar herniated disc  Depression, unspecified depression type: Lost his son this past 2016  Essential hypertension  Sleep apnea, unspecified type  Obesity (BMI 30-39.9)  No significant past surgical history  H/O knee surgery: b/l knees  H/O shoulder surgery: right  S/P arthroscopy of right knee:   S/P right inguinal hernia repair:   S/P ACL repair: left knee   S/P rotator cuff repair: Right shoulder       FAMILY HISTORY:  No pertinent family history in first degree relatives  Family history of squamous cell carcinoma: of parotid  Family history of hypertension  Family history of cancer      Home Medications:  amLODIPine 10 mg oral tablet: 1 tab(s) orally once a day (06 May 2019 20:49)  anastrozole 1 mg oral tablet: 1 tab(s) orally once a week (06 May 2019 20:49)  atorvastatin 20 mg oral tablet: 1 tab(s) orally once a day (06 May 2019 20:49)  clonazePAM 2 mg oral tablet: 1 tab(s) orally once a day (at bedtime) (06 May 2019 20:49)  Lexapro 20 mg oral tablet: 1 tab(s) orally once a day (06 May 2019 20:49)  meloxicam 15 mg oral tablet: 1 tab(s) orally once a day, As Needed (06 May 2019 20:49)  metoprolol tartrate 25 mg oral tablet: 1 tab(s) orally 2 times a day (06 May 2019 20:49)      Allergies    No Known Allergies    Intolerances        ----  MEDICATIONS  (STANDING):  amLODIPine   Tablet 10 milliGRAM(s) Oral daily  atorvastatin 20 milliGRAM(s) Oral at bedtime  dextrose 5%. 1000 milliLiter(s) (50 mL/Hr) IV Continuous <Continuous>  dextrose 50% Injectable 12.5 Gram(s) IV Push once  dextrose 50% Injectable 25 Gram(s) IV Push once  dextrose 50% Injectable 25 Gram(s) IV Push once  enoxaparin Injectable 40 milliGRAM(s) SubCutaneous daily  escitalopram 20 milliGRAM(s) Oral daily  insulin lispro (HumaLOG) corrective regimen sliding scale   SubCutaneous every 6 hours  metoprolol tartrate Injectable 5 milliGRAM(s) IV Push every 6 hours  pancrelipase  (CREON 12,000 Lipase Units) 6 Capsule(s) Oral every 8 hours  pantoprazole    Tablet 40 milliGRAM(s) Oral before breakfast  sodium chloride 0.45% with potassium chloride 20 mEq/L 1000 milliLiter(s) (130 mL/Hr) IV Continuous <Continuous>  traZODone 100 milliGRAM(s) Oral at bedtime    MEDICATIONS  (PRN):  chlordiazePOXIDE 50 milliGRAM(s) Oral every 1 hour PRN CIWA-Ar score 8 or greater  clonazePAM Tablet 1 milliGRAM(s) Oral at bedtime PRN agitation  dextrose 40% Gel 15 Gram(s) Oral once PRN Blood Glucose LESS THAN 70 milliGRAM(s)/deciliter  glucagon  Injectable 1 milliGRAM(s) IntraMuscular once PRN Glucose LESS THAN 70 milligrams/deciliter  HYDROmorphone  Injectable 0.5 milliGRAM(s) IV Push every 6 hours PRN Moderate Pain (4 - 6)  HYDROmorphone  Injectable 1 milliGRAM(s) IV Push every 6 hours PRN Severe Pain (7 - 10)  HYDROmorphone  Injectable 0.5 milliGRAM(s) IV Push once PRN Severe Pain (7 - 10)  melatonin 5 milliGRAM(s) Oral at bedtime PRN Insomnia  morphine  - Injectable 4 milliGRAM(s) IV Push every 6 hours PRN Mild Pain (1 - 3)  simethicone 80 milliGRAM(s) Chew two times a day PRN Dyspepsia      ----  REVIEW OF SYSTEMS:  CONSTITUTIONAL: denies fever, chills, fatigue, weakness  HEENT: denies blurred vision, sore throat  SKIN: denies new lesions, rash  CARDIOVASCULAR: denies chest pain, chest pressure, palpitations  RESPIRATORY: denies shortness of breath, sputum production  GASTROINTESTINAL: EPIGASTRIC PAIN, UPPER ABD PAIN DYSPEPSIA, denies nausea, vomiting, diarrhea  GENITOURINARY: denies dysuria, discharge  NEUROLOGICAL: denies numbness, headache, focal weakness  MUSCULOSKELETAL: denies new joint pain, muscle aches  HEMATOLOGIC: denies gross bleeding, bruising      ----  PHYSICAL EXAM:  GENERAL: middle-aged, overweight male, appropriately interactive, mild distress  EYES: sclera clear, no exudates, no scleral icterus  ENMT: oropharynx clear without erythema, moist mucous membranes  LUNGS: good air entry bilaterally, clear to auscultation, symmetric breath sounds, no wheezing or rhonchi appreciated  HEART: soft S1/S2, regular rate and rhythm, no murmurs noted, no noted edema to b/l LE  GASTROINTESTINAL: epigastric tenderness to light palpation, palpable liver, abdomen is soft, nondistended, hypoactive bowel sounds, reducible midline abd hernia  INTEGUMENT: warm, well perfused, no visualized rashes, no jaundice noted  MUSCULOSKELETAL: no clubbing or cyanosis, no obvious deformity  NEUROLOGIC: awake, alert, oriented x3, good muscle tone in 4 extremities, no obvious sensory deficits  PSYCHIATRIC: mood is good, affect is congruent with mood, linear and logical thought process  HEME/LYMPH: no palpable supraclavicular nodules, no obvious ecchymosis     T(C): 36.4 (19 @ 05:13), Max: 37.3 (19 @ 19:26)  HR: 94 (19 @ 05:13) (62 - 106)  BP: 146/80 (19 @ 05:13) (130/60 - 146/80)  RR: 16 (19 @ 05:13) (16 - 20)  SpO2: 93% (19 @ 05:13) (93% - 99%)  Wt(kg): --    ----  I&O's Summary    06 May 2019 07:01  -  07 May 2019 07:00  --------------------------------------------------------  IN: 1000 mL / OUT: 0 mL / NET: 1000 mL        LABS:                        16.2   7.56  )-----------( 165      ( 07 May 2019 09:02 )             47.5     05-07    137  |  102  |  11  ----------------------------<  196<H>  3.9   |  26  |  0.68    Ca    8.3<L>      07 May 2019 09:02  Phos  3.8     05-  Mg     1.8         TPro  7.4  /  Alb  3.3  /  TBili  0.7  /  DBili  x   /  AST  40<H>  /  ALT  64  /  AlkPhos  146<H>  05-07    PT/INR - ( 06 May 2019 16:34 )   PT: 12.5 sec;   INR: 1.09 ratio         PTT - ( 06 May 2019 16:34 )  PTT:25.4 sec  Urinalysis Basic - ( 06 May 2019 21:00 )    Color: Yellow / Appearance: Clear / S.005 / pH: x  Gluc: x / Ketone: Large  / Bili: Negative / Urobili: Negative   Blood: x / Protein: 25 mg/dL / Nitrite: Negative   Leuk Esterase: Trace / RBC: 0-2 /HPF / WBC 6-10   Sq Epi: x / Non Sq Epi: Occasional / Bacteria: Negative      CAPILLARY BLOOD GLUCOSE      POCT Blood Glucose.: 187 mg/dL (07 May 2019 05:46)    ----  Personally reviewed:  Vital sign trends: [ x ] yes    [  ] no     [  ] n/a  Laboratory results: [x  ] yes    [  ] no     [  ] n/a  Radiology results: [ x ] yes    [  ] no     [  ] n/a  Culture results: [  ] yes    [  ] no     [ x ] n/a  Consultant recommendations: [ x ] yes    [  ] no     [  ] n/a Patient is a 64y old  Male who presents with a chief complaint of Acute Pancreatitis (07 May 2019 10:35)      FROM ADMISSION H+P:   HPI:  65 y/o male with a PMHx of HTN, alcohol use disorder, pancreatitis in past, type 2 diabetes mellitus, depression, HLD, obesity, ROSS, presents with c/o abdominal pain. Patient states he awoke this morning with intense "burning" epigastric abdominal pain. Pain 10/10 at epigastrium, non radiating. Denies nausea vomiting fever or chills. He also endorses chronic  depression, and is  interested in finding a new psychiatrist to follow up with as an outpatient. Patient pursued further medical evaluation at [facility] ED.    In the ED, patient vitals were T(F): 99.1, HR: 92, BP: 135/63, RR: 16, SpO2: 98% on RA. CBC exhibited WBC 12.52, Hgb 14.5, Hct 41.0, Plt 213. CMP exhibited Na 135, K 3.4, Cl 99, CO2 24, BUN 17, Cr 0.92, Gluc 203, AlkPhos 197, AST 65, ALT 90.  Lactate was 1.5. Lipase was found to be 4457, and amylase was found to be 197. Alcohol was found to be <10.0. CT abd/pelvis exhibited stranding of the peripancreatic fat, peripancreatic fluid surrounding the pancreas head and body, as well as stranding of the fat in the region of the gastrohepatic ligament, suggestive of acute pancreatitis superimposed on chronic pancreatitis/pseudocyst formation without evidence of portal or splenic vein thrombosis or pancreatic necrosis. In the ED, patient was administered NS IVF and morphine. (06 May 2019 20:31)      ----  INTERVAL HPI/OVERNIGHT EVENTS: Pt seen and evaluated at the bedside. No acute overnight events occurred. Patient continues to reports some mild mid-epigastric pain but improving from yesterday. denies n/v. Endorses acid reflux.    ----  PAST MEDICAL & SURGICAL HISTORY:  Diabetes: type 2  ROSS (obstructive sleep apnea)  Depression, unspecified depression type  Alcohol use disorder, severe, dependence  Alcohol-induced acute pancreatitis, unspecified complication status  Essential hypertension  Low testosterone in male  Depression  Insomnia  HLD (hyperlipidemia)  HTN (hypertension)  Low testosterone level in male  Sprain of right rotator cuff capsule, subsequent encounter  Borderline diabetes mellitus: last A1c unknown  Low back pain, unspecified back pain laterality, unspecified chronicity, with sciatica presence unspecified  Lumbar herniated disc  Depression, unspecified depression type: Lost his son this past 2016  Essential hypertension  Sleep apnea, unspecified type  Obesity (BMI 30-39.9)  No significant past surgical history  H/O knee surgery: b/l knees  H/O shoulder surgery: right  S/P arthroscopy of right knee:   S/P right inguinal hernia repair:   S/P ACL repair: left knee   S/P rotator cuff repair: Right shoulder 2008      FAMILY HISTORY:  No pertinent family history in first degree relatives  Family history of squamous cell carcinoma: of parotid  Family history of hypertension  Family history of cancer      Home Medications:  amLODIPine 10 mg oral tablet: 1 tab(s) orally once a day (06 May 2019 20:49)  anastrozole 1 mg oral tablet: 1 tab(s) orally once a week (06 May 2019 20:49)  atorvastatin 20 mg oral tablet: 1 tab(s) orally once a day (06 May 2019 20:49)  clonazePAM 2 mg oral tablet: 1 tab(s) orally once a day (at bedtime) (06 May 2019 20:49)  Lexapro 20 mg oral tablet: 1 tab(s) orally once a day (06 May 2019 20:49)  meloxicam 15 mg oral tablet: 1 tab(s) orally once a day, As Needed (06 May 2019 20:49)  metoprolol tartrate 25 mg oral tablet: 1 tab(s) orally 2 times a day (06 May 2019 20:49)      Allergies    No Known Allergies    Intolerances        ----  MEDICATIONS  (STANDING):  amLODIPine   Tablet 10 milliGRAM(s) Oral daily  atorvastatin 20 milliGRAM(s) Oral at bedtime  dextrose 5%. 1000 milliLiter(s) (50 mL/Hr) IV Continuous <Continuous>  dextrose 50% Injectable 12.5 Gram(s) IV Push once  dextrose 50% Injectable 25 Gram(s) IV Push once  dextrose 50% Injectable 25 Gram(s) IV Push once  enoxaparin Injectable 40 milliGRAM(s) SubCutaneous daily  escitalopram 20 milliGRAM(s) Oral daily  insulin lispro (HumaLOG) corrective regimen sliding scale   SubCutaneous every 6 hours  metoprolol tartrate Injectable 5 milliGRAM(s) IV Push every 6 hours  pancrelipase  (CREON 12,000 Lipase Units) 6 Capsule(s) Oral every 8 hours  pantoprazole    Tablet 40 milliGRAM(s) Oral before breakfast  sodium chloride 0.45% with potassium chloride 20 mEq/L 1000 milliLiter(s) (130 mL/Hr) IV Continuous <Continuous>  traZODone 100 milliGRAM(s) Oral at bedtime    MEDICATIONS  (PRN):  chlordiazePOXIDE 50 milliGRAM(s) Oral every 1 hour PRN CIWA-Ar score 8 or greater  clonazePAM Tablet 1 milliGRAM(s) Oral at bedtime PRN agitation  dextrose 40% Gel 15 Gram(s) Oral once PRN Blood Glucose LESS THAN 70 milliGRAM(s)/deciliter  glucagon  Injectable 1 milliGRAM(s) IntraMuscular once PRN Glucose LESS THAN 70 milligrams/deciliter  HYDROmorphone  Injectable 0.5 milliGRAM(s) IV Push every 6 hours PRN Moderate Pain (4 - 6)  HYDROmorphone  Injectable 1 milliGRAM(s) IV Push every 6 hours PRN Severe Pain (7 - 10)  HYDROmorphone  Injectable 0.5 milliGRAM(s) IV Push once PRN Severe Pain (7 - 10)  melatonin 5 milliGRAM(s) Oral at bedtime PRN Insomnia  morphine  - Injectable 4 milliGRAM(s) IV Push every 6 hours PRN Mild Pain (1 - 3)  simethicone 80 milliGRAM(s) Chew two times a day PRN Dyspepsia      ----  REVIEW OF SYSTEMS:  CONSTITUTIONAL: denies fever, chills, fatigue, weakness  HEENT: denies blurred vision, sore throat  SKIN: denies new lesions, rash  CARDIOVASCULAR: denies chest pain, chest pressure, palpitations  RESPIRATORY: denies shortness of breath, sputum production  GASTROINTESTINAL: EPIGASTRIC PAIN, UPPER ABD PAIN DYSPEPSIA, denies nausea, vomiting, diarrhea  GENITOURINARY: denies dysuria, discharge  NEUROLOGICAL: denies numbness, headache, focal weakness  MUSCULOSKELETAL: denies new joint pain, muscle aches  HEMATOLOGIC: denies gross bleeding, bruising      ----  PHYSICAL EXAM:  GENERAL: middle-aged, overweight male, appropriately interactive, mild distress  EYES: sclera clear, no exudates, no scleral icterus  ENMT: oropharynx clear without erythema, moist mucous membranes  LUNGS: good air entry bilaterally, clear to auscultation, symmetric breath sounds, no wheezing or rhonchi appreciated  HEART: soft S1/S2, regular rate and rhythm, no murmurs noted, no noted edema to b/l LE  GASTROINTESTINAL: epigastric tenderness to light palpation, palpable liver, abdomen is soft, nondistended, hypoactive bowel sounds, reducible midline abd hernia  INTEGUMENT: warm, well perfused, no visualized rashes, no jaundice noted  MUSCULOSKELETAL: no clubbing or cyanosis, no obvious deformity  NEUROLOGIC: awake, alert, oriented x3, good muscle tone in 4 extremities, no obvious sensory deficits  PSYCHIATRIC: mood is good, affect is congruent with mood, linear and logical thought process  HEME/LYMPH: no palpable supraclavicular nodules, no obvious ecchymosis     T(C): 36.4 (19 @ 05:13), Max: 37.3 (19 @ 19:26)  HR: 94 (19 @ 05:13) (62 - 106)  BP: 146/80 (19 @ 05:13) (130/60 - 146/80)  RR: 16 (19 @ 05:13) (16 - 20)  SpO2: 93% (19 @ 05:13) (93% - 99%)  Wt(kg): --    ----  I&O's Summary    06 May 2019 07:01  -  07 May 2019 07:00  --------------------------------------------------------  IN: 1000 mL / OUT: 0 mL / NET: 1000 mL        LABS:                        16.2   7.56  )-----------( 165      ( 07 May 2019 09:02 )             47.5     05-07    137  |  102  |  11  ----------------------------<  196<H>  3.9   |  26  |  0.68    Ca    8.3<L>      07 May 2019 09:02  Phos  3.8     05-07  Mg     1.8     05-    TPro  7.4  /  Alb  3.3  /  TBili  0.7  /  DBili  x   /  AST  40<H>  /  ALT  64  /  AlkPhos  146<H>  05-    PT/INR - ( 06 May 2019 16:34 )   PT: 12.5 sec;   INR: 1.09 ratio         PTT - ( 06 May 2019 16:34 )  PTT:25.4 sec  Urinalysis Basic - ( 06 May 2019 21:00 )    Color: Yellow / Appearance: Clear / S.005 / pH: x  Gluc: x / Ketone: Large  / Bili: Negative / Urobili: Negative   Blood: x / Protein: 25 mg/dL / Nitrite: Negative   Leuk Esterase: Trace / RBC: 0-2 /HPF / WBC 6-10   Sq Epi: x / Non Sq Epi: Occasional / Bacteria: Negative      CAPILLARY BLOOD GLUCOSE      POCT Blood Glucose.: 187 mg/dL (07 May 2019 05:46)    ----  Personally reviewed:  Vital sign trends: [ x ] yes    [  ] no     [  ] n/a  Laboratory results: [x  ] yes    [  ] no     [  ] n/a  Radiology results: [ x ] yes    [  ] no     [  ] n/a  Culture results: [  ] yes    [  ] no     [ x ] n/a  Consultant recommendations: [ x ] yes    [  ] no     [  ] n/a

## 2019-05-07 NOTE — PROVIDER CONTACT NOTE (OTHER) - ASSESSMENT
Pulse ox 88% and HR of 104
Pt states last BM was 5/6.  Pt has been receiving pain medications throughout the day.

## 2019-05-07 NOTE — BEHAVIORAL HEALTH ASSESSMENT NOTE - NSBHCHARTREVIEWINVESTIGATE_PSY_A_CORE FT
< from: 12 Lead ECG (04.17.19 @ 17:23) >    Ventricular Rate 72 BPM    Atrial Rate 72 BPM    P-R Interval 196 ms    QRS Duration 92 ms    Q-T Interval 426 ms    QTC Calculation(Bezet) 466 ms    P Axis 60 degrees    R Axis 55 degrees    T Axis 62 degrees    Diagnosis Line Normal sinus rhythm  Normal ECG  No previous ECGs available  Confirmed by KRUPA CORNEJO MD ([id number]) on 4/18/2019 9:06:33 PM    < end of copied text >

## 2019-05-07 NOTE — BEHAVIORAL HEALTH ASSESSMENT NOTE - RISK ASSESSMENT
63 y/o MWM, with no prior psychiatric hospitalizations. Patient at this time is struggling with multiple external stressors complicated by his alcohol dependence. He does have a good social support, and is future-oriented, no evidence of acute threat to self or others at this time.

## 2019-05-07 NOTE — PROGRESS NOTE ADULT - PROBLEM SELECTOR PLAN 4
-continue home amlodipine 10 mg PO with hold parameters  -Start lopressor 5mg IV q6hrs with hold parameters

## 2019-05-07 NOTE — CONSULT NOTE ADULT - SUBJECTIVE AND OBJECTIVE BOX
Date/Time Patient Seen:  		  Referring MD:   Data Reviewed	       Patient is a 64y old  Male who presents with a chief complaint of Acute Pancreatitis (06 May 2019 20:31)      Subjective/HPI  in bed  seen and examined  vs and meds reviewed  labs reviewed  H and P reviewed  ER provider note reviewed  CT scan reviewed    H&P Adult [Charted Location: [facility] 08] [Authored: 06-May-2019 20:31]- for Visit: [id number], Complete, Revised, Signed in Full, General    History and Physical:   Source of Information	Chart(s), Patient  Outpatient Providers	Dr. Mark Bocanegra     Language:  · Patient/Family of Limited English Proficiency	No       History of Present Illness:  Reason for Admission: Acute Pancreatitis  History of Present Illness:   63 y/o male with a PMHx of HTN, alcohol use disorder, pancreatitis in past, type 2 diabetes mellitus, depression, HLD, obesity, ROSS, presents with c/o abdominal pain. Patient states he awoke this morning with intense "burning" epigastric abdominal pain. Pain 10/10 at epigastrium, non radiating. Denies nausea vomiting fever or chills. He also endorses chronic  depression, and is  interested in finding a new psychiatrist to follow up with as an outpatient. Patient pursued further medical evaluation at [facility] ED.    In the ED, patient vitals were T(F): 99.1, HR: 92, BP: 135/63, RR: 16, SpO2: 98% on RA. CBC exhibited WBC 12.52, Hgb 14.5, Hct 41.0, Plt 213. CMP exhibited Na 135, K 3.4, Cl 99, CO2 24, BUN 17, Cr 0.92, Gluc 203, AlkPhos 197, AST 65, ALT 90.  Lactate was 1.5. Lipase was found to be 4457, and amylase was found to be 197. Alcohol was found to be <10.0. CT abd/pelvis exhibited stranding of the peripancreatic fat, peripancreatic fluid surrounding the pancreas head and body, as well as stranding of the fat in the region of the gastrohepatic ligament, suggestive of acute pancreatitis superimposed on chronic pancreatitis/pseudocyst formation without evidence of portal or splenic vein thrombosis or pancreatic necrosis. In the ED, patient was administered NS IVF and morphine.      PAST MEDICAL & SURGICAL HISTORY:  Diabetes: type 2  ROSS (obstructive sleep apnea)  Depression, unspecified depression type  Alcohol use disorder, severe, dependence  Alcohol-induced acute pancreatitis, unspecified complication status  Essential hypertension  Low testosterone in male  Depression  Insomnia  HLD (hyperlipidemia)  HTN (hypertension)  Low testosterone level in male  Sprain of right rotator cuff capsule, subsequent encounter  Borderline diabetes mellitus: last A1c unknown  Low back pain, unspecified back pain laterality, unspecified chronicity, with sciatica presence unspecified  Lumbar herniated disc  Depression, unspecified depression type: Lost his son this past June 2016  Essential hypertension  Sleep apnea, unspecified type  Obesity (BMI 30-39.9)  No significant past surgical history  H/O knee surgery: b/l knees  H/O shoulder surgery: right  S/P arthroscopy of right knee: 2010  S/P right inguinal hernia repair: 2010  S/P ACL repair: left knee 2010  S/P rotator cuff repair: Right shoulder 2008        Medication list         MEDICATIONS  (STANDING):  amLODIPine   Tablet 10 milliGRAM(s) Oral daily  atorvastatin 20 milliGRAM(s) Oral at bedtime  dextrose 5%. 1000 milliLiter(s) (50 mL/Hr) IV Continuous <Continuous>  dextrose 50% Injectable 12.5 Gram(s) IV Push once  dextrose 50% Injectable 25 Gram(s) IV Push once  dextrose 50% Injectable 25 Gram(s) IV Push once  enoxaparin Injectable 40 milliGRAM(s) SubCutaneous daily  escitalopram 20 milliGRAM(s) Oral daily  insulin lispro (HumaLOG) corrective regimen sliding scale   SubCutaneous every 6 hours  metoprolol tartrate Injectable 5 milliGRAM(s) IV Push every 6 hours  sodium chloride 0.9% with potassium chloride 20 mEq/L 1000 milliLiter(s) (125 mL/Hr) IV Continuous <Continuous>    MEDICATIONS  (PRN):  chlordiazePOXIDE 50 milliGRAM(s) Oral every 1 hour PRN CIWA-Ar score 8 or greater  clonazePAM Tablet 2 milliGRAM(s) Oral at bedtime PRN aggitation  dextrose 40% Gel 15 Gram(s) Oral once PRN Blood Glucose LESS THAN 70 milliGRAM(s)/deciliter  glucagon  Injectable 1 milliGRAM(s) IntraMuscular once PRN Glucose LESS THAN 70 milligrams/deciliter  HYDROmorphone  Injectable 0.5 milliGRAM(s) IV Push every 6 hours PRN Moderate Pain (4 - 6)  HYDROmorphone  Injectable 1 milliGRAM(s) IV Push every 6 hours PRN Severe Pain (7 - 10)  HYDROmorphone  Injectable 0.5 milliGRAM(s) IV Push once PRN Severe Pain (7 - 10)  melatonin 5 milliGRAM(s) Oral at bedtime PRN Insomnia  morphine  - Injectable 4 milliGRAM(s) IV Push every 6 hours PRN Mild Pain (1 - 3)  simethicone 80 milliGRAM(s) Chew two times a day PRN Dyspepsia         Vitals log        ICU Vital Signs Last 24 Hrs  T(C): 36.4 (07 May 2019 05:13), Max: 37.3 (06 May 2019 19:26)  T(F): 97.6 (07 May 2019 05:13), Max: 99.1 (06 May 2019 19:26)  HR: 94 (07 May 2019 05:13) (62 - 106)  BP: 146/80 (07 May 2019 05:13) (130/60 - 146/80)  BP(mean): --  ABP: --  ABP(mean): --  RR: 16 (07 May 2019 05:13) (16 - 20)  SpO2: 93% (07 May 2019 05:13) (93% - 99%)           Input and Output:  I&O's Detail      Lab Data                        14.5   12.52 )-----------( 213      ( 06 May 2019 16:34 )             41.0     05-06    136  |  102  |  14  ----------------------------<  206<H>  3.8   |  26  |  0.75    Ca    8.3<L>      06 May 2019 22:44  Phos  4.4     05-06  Mg     1.5     05-06    TPro  7.6  /  Alb  3.7  /  TBili  0.6  /  DBili  .20  /  AST  51<H>  /  ALT  81<H>  /  AlkPhos  174<H>  05-06            Review of Systems	  abd pain    Objective     Physical Examination  heart s1s2  lung dec BS  cn grossly int        Pertinent Lab findings & Imaging      Holder:  NO   Adequate UO     I&O's Detail           Discussed with:     Cultures:	        Radiology        EXAM:  CT ABDOMEN AND PELVIS IC                            PROCEDURE DATE:  05/06/2019          INTERPRETATION:  CLINICAL HISTORY:  Abdominal pain; history of   pancreatitis.    Multiple axial images of the abdomen and pelvis were obtained from the   lung bases through pubic symphysis without the administration of oral   contrast. 100 cc of Omnipaque 350 was administered intravenously without   complication. Reformatted coronal and sagittal images are submitted.    COMPARISON: April 17, 2019    FINDINGS: Since prior evaluation there is new stranding of the   peripancreatic fat and new peripancreatic fluid surrounding the pancreas   head and body. New stranding of the fat in the region of the   gastrohepatic ligament is noted. A 5.6 x 3.9 cm complex fluid collection   noted adjacent to the pancreas tail is unchanged. A 3.1 x 2.3 cm fluid   collection adjacent to the posterior gastric wall is decreased in size.   Findings suggestive for acute pancreatitis superimposed on changes   related to chronic pancreatitis/pseudocyst formation. There is no   evidence for portal or splenic vein thrombosis. There is no evidence for   pancreatic necrosis.    The liver is decreased in attenuation suggesting hepatic parenchymal   fatty infiltration. No focal hepatic masses are identified. There is no   evidence for intrahepatic or extrahepatic biliary dilatation. A small   calcification is noted within the gallbladder lumen, unchanged, likely   small gallstone. No gallbladder wall thickening or pericholecystic fluid   identified.    The spleen and adrenal glands are unremarkable.    There is no evidence for bilateral hydronephrosis or renal calculi.   Poorly marginated inhomogeneous attenuation within the upper pole region   of the right kidney is stable. A 1.3 cm cyst is noted within the   upper-midpole region of the right kidney. A stable 7 mm hypodense focus   is identified within the mid to lower pole aspect of the right kidney.   Scattered hypodense foci within the left kidney measure up to 7 mm, too   small to characterize, stable. The abdominal aorta is normal in course   and caliber. No abnormally enlarged retroperitoneal or pelvic   lymphadenopathy is noted.    Fecal material is scattered throughout the colon. There is no evidence   for mechanical bowel obstruction. No colonic wall thickening is   identified. There is no evidence for free intraperitoneal air or fluid.   There is no evidence for acute appendicitis.    The bladder is not distended, precluding evaluation. Imaging of the lung   bases shows no evidence for significant pleural-parenchymal abnormality.   Mild degenerative change of the thoracic and lumbar spine noted.   Intervertebral disc space narrowing is identified at L1/L2.    IMPRESSION:  Since prior evaluation there is new stranding of the   peripancreatic fat and new peripancreatic fluid surrounding the pancreas   head and body. New stranding of the fat in the region of the   gastrohepatic ligament is noted. A 5.6 x 3.9 cm complex fluid collection   noted adjacent to the pancreas tail is unchanged. A 3.1 x 2.3 cm fluid   collection adjacent to the posterior gastric wall is decreased in size.   Findings suggestive for acute pancreatitis superimposed on changes   related to chronic pancreatitis/pseudocyst formation. There is no   evidence for portal or splenic vein thrombosis. There is no evidence for   pancreatic necrosis.                DAFNE MEYER   This document has been electronically signed. May  6 2019  6:09PM

## 2019-05-07 NOTE — PROGRESS NOTE ADULT - ASSESSMENT
63 y/o male with a PMHx of HTN, alcohol use disorder, type 2 diabetes mellitus, depression, HLD, obesity, ROSS, presents with c/o abdominal pain, admitted for acute pancreatitis

## 2019-05-07 NOTE — CONSULT NOTE ADULT - PROBLEM SELECTOR RECOMMENDATION 9
NPO   IVF  CIWA  possible liquids tomorrow  start pancreatic enzymes   will need outpatient imaging to monitor course of pseudocyst

## 2019-05-07 NOTE — PROGRESS NOTE ADULT - PROBLEM SELECTOR PLAN 1
-GI consult - Dr. Faulkner consulted  -Pain management: Morphine 4mg, Dilaudid 0.5mg, Dialudid 1mg Q6hrs  -NS IVF  cc/hr w/ 20 meq K  -NPO except meds, advance as tolerated  -lipase uptrending  -start Creon pancreatic enzymes   -will need outpatient imaging to monitor course of pseudocyst. -CT scan showing peripancreatic fat stranding, peripancreatic fluid, pseudocyst. Patient with hx of prior pancreatitis, most recently in Nov-Dec of previous year.  -GI consult - Dr. Faulkner consulted  -Pain management: Morphine 4mg, Dilaudid 0.5mg, Dialudid 1mg Q6hrs  -NS IVF  cc/hr w/ 20 meq K  -NPO except meds, advance as tolerated  -lipase uptrending  -start Creon pancreatic enzymes   -will need outpatient imaging to monitor course of pseudocyst. -CT scan showing peripancreatic fat stranding, peripancreatic fluid, pseudocyst. Patient with hx of prior pancreatitis, most recently in Nov-Dec of previous year.  -GI consult - Dr. Faulkner consulted  -Pain management: Morphine 4mg, Dilaudid 0.5mg, Dialudid 1mg Q6hrs  -NS IVF  cc/hr w/ 20 meq K  -NPO except meds, advance as tolerated  -lipase uptrending, becoming more hemoconcentrated, will continue w/ current IVF, check afternoon labs and assess needs for more aggressive IVF  -start Creon pancreatic enzymes   -will need outpatient imaging to monitor course of pseudocyst including probable endoscopic ultrasound w/ possible FNA if this becomes recurrent issue

## 2019-05-07 NOTE — DISCHARGE NOTE PROVIDER - PROVIDER TOKENS
PROVIDER:[TOKEN:[[id number]]] PROVIDER:[TOKEN:[[id number]]],PROVIDER:[TOKEN:[[id number]]],PROVIDER:[TOKEN:[[id number]]]

## 2019-05-07 NOTE — BEHAVIORAL HEALTH ASSESSMENT NOTE - NSBHCHARTREVIEWLAB_PSY_A_CORE FT
16.2   7.56  )-----------( 165      ( 07 May 2019 09:02 )             47.5   05-07    137  |  102  |  11  ----------------------------<  196<H>  3.9   |  26  |  0.68    Ca    8.3<L>      07 May 2019 09:02  Phos  3.8     05-07  Mg     1.8     05-07    TPro  7.4  /  Alb  3.3  /  TBili  0.7  /  DBili  x   /  AST  40<H>  /  ALT  64  /  AlkPhos  146<H>  05-07

## 2019-05-07 NOTE — BEHAVIORAL HEALTH ASSESSMENT NOTE - DETAILS
Son had multiple emotional problems, and drug dependence, daughter has polysubstance dependence Son had polysubstance dependence, daughter has polysubstance dependence Abdominal pain

## 2019-05-08 LAB
-  AMPICILLIN/SULBACTAM: SIGNIFICANT CHANGE UP
-  CEFAZOLIN: SIGNIFICANT CHANGE UP
-  GENTAMICIN: SIGNIFICANT CHANGE UP
-  OXACILLIN: SIGNIFICANT CHANGE UP
-  PENICILLIN: SIGNIFICANT CHANGE UP
-  RIFAMPIN: SIGNIFICANT CHANGE UP
-  TETRACYCLINE: SIGNIFICANT CHANGE UP
-  TRIMETHOPRIM/SULFAMETHOXAZOLE: SIGNIFICANT CHANGE UP
-  VANCOMYCIN: SIGNIFICANT CHANGE UP
ALBUMIN SERPL ELPH-MCNC: 2.4 G/DL — LOW (ref 3.3–5)
ALP SERPL-CCNC: 94 U/L — SIGNIFICANT CHANGE UP (ref 40–120)
ALT FLD-CCNC: 35 U/L — SIGNIFICANT CHANGE UP (ref 12–78)
ANION GAP SERPL CALC-SCNC: 8 MMOL/L — SIGNIFICANT CHANGE UP (ref 5–17)
AST SERPL-CCNC: 28 U/L — SIGNIFICANT CHANGE UP (ref 15–37)
BILIRUB SERPL-MCNC: 0.9 MG/DL — SIGNIFICANT CHANGE UP (ref 0.2–1.2)
BUN SERPL-MCNC: 16 MG/DL — SIGNIFICANT CHANGE UP (ref 7–23)
CALCIUM SERPL-MCNC: 8.6 MG/DL — SIGNIFICANT CHANGE UP (ref 8.5–10.1)
CHLORIDE SERPL-SCNC: 99 MMOL/L — SIGNIFICANT CHANGE UP (ref 96–108)
CO2 SERPL-SCNC: 23 MMOL/L — SIGNIFICANT CHANGE UP (ref 22–31)
CREAT SERPL-MCNC: 0.84 MG/DL — SIGNIFICANT CHANGE UP (ref 0.5–1.3)
GLUCOSE SERPL-MCNC: 184 MG/DL — HIGH (ref 70–99)
HCT VFR BLD CALC: 43.2 % — SIGNIFICANT CHANGE UP (ref 39–50)
HGB BLD-MCNC: 15 G/DL — SIGNIFICANT CHANGE UP (ref 13–17)
LIDOCAIN IGE QN: 1496 U/L — HIGH (ref 73–393)
MAGNESIUM SERPL-MCNC: 2.2 MG/DL — SIGNIFICANT CHANGE UP (ref 1.6–2.6)
MCHC RBC-ENTMCNC: 29.4 PG — SIGNIFICANT CHANGE UP (ref 27–34)
MCHC RBC-ENTMCNC: 34.7 GM/DL — SIGNIFICANT CHANGE UP (ref 32–36)
MCV RBC AUTO: 84.7 FL — SIGNIFICANT CHANGE UP (ref 80–100)
METHOD TYPE: SIGNIFICANT CHANGE UP
NRBC # BLD: 0 /100 WBCS — SIGNIFICANT CHANGE UP (ref 0–0)
PLATELET # BLD AUTO: 159 K/UL — SIGNIFICANT CHANGE UP (ref 150–400)
POTASSIUM SERPL-MCNC: 4.1 MMOL/L — SIGNIFICANT CHANGE UP (ref 3.5–5.3)
POTASSIUM SERPL-SCNC: 4.1 MMOL/L — SIGNIFICANT CHANGE UP (ref 3.5–5.3)
PROT SERPL-MCNC: 6.7 G/DL — SIGNIFICANT CHANGE UP (ref 6–8.3)
RBC # BLD: 5.1 M/UL — SIGNIFICANT CHANGE UP (ref 4.2–5.8)
RBC # FLD: 14.9 % — HIGH (ref 10.3–14.5)
SODIUM SERPL-SCNC: 130 MMOL/L — LOW (ref 135–145)
TRIGL SERPL-MCNC: 307 MG/DL — HIGH (ref 10–149)
WBC # BLD: 11.17 K/UL — HIGH (ref 3.8–10.5)
WBC # FLD AUTO: 11.17 K/UL — HIGH (ref 3.8–10.5)

## 2019-05-08 PROCEDURE — 99233 SBSQ HOSP IP/OBS HIGH 50: CPT | Mod: GC

## 2019-05-08 RX ORDER — MORPHINE SULFATE 50 MG/1
2 CAPSULE, EXTENDED RELEASE ORAL EVERY 6 HOURS
Qty: 0 | Refills: 0 | Status: DISCONTINUED | OUTPATIENT
Start: 2019-05-08 | End: 2019-05-09

## 2019-05-08 RX ORDER — METOPROLOL TARTRATE 50 MG
25 TABLET ORAL
Qty: 0 | Refills: 0 | Status: DISCONTINUED | OUTPATIENT
Start: 2019-05-08 | End: 2019-05-09

## 2019-05-08 RX ORDER — ACETAMINOPHEN 500 MG
650 TABLET ORAL EVERY 6 HOURS
Qty: 0 | Refills: 0 | Status: DISCONTINUED | OUTPATIENT
Start: 2019-05-08 | End: 2019-05-09

## 2019-05-08 RX ORDER — POLYETHYLENE GLYCOL 3350 17 G/17G
17 POWDER, FOR SOLUTION ORAL DAILY
Qty: 0 | Refills: 0 | Status: DISCONTINUED | OUTPATIENT
Start: 2019-05-08 | End: 2019-05-09

## 2019-05-08 RX ORDER — INSULIN LISPRO 100/ML
VIAL (ML) SUBCUTANEOUS EVERY 6 HOURS
Qty: 0 | Refills: 0 | Status: DISCONTINUED | OUTPATIENT
Start: 2019-05-08 | End: 2019-05-09

## 2019-05-08 RX ORDER — MORPHINE SULFATE 50 MG/1
1 CAPSULE, EXTENDED RELEASE ORAL EVERY 6 HOURS
Qty: 0 | Refills: 0 | Status: DISCONTINUED | OUTPATIENT
Start: 2019-05-08 | End: 2019-05-09

## 2019-05-08 RX ORDER — SODIUM CHLORIDE 9 MG/ML
1000 INJECTION INTRAMUSCULAR; INTRAVENOUS; SUBCUTANEOUS
Qty: 0 | Refills: 0 | Status: DISCONTINUED | OUTPATIENT
Start: 2019-05-08 | End: 2019-05-09

## 2019-05-08 RX ORDER — METOPROLOL TARTRATE 50 MG
25 TABLET ORAL
Qty: 0 | Refills: 0 | Status: DISCONTINUED | OUTPATIENT
Start: 2019-05-08 | End: 2019-05-08

## 2019-05-08 RX ADMIN — HYDROMORPHONE HYDROCHLORIDE 1 MILLIGRAM(S): 2 INJECTION INTRAMUSCULAR; INTRAVENOUS; SUBCUTANEOUS at 06:50

## 2019-05-08 RX ADMIN — SODIUM CHLORIDE 100 MILLILITER(S): 9 INJECTION INTRAMUSCULAR; INTRAVENOUS; SUBCUTANEOUS at 21:22

## 2019-05-08 RX ADMIN — Medication 25 MILLIGRAM(S): at 17:18

## 2019-05-08 RX ADMIN — Medication 2: at 00:20

## 2019-05-08 RX ADMIN — AMLODIPINE BESYLATE 10 MILLIGRAM(S): 2.5 TABLET ORAL at 05:51

## 2019-05-08 RX ADMIN — PANTOPRAZOLE SODIUM 40 MILLIGRAM(S): 20 TABLET, DELAYED RELEASE ORAL at 05:51

## 2019-05-08 RX ADMIN — MORPHINE SULFATE 2 MILLIGRAM(S): 50 CAPSULE, EXTENDED RELEASE ORAL at 19:59

## 2019-05-08 RX ADMIN — SENNA PLUS 2 TABLET(S): 8.6 TABLET ORAL at 21:22

## 2019-05-08 RX ADMIN — MORPHINE SULFATE 4 MILLIGRAM(S): 50 CAPSULE, EXTENDED RELEASE ORAL at 13:31

## 2019-05-08 RX ADMIN — Medication 5 MILLIGRAM(S): at 21:22

## 2019-05-08 RX ADMIN — Medication 6 CAPSULE(S): at 17:17

## 2019-05-08 RX ADMIN — Medication 5 MILLIGRAM(S): at 00:20

## 2019-05-08 RX ADMIN — ESCITALOPRAM OXALATE 20 MILLIGRAM(S): 10 TABLET, FILM COATED ORAL at 11:33

## 2019-05-08 RX ADMIN — HYDROMORPHONE HYDROCHLORIDE 1 MILLIGRAM(S): 2 INJECTION INTRAMUSCULAR; INTRAVENOUS; SUBCUTANEOUS at 00:36

## 2019-05-08 RX ADMIN — SODIUM CHLORIDE 100 MILLILITER(S): 9 INJECTION INTRAMUSCULAR; INTRAVENOUS; SUBCUTANEOUS at 10:47

## 2019-05-08 RX ADMIN — ENOXAPARIN SODIUM 40 MILLIGRAM(S): 100 INJECTION SUBCUTANEOUS at 11:33

## 2019-05-08 RX ADMIN — MORPHINE SULFATE 2 MILLIGRAM(S): 50 CAPSULE, EXTENDED RELEASE ORAL at 20:14

## 2019-05-08 RX ADMIN — Medication 6 CAPSULE(S): at 05:57

## 2019-05-08 RX ADMIN — MORPHINE SULFATE 4 MILLIGRAM(S): 50 CAPSULE, EXTENDED RELEASE ORAL at 13:51

## 2019-05-08 RX ADMIN — DEXTROSE MONOHYDRATE, SODIUM CHLORIDE, AND POTASSIUM CHLORIDE 130 MILLILITER(S): 50; .745; 4.5 INJECTION, SOLUTION INTRAVENOUS at 01:16

## 2019-05-08 RX ADMIN — HYDROMORPHONE HYDROCHLORIDE 1 MILLIGRAM(S): 2 INJECTION INTRAMUSCULAR; INTRAVENOUS; SUBCUTANEOUS at 06:35

## 2019-05-08 RX ADMIN — DEXTROSE MONOHYDRATE, SODIUM CHLORIDE, AND POTASSIUM CHLORIDE 130 MILLILITER(S): 50; .745; 4.5 INJECTION, SOLUTION INTRAVENOUS at 09:46

## 2019-05-08 RX ADMIN — HYDROMORPHONE HYDROCHLORIDE 1 MILLIGRAM(S): 2 INJECTION INTRAMUSCULAR; INTRAVENOUS; SUBCUTANEOUS at 00:21

## 2019-05-08 RX ADMIN — Medication 2: at 12:16

## 2019-05-08 RX ADMIN — Medication 5 MILLIGRAM(S): at 11:33

## 2019-05-08 RX ADMIN — Medication 5 MILLIGRAM(S): at 05:52

## 2019-05-08 RX ADMIN — ATORVASTATIN CALCIUM 20 MILLIGRAM(S): 80 TABLET, FILM COATED ORAL at 21:22

## 2019-05-08 RX ADMIN — Medication 6 CAPSULE(S): at 11:33

## 2019-05-08 RX ADMIN — Medication 2: at 05:52

## 2019-05-08 RX ADMIN — Medication 100 MILLIGRAM(S): at 11:33

## 2019-05-08 RX ADMIN — Medication 1 MILLIGRAM(S): at 23:00

## 2019-05-08 RX ADMIN — Medication 2: at 23:45

## 2019-05-08 NOTE — PROGRESS NOTE ADULT - SUBJECTIVE AND OBJECTIVE BOX
Patient is a 64y old  Male who presents with a chief complaint of Acute Pancreatitis (08 May 2019 09:31)      FROM ADMISSION H+P:   HPI:  65 y/o male with a PMHx of HTN, alcohol use disorder, pancreatitis in past, type 2 diabetes mellitus, depression, HLD, obesity, ROSS, presents with c/o abdominal pain. Patient states he awoke this morning with intense "burning" epigastric abdominal pain. Pain 10/10 at epigastrium, non radiating. Denies nausea vomiting fever or chills. He also endorses chronic  depression, and is  interested in finding a new psychiatrist to follow up with as an outpatient. Patient pursued further medical evaluation at [facility] ED.    In the ED, patient vitals were T(F): 99.1, HR: 92, BP: 135/63, RR: 16, SpO2: 98% on RA. CBC exhibited WBC 12.52, Hgb 14.5, Hct 41.0, Plt 213. CMP exhibited Na 135, K 3.4, Cl 99, CO2 24, BUN 17, Cr 0.92, Gluc 203, AlkPhos 197, AST 65, ALT 90.  Lactate was 1.5. Lipase was found to be 4457, and amylase was found to be 197. Alcohol was found to be <10.0. CT abd/pelvis exhibited stranding of the peripancreatic fat, peripancreatic fluid surrounding the pancreas head and body, as well as stranding of the fat in the region of the gastrohepatic ligament, suggestive of acute pancreatitis superimposed on chronic pancreatitis/pseudocyst formation without evidence of portal or splenic vein thrombosis or pancreatic necrosis. In the ED, patient was administered NS IVF and morphine. (06 May 2019 20:31)      ----  INTERVAL HPI/OVERNIGHT EVENTS: Pt seen and evaluated at the bedside. No acute overnight events occurred. Patient was tachycardic overnight mildly hypoxic, though refuses cpap and states that he will bring his own. Patient states that he wants to try a liquid diet. States that his pain is improving and is now well-controlled with his current pain regimen.     ----  PAST MEDICAL & SURGICAL HISTORY:  Diabetes: type 2  ROSS (obstructive sleep apnea)  Depression, unspecified depression type  Alcohol use disorder, severe, dependence  Alcohol-induced acute pancreatitis, unspecified complication status  Essential hypertension  Low testosterone in male  Depression  Insomnia  HLD (hyperlipidemia)  HTN (hypertension)  Low testosterone level in male  Sprain of right rotator cuff capsule, subsequent encounter  Borderline diabetes mellitus: last A1c unknown  Low back pain, unspecified back pain laterality, unspecified chronicity, with sciatica presence unspecified  Lumbar herniated disc  Depression, unspecified depression type: Lost his son this past 2016  Essential hypertension  Sleep apnea, unspecified type  Obesity (BMI 30-39.9)  No significant past surgical history  H/O knee surgery: b/l knees  H/O shoulder surgery: right  S/P arthroscopy of right knee:   S/P right inguinal hernia repair:   S/P ACL repair: left knee   S/P rotator cuff repair: Right shoulder       FAMILY HISTORY:  No pertinent family history in first degree relatives  Family history of squamous cell carcinoma: of parotid  Family history of hypertension  Family history of cancer      Home Medications:  amLODIPine 10 mg oral tablet: 1 tab(s) orally once a day (06 May 2019 20:49)  anastrozole 1 mg oral tablet: 1 tab(s) orally once a week (06 May 2019 20:49)  atorvastatin 20 mg oral tablet: 1 tab(s) orally once a day (06 May 2019 20:49)  clonazePAM 2 mg oral tablet: 1 tab(s) orally once a day (at bedtime) (06 May 2019 20:49)  Lexapro 20 mg oral tablet: 1 tab(s) orally once a day (06 May 2019 20:49)  meloxicam 15 mg oral tablet: 1 tab(s) orally once a day, As Needed (06 May 2019 20:49)  metoprolol tartrate 25 mg oral tablet: 1 tab(s) orally 2 times a day (06 May 2019 20:49)      Allergies    No Known Allergies    Intolerances        ----  MEDICATIONS  (STANDING):  amLODIPine   Tablet 10 milliGRAM(s) Oral daily  atorvastatin 20 milliGRAM(s) Oral at bedtime  bisacodyl 5 milliGRAM(s) Oral at bedtime  dextrose 50% Injectable 12.5 Gram(s) IV Push once  dextrose 50% Injectable 25 Gram(s) IV Push once  dextrose 50% Injectable 25 Gram(s) IV Push once  docusate sodium 100 milliGRAM(s) Oral daily  enoxaparin Injectable 40 milliGRAM(s) SubCutaneous daily  escitalopram 20 milliGRAM(s) Oral daily  insulin lispro (HumaLOG) corrective regimen sliding scale   SubCutaneous every 6 hours  metoprolol tartrate Injectable 5 milliGRAM(s) IV Push every 6 hours  pancrelipase  (CREON 12,000 Lipase Units) 6 Capsule(s) Oral every 8 hours  pantoprazole    Tablet 40 milliGRAM(s) Oral before breakfast  senna 2 Tablet(s) Oral at bedtime  sodium chloride 0.9%. 1000 milliLiter(s) (100 mL/Hr) IV Continuous <Continuous>  traZODone 100 milliGRAM(s) Oral at bedtime    MEDICATIONS  (PRN):  clonazePAM Tablet 1 milliGRAM(s) Oral at bedtime PRN agitation  dextrose 40% Gel 15 Gram(s) Oral once PRN Blood Glucose LESS THAN 70 milliGRAM(s)/deciliter  glucagon  Injectable 1 milliGRAM(s) IntraMuscular once PRN Glucose LESS THAN 70 milligrams/deciliter  HYDROmorphone  Injectable 0.5 milliGRAM(s) IV Push every 6 hours PRN Moderate Pain (4 - 6)  HYDROmorphone  Injectable 1 milliGRAM(s) IV Push every 6 hours PRN Severe Pain (7 - 10)  HYDROmorphone  Injectable 0.5 milliGRAM(s) IV Push once PRN Severe Pain (7 - 10)  melatonin 5 milliGRAM(s) Oral at bedtime PRN Insomnia  morphine  - Injectable 4 milliGRAM(s) IV Push every 6 hours PRN Mild Pain (1 - 3)  polyethylene glycol 3350 17 Gram(s) Oral daily PRN Constipation  simethicone 80 milliGRAM(s) Chew two times a day PRN Dyspepsia      ----  REVIEW OF SYSTEMS:  CONSTITUTIONAL: denies fever, chills, fatigue, weakness, DIAPHORETIC.  HEENT: denies blurred vision, sore throat  SKIN: denies new lesions, rash  CARDIOVASCULAR: denies chest pain, chest pressure, palpitations  RESPIRATORY: denies shortness of breath, sputum production  GASTROINTESTINAL: MILD RUQ PAIN, denies nausea, vomiting, diarrhea  GENITOURINARY: denies dysuria, discharge  NEUROLOGICAL: denies numbness, headache, focal weakness  MUSCULOSKELETAL: denies new joint pain, muscle aches  HEMATOLOGIC: denies gross bleeding, bruising      ----  PHYSICAL EXAM:  GENERAL: middle-aged, overweight male, appropriately interactive, mild distress  EYES: sclera clear, no exudates, no scleral icterus  ENMT: oropharynx clear without erythema, moist mucous membranes  LUNGS: good air entry bilaterally, clear to auscultation, symmetric breath sounds, no wheezing or rhonchi appreciated  HEART: soft S1/S2, regular rate and rhythm, no murmurs noted, no noted edema to b/l LE  GASTROINTESTINAL: tenderness to light palpation RUQ, palpable liver, abdomen is soft, nondistended, hypoactive bowel sounds, reducible midline abd hernia  INTEGUMENT: warm, well perfused, no visualized rashes, no jaundice noted  MUSCULOSKELETAL: no clubbing or cyanosis, no obvious deformity  NEUROLOGIC: awake, alert, oriented x3, good muscle tone in 4 extremities, no obvious sensory deficits  PSYCHIATRIC: mood is good, affect is congruent with mood, linear and logical thought process  HEME/LYMPH: no palpable supraclavicular nodules, no obvious ecchymosis       T(C): 36.9 (19 @ 05:33), Max: 36.9 (19 @ 05:33)  HR: 100 (19 @ 06:42) (90 - 113)  BP: 149/84 (19 @ 05:33) (143/80 - 149/84)  RR: 18 (19 @ 05:33) (17 - 18)  SpO2: 92% (19 @ 05:33) (91% - 99%)  Wt(kg): --    ----  I&O's Summary    07 May 2019 07:  -  08 May 2019 07:00  --------------------------------------------------------  IN: 3110 mL / OUT: 50 mL / NET: 3060 mL    08 May 2019 07:  -  08 May 2019 11:58  --------------------------------------------------------  IN: 260 mL / OUT: 0 mL / NET: 260 mL        LABS:                        15.0   11.17 )-----------( 159      ( 08 May 2019 07:55 )             43.2     05-08    130<L>  |  99  |  16  ----------------------------<  184<H>  4.1   |  23  |  0.84    Ca    8.6      08 May 2019 07:55  Phos  3.8     05-07  Mg     2.2     05-08    TPro  6.7  /  Alb  2.4<L>  /  TBili  0.9  /  DBili  x   /  AST  28  /  ALT  35  /  AlkPhos  94  05-08    PT/INR - ( 06 May 2019 16:34 )   PT: 12.5 sec;   INR: 1.09 ratio         PTT - ( 06 May 2019 16:34 )  PTT:25.4 sec  Urinalysis Basic - ( 06 May 2019 21:00 )    Color: Yellow / Appearance: Clear / S.005 / pH: x  Gluc: x / Ketone: Large  / Bili: Negative / Urobili: Negative   Blood: x / Protein: 25 mg/dL / Nitrite: Negative   Leuk Esterase: Trace / RBC: 0-2 /HPF / WBC 6-10   Sq Epi: x / Non Sq Epi: Occasional / Bacteria: Negative      CAPILLARY BLOOD GLUCOSE      POCT Blood Glucose.: 204 mg/dL (08 May 2019 05:50)  POCT Blood Glucose.: 209 mg/dL (08 May 2019 00:00)  POCT Blood Glucose.: 203 mg/dL (07 May 2019 17:24)  POCT Blood Glucose.: 189 mg/dL (07 May 2019 12:07)      - @ 00:22   10,000 - 49,000 CFU/mL Staphylococcus aureus  --  --

## 2019-05-08 NOTE — CONSULT NOTE ADULT - PROBLEM SELECTOR RECOMMENDATION 9
change mod dose humalog scale coverage  goal bg 100-180 in hosp setting  diabetic teaching  check c peptide level

## 2019-05-08 NOTE — DIETITIAN INITIAL EVALUATION ADULT. - ADHERENCE
fair/patient reports knows what diet is open for verbal diet review at this time . states is giving up ETOH and going to see PCD and endocrinologist

## 2019-05-08 NOTE — PROGRESS NOTE ADULT - PROBLEM SELECTOR PLAN 4
-continue home amlodipine 10 mg PO with hold parameters  -Cont lopressor 5mg IV q6hrs with hold parameters -continue home amlodipine 10 mg PO with hold parameters  -home lopressor 25 mg PO BID

## 2019-05-08 NOTE — PROGRESS NOTE ADULT - PROBLEM SELECTOR PLAN 5
-TGs elevated 706  -continue statin for now, may need additional support but will trend TG level w/ aggressive hydration then decide on po meds once symptoms improve and diet advances.  -patient would not like to start any medications inpatient, will followup with PMD

## 2019-05-08 NOTE — DIETITIAN INITIAL EVALUATION ADULT. - OTHER INFO
patient reports tolerating clear liquids. states with pain on admit states A1c was 6.5% and triglycerides were 125 prior to December admit. patient does not want to deal with pain anymore will go to endocrinologist and see RD on outside. states plans to follow diet at discharge . reports ot on medications for DM PTA.

## 2019-05-08 NOTE — DIETITIAN INITIAL EVALUATION ADULT. - PROBLEM SELECTOR PLAN 10
Lovenox for DVT ppx  NPO  ambulate as tolerated   IMPROVE VTE Individual Risk Assessment          RISK                                                          Points    [  ] Previous VTE                                                3  [  ] Thrombophilia                                             2  [  ] Lower limb paralysis                                   2        (unable to hold up >15 seconds)    [  ] Current Cancer                                            2         (within 6 months)  [  ] Immobilization > 24 hrs                              1  [  ] ICU/CCU stay > 24 hours                            1  [  x] Age > 60                                                    1    IMPROVE VTE Score ____1_____

## 2019-05-08 NOTE — PROGRESS NOTE ADULT - NSHPATTENDINGPLANDISCUSS_GEN_ALL_CORE
pt, RN, SW, CM, residency team - re: tx plan, disposition concerns, expected duration of inpatient hospitalization, diet
patient re: advancement of diet, pain control, withdrawal, anticipated hospital course, discharge planning

## 2019-05-08 NOTE — CONSULT NOTE ADULT - SUBJECTIVE AND OBJECTIVE BOX
Patient is a 64y old  Male who presents with a chief complaint of Acute Pancreatitis (08 May 2019 11:58)      Reason For Consult: dm2 uncontrolled    HPI:  65 y/o male with a PMHx of HTN, alcohol use disorder, pancreatitis in past, type 2 diabetes mellitus, depression, HLD, obesity, ROSS, presents with c/o abdominal pain. Patient states he awoke this morning with intense "burning" epigastric abdominal pain. Pain 10/10 at epigastrium, non radiating. Denies nausea vomiting fever or chills. He also endorses chronic  depression, and is  interested in finding a new psychiatrist to follow up with as an outpatient. Patient pursued further medical evaluation at [facility] ED.    In the ED, patient vitals were T(F): 99.1, HR: 92, BP: 135/63, RR: 16, SpO2: 98% on RA. CBC exhibited WBC 12.52, Hgb 14.5, Hct 41.0, Plt 213. CMP exhibited Na 135, K 3.4, Cl 99, CO2 24, BUN 17, Cr 0.92, Gluc 203, AlkPhos 197, AST 65, ALT 90.  Lactate was 1.5. Lipase was found to be 4457, and amylase was found to be 197. Alcohol was found to be <10.0. CT abd/pelvis exhibited stranding of the peripancreatic fat, peripancreatic fluid surrounding the pancreas head and body, as well as stranding of the fat in the region of the gastrohepatic ligament, suggestive of acute pancreatitis superimposed on chronic pancreatitis/pseudocyst formation without evidence of portal or splenic vein thrombosis or pancreatic necrosis. In the ED, patient was administered NS IVF and morphine. (06 May 2019 20:31)      PAST MEDICAL & SURGICAL HISTORY:  Diabetes: type 2  ROSS (obstructive sleep apnea)  Depression, unspecified depression type  Alcohol use disorder, severe, dependence  Alcohol-induced acute pancreatitis, unspecified complication status  Essential hypertension  Low testosterone in male  Depression  Insomnia  HLD (hyperlipidemia)  HTN (hypertension)  Low testosterone level in male  Sprain of right rotator cuff capsule, subsequent encounter  Borderline diabetes mellitus: last A1c unknown  Low back pain, unspecified back pain laterality, unspecified chronicity, with sciatica presence unspecified  Lumbar herniated disc  Depression, unspecified depression type: Lost his son this past June 2016  Essential hypertension  Sleep apnea, unspecified type  Obesity (BMI 30-39.9)  No significant past surgical history  H/O knee surgery: b/l knees  H/O shoulder surgery: right  S/P arthroscopy of right knee: 2010  S/P right inguinal hernia repair: 2010  S/P ACL repair: left knee 2010  S/P rotator cuff repair: Right shoulder 2008      FAMILY HISTORY:  No pertinent family history in first degree relatives  Family history of squamous cell carcinoma: of parotid  Family history of hypertension  Family history of cancer        Social History:    MEDICATIONS  (STANDING):  amLODIPine   Tablet 10 milliGRAM(s) Oral daily  atorvastatin 20 milliGRAM(s) Oral at bedtime  bisacodyl 5 milliGRAM(s) Oral at bedtime  dextrose 50% Injectable 12.5 Gram(s) IV Push once  dextrose 50% Injectable 25 Gram(s) IV Push once  dextrose 50% Injectable 25 Gram(s) IV Push once  docusate sodium 100 milliGRAM(s) Oral daily  enoxaparin Injectable 40 milliGRAM(s) SubCutaneous daily  escitalopram 20 milliGRAM(s) Oral daily  insulin lispro (HumaLOG) corrective regimen sliding scale   SubCutaneous every 6 hours  metoprolol tartrate Injectable 5 milliGRAM(s) IV Push every 6 hours  pancrelipase  (CREON 12,000 Lipase Units) 6 Capsule(s) Oral every 8 hours  pantoprazole    Tablet 40 milliGRAM(s) Oral before breakfast  senna 2 Tablet(s) Oral at bedtime  sodium chloride 0.9%. 1000 milliLiter(s) (100 mL/Hr) IV Continuous <Continuous>  traZODone 100 milliGRAM(s) Oral at bedtime    MEDICATIONS  (PRN):  clonazePAM Tablet 1 milliGRAM(s) Oral at bedtime PRN agitation  dextrose 40% Gel 15 Gram(s) Oral once PRN Blood Glucose LESS THAN 70 milliGRAM(s)/deciliter  glucagon  Injectable 1 milliGRAM(s) IntraMuscular once PRN Glucose LESS THAN 70 milligrams/deciliter  HYDROmorphone  Injectable 0.5 milliGRAM(s) IV Push every 6 hours PRN Moderate Pain (4 - 6)  HYDROmorphone  Injectable 1 milliGRAM(s) IV Push every 6 hours PRN Severe Pain (7 - 10)  HYDROmorphone  Injectable 0.5 milliGRAM(s) IV Push once PRN Severe Pain (7 - 10)  melatonin 5 milliGRAM(s) Oral at bedtime PRN Insomnia  morphine  - Injectable 4 milliGRAM(s) IV Push every 6 hours PRN Mild Pain (1 - 3)  polyethylene glycol 3350 17 Gram(s) Oral daily PRN Constipation  simethicone 80 milliGRAM(s) Chew two times a day PRN Dyspepsia        T(C): 36.9 (05-08-19 @ 05:33), Max: 36.9 (05-08-19 @ 05:33)  HR: 100 (05-08-19 @ 06:42) (90 - 113)  BP: 149/84 (05-08-19 @ 05:33) (143/80 - 149/84)  RR: 18 (05-08-19 @ 05:33) (17 - 18)  SpO2: 92% (05-08-19 @ 05:33) (91% - 99%)  Wt(kg): --    PHYSICAL EXAM:  GENERAL: NAD, well-groomed, well-developed  HEAD:  Atraumatic, Normocephalic  NECK: Supple, No JVD, Normal thyroid  CHEST/LUNG: Clear to percussion bilaterally; No rales, rhonchi, wheezing, or rubs  HEART: Regular rate and rhythm; No murmurs, rubs, or gallops  ABDOMEN: Soft, Nontender, Nondistended; Bowel sounds present  EXTREMITIES:  2+ Peripheral Pulses, No clubbing, cyanosis, or edema  SKIN: No rashes or lesions    CAPILLARY BLOOD GLUCOSE      POCT Blood Glucose.: 208 mg/dL (08 May 2019 11:58)  POCT Blood Glucose.: 204 mg/dL (08 May 2019 05:50)  POCT Blood Glucose.: 209 mg/dL (08 May 2019 00:00)  POCT Blood Glucose.: 203 mg/dL (07 May 2019 17:24)                            15.0   11.17 )-----------( 159      ( 08 May 2019 07:55 )             43.2       CMP:  05-08 @ 07:55  SGPT 35  Albumin 2.4   Alk Phos 94   Anion Gap 8   SGOT 28   Total Bili 0.9   BUN 16   Calcium Total 8.6   CO2 23   Chloride 99   Creatinine 0.84   eGFR if    eGFR if non AA 93   Glucose 184   Potassium 4.1   Protein 6.7   Sodium 130      Thyroid Function Tests:      Diabetes Tests:       Radiology:

## 2019-05-08 NOTE — PROGRESS NOTE ADULT - SUBJECTIVE AND OBJECTIVE BOX
INTERVAL HPI/OVERNIGHT EVENTS:  pt seen and examined  abd pain controlled on current regimen   denies n/v  last bm monday, currently passing gas  tolerating ice chips    MEDICATIONS  (STANDING):  amLODIPine   Tablet 10 milliGRAM(s) Oral daily  atorvastatin 20 milliGRAM(s) Oral at bedtime  bisacodyl 5 milliGRAM(s) Oral at bedtime  dextrose 5%. 1000 milliLiter(s) (50 mL/Hr) IV Continuous <Continuous>  dextrose 50% Injectable 12.5 Gram(s) IV Push once  dextrose 50% Injectable 25 Gram(s) IV Push once  dextrose 50% Injectable 25 Gram(s) IV Push once  docusate sodium 100 milliGRAM(s) Oral daily  enoxaparin Injectable 40 milliGRAM(s) SubCutaneous daily  escitalopram 20 milliGRAM(s) Oral daily  insulin lispro (HumaLOG) corrective regimen sliding scale   SubCutaneous every 6 hours  metoprolol tartrate Injectable 5 milliGRAM(s) IV Push every 6 hours  pancrelipase  (CREON 12,000 Lipase Units) 6 Capsule(s) Oral every 8 hours  pantoprazole    Tablet 40 milliGRAM(s) Oral before breakfast  senna 2 Tablet(s) Oral at bedtime  sodium chloride 0.45% with potassium chloride 20 mEq/L 1000 milliLiter(s) (130 mL/Hr) IV Continuous <Continuous>  traZODone 100 milliGRAM(s) Oral at bedtime    MEDICATIONS  (PRN):  clonazePAM Tablet 1 milliGRAM(s) Oral at bedtime PRN agitation  dextrose 40% Gel 15 Gram(s) Oral once PRN Blood Glucose LESS THAN 70 milliGRAM(s)/deciliter  glucagon  Injectable 1 milliGRAM(s) IntraMuscular once PRN Glucose LESS THAN 70 milligrams/deciliter  HYDROmorphone  Injectable 0.5 milliGRAM(s) IV Push every 6 hours PRN Moderate Pain (4 - 6)  HYDROmorphone  Injectable 1 milliGRAM(s) IV Push every 6 hours PRN Severe Pain (7 - 10)  HYDROmorphone  Injectable 0.5 milliGRAM(s) IV Push once PRN Severe Pain (7 - 10)  melatonin 5 milliGRAM(s) Oral at bedtime PRN Insomnia  morphine  - Injectable 4 milliGRAM(s) IV Push every 6 hours PRN Mild Pain (1 - 3)  simethicone 80 milliGRAM(s) Chew two times a day PRN Dyspepsia      Allergies    No Known Allergies    Intolerances        Review of Systems:    General:  No wt loss, fevers, chills, night sweats, fatigue   Eyes:  Good vision, no reported pain  ENT:  No sore throat, pain, runny nose, dysphagia  CV:  No pain, palpitations, hypo/hypertension  Resp:  No dyspnea, cough, tachypnea, wheezing  GI:  +pain, No nausea, No vomiting, No diarrhea, No constipation, No weight loss, No fever, No pruritis, No rectal bleeding, No melena, No dysphagia  :  No pain, bleeding, incontinence, nocturia  Muscle:  No pain, weakness  Neuro:  No weakness, tingling, memory problems  Psych:  No fatigue, insomnia, mood problems, depression  Endocrine:  No polyuria, polydypsia, cold/heat intolerance  Heme:  No petechiae, ecchymosis, easy bruisability  Skin:  No rash, tattoos, scars, edema      Vital Signs Last 24 Hrs  T(C): 36.9 (08 May 2019 05:33), Max: 36.9 (08 May 2019 05:33)  T(F): 98.4 (08 May 2019 05:33), Max: 98.4 (08 May 2019 05:33)  HR: 100 (08 May 2019 06:42) (90 - 113)  BP: 149/84 (08 May 2019 05:33) (143/80 - 149/84)  BP(mean): --  RR: 18 (08 May 2019 05:33) (17 - 18)  SpO2: 92% (08 May 2019 05:33) (91% - 99%)    PHYSICAL EXAM:    Constitutional: lying in bed  HEENT: ncat  Neck: No LAD  Respiratory: dec bs  Cardiovascular: S1 and S2, RRR  Gastrointestinal: soft mild ttp epigastric region +dt - recently medicated  Extremities: No peripheral edema  Vascular: 2+ peripheral pulses  Neurological: A/O x 3  Skin: No rashes      LABS:                        15.0   11.17 )-----------( 159      ( 08 May 2019 07:55 )             43.2     05-08    130<L>  |  99  |  16  ----------------------------<  184<H>  4.1   |  23  |  0.84    Ca    8.6      08 May 2019 07:55  Phos  3.8     -  Mg     2.2     -    TPro  6.7  /  Alb  2.4<L>  /  TBili  0.9  /  DBili  x   /  AST  28  /  ALT  35  /  AlkPhos  94  -    PT/INR - ( 06 May 2019 16:34 )   PT: 12.5 sec;   INR: 1.09 ratio         PTT - ( 06 May 2019 16:34 )  PTT:25.4 sec  Urinalysis Basic - ( 06 May 2019 21:00 )    Color: Yellow / Appearance: Clear / S.005 / pH: x  Gluc: x / Ketone: Large  / Bili: Negative / Urobili: Negative   Blood: x / Protein: 25 mg/dL / Nitrite: Negative   Leuk Esterase: Trace / RBC: 0-2 /HPF / WBC 6-10   Sq Epi: x / Non Sq Epi: Occasional / Bacteria: Negative        RADIOLOGY & ADDITIONAL TESTS:

## 2019-05-08 NOTE — DIETITIAN INITIAL EVALUATION ADULT. - PROBLEM SELECTOR PLAN 1
GI consult - Dr. Faulkner consulted  Pain management: Morphine 4mg, Dilaudid 0.5mg, Dialudid 1mg Q6hrs  NS IVF  NPO

## 2019-05-08 NOTE — PROGRESS NOTE ADULT - ASSESSMENT
65 y/o male with a PMHx of HTN, alcohol use disorder, type 2 diabetes mellitus, depression, HLD, obesity, ROSS, presents with c/o abdominal pain, admitted for acute pancreatitis.

## 2019-05-08 NOTE — PROGRESS NOTE ADULT - PROBLEM SELECTOR PLAN 1
todd  etoh use  pancreatitis  abd pain  anxiety  monitor for w/d sx -   ciwa scale  vitamins and IVF  I and O  replete lytes  oral and skin care  out of bed as tolerated  ambulate  serial abd exam  serial labs  GI follow up  counseling  education  TODD - CPAP - pt prefers Nasal Pillows mask  will follow

## 2019-05-08 NOTE — DIETITIAN INITIAL EVALUATION ADULT. - NS AS NUTRI INTERV MEALS SNACK
Carbohydrate - modified diet/advance to full fluids to low fat low Na consistent cho diet as appropriate/Fat - modified diet/Mineral - modified diet

## 2019-05-08 NOTE — PROGRESS NOTE ADULT - PROBLEM SELECTOR PLAN 1
CT showed acute on chronic pancreatitis w pseudocyst formation (unchanged from prior)  labs/pain improving  IVF/cont clears  cont ppi  prn pain control/anti emetics  bowel regimen  trend TG  monitor exam  CIWA per primary team  etoh abstinence   will need outpatient imaging w mri to monitor course of pseudocyst  will follow CT showed acute on chronic pancreatitis w pseudocyst formation (unchanged from prior)  labs/pain improving  IVF/cont clears  cont ppi  prn pain control/anti emetics  bowel regimen  trend TG; optimize glycemic control  monitor exam  CIWA per primary team  etoh abstinence   will need outpatient imaging w mri to monitor course of pseudocyst  will follow CT showed acute on chronic pancreatitis w pseudocyst formation (unchanged from prior)  labs/pain improving  IVF/cont clears  cont ppi  prn pain control/anti emetics  bowel regimen  trend TG, consider addition of fenofibrate/fish oil; optimize glycemic control  monitor exam  CIWA per primary team  etoh abstinence   will need outpatient imaging w mri to monitor course of pseudocyst  will follow

## 2019-05-08 NOTE — PROGRESS NOTE ADULT - SUBJECTIVE AND OBJECTIVE BOX
Date/Time Patient Seen:  		  Referring MD:   Data Reviewed	       Patient is a 64y old  Male who presents with a chief complaint of Acute Pancreatitis (07 May 2019 17:22)      Subjective/HPI     PAST MEDICAL & SURGICAL HISTORY:  Diabetes: type 2  ROSS (obstructive sleep apnea)  Depression, unspecified depression type  Alcohol use disorder, severe, dependence  Alcohol-induced acute pancreatitis, unspecified complication status  Essential hypertension  Low testosterone in male  Depression  Insomnia  HLD (hyperlipidemia)  HTN (hypertension)  Low testosterone level in male  Sprain of right rotator cuff capsule, subsequent encounter  Borderline diabetes mellitus: last A1c unknown  Low back pain, unspecified back pain laterality, unspecified chronicity, with sciatica presence unspecified  Lumbar herniated disc  Depression, unspecified depression type: Lost his son this past June 2016  Essential hypertension  Sleep apnea, unspecified type  Obesity (BMI 30-39.9)  No significant past surgical history  H/O knee surgery: b/l knees  H/O shoulder surgery: right  S/P arthroscopy of right knee: 2010  S/P right inguinal hernia repair: 2010  S/P ACL repair: left knee 2010  S/P rotator cuff repair: Right shoulder 2008        Medication list         MEDICATIONS  (STANDING):  amLODIPine   Tablet 10 milliGRAM(s) Oral daily  atorvastatin 20 milliGRAM(s) Oral at bedtime  bisacodyl 5 milliGRAM(s) Oral at bedtime  dextrose 5%. 1000 milliLiter(s) (50 mL/Hr) IV Continuous <Continuous>  dextrose 50% Injectable 12.5 Gram(s) IV Push once  dextrose 50% Injectable 25 Gram(s) IV Push once  dextrose 50% Injectable 25 Gram(s) IV Push once  docusate sodium 100 milliGRAM(s) Oral daily  enoxaparin Injectable 40 milliGRAM(s) SubCutaneous daily  escitalopram 20 milliGRAM(s) Oral daily  insulin lispro (HumaLOG) corrective regimen sliding scale   SubCutaneous every 6 hours  metoprolol tartrate Injectable 5 milliGRAM(s) IV Push every 6 hours  pancrelipase  (CREON 12,000 Lipase Units) 6 Capsule(s) Oral every 8 hours  pantoprazole    Tablet 40 milliGRAM(s) Oral before breakfast  senna 2 Tablet(s) Oral at bedtime  sodium chloride 0.45% with potassium chloride 20 mEq/L 1000 milliLiter(s) (130 mL/Hr) IV Continuous <Continuous>  traZODone 100 milliGRAM(s) Oral at bedtime    MEDICATIONS  (PRN):  clonazePAM Tablet 1 milliGRAM(s) Oral at bedtime PRN agitation  dextrose 40% Gel 15 Gram(s) Oral once PRN Blood Glucose LESS THAN 70 milliGRAM(s)/deciliter  glucagon  Injectable 1 milliGRAM(s) IntraMuscular once PRN Glucose LESS THAN 70 milligrams/deciliter  HYDROmorphone  Injectable 0.5 milliGRAM(s) IV Push every 6 hours PRN Moderate Pain (4 - 6)  HYDROmorphone  Injectable 1 milliGRAM(s) IV Push every 6 hours PRN Severe Pain (7 - 10)  HYDROmorphone  Injectable 0.5 milliGRAM(s) IV Push once PRN Severe Pain (7 - 10)  melatonin 5 milliGRAM(s) Oral at bedtime PRN Insomnia  morphine  - Injectable 4 milliGRAM(s) IV Push every 6 hours PRN Mild Pain (1 - 3)  simethicone 80 milliGRAM(s) Chew two times a day PRN Dyspepsia         Vitals log        ICU Vital Signs Last 24 Hrs  T(C): 36.9 (08 May 2019 05:33), Max: 36.9 (08 May 2019 05:33)  T(F): 98.4 (08 May 2019 05:33), Max: 98.4 (08 May 2019 05:33)  HR: 112 (08 May 2019 05:33) (90 - 113)  BP: 149/84 (08 May 2019 05:33) (143/80 - 149/84)  BP(mean): --  ABP: --  ABP(mean): --  RR: 18 (08 May 2019 05:33) (17 - 18)  SpO2: 92% (08 May 2019 05:33) (91% - 99%)           Input and Output:  I&O's Detail    06 May 2019 07:01  -  07 May 2019 07:00  --------------------------------------------------------  IN:    sodium chloride 0.9% with potassium chloride 20 mEq/L: 1000 mL  Total IN: 1000 mL    OUT:  Total OUT: 0 mL    Total NET: 1000 mL      07 May 2019 07:01  -  08 May 2019 06:27  --------------------------------------------------------  IN:    sodium chloride 0.45% with potassium chloride 20 mEq/L: 2540 mL    Solution: 50 mL  Total IN: 2590 mL    OUT:    Voided: 50 mL  Total OUT: 50 mL    Total NET: 2540 mL          Lab Data                        16.1   7.51  )-----------( 157      ( 07 May 2019 17:38 )             47.7     05-07    135  |  101  |  12  ----------------------------<  221<H>  4.3   |  26  |  0.94    Ca    8.5      07 May 2019 17:38  Phos  3.8     05-07  Mg     1.8     05-07    TPro  7.4  /  Alb  3.3  /  TBili  0.7  /  DBili  x   /  AST  40<H>  /  ALT  64  /  AlkPhos  146<H>  05-07            Review of Systems	      Objective     Physical Examination    heart s1s2  lung dec BS  abd soft    Pertinent Lab findings & Imaging      Glory:  NO   Adequate UO     I&O's Detail    06 May 2019 07:01  -  07 May 2019 07:00  --------------------------------------------------------  IN:    sodium chloride 0.9% with potassium chloride 20 mEq/L: 1000 mL  Total IN: 1000 mL    OUT:  Total OUT: 0 mL    Total NET: 1000 mL      07 May 2019 07:01  -  08 May 2019 06:27  --------------------------------------------------------  IN:    sodium chloride 0.45% with potassium chloride 20 mEq/L: 2540 mL    Solution: 50 mL  Total IN: 2590 mL    OUT:    Voided: 50 mL  Total OUT: 50 mL    Total NET: 2540 mL               Discussed with:     Cultures:	        Radiology

## 2019-05-08 NOTE — CHART NOTE - NSCHARTNOTEFT_GEN_A_CORE
Stopped by RN on the floor, states patient does not want to wear hospital cpap. Patient o2 sat on RNs personal pulse ox noted to be 88 and per RN patient HR was 104, on chart review patient HR has been in the high 90s. Patient asymptomatic and AAOx3. Patient has the right to refuse cpap and will not put patient on NC as patient satting well on RA. Patients repeat HR noted to be 90 and O2 sat noted to be 99. No intervention at this time. Patient advised to bring in his cpap from home so he will be more comfortable tomorrow night. Senior resident present during discussion and agrees with plan.

## 2019-05-08 NOTE — PROGRESS NOTE ADULT - PROBLEM SELECTOR PLAN 1
-CT scan showing peripancreatic fat stranding, peripancreatic fluid, pseudocyst. Patient with hx of prior pancreatitis, most recently in Nov-Dec of previous year.  -GI consult - Dr. Faulkner consulted  -Pain management: Morphine 4mg, Dilaudid 0.5mg, Dialudid 1mg Q6hrs  -NS IVF  cc/hr  -Diet advanced to clear liquid, advance as tolerated  -lipase downtrending  -start Creon pancreatic enzymes   -will need outpatient imaging to monitor course of pseudocyst including probable endoscopic ultrasound w/ possible FNA if this becomes recurrent issue -CT scan showing peripancreatic fat stranding, peripancreatic fluid, pseudocyst. Patient with hx of prior pancreatitis, most recently in Nov-Dec of previous year.  -GI consult - Dr. Faulkner consulted  -Pain management: Morphine 4mg, Dilaudid 0.5mg, Dialudid 1mg Q6hrs  -NS IVF  cc/hr  -Diet advanced to clear liquid, advance as tolerated  -lipase downtrending  -start Creon pancreatic enzymes   -will need outpatient imaging to monitor course of pseudocyst including probable endoscopic ultrasound w/ possible FNA if this becomes recurrent issue  -Pain scale: tylenol for mild, morphine 1 mg q6 for mod, morphine 2 mg q6 for severe -CT scan showing peripancreatic fat stranding, peripancreatic fluid, pseudocyst. Patient with hx of prior pancreatitis, most recently in Nov-Dec of previous year.  -GI consult - Dr. Faulkner consulted  -NS IVF  cc/hr  -Diet advanced to clear liquid, advance as tolerated  -lipase downtrending  -start Creon pancreatic enzymes   -will need outpatient imaging to monitor course of pseudocyst including probable endoscopic ultrasound w/ possible FNA if this becomes recurrent issue  -Pain scale: tylenol for mild, morphine 1 mg q6 for mod, morphine 2 mg q6 for severe

## 2019-05-08 NOTE — DIETITIAN INITIAL EVALUATION ADULT. - PROBLEM SELECTOR PLAN 2
[facility] protocol - librium; in past, notes reviewed, required ICU support and precedex. consider ICU consult based on clinical course.   Psych - Dr. Mcwilliams consulted  Addiction medicine - Consult Dr. Coats in AM

## 2019-05-08 NOTE — PROGRESS NOTE ADULT - PROBLEM SELECTOR PLAN 2
-[facility] protocol - librium; in past, notes reviewed, required ICU support and precedex. consider ICU consult based on clinical course.   -Psych Consult - Dr. Mcwilliams consulted  -Addiction Medicine Consult - Dr. Coats -In past, he required ICU support and precedex.  -He shows no signs of withdrawal at present. Will continue to monitor with CIWA scale.   -Psych Consult - Dr. Mcwilliams consulted  -Addiction Medicine Consult - Dr. Koch

## 2019-05-09 ENCOUNTER — TRANSCRIPTION ENCOUNTER (OUTPATIENT)
Age: 64
End: 2019-05-09

## 2019-05-09 VITALS
SYSTOLIC BLOOD PRESSURE: 148 MMHG | RESPIRATION RATE: 18 BRPM | OXYGEN SATURATION: 93 % | DIASTOLIC BLOOD PRESSURE: 72 MMHG | TEMPERATURE: 98 F | HEART RATE: 102 BPM

## 2019-05-09 LAB
ALBUMIN SERPL ELPH-MCNC: 2.4 G/DL — LOW (ref 3.3–5)
ALP SERPL-CCNC: 100 U/L — SIGNIFICANT CHANGE UP (ref 40–120)
ALT FLD-CCNC: 27 U/L — SIGNIFICANT CHANGE UP (ref 12–78)
ANION GAP SERPL CALC-SCNC: 10 MMOL/L — SIGNIFICANT CHANGE UP (ref 5–17)
AST SERPL-CCNC: 20 U/L — SIGNIFICANT CHANGE UP (ref 15–37)
BILIRUB SERPL-MCNC: 0.9 MG/DL — SIGNIFICANT CHANGE UP (ref 0.2–1.2)
BUN SERPL-MCNC: 12 MG/DL — SIGNIFICANT CHANGE UP (ref 7–23)
CALCIUM SERPL-MCNC: 8.8 MG/DL — SIGNIFICANT CHANGE UP (ref 8.5–10.1)
CHLORIDE SERPL-SCNC: 99 MMOL/L — SIGNIFICANT CHANGE UP (ref 96–108)
CO2 SERPL-SCNC: 24 MMOL/L — SIGNIFICANT CHANGE UP (ref 22–31)
CREAT SERPL-MCNC: 0.68 MG/DL — SIGNIFICANT CHANGE UP (ref 0.5–1.3)
GLUCOSE SERPL-MCNC: 165 MG/DL — HIGH (ref 70–99)
HCT VFR BLD CALC: 36.7 % — LOW (ref 39–50)
HGB BLD-MCNC: 12.6 G/DL — LOW (ref 13–17)
LIDOCAIN IGE QN: 653 U/L — HIGH (ref 73–393)
MCHC RBC-ENTMCNC: 29.4 PG — SIGNIFICANT CHANGE UP (ref 27–34)
MCHC RBC-ENTMCNC: 34.3 GM/DL — SIGNIFICANT CHANGE UP (ref 32–36)
MCV RBC AUTO: 85.5 FL — SIGNIFICANT CHANGE UP (ref 80–100)
NRBC # BLD: 0 /100 WBCS — SIGNIFICANT CHANGE UP (ref 0–0)
PLATELET # BLD AUTO: 141 K/UL — LOW (ref 150–400)
POTASSIUM SERPL-MCNC: 3.5 MMOL/L — SIGNIFICANT CHANGE UP (ref 3.5–5.3)
POTASSIUM SERPL-SCNC: 3.5 MMOL/L — SIGNIFICANT CHANGE UP (ref 3.5–5.3)
PROT SERPL-MCNC: 6.9 G/DL — SIGNIFICANT CHANGE UP (ref 6–8.3)
RBC # BLD: 4.29 M/UL — SIGNIFICANT CHANGE UP (ref 4.2–5.8)
RBC # FLD: 14.7 % — HIGH (ref 10.3–14.5)
SODIUM SERPL-SCNC: 133 MMOL/L — LOW (ref 135–145)
WBC # BLD: 10.72 K/UL — HIGH (ref 3.8–10.5)
WBC # FLD AUTO: 10.72 K/UL — HIGH (ref 3.8–10.5)

## 2019-05-09 PROCEDURE — 85730 THROMBOPLASTIN TIME PARTIAL: CPT

## 2019-05-09 PROCEDURE — 80076 HEPATIC FUNCTION PANEL: CPT

## 2019-05-09 PROCEDURE — 83036 HEMOGLOBIN GLYCOSYLATED A1C: CPT

## 2019-05-09 PROCEDURE — 80061 LIPID PANEL: CPT

## 2019-05-09 PROCEDURE — 96376 TX/PRO/DX INJ SAME DRUG ADON: CPT

## 2019-05-09 PROCEDURE — 87186 SC STD MICRODIL/AGAR DIL: CPT

## 2019-05-09 PROCEDURE — 85027 COMPLETE CBC AUTOMATED: CPT

## 2019-05-09 PROCEDURE — 93005 ELECTROCARDIOGRAM TRACING: CPT

## 2019-05-09 PROCEDURE — 84100 ASSAY OF PHOSPHORUS: CPT

## 2019-05-09 PROCEDURE — 99239 HOSP IP/OBS DSCHRG MGMT >30: CPT | Mod: GC

## 2019-05-09 PROCEDURE — 96375 TX/PRO/DX INJ NEW DRUG ADDON: CPT

## 2019-05-09 PROCEDURE — 81001 URINALYSIS AUTO W/SCOPE: CPT

## 2019-05-09 PROCEDURE — 94760 N-INVAS EAR/PLS OXIMETRY 1: CPT

## 2019-05-09 PROCEDURE — 83735 ASSAY OF MAGNESIUM: CPT

## 2019-05-09 PROCEDURE — 86803 HEPATITIS C AB TEST: CPT

## 2019-05-09 PROCEDURE — 82150 ASSAY OF AMYLASE: CPT

## 2019-05-09 PROCEDURE — 80048 BASIC METABOLIC PNL TOTAL CA: CPT

## 2019-05-09 PROCEDURE — 83605 ASSAY OF LACTIC ACID: CPT

## 2019-05-09 PROCEDURE — 85610 PROTHROMBIN TIME: CPT

## 2019-05-09 PROCEDURE — 96374 THER/PROPH/DIAG INJ IV PUSH: CPT

## 2019-05-09 PROCEDURE — 36415 COLL VENOUS BLD VENIPUNCTURE: CPT

## 2019-05-09 PROCEDURE — 83690 ASSAY OF LIPASE: CPT

## 2019-05-09 PROCEDURE — 74177 CT ABD & PELVIS W/CONTRAST: CPT

## 2019-05-09 PROCEDURE — 84478 ASSAY OF TRIGLYCERIDES: CPT

## 2019-05-09 PROCEDURE — 80307 DRUG TEST PRSMV CHEM ANLYZR: CPT

## 2019-05-09 PROCEDURE — 94660 CPAP INITIATION&MGMT: CPT

## 2019-05-09 PROCEDURE — 80053 COMPREHEN METABOLIC PANEL: CPT

## 2019-05-09 PROCEDURE — 87086 URINE CULTURE/COLONY COUNT: CPT

## 2019-05-09 PROCEDURE — 99285 EMERGENCY DEPT VISIT HI MDM: CPT | Mod: 25

## 2019-05-09 PROCEDURE — 82962 GLUCOSE BLOOD TEST: CPT

## 2019-05-09 RX ORDER — TRAZODONE HCL 50 MG
1 TABLET ORAL
Qty: 30 | Refills: 0
Start: 2019-05-09 | End: 2019-06-07

## 2019-05-09 RX ORDER — PANTOPRAZOLE SODIUM 20 MG/1
1 TABLET, DELAYED RELEASE ORAL
Qty: 30 | Refills: 0
Start: 2019-05-09 | End: 2019-06-07

## 2019-05-09 RX ORDER — LIPASE/PROTEASE/AMYLASE 16-48-48K
6 CAPSULE,DELAYED RELEASE (ENTERIC COATED) ORAL
Qty: 252 | Refills: 0
Start: 2019-05-09 | End: 2019-05-22

## 2019-05-09 RX ORDER — GLYCERIN ADULT
1 SUPPOSITORY, RECTAL RECTAL AT BEDTIME
Refills: 0 | Status: DISCONTINUED | OUTPATIENT
Start: 2019-05-09 | End: 2019-05-09

## 2019-05-09 RX ADMIN — Medication 100 MILLIGRAM(S): at 11:53

## 2019-05-09 RX ADMIN — Medication 25 MILLIGRAM(S): at 06:18

## 2019-05-09 RX ADMIN — Medication 6 CAPSULE(S): at 11:53

## 2019-05-09 RX ADMIN — PANTOPRAZOLE SODIUM 40 MILLIGRAM(S): 20 TABLET, DELAYED RELEASE ORAL at 06:18

## 2019-05-09 RX ADMIN — MORPHINE SULFATE 1 MILLIGRAM(S): 50 CAPSULE, EXTENDED RELEASE ORAL at 04:16

## 2019-05-09 RX ADMIN — Medication 6 CAPSULE(S): at 06:18

## 2019-05-09 RX ADMIN — AMLODIPINE BESYLATE 10 MILLIGRAM(S): 2.5 TABLET ORAL at 06:18

## 2019-05-09 RX ADMIN — SODIUM CHLORIDE 100 MILLILITER(S): 9 INJECTION INTRAMUSCULAR; INTRAVENOUS; SUBCUTANEOUS at 06:57

## 2019-05-09 RX ADMIN — ESCITALOPRAM OXALATE 20 MILLIGRAM(S): 10 TABLET, FILM COATED ORAL at 11:53

## 2019-05-09 RX ADMIN — MORPHINE SULFATE 1 MILLIGRAM(S): 50 CAPSULE, EXTENDED RELEASE ORAL at 04:01

## 2019-05-09 NOTE — PROGRESS NOTE ADULT - PROBLEM SELECTOR PLAN 1
todd  etoh abuse  atelectasis  pancreatitis  on clears  on IVF  serial labs  serial clinical exam  endo eval noted  on Opioids prn for pain  enc use of CPAP for TODD  weight management and sleep hygiene discussion  etoh use makes TODD worse  counseling  education  will follow  discussed with pt above plan of care

## 2019-05-09 NOTE — PROGRESS NOTE ADULT - SUBJECTIVE AND OBJECTIVE BOX
CAPILLARY BLOOD GLUCOSE      POCT Blood Glucose.: 142 mg/dL (09 May 2019 06:16)  POCT Blood Glucose.: 152 mg/dL (08 May 2019 23:39)  POCT Blood Glucose.: 146 mg/dL (08 May 2019 17:06)  POCT Blood Glucose.: 208 mg/dL (08 May 2019 11:58)      Vital Signs Last 24 Hrs  T(C): 36.9 (09 May 2019 05:30), Max: 37.7 (08 May 2019 13:37)  T(F): 98.5 (09 May 2019 05:30), Max: 99.8 (08 May 2019 13:37)  HR: 102 (09 May 2019 05:30) (96 - 102)  BP: 143/79 (09 May 2019 05:30) (131/76 - 143/79)  BP(mean): --  RR: 18 (09 May 2019 05:30) (18 - 19)  SpO2: 91% (09 May 2019 05:30) (91% - 92%)    General: WN/WD NAD  Respiratory: CTA B/L  CV: RRR, S1S2, no murmurs, rubs or gallops  Abdominal: Soft, NT, ND +BS, Last BM  Extremities: No edema, + peripheral pulses    Hemoglobin A1C, Whole Blood: 8.9 % (05-07 @ 11:00)   05-09    133<L>  |  99  |  12  ----------------------------<  165<H>  3.5   |  24  |  0.68    Ca    8.8      09 May 2019 08:39  Mg     2.2     05-08    TPro  6.9  /  Alb  2.4<L>  /  TBili  0.9  /  DBili  x   /  AST  20  /  ALT  27  /  AlkPhos  100  05-09      atorvastatin 20 milliGRAM(s) Oral at bedtime  dextrose 40% Gel 15 Gram(s) Oral once PRN  dextrose 50% Injectable 12.5 Gram(s) IV Push once  dextrose 50% Injectable 25 Gram(s) IV Push once  dextrose 50% Injectable 25 Gram(s) IV Push once  glucagon  Injectable 1 milliGRAM(s) IntraMuscular once PRN  insulin lispro (HumaLOG) corrective regimen sliding scale   SubCutaneous every 6 hours

## 2019-05-09 NOTE — PROGRESS NOTE ADULT - PROBLEM SELECTOR PROBLEM 2
Pancreatitis, chronic
Pancreatitis, chronic
Alcohol use disorder, severe, dependence
Alcohol use disorder, severe, dependence

## 2019-05-09 NOTE — PROGRESS NOTE ADULT - PROBLEM SELECTOR PLAN 1
CT showed acute on chronic pancreatitis w pseudocyst formation (unchanged from prior)  clinically improving  diet as tolerated  cont ppi  prn pain control/anti emetics  increase bowel regimen  trend TG, consider addition of fenofibrate/fish oil; optimize glycemic control; endo following  monitor exam  CIWA per primary team; etoh abstinence   will need outpatient imaging w mri to monitor course of pseudocyst

## 2019-05-09 NOTE — PROGRESS NOTE ADULT - ATTENDING COMMENTS
Advanced care planning was discussed with patient and family.  Advanced care planning forms were reviewed and discussed.  Risks, benefits and alternatives of gastroenterologic procedures were discussed in detail and all questions were answered.    30 minutes spent.
Advanced care planning was discussed with patient and family.  Advanced care planning forms were reviewed and discussed.  Risks, benefits and alternatives of gastroenterologic procedures were discussed in detail and all questions were answered.    30 minutes spent.
I personally conducted a physical examination of the patient. I personally gathered the patient's history. I edited the above listed findings which were prepared by the listed resident physician. I personally discussed the plan of care with the patient. The questions and concerns were addressed to the best of my ability. The patient is in agreement with the listed treatment plan.     - GI consult for etoh induced pancreatitis. npo. ivf. trend lipase. trend TG level. monitor pseudocyst as outpatient, pt is aware of the need for this surveillance.  - advance to clear liquid diet once indicated, pt still has pain and nausea today  - CIWA protocol, heavy drinker, high risk for uncontrolled symptoms of etoh withdrawal, will monitor closely

## 2019-05-09 NOTE — PROGRESS NOTE ADULT - REASON FOR ADMISSION
Acute Pancreatitis

## 2019-05-09 NOTE — PROGRESS NOTE ADULT - PROBLEM SELECTOR PROBLEM 1
Pancreatitis, alcoholic, acute
Pancreatitis, alcoholic, acute
ROSS (obstructive sleep apnea)
ROSS (obstructive sleep apnea)
Type 2 diabetes mellitus
Alcohol-induced acute pancreatitis, unspecified complication status
Alcohol-induced acute pancreatitis, unspecified complication status

## 2019-05-09 NOTE — PROGRESS NOTE ADULT - SUBJECTIVE AND OBJECTIVE BOX
Date/Time Patient Seen:  		  Referring MD:   Data Reviewed	       Patient is a 64y old  Male who presents with a chief complaint of Acute Pancreatitis (08 May 2019 12:27)      Subjective/HPI     PAST MEDICAL & SURGICAL HISTORY:  Diabetes: type 2  ROSS (obstructive sleep apnea)  Depression, unspecified depression type  Alcohol use disorder, severe, dependence  Alcohol-induced acute pancreatitis, unspecified complication status  Essential hypertension  Low testosterone in male  Depression  Insomnia  HLD (hyperlipidemia)  HTN (hypertension)  Low testosterone level in male  Sprain of right rotator cuff capsule, subsequent encounter  Borderline diabetes mellitus: last A1c unknown  Low back pain, unspecified back pain laterality, unspecified chronicity, with sciatica presence unspecified  Lumbar herniated disc  Depression, unspecified depression type: Lost his son this past June 2016  Essential hypertension  Sleep apnea, unspecified type  Obesity (BMI 30-39.9)  No significant past surgical history  H/O knee surgery: b/l knees  H/O shoulder surgery: right  S/P arthroscopy of right knee: 2010  S/P right inguinal hernia repair: 2010  S/P ACL repair: left knee 2010  S/P rotator cuff repair: Right shoulder 2008        Medication list         MEDICATIONS  (STANDING):  amLODIPine   Tablet 10 milliGRAM(s) Oral daily  atorvastatin 20 milliGRAM(s) Oral at bedtime  bisacodyl 5 milliGRAM(s) Oral at bedtime  dextrose 50% Injectable 12.5 Gram(s) IV Push once  dextrose 50% Injectable 25 Gram(s) IV Push once  dextrose 50% Injectable 25 Gram(s) IV Push once  docusate sodium 100 milliGRAM(s) Oral daily  enoxaparin Injectable 40 milliGRAM(s) SubCutaneous daily  escitalopram 20 milliGRAM(s) Oral daily  insulin lispro (HumaLOG) corrective regimen sliding scale   SubCutaneous every 6 hours  metoprolol tartrate 25 milliGRAM(s) Oral two times a day  pancrelipase  (CREON 12,000 Lipase Units) 6 Capsule(s) Oral every 8 hours  pantoprazole    Tablet 40 milliGRAM(s) Oral before breakfast  senna 2 Tablet(s) Oral at bedtime  sodium chloride 0.9%. 1000 milliLiter(s) (100 mL/Hr) IV Continuous <Continuous>  traZODone 100 milliGRAM(s) Oral at bedtime    MEDICATIONS  (PRN):  acetaminophen   Tablet .. 650 milliGRAM(s) Oral every 6 hours PRN Mild Pain (1 - 3)  clonazePAM Tablet 1 milliGRAM(s) Oral at bedtime PRN agitation  dextrose 40% Gel 15 Gram(s) Oral once PRN Blood Glucose LESS THAN 70 milliGRAM(s)/deciliter  glucagon  Injectable 1 milliGRAM(s) IntraMuscular once PRN Glucose LESS THAN 70 milligrams/deciliter  melatonin 5 milliGRAM(s) Oral at bedtime PRN Insomnia  morphine  - Injectable 2 milliGRAM(s) IV Push every 6 hours PRN Severe Pain (7 - 10)  morphine  - Injectable 1 milliGRAM(s) IV Push every 6 hours PRN Moderate Pain (4 - 6)  polyethylene glycol 3350 17 Gram(s) Oral daily PRN Constipation  simethicone 80 milliGRAM(s) Chew two times a day PRN Dyspepsia         Vitals log        ICU Vital Signs Last 24 Hrs  T(C): 36.9 (09 May 2019 05:30), Max: 37.7 (08 May 2019 13:37)  T(F): 98.5 (09 May 2019 05:30), Max: 99.8 (08 May 2019 13:37)  HR: 102 (09 May 2019 05:30) (96 - 102)  BP: 143/79 (09 May 2019 05:30) (131/76 - 143/79)  BP(mean): --  ABP: --  ABP(mean): --  RR: 18 (09 May 2019 05:30) (18 - 19)  SpO2: 91% (09 May 2019 05:30) (91% - 92%)           Input and Output:  I&O's Detail    08 May 2019 07:01  -  09 May 2019 07:00  --------------------------------------------------------  IN:    sodium chloride 0.45% with potassium chloride 20 mEq/L: 260 mL    sodium chloride 0.9%.: 1200 mL  Total IN: 1460 mL    OUT:    Voided: 500 mL  Total OUT: 500 mL    Total NET: 960 mL          Lab Data                        15.0   11.17 )-----------( 159      ( 08 May 2019 07:55 )             43.2     05-08    130<L>  |  99  |  16  ----------------------------<  184<H>  4.1   |  23  |  0.84    Ca    8.6      08 May 2019 07:55  Phos  3.8     05-07  Mg     2.2     05-08    TPro  6.7  /  Alb  2.4<L>  /  TBili  0.9  /  DBili  x   /  AST  28  /  ALT  35  /  AlkPhos  94  05-08            Review of Systems	      Objective     Physical Examination    heart s1s2  lung dec BS      Pertinent Lab findings & Imaging      Glory:  NO   Adequate UO     I&O's Detail    08 May 2019 07:01  -  09 May 2019 07:00  --------------------------------------------------------  IN:    sodium chloride 0.45% with potassium chloride 20 mEq/L: 260 mL    sodium chloride 0.9%.: 1200 mL  Total IN: 1460 mL    OUT:    Voided: 500 mL  Total OUT: 500 mL    Total NET: 960 mL               Discussed with:     Cultures:	        Radiology

## 2019-05-09 NOTE — DISCHARGE NOTE NURSING/CASE MANAGEMENT/SOCIAL WORK - NSDCDPATPORTLINK_GEN_ALL_CORE
You can access the [facility] Patient Portal, offered by [facility], by registering with the following website: [email or web address]

## 2019-05-09 NOTE — PROGRESS NOTE ADULT - SUBJECTIVE AND OBJECTIVE BOX
INTERVAL HPI/OVERNIGHT EVENTS:  pt seen and examined  denies n/v  states pain better, tolerating clears  +gas no bm    MEDICATIONS  (STANDING):  amLODIPine   Tablet 10 milliGRAM(s) Oral daily  atorvastatin 20 milliGRAM(s) Oral at bedtime  bisacodyl 5 milliGRAM(s) Oral at bedtime  dextrose 50% Injectable 12.5 Gram(s) IV Push once  dextrose 50% Injectable 25 Gram(s) IV Push once  dextrose 50% Injectable 25 Gram(s) IV Push once  docusate sodium 100 milliGRAM(s) Oral daily  enoxaparin Injectable 40 milliGRAM(s) SubCutaneous daily  escitalopram 20 milliGRAM(s) Oral daily  insulin lispro (HumaLOG) corrective regimen sliding scale   SubCutaneous every 6 hours  metoprolol tartrate 25 milliGRAM(s) Oral two times a day  pancrelipase  (CREON 12,000 Lipase Units) 6 Capsule(s) Oral every 8 hours  pantoprazole    Tablet 40 milliGRAM(s) Oral before breakfast  senna 2 Tablet(s) Oral at bedtime  traZODone 100 milliGRAM(s) Oral at bedtime    MEDICATIONS  (PRN):  acetaminophen   Tablet .. 650 milliGRAM(s) Oral every 6 hours PRN Mild Pain (1 - 3)  clonazePAM Tablet 1 milliGRAM(s) Oral at bedtime PRN agitation  dextrose 40% Gel 15 Gram(s) Oral once PRN Blood Glucose LESS THAN 70 milliGRAM(s)/deciliter  glucagon  Injectable 1 milliGRAM(s) IntraMuscular once PRN Glucose LESS THAN 70 milligrams/deciliter  melatonin 5 milliGRAM(s) Oral at bedtime PRN Insomnia  polyethylene glycol 3350 17 Gram(s) Oral daily PRN Constipation  simethicone 80 milliGRAM(s) Chew two times a day PRN Dyspepsia      Allergies    No Known Allergies    Intolerances        Review of Systems:    General:  No wt loss, fevers, chills, night sweats, fatigue   Eyes:  Good vision, no reported pain  ENT:  No sore throat, pain, runny nose, dysphagia  CV:  No pain, palpitations, hypo/hypertension  Resp:  No dyspnea, cough, tachypnea, wheezing  GI:  improving pain, No nausea, No vomiting, No diarrhea, No constipation, No weight loss, No fever, No pruritis, No rectal bleeding, No melena, No dysphagia  :  No pain, bleeding, incontinence, nocturia  Muscle:  No pain, weakness  Neuro:  No weakness, tingling, memory problems  Psych:  No fatigue, insomnia, mood problems, depression  Endocrine:  No polyuria, polydypsia, cold/heat intolerance  Heme:  No petechiae, ecchymosis, easy bruisability  Skin:  No rash, tattoos, scars, edema      Vital Signs Last 24 Hrs  T(C): 36.9 (09 May 2019 05:30), Max: 37.7 (08 May 2019 13:37)  T(F): 98.5 (09 May 2019 05:30), Max: 99.8 (08 May 2019 13:37)  HR: 102 (09 May 2019 05:30) (96 - 102)  BP: 143/79 (09 May 2019 05:30) (131/76 - 143/79)  BP(mean): --  RR: 18 (09 May 2019 05:30) (18 - 19)  SpO2: 91% (09 May 2019 05:30) (91% - 92%)    PHYSICAL EXAM:  Constitutional: lying in bed  HEENT: ncat  Neck: No LAD  Respiratory: dec bs  Cardiovascular: S1 and S2, RRR  Gastrointestinal: soft mild ttp epigastric region no guarding +dt   Extremities: No peripheral edema  Vascular: 2+ peripheral pulses  Neurological: A/O x 3  Skin: No rashes        LABS:                        12.6   10.72 )-----------( 141      ( 09 May 2019 08:39 )             36.7     05-09    133<L>  |  99  |  12  ----------------------------<  165<H>  3.5   |  24  |  0.68    Ca    8.8      09 May 2019 08:39  Mg     2.2     05-08    TPro  6.9  /  Alb  2.4<L>  /  TBili  0.9  /  DBili  x   /  AST  20  /  ALT  27  /  AlkPhos  100  05-09          RADIOLOGY & ADDITIONAL TESTS:

## 2019-05-10 LAB
CULTURE RESULTS: SIGNIFICANT CHANGE UP
ORGANISM # SPEC MICROSCOPIC CNT: SIGNIFICANT CHANGE UP
ORGANISM # SPEC MICROSCOPIC CNT: SIGNIFICANT CHANGE UP
SPECIMEN SOURCE: SIGNIFICANT CHANGE UP

## 2020-01-10 NOTE — BEHAVIORAL HEALTH ASSESSMENT NOTE - NS ED BHA MED ROS ALLERGIC IMMUNOLOGIC
Spoke with patient. She was increased to escitalopram 10 mg at last visit and prescription was sent to [facility] for 90 day supply. She states she doesn't think she can use [facility] anymore. [facility]. Will resend prescription as 90 day supply to [facility].    No complaints

## 2020-02-22 NOTE — BEHAVIORAL HEALTH ASSESSMENT NOTE - AXIS III
Department of Internal Medicine  General Internal Medicine  Attending Progress Note      SUBJECTIVE:  She is not getting her stress test today. She will get it on Monday.       OBJECTIVE      Medications    Current Facility-Administered Medications: ondansetron (ZOFRAN) injection 4 mg, 4 mg, Intravenous, Q30 Min PRN  busPIRone (BUSPAR) tablet 15 mg, 15 mg, Oral, BID  mirtazapine (REMERON) tablet 15 mg, 15 mg, Oral, Nightly  losartan (COZAAR) tablet 50 mg, 50 mg, Oral, Daily  carvedilol (COREG) tablet 25 mg, 25 mg, Oral, BID WC  atorvastatin (LIPITOR) tablet 20 mg, 20 mg, Oral, Nightly  traZODone (DESYREL) tablet 100 mg, 100 mg, Oral, Nightly  pantoprazole (PROTONIX) tablet 40 mg, 40 mg, Oral, QAM AC  escitalopram (LEXAPRO) tablet 20 mg, 20 mg, Oral, Daily  aspirin EC tablet 81 mg, 81 mg, Oral, Daily  ALPRAZolam (XANAX) tablet 0.5 mg, 0.5 mg, Oral, TID PRN  sodium chloride flush 0.9 % injection 10 mL, 10 mL, Intravenous, 2 times per day  sodium chloride flush 0.9 % injection 10 mL, 10 mL, Intravenous, PRN  acetaminophen (TYLENOL) tablet 650 mg, 650 mg, Oral, Q6H PRN  acetaminophen (TYLENOL) suppository 650 mg, 650 mg, Rectal, Q6H PRN  polyethylene glycol (GLYCOLAX) packet 17 g, 17 g, Oral, Daily PRN  promethazine (PHENERGAN) tablet 12.5 mg, 12.5 mg, Oral, Q6H PRN  ondansetron (ZOFRAN) injection 4 mg, 4 mg, Intravenous, Q6H PRN  enoxaparin (LOVENOX) injection 40 mg, 40 mg, Subcutaneous, Daily  insulin lispro (HUMALOG) injection vial 0-6 Units, 0-6 Units, Subcutaneous, TID WC  insulin lispro (HUMALOG) injection vial 0-3 Units, 0-3 Units, Subcutaneous, Nightly  glucose (GLUTOSE) 40 % oral gel 15 g, 15 g, Oral, PRN  dextrose 50 % IV solution, 12.5 g, Intravenous, PRN  glucagon (rDNA) injection 1 mg, 1 mg, Intramuscular, PRN  dextrose 5 % solution, 100 mL/hr, Intravenous, PRN  Physical    VITALS:  BP 93/63   Pulse 62   Temp 98.1 °F (36.7 °C) (Oral)   Resp 12   Ht 5' 4\" (1.626 m)   Wt 134 lb (60.8 kg)   SpO2 95% HTN, HLD, Sleep apnea

## 2020-03-22 NOTE — PATIENT PROFILE ADULT - NSPROGENPREVTRANSF_GEN_A_NUR
Onset: today    Location / description: Patient states started to have nausea, diarrhea, headache, fatigue, and difficulty breathing today.  Is hard to breath even at rest laying down.  Took Pepto-Bismol earlier today which did help with nausea and diarrhea.  Took Ibuprofen for headache around 7 pm and did not help.  States her step-children's mother tested positive for COVID-19 3 days ago but she has not been directly around her.      Precipitating Factors: none    Pain Scale (1 - 10), 10 highest: did not rate    Associated Symptoms: see above    What improves/worsens symptoms: Pepto-Bismol did help with the nausea and diarrhea    Symptom specific medications: Pepto-Bismol, Ibuprofen    LMP : Patient's last menstrual period was 07/19/2018.    Are you pregnant or breast feeding: denies    Recent Care: none    No PCP on file, unable to route encounter.    Plan:  Advised patient to be evaluated in ED and to call 911 if anything changes or worsens.  Patient verbalized understanding and is agreeable.     Reason for Disposition  • [1] MODERATE difficulty breathing (e.g., speaks in phrases, SOB even at rest, pulse 100-120) AND [2] NEW-onset or WORSE than normal    Protocols used: BREATHING DIFFICULTY-A-AH       no

## 2020-03-25 ENCOUNTER — EMERGENCY (EMERGENCY)
Facility: HOSPITAL | Age: 65
LOS: 1 days | Discharge: HOME | End: 2020-03-25
Attending: EMERGENCY MEDICINE | Admitting: EMERGENCY MEDICINE
Payer: COMMERCIAL

## 2020-03-25 VITALS
SYSTOLIC BLOOD PRESSURE: 169 MMHG | HEART RATE: 89 BPM | RESPIRATION RATE: 15 BRPM | TEMPERATURE: 100 F | OXYGEN SATURATION: 99 % | DIASTOLIC BLOOD PRESSURE: 83 MMHG

## 2020-03-25 VITALS
RESPIRATION RATE: 16 BRPM | WEIGHT: 177.03 LBS | SYSTOLIC BLOOD PRESSURE: 161 MMHG | HEIGHT: 70 IN | DIASTOLIC BLOOD PRESSURE: 79 MMHG | HEART RATE: 99 BPM | TEMPERATURE: 100 F | OXYGEN SATURATION: 96 %

## 2020-03-25 DIAGNOSIS — Z98.89 OTHER SPECIFIED POSTPROCEDURAL STATES: Chronic | ICD-10-CM

## 2020-03-25 DIAGNOSIS — Z98.890 OTHER SPECIFIED POSTPROCEDURAL STATES: Chronic | ICD-10-CM

## 2020-03-25 LAB
ANION GAP SERPL CALC-SCNC: 16 MMOL/L — SIGNIFICANT CHANGE UP (ref 5–17)
APTT BLD: 32.1 SEC — SIGNIFICANT CHANGE UP (ref 28.5–37)
BUN SERPL-MCNC: 10 MG/DL — SIGNIFICANT CHANGE UP (ref 7–23)
CALCIUM SERPL-MCNC: 9.5 MG/DL — SIGNIFICANT CHANGE UP (ref 8.5–10.1)
CHLORIDE SERPL-SCNC: 90 MMOL/L — LOW (ref 96–108)
CO2 SERPL-SCNC: 22 MMOL/L — SIGNIFICANT CHANGE UP (ref 22–31)
CREAT SERPL-MCNC: 0.92 MG/DL — SIGNIFICANT CHANGE UP (ref 0.5–1.3)
GLUCOSE SERPL-MCNC: 307 MG/DL — HIGH (ref 70–99)
HCT VFR BLD CALC: 42.6 % — SIGNIFICANT CHANGE UP (ref 39–50)
HGB BLD-MCNC: 14.9 G/DL — SIGNIFICANT CHANGE UP (ref 13–17)
INR BLD: 1.17 RATIO — HIGH (ref 0.88–1.16)
MCHC RBC-ENTMCNC: 30.5 PG — SIGNIFICANT CHANGE UP (ref 27–34)
MCHC RBC-ENTMCNC: 35 GM/DL — SIGNIFICANT CHANGE UP (ref 32–36)
MCV RBC AUTO: 87.1 FL — SIGNIFICANT CHANGE UP (ref 80–100)
NRBC # BLD: 0 /100 WBCS — SIGNIFICANT CHANGE UP (ref 0–0)
PLATELET # BLD AUTO: 123 K/UL — LOW (ref 150–400)
POTASSIUM SERPL-MCNC: 3.7 MMOL/L — SIGNIFICANT CHANGE UP (ref 3.5–5.3)
POTASSIUM SERPL-SCNC: 3.7 MMOL/L — SIGNIFICANT CHANGE UP (ref 3.5–5.3)
PROTHROM AB SERPL-ACNC: 13.2 SEC — HIGH (ref 10–12.9)
RBC # BLD: 4.89 M/UL — SIGNIFICANT CHANGE UP (ref 4.2–5.8)
RBC # FLD: 11.9 % — SIGNIFICANT CHANGE UP (ref 10.3–14.5)
SODIUM SERPL-SCNC: 128 MMOL/L — LOW (ref 135–145)
WBC # BLD: 4.67 K/UL — SIGNIFICANT CHANGE UP (ref 3.8–10.5)
WBC # FLD AUTO: 4.67 K/UL — SIGNIFICANT CHANGE UP (ref 3.8–10.5)

## 2020-03-25 PROCEDURE — 70496 CT ANGIOGRAPHY HEAD: CPT

## 2020-03-25 PROCEDURE — 70498 CT ANGIOGRAPHY NECK: CPT

## 2020-03-25 PROCEDURE — 85730 THROMBOPLASTIN TIME PARTIAL: CPT

## 2020-03-25 PROCEDURE — 70450 CT HEAD/BRAIN W/O DYE: CPT | Mod: 26

## 2020-03-25 PROCEDURE — 99284 EMERGENCY DEPT VISIT MOD MDM: CPT | Mod: 25

## 2020-03-25 PROCEDURE — 99284 EMERGENCY DEPT VISIT MOD MDM: CPT

## 2020-03-25 PROCEDURE — 93010 ELECTROCARDIOGRAM REPORT: CPT

## 2020-03-25 PROCEDURE — 85610 PROTHROMBIN TIME: CPT

## 2020-03-25 PROCEDURE — 70450 CT HEAD/BRAIN W/O DYE: CPT

## 2020-03-25 PROCEDURE — 80048 BASIC METABOLIC PNL TOTAL CA: CPT

## 2020-03-25 PROCEDURE — 70496 CT ANGIOGRAPHY HEAD: CPT | Mod: 26

## 2020-03-25 PROCEDURE — 36415 COLL VENOUS BLD VENIPUNCTURE: CPT

## 2020-03-25 PROCEDURE — 70498 CT ANGIOGRAPHY NECK: CPT | Mod: 26

## 2020-03-25 PROCEDURE — 85027 COMPLETE CBC AUTOMATED: CPT

## 2020-03-25 PROCEDURE — 93005 ELECTROCARDIOGRAM TRACING: CPT

## 2020-03-25 NOTE — ED PROVIDER NOTE - CARE PROVIDER_API CALL
Kostas Joaquin; PhD)  Infectious Disease; Internal Medicine  [address] [place] [address]  Phone: [phone]  Fax: [phone]  Follow Up Time:

## 2020-03-25 NOTE — ED PROVIDER NOTE - DISPOSITION TYPE
ED Provider Note    CHIEF COMPLAINT  Chief Complaint   Patient presents with   • Vomiting     6 months of vomiting, now she cannot hold down her seizure meds and has had 7 seizures prior to arrival       HPI  Oseas Day is a 30 y.o. female who presents with history of vomiting for the last 2 months worse over the last week worse again today. Also has a long history of seizures, has had 7 seizures today according to her. Grand mal seizures as she is unable to keep down her. She has a long history of seizures, 7 years of seizures since she stopped illicit drugs.. Normally seizures are well controlled on Keppra but currently she cannot keep to Her down. Evidently she is scheduled for endoscopy however cannot receive endoscopy until seizures are controlled.    REVIEW OF SYSTEMS  See HPI for further details. History of asthma, history of pulmonary depression seizures, multiple episodes of vomiting Denies other G.I., G.U.. endrocine, cardiovascular, respriatory or neurological problems. All other systems are negative.     PAST MEDICAL HISTORY  Past Medical History:   Diagnosis Date   • Asthma     Inhaler PRN   • Bowel habit changes     diarrhea   • Heart burn    • Indigestion    • Psychiatric problem     Bipolar   • Seizure disorder (HCC) 05/21/2018    epilepsy; 5/21/2018 nausea/vomiting difficulty holding down seizure medication; PCP, Dr. Shun Flowers monitors seizures       FAMILY HISTORY  No family history on file.    SOCIAL HISTORY  Social History     Social History   • Marital status: Other     Spouse name: N/A   • Number of children: N/A   • Years of education: N/A     Social History Main Topics   • Smoking status: Current Every Day Smoker     Packs/day: 0.50     Years: 4.00     Types: Cigarettes   • Smokeless tobacco: Never Used   • Alcohol use Yes      Comment: none since 2011   • Drug use: Yes     Types: Intravenous      Comment: none since 2010   • Sexual activity: Not on file     Other Topics Concern  "  • Not on file     Social History Narrative   • No narrative on file       SURGICAL HISTORY  Past Surgical History:   Procedure Laterality Date   • DENTAL EXTRACTION(S)  2003    wisdom teeth   • GASTROSCOPY  2001       CURRENT MEDICATIONS  Home Medications     Reviewed by Ronald Alex (Pharmacy Tech) on 05/23/18 at 1538  Med List Status: Complete   Medication Last Dose Status   albuterol 108 (90 Base) MCG/ACT Aero Soln inhalation aerosol PRN Active   cyclobenzaprine (FLEXERIL) 5 MG tablet 5/23/2018 Active   dicyclomine (BENTYL) 20 MG Tab 5/23/2018 Active   levETIRAcetam (KEPPRA) 500 MG Tab 5/23/2018 Active   ondansetron (ZOFRAN ODT) 8 MG TABLET DISPERSIBLE 5/23/2018 Active   pantoprazole (PROTONIX) 40 MG Tablet Delayed Response 5/23/2018 Active   sucralfate (CARAFATE) 1 GM Tab 5/23/2018 Active   tramadol (ULTRAM) 50 MG Tab 5/23/2018 Active   traZODone (DESYREL) 50 MG Tab 5/22/2018 Active   venlafaxine XR (EFFEXOR XR) 75 MG CAPSULE SR 24 HR 5/23/2018 Active   vitamin D, Ergocalciferol, (DRISDOL) 20333 units Cap capsule 5/21/2018 Active                ALLERGIES  Allergies   Allergen Reactions   • Shellfish Allergy Anaphylaxis   • Other Drug      Pt states  Monitored NARCOTICS if possible; recovering addict       PHYSICAL EXAM  VITAL SIGNS: /52   Pulse 80   Temp 36.9 °C (98.4 °F)   Resp 16   Ht 1.778 m (5' 10\")   Wt 117.3 kg (258 lb 9.6 oz)   LMP 05/01/2018 (Approximate)   SpO2 94%   BMI 37.11 kg/m²    Constitutional: Well developed, Well nourished, moderately obese No acute distress, Non-toxic appearance.   HENT: Normocephalic, Atraumatic, Bilateral external ears normal, Oropharynx moist, No oral exudates, Nose normal.   Eyes: PERRL, EOMI, Conjunctiva normal, No discharge.   Neck: Normal range of motion, No tenderness, Supple, No stridor.   Lymphatic: No lymphadenopathy noted.   Cardiovascular: Normal heart rate, Normal rhythm, No murmurs, No rubs, No gallops.   Thorax & Lungs: Normal breath " sounds, No respiratory distress, No wheezing, No chest tenderness.   Abdomen:  No tenderness, no guarding no rigidity and the abdomen is soft.  No masses, No pulsatile masses.  Skin: Warm, Dry, No erythema, No rash.   Back: No tenderness, No CVA tenderness.   Extremities: Intact distal pulses, No edema, No tenderness, No cyanosis, No clubbing.   Musculoskeletal: Good range of motion in all major joints. No tenderness to palpation or major deformities noted.   Neurologic: Alert & oriented x 3, Normal motor function, Normal sensory function, No focal deficits noted.   Psychiatric: Affect normal, Judgment normal, Mood normal.       RADIOLOGY/PROCEDURES      COURSE & MEDICAL DECISION MAKING  Pertinent Labs & Imaging studies reviewed. (See chart for details) white count normal hematocrit and normal platelet count is normal differential is normal electrolytes unremarkable renal function and normal liver function normal, lipase normal, pregnancy test negative          She has a long history of seizures, 7 years of seizures ever cannot keep her Keppra down and now is having more seizures. Also has vomiting for several weeks. Scheduled for endoscopy however endoscopy will not be done until seizures are controlled. I have asked for a Keppra level today. She is given additional Keppra she says she is unable to keep any of her Keppra down.    Also given IV normal saline and Zofran., Also Keppra I will talk with the hospitalist about admission, [facility] about further workup.  FINAL IMPRESSION  1.   1. Nausea and vomiting, intractability of vomiting not specified, unspecified vomiting type    2. Seizures (HCC)        2.   3.     Disposition  I have talk with hospitalist about admission  Electronically signed by: Lorenzo Robles, 5/23/2018 3:50 PM     DISCHARGE

## 2020-03-25 NOTE — ED PROVIDER NOTE - OBJECTIVE STATEMENT
64 yo white male with H/O HTN and HLD states that over the past few days he has been experiencing intermittent problems with formulating words and occasionally hard time getting in and out of bed, in terms of his coordination. In addition mild cough, non productive without any SOB, fever, chills or chest pains. No headache and no altered vision.

## 2020-03-25 NOTE — ED ADULT NURSE NOTE - NSIMPLEMENTINTERV_GEN_ALL_ED
Implemented All Universal Safety Interventions:  [place] to call system. Call bell, personal items and telephone within reach. Instruct patient to call for assistance. Room bathroom lighting operational. Non-slip footwear when patient is off stretcher. Physically safe environment: no spills, clutter or unnecessary equipment. Stretcher in lowest position, wheels locked, appropriate side rails in place.

## 2020-03-25 NOTE — ED ADULT NURSE NOTE - CHPI ED NUR SYMPTOMS NEG
no blurred vision/no change in level of consciousness/no confusion/no dizziness/no loss of consciousness/no numbness/no weakness/no nausea/no vomiting/no fever

## 2020-03-25 NOTE — ED PROVIDER NOTE - NSFOLLOWUPINSTRUCTIONS_ED_ALL_ED_FT
Rest  Home QUARANTINE for the next 14-DAYS  Tylenol for fever  Follow-up with your doctor this week  Return here if needed

## 2020-03-25 NOTE — ED PROVIDER NOTE - CONSTITUTIONAL, MLM
normal... Well appearing, white older male, awake, alert, oriented to person, place, time/situation and in no apparent distress.

## 2020-03-25 NOTE — ED ADULT TRIAGE NOTE - CHIEF COMPLAINT QUOTE
"My doctor sent me here. Joaquina been having trouble walking for the past couple of days, Im losing my balance.  Joaquina been feeling sick for the past 5 days though, Joaquina been feeling weak, not really eating, and the other day I threw up. I also have a stiff neck"

## 2020-03-25 NOTE — ED PROVIDER NOTE - CHPI ED SYMPTOMS NEG
no confusion/no loss of consciousness/no nausea/no weakness/no numbness/no change in level of consciousness/no vomiting/no blurred vision/no dizziness/no fever

## 2020-03-25 NOTE — ED PROVIDER NOTE - PATIENT PORTAL LINK FT
You can access the FollowMyHealth Patient Portal offered by [facility] by registering at the following website: [email or web address]. By joining [employer]’s FollowMyHealth portal, you will also be able to view your health information using other applications (apps) compatible with our system.

## 2020-03-25 NOTE — ED PROVIDER NOTE - PROGRESS NOTE DETAILS
CTA Head and Neck unremarkable except for B/L upper lobe patchy infiltrates c/w Covid CTA Head and Neck unremarkable except for B/L upper lobe patchy infiltrates c/w Covid. Results explained to patient and he agrees to be under 14-days quarantine. Seen and evaluated by Dr. Alvarez>>>does not think and acute neuro disorder is present.

## 2020-03-25 NOTE — ED ADULT NURSE NOTE - OBJECTIVE STATEMENT
65 year old male presents to the ED complaining of difficulty ambulating and difficulty speaking. As per patient, five or six days ago he began experiencing difficulty walking, losing his balance more frequently, and finding difficulty with the stairs in his home. Patient reports loss of balance rather than feeling dizzy. Patient also reports difficulty formulating sentences and finding words. Patient reports that when the words eventually come out they are the correct words but it takes much more time to put them all together. Patient denies right versus left sided weakness. Patient denies numbness/tingling. Patient also reports low grade fevers and infrequent dry cough for five days. Patient denies sick contacts. Patient denies abdominal pain, nausea or vomiting, diarrhea or constipation. Patient denies chest pain, shortness of breath or palpitations. Patient denies urinary symptoms. Patient states his symptoms improved slightly today but wife was concerned which caused him to come to the ED for evaluation.

## 2020-04-25 NOTE — PROGRESS NOTE ADULT - PROBLEM SELECTOR PLAN 8
Patient states that he received testosterone injections every 3 weeks  States he takes anasterozole 1 mg, two times a week. Accurate scheduling of medication to be addressed in am. Held anasterozole for now. Patient states that he received testosterone injections every 3 weeks  States he takes Anastrozole 1 mg, two times a week. Accurate scheduling of medication to be addressed in am. Held Anastrozole for now. No STEMI

## 2020-05-03 ENCOUNTER — EMERGENCY (EMERGENCY)
Facility: HOSPITAL | Age: 65
LOS: 0 days | Discharge: HOME | End: 2020-05-04
Attending: EMERGENCY MEDICINE
Payer: COMMERCIAL

## 2020-05-03 VITALS
OXYGEN SATURATION: 93 % | RESPIRATION RATE: 16 BRPM | DIASTOLIC BLOOD PRESSURE: 71 MMHG | SYSTOLIC BLOOD PRESSURE: 113 MMHG | HEART RATE: 80 BPM

## 2020-05-03 VITALS — HEIGHT: 68 IN | WEIGHT: 175.05 LBS
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
## 2020-05-03 DIAGNOSIS — I10 ESSENTIAL (PRIMARY) HYPERTENSION: ICD-10-CM

## 2020-05-03 DIAGNOSIS — Z98.890 OTHER SPECIFIED POSTPROCEDURAL STATES: Chronic | ICD-10-CM

## 2020-05-03 DIAGNOSIS — F10.220 ALCOHOL DEPENDENCE WITH INTOXICATION, UNCOMPLICATED: ICD-10-CM

## 2020-05-03 DIAGNOSIS — E11.9 TYPE 2 DIABETES MELLITUS WITHOUT COMPLICATIONS: ICD-10-CM

## 2020-05-03 DIAGNOSIS — F32.9 MAJOR DEPRESSIVE DISORDER, SINGLE EPISODE, UNSPECIFIED: ICD-10-CM

## 2020-05-03 DIAGNOSIS — Z98.89 OTHER SPECIFIED POSTPROCEDURAL STATES: Chronic | ICD-10-CM

## 2020-05-03 DIAGNOSIS — G47.33 OBSTRUCTIVE SLEEP APNEA (ADULT) (PEDIATRIC): ICD-10-CM

## 2020-05-03 DIAGNOSIS — R55 SYNCOPE AND COLLAPSE: ICD-10-CM

## 2020-05-03 DIAGNOSIS — E78.5 HYPERLIPIDEMIA, UNSPECIFIED: ICD-10-CM

## 2020-05-03 DIAGNOSIS — G47.00 INSOMNIA, UNSPECIFIED: ICD-10-CM

## 2020-05-03 LAB
ADD ON TEST-SPECIMEN IN LAB: SIGNIFICANT CHANGE UP
ADD ON TEST-SPECIMEN IN LAB: SIGNIFICANT CHANGE UP
ALBUMIN SERPL ELPH-MCNC: 3.5 G/DL — SIGNIFICANT CHANGE UP (ref 3.3–5)
ALP SERPL-CCNC: 93 U/L — SIGNIFICANT CHANGE UP (ref 40–120)
ALT FLD-CCNC: 43 U/L — SIGNIFICANT CHANGE UP (ref 12–78)
ANION GAP SERPL CALC-SCNC: 14 MMOL/L — SIGNIFICANT CHANGE UP (ref 5–17)
APTT BLD: 27.8 SEC — SIGNIFICANT CHANGE UP (ref 27.5–36.3)
AST SERPL-CCNC: 32 U/L — SIGNIFICANT CHANGE UP (ref 15–37)
BASOPHILS # BLD AUTO: 0.04 K/UL — SIGNIFICANT CHANGE UP (ref 0–0.2)
BASOPHILS NFR BLD AUTO: 0.5 % — SIGNIFICANT CHANGE UP (ref 0–2)
BILIRUB SERPL-MCNC: 0.3 MG/DL — SIGNIFICANT CHANGE UP (ref 0.2–1.2)
BUN SERPL-MCNC: 6 MG/DL — LOW (ref 7–23)
CALCIUM SERPL-MCNC: 8.9 MG/DL — SIGNIFICANT CHANGE UP (ref 8.5–10.1)
CHLORIDE SERPL-SCNC: 99 MMOL/L — SIGNIFICANT CHANGE UP (ref 96–108)
CO2 SERPL-SCNC: 21 MMOL/L — LOW (ref 22–31)
CREAT SERPL-MCNC: 0.86 MG/DL — SIGNIFICANT CHANGE UP (ref 0.5–1.3)
EOSINOPHIL # BLD AUTO: 0.1 K/UL — SIGNIFICANT CHANGE UP (ref 0–0.5)
EOSINOPHIL NFR BLD AUTO: 1.2 % — SIGNIFICANT CHANGE UP (ref 0–6)
GLUCOSE SERPL-MCNC: 385 MG/DL — HIGH (ref 70–99)
HCT VFR BLD CALC: 39.7 % — SIGNIFICANT CHANGE UP (ref 39–50)
HGB BLD-MCNC: 13.2 G/DL — SIGNIFICANT CHANGE UP (ref 13–17)
IMM GRANULOCYTES NFR BLD AUTO: 0.5 % — SIGNIFICANT CHANGE UP (ref 0–1.5)
INR BLD: 0.96 RATIO — SIGNIFICANT CHANGE UP (ref 0.88–1.16)
LYMPHOCYTES # BLD AUTO: 3.44 K/UL — HIGH (ref 1–3.3)
LYMPHOCYTES # BLD AUTO: 41.5 % — SIGNIFICANT CHANGE UP (ref 13–44)
MAGNESIUM SERPL-MCNC: 1.9 MG/DL — SIGNIFICANT CHANGE UP (ref 1.6–2.6)
MCHC RBC-ENTMCNC: 30.1 PG — SIGNIFICANT CHANGE UP (ref 27–34)
MCHC RBC-ENTMCNC: 33.2 GM/DL — SIGNIFICANT CHANGE UP (ref 32–36)
MCV RBC AUTO: 90.4 FL — SIGNIFICANT CHANGE UP (ref 80–100)
MONOCYTES # BLD AUTO: 0.77 K/UL — SIGNIFICANT CHANGE UP (ref 0–0.9)
MONOCYTES NFR BLD AUTO: 9.3 % — SIGNIFICANT CHANGE UP (ref 2–14)
NEUTROPHILS # BLD AUTO: 3.9 K/UL — SIGNIFICANT CHANGE UP (ref 1.8–7.4)
NEUTROPHILS NFR BLD AUTO: 47 % — SIGNIFICANT CHANGE UP (ref 43–77)
PLATELET # BLD AUTO: 204 K/UL — SIGNIFICANT CHANGE UP (ref 150–400)
POTASSIUM SERPL-MCNC: 3.7 MMOL/L — SIGNIFICANT CHANGE UP (ref 3.5–5.3)
POTASSIUM SERPL-SCNC: 3.7 MMOL/L — SIGNIFICANT CHANGE UP (ref 3.5–5.3)
PROT SERPL-MCNC: 8 GM/DL — SIGNIFICANT CHANGE UP (ref 6–8.3)
PROTHROM AB SERPL-ACNC: 10.6 SEC — SIGNIFICANT CHANGE UP (ref 10–12.9)
RBC # BLD: 4.39 M/UL — SIGNIFICANT CHANGE UP (ref 4.2–5.8)
RBC # FLD: 13.2 % — SIGNIFICANT CHANGE UP (ref 10.3–14.5)
SODIUM SERPL-SCNC: 134 MMOL/L — LOW (ref 135–145)
TROPONIN I SERPL-MCNC: <0.015 NG/ML — SIGNIFICANT CHANGE UP (ref 0.01–0.04)
TROPONIN I SERPL-MCNC: <0.015 NG/ML — SIGNIFICANT CHANGE UP (ref 0.01–0.04)
WBC # BLD: 8.29 K/UL — SIGNIFICANT CHANGE UP (ref 3.8–10.5)
WBC # FLD AUTO: 8.29 K/UL — SIGNIFICANT CHANGE UP (ref 3.8–10.5)

## 2020-05-03 PROCEDURE — 85610 PROTHROMBIN TIME: CPT

## 2020-05-03 PROCEDURE — 84484 ASSAY OF TROPONIN QUANT: CPT

## 2020-05-03 PROCEDURE — 71045 X-RAY EXAM CHEST 1 VIEW: CPT | Mod: 26

## 2020-05-03 PROCEDURE — 99284 EMERGENCY DEPT VISIT MOD MDM: CPT

## 2020-05-03 PROCEDURE — 71045 X-RAY EXAM CHEST 1 VIEW: CPT

## 2020-05-03 PROCEDURE — 80053 COMPREHEN METABOLIC PANEL: CPT

## 2020-05-03 PROCEDURE — 70450 CT HEAD/BRAIN W/O DYE: CPT

## 2020-05-03 PROCEDURE — 93010 ELECTROCARDIOGRAM REPORT: CPT

## 2020-05-03 PROCEDURE — 83735 ASSAY OF MAGNESIUM: CPT

## 2020-05-03 PROCEDURE — 36415 COLL VENOUS BLD VENIPUNCTURE: CPT

## 2020-05-03 PROCEDURE — 80307 DRUG TEST PRSMV CHEM ANLYZR: CPT

## 2020-05-03 PROCEDURE — 93005 ELECTROCARDIOGRAM TRACING: CPT

## 2020-05-03 PROCEDURE — 85730 THROMBOPLASTIN TIME PARTIAL: CPT

## 2020-05-03 PROCEDURE — 70450 CT HEAD/BRAIN W/O DYE: CPT | Mod: 26

## 2020-05-03 PROCEDURE — 83690 ASSAY OF LIPASE: CPT

## 2020-05-03 PROCEDURE — 99285 EMERGENCY DEPT VISIT HI MDM: CPT | Mod: 25

## 2020-05-03 PROCEDURE — 82962 GLUCOSE BLOOD TEST: CPT

## 2020-05-03 PROCEDURE — 85025 COMPLETE CBC W/AUTO DIFF WBC: CPT

## 2020-05-03 RX ORDER — SODIUM CHLORIDE 9 MG/ML
1000 INJECTION INTRAMUSCULAR; INTRAVENOUS; SUBCUTANEOUS ONCE
Refills: 0 | Status: COMPLETED | OUTPATIENT
Start: 2020-05-03 | End: 2020-05-03

## 2020-05-03 RX ADMIN — SODIUM CHLORIDE 1000 MILLILITER(S): 9 INJECTION INTRAMUSCULAR; INTRAVENOUS; SUBCUTANEOUS at 19:35

## 2020-05-03 NOTE — ED PROVIDER NOTE - ATTENDING CONTRIBUTION TO CARE
I, Tyrell Arenas MD, personally saw the patient with the PA, and completed the key components of the history and physical exam. I then discussed the management plan with the PA.

## 2020-05-03 NOTE — ED ADULT NURSE NOTE - OBJECTIVE STATEMENT
Pt. to the ED BIBA from [place] for syncope episode. 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 No head or physical trauma noted- Pt. states he was drinking alcohol this evening- Hx. of Diabetes-- Pt. reports being + COVID in March. pt states he had 6 drinks, denies drug use. pt cooperative

## 2020-05-03 NOTE — ED PROVIDER NOTE - PATIENT PORTAL LINK FT
You can access the FollowMyHealth Patient Portal offered by [facility] by registering at the following website: [email or web address]. By joining [employer]’s FollowMyHealth portal, you will also be able to view your health information using other applications (apps) compatible with our system.

## 2020-05-03 NOTE — ED ADULT NURSE NOTE - CHIEF COMPLAINT QUOTE
Pt. to the ED BIBA from [place] for syncope episode. 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 No head or physical trauma noted- Pt. states he was drinking alcohol this evening- Hx. of Diabetes-- Pt. reports being + COVID in March

## 2020-05-03 NOTE — ED PROVIDER NOTE - CONSTITUTIONAL, MLM
normal... Well appearing, awake, alert, oriented to person, place, time/situation and in no apparent distress. Adult white male, alert, no respiratory discomfort, non-toxic.

## 2020-05-03 NOTE — ED PROVIDER NOTE - NSFOLLOWUPINSTRUCTIONS_ED_ALL_ED_FT
Avoid abusing alcohol.  Drink more non-alcoholic oral fluids.  Follow up this week with your own doctor.      ED evaluation and management discussed with the patient and family (if available) in detail.  Close PMD follow up encouraged.  Strict ED return instructions discussed in detail and patient given the opportunity to ask any questions about their discharge diagnosis and instructions. Patient verbalized understanding.        Alcohol Abuse    Alcohol intoxication occurs when the amount of alcohol that a person has consumed impairs his or her ability to mentally and physically function. Chronic alcohol consumption can also lead to a variety of health issues including neurological disease, stomach disease, heart disease, liver disease, etc. Do not drive after drinking alcohol. Drinking enough alcohol to end up in an Emergency Room suggests you may have an alcohol abuse problem. Seek help at a drug addiction center.    SEEK IMMEDIATE MEDICAL CARE IF YOU HAVE ANY OF THE FOLLOWING SYMPTOMS: seizures, vomiting blood, blood in your stool, lightheadedness/dizziness, or becoming shaky to tremulous when you stop drinking.        Syncope    Syncope is when you temporarily lose consciousness, also called fainting or passing out. It is caused by a sudden decrease in blood flow to the brain. Even though most causes of syncope are not dangerous, syncope can possibly be a sign of a serious medical problem. Signs that you may be about to faint include feeling dizzy, lightheaded, nausea, visual changes, or cold/clammy skin. Do not drive, operate heavy machinery, or play sports until your health care provider says it is okay.    SEEK IMMEDIATE MEDICAL CARE IF YOU HAVE ANY OF THE FOLLOWING SYMPTOMS: severe headache, pain in your chest/abdomen/back, bleeding from your mouth or rectum, palpitations, shortness of breath, pain with breathing, seizure, confusion, or trouble walking.

## 2020-05-03 NOTE — ED PROVIDER NOTE - CLINICAL SUMMARY MEDICAL DECISION MAKING FREE TEXT BOX
64 y/o male with a PMHx of ROSS, T2DM, HTN, depression, EtOH disorder, HLD, insomnia, herniated disc, and low back pain presents BIBA to ED s/p syncope episode. Plan - EKG, cardiac monitor, CT head, CXR, labs including serial troponin and alcohol, IV fluid, reassess. Syncope likely related to alcohol intoxication.

## 2020-05-03 NOTE — ED ADULT NURSE NOTE - NSIMPLEMENTINTERV_GEN_ALL_ED
Implemented All Fall Risk Interventions:  [place] to call system. Call bell, personal items and telephone within reach. Instruct patient to call for assistance. Room bathroom lighting operational. Non-slip footwear when patient is off stretcher. Physically safe environment: no spills, clutter or unnecessary equipment. Stretcher in lowest position, wheels locked, appropriate side rails in place. Provide visual cue, wrist band, yellow gown, etc. Monitor gait and stability. Monitor for mental status changes and reorient to person, place, and time. Review medications for side effects contributing to fall risk. Reinforce activity limits and safety measures with patient and family.

## 2020-05-03 NOTE — ED PROVIDER NOTE - PROGRESS NOTE DETAILS
Pt. is a 65 year old male Hx HTN, DM2, ETOH abuse, presenting to ED with ?syncope.  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  Unsure of how he arrived to the ED.  History of chornic alcohol abuse and pancrdatitis.  Pt. denies current dizzines, N/V, abdominal pin.  Naz Gomez PA-C Dr. Arenas:  wife at ED ready to pock up to bring him home.  Troponin negative X 2 sets, alert, + stable for D/C home under wife's care.

## 2020-05-03 NOTE — ED PROVIDER NOTE - CARE PLAN
Principal Discharge DX:	Alcohol intoxication in active alcoholic with complication  Secondary Diagnosis:	Syncope and collapse

## 2020-05-03 NOTE — ED PROVIDER NOTE - OBJECTIVE STATEMENT
66 y/o male with a PMHx of ROSS, T2DM, HTN, depression, EtOH disorder, HLD, insomnia, herniated disc, and low back pain presents BIBA to ED s/p syncope episode.  Pt last recalls shopping in [place] then awoke in ambulance en route to ED. Pt admits to +EtOH earlier today. Pt denies CP, SOB, fevers, chills, HA, abd pain, n/v/d, pain to neck, back, and LE. Pt denies recent illness.

## 2020-05-14 NOTE — BEHAVIORAL HEALTH ASSESSMENT NOTE - SUICIDALITY
of viable male infant at 0630. Terminal meconium. NICU attended. Baby placed on mothers abdomen. Tactile stimulation to cry. Warm blankets and hat applied. NICU dismissed at 1 minute apgar. IV pitocin given per orders following delivery of baby. Continue postpartum cares.   None known in lifetime

## 2020-08-27 NOTE — ED ADULT TRIAGE NOTE - BP NONINVASIVE SYSTOLIC (MM HG)
Nutrition Education Scheduling Outreach #1:    Call to patient to schedule. Left message with phone number to call to schedule.    Plan for 2nd outreach attempt within 1 week.    Maira Orellana  [facility]  Diabetes and Nutrition Scheduling      
[phone]

## 2021-10-06 PROBLEM — I10 ESSENTIAL HYPERTENSION: Status: ACTIVE | Noted: 2017-12-26

## 2021-11-20 NOTE — BEHAVIORAL HEALTH ASSESSMENT NOTE - FAMILY HISTORY OF SUBSTANCE ABUSE
You can access the FollowMyHealth Patient Portal offered by [facility] by registering at the following website: [email or web address]. By joining [employer]’s FollowMyHealth portal, you will also be able to view your health information using other applications (apps) compatible with our system.
Yes

## 2021-11-29 NOTE — CONSULT NOTE ADULT - PROVIDER SPECIALTY LIST ADULT
Addended by: THANG VIZCAINO on: 11/29/2021 12:23 PM     Modules accepted: Orders    
Internal Medicine
Gastroenterology

## 2022-01-01 NOTE — PATIENT PROFILE ADULT. - NS TRANSFER EYEGLASSES PAIRS

## 2022-01-18 NOTE — ED ADULT NURSE NOTE - NSFALLRSKASSESASSIST_ED_ALL_ED
Chief Complaint   Patient presents with   • Syncope        TIME SEEN: 0850    HPI: This is a 50-year-old female with no significant past medical history presents to the emergency room chief complaint of passing out.  Patient reports that she was making breakfast this morning and then was eating toast.  The next thing she knew--she was on the ground and did not show how she got there.  She yelled for her ..  Her  called paramedics and she was brought to the emergency room and for evaluation.  The patient suspects he may have passed out.  Patient states that over the past week she has experienced symptoms of urgency and was placed on Macrobid which she took 1 dose of yesterday evening.  Patient denies have any headache, neck pain, chest pain, abdominal pain.    Review of Systems   Constitutional: Negative for chills, fatigue and fever.   HENT: Negative for rhinorrhea, sore throat and trouble swallowing.    Eyes: Negative for pain and visual disturbance.   Respiratory: Negative for cough and shortness of breath.    Cardiovascular: Negative for chest pain, palpitations and leg swelling.   Gastrointestinal: Negative for diarrhea, nausea and vomiting.   Genitourinary: Positive for frequency.   Musculoskeletal: Negative for myalgias, neck pain and neck stiffness.   Skin: Negative for rash.   Neurological: Positive for syncope. Negative for dizziness, weakness, numbness and headaches.   Hematological: Negative for adenopathy.   Psychiatric/Behavioral: Negative for hallucinations and suicidal ideas.   All other systems reviewed and are negative.       PAST MEDICAL HISTORY:  None    PAST SURGICAL HISTORY:    LUNG SURGERY                                    2016, 2014    Social History     Tobacco Use   • Smoking status: Never Smoker   • Smokeless tobacco: Never Used   Substance Use Topics   • Alcohol use: Yes   • Drug use: Never        No family history on file.    ED Triage Vitals [01/18/22 0849]   Enc Vitals  Group      /73      Heart Rate 82      Resp 15      Temp 96.1 °F (35.6 °C)      Temp src Temporal      SpO2 100 %      Weight 92 lb (41.7 kg)      Height       Head Circumference       Peak Flow       Pain Score       Pain Loc       Pain Edu?       Excl. in GC?         Physical Exam  Constitutional:       General: She is not in acute distress.  HENT:      Head: Normocephalic and atraumatic.      Right Ear: Hearing normal.      Left Ear: Hearing normal.   Eyes:      General: No scleral icterus.     Conjunctiva/sclera: Conjunctivae normal.      Pupils: Pupils are equal, round, and reactive to light. Pupils are equal.   Neck:      Trachea: Trachea normal.   Cardiovascular:      Rate and Rhythm: Normal rate and regular rhythm.   Pulmonary:      Effort: Pulmonary effort is normal. No respiratory distress.      Breath sounds: Normal breath sounds. No wheezing, rhonchi or rales.   Chest:      Chest wall: No tenderness.   Abdominal:      General: There is no distension.      Palpations: Abdomen is soft. There is no mass.      Tenderness: There is no abdominal tenderness. There is no guarding or rebound.   Musculoskeletal:         General: Normal range of motion.      Cervical back: Neck supple.   Skin:     General: Skin is warm and dry.      Findings: No lesion or rash.   Neurological:      Mental Status: She is lethargic.      Sensory: Sensation is intact.      Motor: Motor function is intact.   Psychiatric:         Mood and Affect: Mood and affect normal.            ECG Read Date: 01/18/22  ECG Read Time: 0849  Comment: Sinus rhythm with a heart rate of 81.  No ST changes.  Normal QT.  Normal QT.    Labwork:    Results for orders placed or performed during the hospital encounter of 01/18/22   Lactic Acid Venous With Reflex   Result Value    Lactate, Venous 2.6 ()   Comprehensive Metabolic Panel   Result Value    Fasting Status     Sodium 139    Potassium 3.9    Chloride 102    Carbon Dioxide 24    Anion Gap 17     Glucose 138 (H)    BUN 24 (H)    Creatinine 0.80    Glomerular Filtration Rate 87     Comment: eGFR results = or >60 mL/min/1.73m2 = Normal kidney function. Estimated GFR calculated using the 2009 CKD-EPI creatinine equation.      BUN/ Creatinine Ratio 30 (H)    Calcium 9.3    Bilirubin, Total 1.1 (H)    GOT/AST 22    GPT/ALT 26    Alkaline Phosphatase 72    Albumin 4.0    Protein, Total 7.2    Globulin 3.2    A/G Ratio 1.3   Magnesium   Result Value    Magnesium 1.8   Prothrombin Time   Result Value    Prothrombin Time 11.4    INR 1.1     Comment: INR Therapeutic Range: 2.0 to 3.0 (2.5 to 3.5 recommended for recurrent thrombotic episodes and mechanical prosthetic heart valves.)   Partial Thromboplastin Time   Result Value    PTT 25   Urinalysis & Reflex Microscopy With Culture If Indicated   Result Value    COLOR, URINALYSIS Yellow    APPEARANCE, URINALYSIS Clear    GLUCOSE, URINALYSIS Negative    BILIRUBIN, URINALYSIS Negative    KETONES, URINALYSIS 15   (A)    SPECIFIC GRAVITY, URINALYSIS 1.015    OCCULT BLOOD, URINALYSIS Negative    PH, URINALYSIS 8.0 (H)    PROTEIN, URINALYSIS Negative    UROBILINOGEN, URINALYSIS 0.2    NITRITE, URINALYSIS Negative    LEUKOCYTE ESTERASE, URINALYSIS Negative   TROPONIN I, HIGH SENSITIVITY   Result Value    Troponin I, High Sensitivity 5   Alcohol   Result Value    Alcohol None Detected   Drug Abuse Screen, Urine   Result Value    Amphetamines, Urine Negative     Comment: Cutoff = 500 ng/mL    Barbiturates, Urine Negative     Comment: Cutoff = 200 ng/mL    Benzodiazepines, Urine Negative     Comment: Cutoff = 200 ng/mL    Cocaine/ Metabolite, Urine Negative     Comment: Cutoff = 150 ng/ml    Opiates, Urine Negative     Comment: Cutoff = 300 ng/mL    Phencyclidine, Urine Negative     Comment: Cutoff = 25 ng/mL    Cannabinoids, Urine Negative     Comment: Cutoff = 50 ng/mL   CBC with Automated Differential (performable only)   Result Value    WBC 12.2 (H)    RBC 4.38    HGB 13.6     HCT 40.1    MCV 91.6    MCH 31.1    MCHC 33.9    RDW-CV 12.4    RDW-SD 41.2        NRBC 0    Neutrophil, Percent 84    Lymphocytes, Percent 9    Mono, Percent 4    Eosinophils, Percent 1    Basophils, Percent 1    Immature Granulocytes 1    Absolute Neutrophils 10.4 (H)    Absolute Lymphocytes 1.1    Absolute Monocytes 0.5    Absolute Eosinophils  0.1    Absolute Basophils 0.1    Absolute Immmature Granulocytes 0.1        Imaging Results          CT HEAD WO CONTRAST (Final result)  Result time 01/18/22 09:33:13    Final result                 Impression:    No evidence for acute intracranial abnormality.    Noncontrast enhanced CT is a screening survey. Conditions such as vascular  malformations, aneurysms, low grade, tumors, meningitis, subtle  subarachnoid hemorrhages, etc., may not be demonstrated. Followup studies  as clinically indicated may be necessary.     Electronically Signed by: NEY AMBROSIO M.D.   Signed on: 1/18/2022 9:33 AM                Narrative:    CT BRAIN WITHOUT CONTRAST    CLINICAL INDICATION: Recurrent syncope. Examination performed within 24  hours of patient arrival to the emergency department.     COMPARISON: None    TECHNIQUE: Multiple axial images of the head were obtained from the base of  the skull to the vertex. Automated exposure control employed as radiation  dose reduction strategy on this patient.    FINDINGS:   The gray-white matter differentiation is maintained. There is no evidence  of acute intracranial or extra-axial hemorrhage. No areas of abnormal  parenchymal attenuation are identified.     The ventricles and sulci are within normal limits for patient age. There is  no midline shift or mass effect. The basal cisterns are intact. The  craniocervical junction is unremarkable.     The orbital structures appear symmetric and unremarkable. The paranasal  sinuses and mastoid air cells are clear. There are no aggressive bony  lesions.                                XR  CHEST PA OR AP 1 VIEW (Final result)  Result time 01/18/22 09:43:26    Final result                 Impression:    1.  No evidence of an acute infiltrate.    Electronically Signed by: YESSY KING M.D.   Signed on: 1/18/2022 9:43 AM                Narrative:    Chest single view.    CLINICAL HISTORY: Fever.    COMPARISON: Chest x-ray dated 10/29/2014.    FINDINGS: A single portable view of the chest was obtained at 9:15 AM.   There is no evidence of consolidation or a pleural effusion.  Mild linear  opacity within the right and left lung base may represent atelectasis or  scar.  Surgical sutures are noted within the left lung apex and medial  aspect of the left upper lung field.    The cardiac silhouette is within normal limits in size.                                  Medications   sodium chloride (NORMAL SALINE) 0.9 % bolus 1,000 mL (1,000 mLs Intravenous New Bag 1/18/22 0900)             Vitals:    01/18/22 0849 01/18/22 1018 01/18/22 1036   BP: 103/73 110/70 101/70   BP Location: RUE - Right upper extremity     Patient Position: Sitting     Pulse: 82 78 81   Resp: 15 16 17   Temp: 96.1 °F (35.6 °C)     TempSrc: Temporal     SpO2: 100% 99% 99%   Weight: 41.7 kg (92 lb)         MDM:    The patient had an IV established and blood work was obtained.  CBC and CMP were unremarkable.  Urinalysis not show any evidence of infection.  Troponin was negative and EKG did not show any ischemic changes.  Patient is CT scan of the brain was unremarkable.  The patient otherwise looks well and is ambulating without difficulty.  There is no dizziness.  She will need further outpatient work-up.  The case discussed with Dr. Velez.  Plan is to discharge patient home she will follow-up with her later this week for evaluation and likely further outpatient testing.    ED Diagnosis        Final diagnosis    Syncope, unspecified syncope type                            Yesica Velez MD  [address]  [place]  [facility] [address]  [phone]    Schedule an appointment as soon as possible for a visit   She wants to see you on Thursday         Summary of your Discharge Medications      You have not been prescribed any medications.          DISPOSITION: Home       Shabbir A Kanji, MD  01/18/22 6547     no

## 2022-03-23 NOTE — ED ADULT NURSE NOTE - NS ED NURSE TRANSPORT WITH
"Physical Therapy Daily Progress Note        Patient: Juventino Fontaine   : 1942  Diagnosis/ICD-10 Code:  Pain, hip [M25.559]  Referring practitioner: Franky Longoria MD  Date of Initial Visit: Type: THERAPY  Noted: 3/18/2022  Today's Date: 3/23/2022  Patient seen for 2 sessions             Subjective   Juventino Fontaine reports having increased pain in his lower back and right leg- rating 10/10 upon arrival today.    Objective       + Tightness Bilateral Hip/Knee musculature    See Exercise , Manual, and Modality Logs for complete treatment.       Assessment/Plan     Pt tolerated therapy session moderately well - with progression of therapeutic exercises, CKC-Functional activities, and Manual therapy. He continues to have deficits in Trunk and Bilateral Lower Extremity ROM,  Strength,  Stability, Pain, and Balance; limiting function and ability to perform ADLs at this time.  Gt belt used during transitions and ambulation secondary to unsteady gait and reports of occasional \"Dizziness\".  Strongly recommended pt use his Rolling Walker when ambulating secondary to unsteadiness and increased risk for falls.    Progress per Plan of Care           Timed:  Manual Therapy:    8     mins  47880;  Therapeutic Exercise:    22     mins  26021;     Neuromuscular Jung:    0    mins  39724;    Therapeutic Activity:     0     mins  55598;     Gait Trainin     mins  19492;     Ultrasound:     0     mins  86662;    Electrical Stimulation:    0     mins  68289;  Iontophoresis     0     mins  83066    Untimed:  Electrical Stimulation:    0     mins  39898 (MC );  Mechanical Traction:    0     mins  66188;   Fluidotherapy     0     mins  54486    Timed Treatment:   30   mins   Total Treatment:     30   mins        Raine Bravo PTA  Physical Therapist Assistant  "
IV

## 2022-07-01 NOTE — ED ADULT NURSE NOTE - NS ED NURSE REPORT GIVEN TO FT
Medication(s) requested: Adderall 10 mg  Last office visit: 4/4/22  Next office visit: 7/14/22  Last refill given: 6/2/22  Last refill picked up from pharmacy: no access  Contract present: Yes  Date of contract: 9/7/21  Last urine drug screen: 12/21/21    Is the patient due for refill of this medication(s): Yes  PDMP review: no access to pdmp, will have provider review       RN

## 2022-11-04 NOTE — CONSULT NOTE ADULT - CONSULT REQUESTED BY NAME
Group Therapy Note    Date: 11/4/2022    Group Start Time: 1000  Group End Time: 9202  Group Topic: Psychotherapy    [address] [facility], [place], [place]        Group Therapy Note    Attendees: 3/15     Patient refused to attend psychotherapy group at 10:00 am after encouragement from staff. 1:1 talk time provided as alternative to group session.   Discipline Responsible: /Counselor      Signature:  OBDULIA Garcia, ADRIENNE Dr. Parham

## 2023-09-25 NOTE — PROGRESS NOTE ADULT - ASSESSMENT
unknown 62 yo M pmh htn, hld, ROSS, depression presents to the ED with abdominal and back pain. Admit to F for pancreatitis, leokocytosis, elevated creatine kinase, ETOH abuse. Patient to return home.

## 2023-09-27 NOTE — H&P ADULT - ATTENDING COMMENTS
How Severe Are Your Spot(S)?: mild What Type Of Note Output Would You Prefer (Optional)?: Standard Output What Is The Reason For Today's Visit?: Full Body Skin Examination What Is The Reason For Today's Visit? (Being Monitored For X): concerning skin lesions on a periodic basis Pt seen, Examined, case & care plan d/w pt & resident at detail.  Follow GI & Detax consult  NPO, DVT PPx

## 2024-02-01 NOTE — PROVIDER CONTACT NOTE (OTHER) - BACKGROUND
respiratory failure Pt. admitted for alcohol induced pancreatitis with possible necrosis.  Pt also diagnosed with peripancreatic collection

## 2024-04-16 NOTE — PROGRESS NOTE ADULT - PROBLEM SELECTOR PLAN 1
seen for renal failure, anemia    no acute complaints  had NSVT on tele, episodes of bradycardia  gen weakness  ros negative     MEDICATIONS  (STANDING):  amLODIPine   Tablet 10 milliGRAM(s) Oral daily  atorvastatin 40 milliGRAM(s) Oral at bedtime  carvedilol 6.25 milliGRAM(s) Oral every 12 hours  cloNIDine 0.1 milliGRAM(s) Oral two times a day  hydrALAZINE 50 milliGRAM(s) Oral every 12 hours  iron sucrose Injectable 200 milliGRAM(s) IV Push every 24 hours  isosorbide   mononitrate ER Tablet (IMDUR) 30 milliGRAM(s) Oral daily  levETIRAcetam 500 milliGRAM(s) Oral two times a day  Nephro-jeanie 1 Tablet(s) Oral daily  pantoprazole  Injectable 40 milliGRAM(s) IV Push every 12 hours  potassium chloride    Tablet ER 20 milliEquivalent(s) Oral every 2 hours  sodium chloride 0.45% 1000 milliLiter(s) (100 mL/Hr) IV Continuous <Continuous>  tamsulosin 0.4 milliGRAM(s) Oral at bedtime    MEDICATIONS  (PRN):  acetaminophen     Tablet .. 650 milliGRAM(s) Oral every 6 hours PRN Temp greater or equal to 38C (100.4F), Mild Pain (1 - 3)  ALPRAZolam 0.25 milliGRAM(s) Oral three times a day PRN anxiety  aluminum hydroxide/magnesium hydroxide/simethicone Suspension 30 milliLiter(s) Oral every 4 hours PRN Dyspepsia  cyclobenzaprine 5 milliGRAM(s) Oral three times a day PRN Muscle Spasm  hydrALAZINE Injectable 10 milliGRAM(s) IV Push every 4 hours PRN sbp>159  labetalol Injectable 10 milliGRAM(s) IV Push every 6 hours PRN Systolic blood pressure >160  melatonin 3 milliGRAM(s) Oral at bedtime PRN Insomnia  ondansetron Injectable 4 milliGRAM(s) IV Push every 8 hours PRN Nausea and/or Vomiting      Allergies    penicillin (Other)      Vital Signs Last 24 Hrs  T(C): 36.4 (16 Apr 2024 08:20), Max: 36.5 (16 Apr 2024 00:00)  T(F): 97.5 (16 Apr 2024 08:20), Max: 97.7 (16 Apr 2024 00:00)  HR: 53 (16 Apr 2024 08:20) (53 - 61)  BP: 134/74 (16 Apr 2024 08:20) (123/68 - 197/92)  BP(mean): 127 (16 Apr 2024 05:00) (127 - 127)  RR: 17 (16 Apr 2024 08:20) (17 - 18)  SpO2: 97% (16 Apr 2024 08:20) (93% - 98%)    Parameters below as of 16 Apr 2024 08:20  Patient On (Oxygen Delivery Method): room air        PHYSICAL EXAM:    GENERAL: NAD  CHEST/LUNG: Clear to ausculation bilaterally  HEART: Regular rate and rhythm; S1 S2  ABDOMEN: Soft, Nontender,  Bowel sounds present  EXTREMITIES: no edema   NERVOUS SYSTEM:  Alert & Oriented X3,  Motor Strength 5/5 B/L upper and lower extremities  PSYCH: normal mood, appropriate response.    LABS:                        7.7    5.89  )-----------( 120      ( 16 Apr 2024 04:47 )             24.3     04-16    141  |  106  |  62.2<H>  ----------------------------<  94  3.4<L>   |  19.0<L>  |  6.27<H>    Ca    7.7<L>      16 Apr 2024 04:47  Phos  4.3     04-16  Mg     1.9     04-16    TPro  5.8<L>  /  Alb  3.2<L>  /  TBili  0.3<L>  /  DBili  x   /  AST  6   /  ALT  9   /  AlkPhos  65  04-16      Urinalysis Basic - ( 16 Apr 2024 04:47 )    Color: x / Appearance: x / SG: x / pH: x  Gluc: 94 mg/dL / Ketone: x  / Bili: x / Urobili: x   Blood: x / Protein: x / Nitrite: x   Leuk Esterase: x / RBC: x / WBC x   Sq Epi: x / Non Sq Epi: x / Bacteria: x        CAPILLARY BLOOD GLUCOSE            RADIOLOGY & ADDITIONAL TESTS:   Likely secondary to acute pancreatitis  Doubt infection  Monitor off antibiotics

## 2024-04-24 NOTE — DISCHARGE NOTE ADULT - THE PATIENT HAS
Nutrition and Exercise Counseling:     The patient's Body mass index is 17.48 kg/m². This is 63 %ile (Z= 0.34) based on CDC (Boys, 2-20 Years) BMI-for-age based on BMI available as of 4/23/2024.    Nutrition counseling provided:  Reviewed long term health goals and risks of obesity. Referral to nutrition program given. Educational material provided to patient/parent regarding nutrition. Avoid juice/sugary drinks. Anticipatory guidance for nutrition given and counseled on healthy eating habits. 5 servings of fruits/vegetables.    Exercise counseling provided:  Anticipatory guidance and counseling on exercise and physical activity given. Educational material provided to patient/family on physical activity. Reduce screen time to less than 2 hours per day. 1 hour of aerobic exercise daily. Take stairs whenever possible. Reviewed long term health goals and risks of obesity.         no difficulties

## 2024-07-01 NOTE — PATIENT PROFILE ADULT - HAVE YOU EXPERIENCED A TRAUMATIC EVENT?
Performed face to face conference with Rosemary Guido PTA, regarding pt's POC and progress thus far    
yes

## 2024-07-31 NOTE — ED ADULT NURSE NOTE - RESPIRATORY WDL
See lactation note  
Breathing spontaneous and unlabored. Breath sounds clear and equal bilaterally with regular rhythm.

## 2024-11-11 NOTE — ED ADULT NURSE NOTE - PRIMARY CARE PROVIDER
Take the antibiotic as directed  Set up a follow up in 48 hours with your PCP  If its getting worse, or you feel ill, go to the ER  Wash daily  
Dr. Valadez

## 2024-11-18 NOTE — ED PROVIDER NOTE - NEUROLOGICAL, MLM
Route for mechanical ventilation/CODE
Alert and oriented, no focal deficits, no motor or sensory deficits.

## 2025-03-21 NOTE — ED ADULT NURSE NOTE - NSIMPLEMENTINTERV_GEN_ALL_ED
Reviewed notes from anticoagulation clinic and agree with plan of care.   Rosa Hancock NP     Implemented All Universal Safety Interventions:  [place] to call system. Call bell, personal items and telephone within reach. Instruct patient to call for assistance. Room bathroom lighting operational. Non-slip footwear when patient is off stretcher. Physically safe environment: no spills, clutter or unnecessary equipment. Stretcher in lowest position, wheels locked, appropriate side rails in place.

## 2025-04-21 NOTE — BEHAVIORAL HEALTH ASSESSMENT NOTE - KNOWN PSYCHIATRIC ADMISSION WITHIN THE PAST 30 DAYS
The office order for PCP removal request is Approved and finalized on April 21, 2025.    Removed April Arango MD as the patient's Primary Care Physician     No

## 2025-07-31 NOTE — BEHAVIORAL HEALTH ASSESSMENT NOTE - NSBHREFERLPTEAMNAME_PSY_A_CORE_FT
Health Maintenance       COVID-19 Vaccine (1 - Pediatric 2024-25 season)  Never done    Well Child Visit (ages 3 - 21) (Yearly)  Scheduled for 7/31/2025          Following review of the above:  Health maintenance topics up to date.    Note: Refer to final orders and clinician documentation.       Dr. Segundo